# Patient Record
Sex: FEMALE | Race: WHITE | NOT HISPANIC OR LATINO | Employment: OTHER | URBAN - METROPOLITAN AREA
[De-identification: names, ages, dates, MRNs, and addresses within clinical notes are randomized per-mention and may not be internally consistent; named-entity substitution may affect disease eponyms.]

---

## 2017-02-17 ENCOUNTER — COMMUNICATION - HEALTHEAST (OUTPATIENT)
Dept: ADMINISTRATIVE | Facility: CLINIC | Age: 82
End: 2017-02-17

## 2017-02-17 DIAGNOSIS — M31.6 GCA (GIANT CELL ARTERITIS) (H): ICD-10-CM

## 2017-03-16 ENCOUNTER — COMMUNICATION - HEALTHEAST (OUTPATIENT)
Dept: ADMINISTRATIVE | Facility: CLINIC | Age: 82
End: 2017-03-16

## 2017-03-16 DIAGNOSIS — M31.6 GCA (GIANT CELL ARTERITIS) (H): ICD-10-CM

## 2017-03-22 ENCOUNTER — COMMUNICATION - HEALTHEAST (OUTPATIENT)
Dept: FAMILY MEDICINE | Facility: CLINIC | Age: 82
End: 2017-03-22

## 2017-03-22 DIAGNOSIS — E78.01 ESSENTIAL FAMILIAL HYPERCHOLESTEROLEMIA: ICD-10-CM

## 2017-05-02 ENCOUNTER — RECORDS - HEALTHEAST (OUTPATIENT)
Dept: ADMINISTRATIVE | Facility: OTHER | Age: 82
End: 2017-05-02

## 2017-05-08 ENCOUNTER — OFFICE VISIT - HEALTHEAST (OUTPATIENT)
Dept: RHEUMATOLOGY | Facility: CLINIC | Age: 82
End: 2017-05-08

## 2017-05-08 DIAGNOSIS — M81.0 OSTEOPOROSIS: ICD-10-CM

## 2017-05-08 DIAGNOSIS — M31.6 GCA (GIANT CELL ARTERITIS) (H): ICD-10-CM

## 2017-05-08 DIAGNOSIS — Z79.899 HIGH RISK MEDICATION USE: ICD-10-CM

## 2017-05-24 ENCOUNTER — COMMUNICATION - HEALTHEAST (OUTPATIENT)
Dept: FAMILY MEDICINE | Facility: CLINIC | Age: 82
End: 2017-05-24

## 2017-05-24 DIAGNOSIS — M81.0 OSTEOPOROSIS, UNSPECIFIED: ICD-10-CM

## 2017-06-19 ENCOUNTER — COMMUNICATION - HEALTHEAST (OUTPATIENT)
Dept: SCHEDULING | Facility: CLINIC | Age: 82
End: 2017-06-19

## 2017-06-27 ENCOUNTER — OFFICE VISIT - HEALTHEAST (OUTPATIENT)
Dept: FAMILY MEDICINE | Facility: CLINIC | Age: 82
End: 2017-06-27

## 2017-06-27 DIAGNOSIS — E83.52 HYPERCALCEMIA: ICD-10-CM

## 2017-06-27 DIAGNOSIS — E78.5 HYPERLIPIDEMIA: ICD-10-CM

## 2017-06-27 DIAGNOSIS — R22.0 LIP SWELLING: ICD-10-CM

## 2017-06-27 DIAGNOSIS — M81.0 OSTEOPOROSIS: ICD-10-CM

## 2017-06-27 LAB
CHOLEST SERPL-MCNC: 220 MG/DL
FASTING STATUS PATIENT QL REPORTED: YES
HDLC SERPL-MCNC: 53 MG/DL
LDLC SERPL CALC-MCNC: 142 MG/DL
TRIGL SERPL-MCNC: 125 MG/DL

## 2017-06-30 ENCOUNTER — COMMUNICATION - HEALTHEAST (OUTPATIENT)
Dept: PEDIATRICS | Facility: CLINIC | Age: 82
End: 2017-06-30

## 2017-07-05 ENCOUNTER — OFFICE VISIT - HEALTHEAST (OUTPATIENT)
Dept: RHEUMATOLOGY | Facility: CLINIC | Age: 82
End: 2017-07-05

## 2017-07-05 DIAGNOSIS — M81.0 OSTEOPOROSIS: ICD-10-CM

## 2017-07-05 DIAGNOSIS — M31.6 GCA (GIANT CELL ARTERITIS) (H): ICD-10-CM

## 2017-07-05 DIAGNOSIS — M17.0 BILATERAL PRIMARY OSTEOARTHRITIS OF KNEE: ICD-10-CM

## 2017-07-05 ASSESSMENT — MIFFLIN-ST. JEOR: SCORE: 1059.97

## 2017-07-07 ENCOUNTER — AMBULATORY - HEALTHEAST (OUTPATIENT)
Dept: NURSING | Facility: CLINIC | Age: 82
End: 2017-07-07

## 2017-07-18 ENCOUNTER — OFFICE VISIT - HEALTHEAST (OUTPATIENT)
Dept: FAMILY MEDICINE | Facility: CLINIC | Age: 82
End: 2017-07-18

## 2017-07-18 ENCOUNTER — RECORDS - HEALTHEAST (OUTPATIENT)
Dept: ADMINISTRATIVE | Facility: OTHER | Age: 82
End: 2017-07-18

## 2017-07-18 ENCOUNTER — SURGERY - HEALTHEAST (OUTPATIENT)
Dept: CARDIOLOGY | Facility: CLINIC | Age: 82
End: 2017-07-18

## 2017-07-18 ENCOUNTER — RECORDS - HEALTHEAST (OUTPATIENT)
Dept: BONE DENSITY | Facility: CLINIC | Age: 82
End: 2017-07-18

## 2017-07-18 DIAGNOSIS — R07.9 CHEST PAIN: ICD-10-CM

## 2017-07-18 DIAGNOSIS — M81.0 AGE-RELATED OSTEOPOROSIS WITHOUT CURRENT PATHOLOGICAL FRACTURE: ICD-10-CM

## 2017-07-18 LAB
ATRIAL RATE - MUSE: 75 BPM
DIASTOLIC BLOOD PRESSURE - MUSE: NORMAL MMHG
INTERPRETATION ECG - MUSE: NORMAL
P AXIS - MUSE: 76 DEGREES
PR INTERVAL - MUSE: 170 MS
QRS DURATION - MUSE: 76 MS
QT - MUSE: 372 MS
QTC - MUSE: 415 MS
R AXIS - MUSE: -9 DEGREES
SYSTOLIC BLOOD PRESSURE - MUSE: NORMAL MMHG
T AXIS - MUSE: 32 DEGREES
VENTRICULAR RATE- MUSE: 75 BPM

## 2017-07-18 ASSESSMENT — MIFFLIN-ST. JEOR
SCORE: 1043.2
SCORE: 1050.45

## 2017-07-19 ASSESSMENT — MIFFLIN-ST. JEOR: SCORE: 1048.19

## 2017-07-21 ENCOUNTER — COMMUNICATION - HEALTHEAST (OUTPATIENT)
Dept: ADMINISTRATIVE | Facility: CLINIC | Age: 82
End: 2017-07-21

## 2017-07-25 ENCOUNTER — OFFICE VISIT - HEALTHEAST (OUTPATIENT)
Dept: FAMILY MEDICINE | Facility: CLINIC | Age: 82
End: 2017-07-25

## 2017-07-25 DIAGNOSIS — I95.9 HYPOTENSION: ICD-10-CM

## 2017-07-25 DIAGNOSIS — I21.3 STEMI (ST ELEVATION MYOCARDIAL INFARCTION) (H): ICD-10-CM

## 2017-07-25 DIAGNOSIS — R09.A2 GLOBUS SENSATION: ICD-10-CM

## 2017-07-25 DIAGNOSIS — I10 HTN (HYPERTENSION): ICD-10-CM

## 2017-07-28 ENCOUNTER — COMMUNICATION - HEALTHEAST (OUTPATIENT)
Dept: CARDIOLOGY | Facility: CLINIC | Age: 82
End: 2017-07-28

## 2017-07-31 ENCOUNTER — COMMUNICATION - HEALTHEAST (OUTPATIENT)
Dept: FAMILY MEDICINE | Facility: CLINIC | Age: 82
End: 2017-07-31

## 2017-08-01 ENCOUNTER — AMBULATORY - HEALTHEAST (OUTPATIENT)
Dept: CARDIAC REHAB | Facility: HOSPITAL | Age: 82
End: 2017-08-01

## 2017-08-01 DIAGNOSIS — I21.3 STEMI (ST ELEVATION MYOCARDIAL INFARCTION) (H): ICD-10-CM

## 2017-08-01 DIAGNOSIS — Z95.5 STENTED CORONARY ARTERY: ICD-10-CM

## 2017-08-01 DIAGNOSIS — I25.5 ISCHEMIC CARDIOMYOPATHY: ICD-10-CM

## 2017-08-01 DIAGNOSIS — I21.02 ST ELEVATION MYOCARDIAL INFARCTION INVOLVING LEFT ANTERIOR DESCENDING (LAD) CORONARY ARTERY (H): ICD-10-CM

## 2017-08-02 ENCOUNTER — AMBULATORY - HEALTHEAST (OUTPATIENT)
Dept: CARDIAC REHAB | Facility: HOSPITAL | Age: 82
End: 2017-08-02

## 2017-08-02 DIAGNOSIS — Z95.5 STENTED CORONARY ARTERY: ICD-10-CM

## 2017-08-03 ENCOUNTER — OFFICE VISIT - HEALTHEAST (OUTPATIENT)
Dept: CARDIOLOGY | Facility: CLINIC | Age: 82
End: 2017-08-03

## 2017-08-03 DIAGNOSIS — I25.110 CORONARY ARTERY DISEASE INVOLVING NATIVE CORONARY ARTERY OF NATIVE HEART WITH UNSTABLE ANGINA PECTORIS (H): ICD-10-CM

## 2017-08-03 DIAGNOSIS — E78.2 MIXED HYPERLIPIDEMIA: ICD-10-CM

## 2017-08-03 DIAGNOSIS — I25.5 ISCHEMIC CARDIOMYOPATHY: ICD-10-CM

## 2017-08-03 ASSESSMENT — MIFFLIN-ST. JEOR: SCORE: 1014.17

## 2017-08-04 ENCOUNTER — AMBULATORY - HEALTHEAST (OUTPATIENT)
Dept: CARDIAC REHAB | Facility: HOSPITAL | Age: 82
End: 2017-08-04

## 2017-08-04 DIAGNOSIS — I21.02 ST ELEVATION MYOCARDIAL INFARCTION INVOLVING LEFT ANTERIOR DESCENDING (LAD) CORONARY ARTERY (H): ICD-10-CM

## 2017-08-04 DIAGNOSIS — Z95.5 STENTED CORONARY ARTERY: ICD-10-CM

## 2017-08-08 ENCOUNTER — COMMUNICATION - HEALTHEAST (OUTPATIENT)
Dept: FAMILY MEDICINE | Facility: CLINIC | Age: 82
End: 2017-08-08

## 2017-08-09 ENCOUNTER — AMBULATORY - HEALTHEAST (OUTPATIENT)
Dept: CARDIAC REHAB | Facility: HOSPITAL | Age: 82
End: 2017-08-09

## 2017-08-09 DIAGNOSIS — Z95.5 STENTED CORONARY ARTERY: ICD-10-CM

## 2017-08-11 ENCOUNTER — AMBULATORY - HEALTHEAST (OUTPATIENT)
Dept: CARDIAC REHAB | Facility: HOSPITAL | Age: 82
End: 2017-08-11

## 2017-08-11 DIAGNOSIS — I21.02 ST ELEVATION MYOCARDIAL INFARCTION INVOLVING LEFT ANTERIOR DESCENDING (LAD) CORONARY ARTERY (H): ICD-10-CM

## 2017-08-11 DIAGNOSIS — Z95.5 STENTED CORONARY ARTERY: ICD-10-CM

## 2017-08-16 ENCOUNTER — AMBULATORY - HEALTHEAST (OUTPATIENT)
Dept: CARDIAC REHAB | Facility: HOSPITAL | Age: 82
End: 2017-08-16

## 2017-08-16 ENCOUNTER — OFFICE VISIT - HEALTHEAST (OUTPATIENT)
Dept: CARDIOLOGY | Facility: CLINIC | Age: 82
End: 2017-08-16

## 2017-08-16 DIAGNOSIS — I21.02 ACUTE ST ELEVATION MYOCARDIAL INFARCTION (STEMI) INVOLVING LEFT ANTERIOR DESCENDING CORONARY ARTERY (H): ICD-10-CM

## 2017-08-16 DIAGNOSIS — I25.5 ISCHEMIC CARDIOMYOPATHY: ICD-10-CM

## 2017-08-16 DIAGNOSIS — Z95.5 STENTED CORONARY ARTERY: ICD-10-CM

## 2017-08-16 DIAGNOSIS — I50.22 CHRONIC SYSTOLIC HEART FAILURE (H): ICD-10-CM

## 2017-08-16 ASSESSMENT — MIFFLIN-ST. JEOR: SCORE: 1027.2

## 2017-08-18 ENCOUNTER — COMMUNICATION - HEALTHEAST (OUTPATIENT)
Dept: FAMILY MEDICINE | Facility: CLINIC | Age: 82
End: 2017-08-18

## 2017-08-18 ENCOUNTER — AMBULATORY - HEALTHEAST (OUTPATIENT)
Dept: CARDIAC REHAB | Facility: HOSPITAL | Age: 82
End: 2017-08-18

## 2017-08-18 DIAGNOSIS — N28.9 RENAL INSUFFICIENCY: ICD-10-CM

## 2017-08-18 DIAGNOSIS — Z95.5 STENTED CORONARY ARTERY: ICD-10-CM

## 2017-08-18 DIAGNOSIS — I21.02 ST ELEVATION MYOCARDIAL INFARCTION INVOLVING LEFT ANTERIOR DESCENDING (LAD) CORONARY ARTERY (H): ICD-10-CM

## 2017-08-23 ENCOUNTER — AMBULATORY - HEALTHEAST (OUTPATIENT)
Dept: CARDIAC REHAB | Facility: HOSPITAL | Age: 82
End: 2017-08-23

## 2017-08-23 DIAGNOSIS — I21.02 ST ELEVATION MYOCARDIAL INFARCTION INVOLVING LEFT ANTERIOR DESCENDING (LAD) CORONARY ARTERY (H): ICD-10-CM

## 2017-08-23 DIAGNOSIS — Z95.5 STENTED CORONARY ARTERY: ICD-10-CM

## 2017-08-25 ENCOUNTER — AMBULATORY - HEALTHEAST (OUTPATIENT)
Dept: CARDIAC REHAB | Facility: HOSPITAL | Age: 82
End: 2017-08-25

## 2017-08-25 DIAGNOSIS — I21.02 ST ELEVATION MYOCARDIAL INFARCTION INVOLVING LEFT ANTERIOR DESCENDING (LAD) CORONARY ARTERY (H): ICD-10-CM

## 2017-08-25 DIAGNOSIS — Z95.5 STENTED CORONARY ARTERY: ICD-10-CM

## 2017-08-30 ENCOUNTER — AMBULATORY - HEALTHEAST (OUTPATIENT)
Dept: CARDIAC REHAB | Facility: HOSPITAL | Age: 82
End: 2017-08-30

## 2017-08-30 DIAGNOSIS — Z95.5 STENTED CORONARY ARTERY: ICD-10-CM

## 2017-09-01 ENCOUNTER — AMBULATORY - HEALTHEAST (OUTPATIENT)
Dept: CARDIAC REHAB | Facility: HOSPITAL | Age: 82
End: 2017-09-01

## 2017-09-01 DIAGNOSIS — I21.02 ST ELEVATION MYOCARDIAL INFARCTION INVOLVING LEFT ANTERIOR DESCENDING (LAD) CORONARY ARTERY (H): ICD-10-CM

## 2017-09-01 DIAGNOSIS — Z95.5 STENTED CORONARY ARTERY: ICD-10-CM

## 2017-09-06 ENCOUNTER — OFFICE VISIT - HEALTHEAST (OUTPATIENT)
Dept: OTOLARYNGOLOGY | Facility: CLINIC | Age: 82
End: 2017-09-06

## 2017-09-06 ENCOUNTER — AMBULATORY - HEALTHEAST (OUTPATIENT)
Dept: CARDIAC REHAB | Facility: HOSPITAL | Age: 82
End: 2017-09-06

## 2017-09-06 ENCOUNTER — OFFICE VISIT - HEALTHEAST (OUTPATIENT)
Dept: CARDIOLOGY | Facility: CLINIC | Age: 82
End: 2017-09-06

## 2017-09-06 DIAGNOSIS — E78.2 MIXED HYPERLIPIDEMIA: ICD-10-CM

## 2017-09-06 DIAGNOSIS — R09.A2 SENSATION OF LUMP IN THROAT: ICD-10-CM

## 2017-09-06 DIAGNOSIS — I21.02 ST ELEVATION MYOCARDIAL INFARCTION INVOLVING LEFT ANTERIOR DESCENDING (LAD) CORONARY ARTERY (H): ICD-10-CM

## 2017-09-06 DIAGNOSIS — I25.110 CORONARY ARTERY DISEASE INVOLVING NATIVE CORONARY ARTERY OF NATIVE HEART WITH UNSTABLE ANGINA PECTORIS (H): ICD-10-CM

## 2017-09-06 DIAGNOSIS — Z95.5 STENTED CORONARY ARTERY: ICD-10-CM

## 2017-09-06 DIAGNOSIS — I21.02 ACUTE ST ELEVATION MYOCARDIAL INFARCTION (STEMI) INVOLVING LEFT ANTERIOR DESCENDING CORONARY ARTERY (H): ICD-10-CM

## 2017-09-06 DIAGNOSIS — I50.23 ACUTE ON CHRONIC SYSTOLIC HEART FAILURE (H): ICD-10-CM

## 2017-09-06 DIAGNOSIS — I25.5 ISCHEMIC CARDIOMYOPATHY: ICD-10-CM

## 2017-09-06 ASSESSMENT — MIFFLIN-ST. JEOR: SCORE: 1020.28

## 2017-09-08 ENCOUNTER — AMBULATORY - HEALTHEAST (OUTPATIENT)
Dept: CARDIAC REHAB | Facility: HOSPITAL | Age: 82
End: 2017-09-08

## 2017-09-08 DIAGNOSIS — Z95.5 STENTED CORONARY ARTERY: ICD-10-CM

## 2017-09-08 DIAGNOSIS — I21.02 ST ELEVATION MYOCARDIAL INFARCTION INVOLVING LEFT ANTERIOR DESCENDING (LAD) CORONARY ARTERY (H): ICD-10-CM

## 2017-09-11 ENCOUNTER — AMBULATORY - HEALTHEAST (OUTPATIENT)
Dept: LAB | Facility: CLINIC | Age: 82
End: 2017-09-11

## 2017-09-11 ENCOUNTER — AMBULATORY - HEALTHEAST (OUTPATIENT)
Dept: FAMILY MEDICINE | Facility: CLINIC | Age: 82
End: 2017-09-11

## 2017-09-11 DIAGNOSIS — N28.9 RENAL INSUFFICIENCY: ICD-10-CM

## 2017-09-13 ENCOUNTER — AMBULATORY - HEALTHEAST (OUTPATIENT)
Dept: CARDIAC REHAB | Facility: HOSPITAL | Age: 82
End: 2017-09-13

## 2017-09-13 DIAGNOSIS — I21.02 ST ELEVATION MYOCARDIAL INFARCTION INVOLVING LEFT ANTERIOR DESCENDING (LAD) CORONARY ARTERY (H): ICD-10-CM

## 2017-09-13 DIAGNOSIS — Z95.5 STENTED CORONARY ARTERY: ICD-10-CM

## 2017-09-14 ENCOUNTER — HOSPITAL ENCOUNTER (OUTPATIENT)
Dept: CARDIOLOGY | Facility: HOSPITAL | Age: 82
Discharge: HOME | End: 2017-09-14
Attending: INTERNAL MEDICINE

## 2017-09-14 ENCOUNTER — COMMUNICATION - HEALTHEAST (OUTPATIENT)
Dept: FAMILY MEDICINE | Facility: CLINIC | Age: 82
End: 2017-09-14

## 2017-09-14 ENCOUNTER — HOSPITAL ENCOUNTER (OUTPATIENT)
Dept: NUCLEAR MEDICINE | Facility: HOSPITAL | Age: 82
Discharge: HOME | End: 2017-09-14
Attending: INTERNAL MEDICINE

## 2017-09-14 DIAGNOSIS — I21.02 ACUTE ST ELEVATION MYOCARDIAL INFARCTION (STEMI) INVOLVING LEFT ANTERIOR DESCENDING CORONARY ARTERY (H): ICD-10-CM

## 2017-09-14 DIAGNOSIS — I25.110 CORONARY ARTERY DISEASE INVOLVING NATIVE CORONARY ARTERY OF NATIVE HEART WITH UNSTABLE ANGINA PECTORIS (H): ICD-10-CM

## 2017-09-14 DIAGNOSIS — I25.5 ISCHEMIC CARDIOMYOPATHY: ICD-10-CM

## 2017-09-14 LAB
CV STRESS CURRENT BP HE: NORMAL
CV STRESS CURRENT HR HE: 59
CV STRESS CURRENT HR HE: 59
CV STRESS CURRENT HR HE: 67
CV STRESS CURRENT HR HE: 68
CV STRESS CURRENT HR HE: 72
CV STRESS CURRENT HR HE: 74
CV STRESS CURRENT HR HE: 76
CV STRESS CURRENT HR HE: 76
CV STRESS CURRENT HR HE: 78
CV STRESS CURRENT HR HE: 79
CV STRESS CURRENT HR HE: 82
CV STRESS CURRENT HR HE: 85
CV STRESS CURRENT HR HE: 87
CV STRESS CURRENT HR HE: 88
CV STRESS DEVIATION TIME HE: NORMAL
CV STRESS ECHO PERCENT HR HE: NORMAL
CV STRESS EXERCISE STAGE HE: NORMAL
CV STRESS FINAL RESTING BP HE: NORMAL
CV STRESS FINAL RESTING HR HE: 76
CV STRESS MAX HR HE: 89
CV STRESS MAX TREADMILL GRADE HE: 0
CV STRESS MAX TREADMILL SPEED HE: 0
CV STRESS PEAK DIA BP HE: NORMAL
CV STRESS PEAK SYS BP HE: NORMAL
CV STRESS PHASE HE: NORMAL
CV STRESS PROTOCOL HE: NORMAL
CV STRESS RESTING PT POSITION HE: NORMAL
CV STRESS ST DEVIATION AMOUNT HE: NORMAL
CV STRESS ST DEVIATION ELEVATION HE: NORMAL
CV STRESS ST EVELATION AMOUNT HE: NORMAL
CV STRESS TEST TYPE HE: NORMAL
CV STRESS TOTAL STAGE TIME MIN 1 HE: NORMAL
NUC STRESS EJECTION FRACTION: 70 %
STRESS ECHO BASELINE BP: NORMAL
STRESS ECHO BASELINE HR: 63
STRESS ECHO CALCULATED PERCENT HR: 67 %
STRESS ECHO LAST STRESS BP: NORMAL
STRESS ECHO LAST STRESS HR: 85

## 2017-09-16 ENCOUNTER — COMMUNICATION - HEALTHEAST (OUTPATIENT)
Dept: RHEUMATOLOGY | Facility: CLINIC | Age: 82
End: 2017-09-16

## 2017-09-16 DIAGNOSIS — M31.6 GCA (GIANT CELL ARTERITIS) (H): ICD-10-CM

## 2017-09-17 ENCOUNTER — COMMUNICATION - HEALTHEAST (OUTPATIENT)
Dept: FAMILY MEDICINE | Facility: CLINIC | Age: 82
End: 2017-09-17

## 2017-10-01 ENCOUNTER — COMMUNICATION - HEALTHEAST (OUTPATIENT)
Dept: INTENSIVE CARE | Facility: CLINIC | Age: 82
End: 2017-10-01

## 2017-10-01 DIAGNOSIS — I21.3 STEMI (ST ELEVATION MYOCARDIAL INFARCTION) (H): ICD-10-CM

## 2017-10-04 ENCOUNTER — OFFICE VISIT - HEALTHEAST (OUTPATIENT)
Dept: CARDIOLOGY | Facility: CLINIC | Age: 82
End: 2017-10-04

## 2017-10-04 ENCOUNTER — OFFICE VISIT - HEALTHEAST (OUTPATIENT)
Dept: RHEUMATOLOGY | Facility: CLINIC | Age: 82
End: 2017-10-04

## 2017-10-04 DIAGNOSIS — M31.6 GCA (GIANT CELL ARTERITIS) (H): ICD-10-CM

## 2017-10-04 DIAGNOSIS — Z79.899 HIGH RISK MEDICATION USE: ICD-10-CM

## 2017-10-04 DIAGNOSIS — I25.5 ISCHEMIC CARDIOMYOPATHY: ICD-10-CM

## 2017-10-04 DIAGNOSIS — M17.0 BILATERAL PRIMARY OSTEOARTHRITIS OF KNEE: ICD-10-CM

## 2017-10-04 ASSESSMENT — MIFFLIN-ST. JEOR
SCORE: 996.02
SCORE: 991.49

## 2017-10-17 ENCOUNTER — AMBULATORY - HEALTHEAST (OUTPATIENT)
Dept: NURSING | Facility: CLINIC | Age: 82
End: 2017-10-17

## 2017-10-17 DIAGNOSIS — Z23 NEED FOR INFLUENZA VACCINATION: ICD-10-CM

## 2017-11-06 ENCOUNTER — OFFICE VISIT - HEALTHEAST (OUTPATIENT)
Dept: CARDIOLOGY | Facility: CLINIC | Age: 82
End: 2017-11-06

## 2017-11-06 DIAGNOSIS — I25.5 ISCHEMIC CARDIOMYOPATHY: ICD-10-CM

## 2017-11-06 ASSESSMENT — MIFFLIN-ST. JEOR: SCORE: 1005.54

## 2017-12-06 ENCOUNTER — OFFICE VISIT - HEALTHEAST (OUTPATIENT)
Dept: CARDIOLOGY | Facility: CLINIC | Age: 82
End: 2017-12-06

## 2017-12-06 DIAGNOSIS — I25.5 ISCHEMIC CARDIOMYOPATHY: ICD-10-CM

## 2017-12-06 DIAGNOSIS — I21.02 ACUTE ST ELEVATION MYOCARDIAL INFARCTION (STEMI) INVOLVING LEFT ANTERIOR DESCENDING CORONARY ARTERY (H): ICD-10-CM

## 2017-12-06 DIAGNOSIS — E78.2 MIXED HYPERLIPIDEMIA: ICD-10-CM

## 2017-12-06 DIAGNOSIS — I25.110 CORONARY ARTERY DISEASE INVOLVING NATIVE CORONARY ARTERY OF NATIVE HEART WITH UNSTABLE ANGINA PECTORIS (H): ICD-10-CM

## 2017-12-06 ASSESSMENT — MIFFLIN-ST. JEOR: SCORE: 980.14

## 2017-12-13 ENCOUNTER — OFFICE VISIT - HEALTHEAST (OUTPATIENT)
Dept: RHEUMATOLOGY | Facility: CLINIC | Age: 82
End: 2017-12-13

## 2017-12-13 DIAGNOSIS — M31.6 GCA (GIANT CELL ARTERITIS) (H): ICD-10-CM

## 2017-12-13 DIAGNOSIS — Z79.899 HIGH RISK MEDICATION USE: ICD-10-CM

## 2017-12-13 DIAGNOSIS — M17.0 BILATERAL PRIMARY OSTEOARTHRITIS OF KNEE: ICD-10-CM

## 2017-12-13 ASSESSMENT — MIFFLIN-ST. JEOR: SCORE: 980.14

## 2017-12-28 ENCOUNTER — COMMUNICATION - HEALTHEAST (OUTPATIENT)
Dept: CARDIOLOGY | Facility: CLINIC | Age: 82
End: 2017-12-28

## 2018-01-09 ENCOUNTER — COMMUNICATION - HEALTHEAST (OUTPATIENT)
Dept: FAMILY MEDICINE | Facility: CLINIC | Age: 83
End: 2018-01-09

## 2018-01-23 ENCOUNTER — OFFICE VISIT - HEALTHEAST (OUTPATIENT)
Dept: FAMILY MEDICINE | Facility: CLINIC | Age: 83
End: 2018-01-23

## 2018-01-23 DIAGNOSIS — Z48.02 ENCOUNTER FOR STAPLE REMOVAL: ICD-10-CM
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
## 2018-02-13 ENCOUNTER — AMBULATORY - HEALTHEAST (OUTPATIENT)
Dept: NURSING | Facility: CLINIC | Age: 83
End: 2018-02-13

## 2018-02-24 ENCOUNTER — COMMUNICATION - HEALTHEAST (OUTPATIENT)
Dept: RHEUMATOLOGY | Facility: CLINIC | Age: 83
End: 2018-02-24

## 2018-02-24 DIAGNOSIS — M31.6 GCA (GIANT CELL ARTERITIS) (H): ICD-10-CM

## 2018-02-24 DIAGNOSIS — M17.0 BILATERAL PRIMARY OSTEOARTHRITIS OF KNEE: ICD-10-CM

## 2018-03-10 ENCOUNTER — COMMUNICATION - HEALTHEAST (OUTPATIENT)
Dept: INTENSIVE CARE | Facility: CLINIC | Age: 83
End: 2018-03-10

## 2018-03-10 DIAGNOSIS — I21.3 STEMI (ST ELEVATION MYOCARDIAL INFARCTION) (H): ICD-10-CM

## 2018-03-21 ENCOUNTER — OFFICE VISIT - HEALTHEAST (OUTPATIENT)
Dept: RHEUMATOLOGY | Facility: CLINIC | Age: 83
End: 2018-03-21

## 2018-03-21 DIAGNOSIS — M17.0 BILATERAL PRIMARY OSTEOARTHRITIS OF KNEE: ICD-10-CM

## 2018-03-21 DIAGNOSIS — Z79.899 HIGH RISK MEDICATION USE: ICD-10-CM

## 2018-03-21 DIAGNOSIS — M31.6 GCA (GIANT CELL ARTERITIS) (H): ICD-10-CM

## 2018-03-21 ASSESSMENT — MIFFLIN-ST. JEOR: SCORE: 968.81

## 2018-04-09 ENCOUNTER — COMMUNICATION - HEALTHEAST (OUTPATIENT)
Dept: FAMILY MEDICINE | Facility: CLINIC | Age: 83
End: 2018-04-09

## 2018-04-09 DIAGNOSIS — M81.0 OSTEOPOROSIS: ICD-10-CM

## 2018-05-20 ENCOUNTER — COMMUNICATION - HEALTHEAST (OUTPATIENT)
Dept: CARDIOLOGY | Facility: CLINIC | Age: 83
End: 2018-05-20

## 2018-05-20 DIAGNOSIS — I25.5 ISCHEMIC CARDIOMYOPATHY: ICD-10-CM

## 2018-05-29 ENCOUNTER — COMMUNICATION - HEALTHEAST (OUTPATIENT)
Dept: RHEUMATOLOGY | Facility: CLINIC | Age: 83
End: 2018-05-29

## 2018-05-29 DIAGNOSIS — M31.6 GCA (GIANT CELL ARTERITIS) (H): ICD-10-CM

## 2018-05-29 DIAGNOSIS — M17.0 BILATERAL PRIMARY OSTEOARTHRITIS OF KNEE: ICD-10-CM

## 2018-06-06 ENCOUNTER — COMMUNICATION - HEALTHEAST (OUTPATIENT)
Dept: CARE COORDINATION | Facility: CLINIC | Age: 83
End: 2018-06-06

## 2018-06-20 ENCOUNTER — COMMUNICATION - HEALTHEAST (OUTPATIENT)
Dept: CARE COORDINATION | Facility: CLINIC | Age: 83
End: 2018-06-20

## 2018-06-21 ENCOUNTER — COMMUNICATION - HEALTHEAST (OUTPATIENT)
Dept: FAMILY MEDICINE | Facility: CLINIC | Age: 83
End: 2018-06-21

## 2018-06-21 DIAGNOSIS — M81.0 OSTEOPOROSIS: ICD-10-CM

## 2018-06-28 ENCOUNTER — COMMUNICATION - HEALTHEAST (OUTPATIENT)
Dept: FAMILY MEDICINE | Facility: CLINIC | Age: 83
End: 2018-06-28

## 2018-07-03 ENCOUNTER — COMMUNICATION - HEALTHEAST (OUTPATIENT)
Dept: CARDIOLOGY | Facility: CLINIC | Age: 83
End: 2018-07-03

## 2018-07-03 DIAGNOSIS — I21.3 STEMI (ST ELEVATION MYOCARDIAL INFARCTION) (H): ICD-10-CM

## 2018-07-03 DIAGNOSIS — I25.110 CORONARY ARTERY DISEASE INVOLVING NATIVE CORONARY ARTERY OF NATIVE HEART WITH UNSTABLE ANGINA PECTORIS (H): ICD-10-CM

## 2018-07-03 DIAGNOSIS — I25.5 ISCHEMIC CARDIOMYOPATHY: ICD-10-CM

## 2018-07-08 ASSESSMENT — MIFFLIN-ST. JEOR: SCORE: 972.2

## 2018-07-09 ENCOUNTER — SURGERY - HEALTHEAST (OUTPATIENT)
Dept: GASTROENTEROLOGY | Facility: HOSPITAL | Age: 83
End: 2018-07-09

## 2018-07-11 ENCOUNTER — COMMUNICATION - HEALTHEAST (OUTPATIENT)
Dept: NEUROSURGERY | Facility: CLINIC | Age: 83
End: 2018-07-11

## 2018-07-11 DIAGNOSIS — S22.000A COMPRESSION FRACTURE OF THORACIC VERTEBRA (H): ICD-10-CM

## 2018-07-13 ENCOUNTER — OFFICE VISIT - HEALTHEAST (OUTPATIENT)
Dept: GERIATRICS | Facility: CLINIC | Age: 83
End: 2018-07-13

## 2018-07-13 DIAGNOSIS — E87.6 HYPOKALEMIA: ICD-10-CM

## 2018-07-13 DIAGNOSIS — R52 PAIN MANAGEMENT: ICD-10-CM

## 2018-07-13 DIAGNOSIS — S22.060A: ICD-10-CM

## 2018-07-13 DIAGNOSIS — E83.52 HYPERCALCEMIA: ICD-10-CM

## 2018-07-13 DIAGNOSIS — N39.0 URINARY TRACT INFECTION: ICD-10-CM

## 2018-07-13 DIAGNOSIS — I25.10 CORONARY ARTERY DISEASE: ICD-10-CM

## 2018-07-16 ENCOUNTER — RECORDS - HEALTHEAST (OUTPATIENT)
Dept: LAB | Facility: CLINIC | Age: 83
End: 2018-07-16

## 2018-07-16 LAB
CALCIUM SERPL-MCNC: 9.5 MG/DL (ref 8.5–10.5)
POTASSIUM BLD-SCNC: 3.4 MMOL/L (ref 3.5–5)

## 2018-07-17 ENCOUNTER — OFFICE VISIT - HEALTHEAST (OUTPATIENT)
Dept: GERIATRICS | Facility: CLINIC | Age: 83
End: 2018-07-17

## 2018-07-17 ENCOUNTER — RECORDS - HEALTHEAST (OUTPATIENT)
Dept: LAB | Facility: CLINIC | Age: 83
End: 2018-07-17

## 2018-07-17 DIAGNOSIS — E83.52 HYPERCALCEMIA: ICD-10-CM

## 2018-07-17 DIAGNOSIS — N39.0 URINARY TRACT INFECTION: ICD-10-CM

## 2018-07-17 DIAGNOSIS — I25.10 CORONARY ARTERY DISEASE: ICD-10-CM

## 2018-07-17 DIAGNOSIS — R52 PAIN MANAGEMENT: ICD-10-CM

## 2018-07-17 DIAGNOSIS — S22.060A: ICD-10-CM

## 2018-07-17 LAB — POTASSIUM BLD-SCNC: 3 MMOL/L (ref 3.5–5)

## 2018-07-19 ENCOUNTER — RECORDS - HEALTHEAST (OUTPATIENT)
Dept: LAB | Facility: CLINIC | Age: 83
End: 2018-07-19

## 2018-07-19 ENCOUNTER — OFFICE VISIT - HEALTHEAST (OUTPATIENT)
Dept: GERIATRICS | Facility: CLINIC | Age: 83
End: 2018-07-19

## 2018-07-19 DIAGNOSIS — S22.060A: ICD-10-CM

## 2018-07-19 DIAGNOSIS — R52 PAIN MANAGEMENT: ICD-10-CM

## 2018-07-19 LAB
ANION GAP SERPL CALCULATED.3IONS-SCNC: 10 MMOL/L (ref 5–18)
BUN SERPL-MCNC: 20 MG/DL (ref 8–28)
CALCIUM SERPL-MCNC: 9.2 MG/DL (ref 8.5–10.5)
CHLORIDE BLD-SCNC: 106 MMOL/L (ref 98–107)
CO2 SERPL-SCNC: 24 MMOL/L (ref 22–31)
CREAT SERPL-MCNC: 0.68 MG/DL (ref 0.6–1.1)
GFR SERPL CREATININE-BSD FRML MDRD: >60 ML/MIN/1.73M2
GLUCOSE BLD-MCNC: 89 MG/DL (ref 70–125)
POTASSIUM BLD-SCNC: 3.9 MMOL/L (ref 3.5–5)
SODIUM SERPL-SCNC: 140 MMOL/L (ref 136–145)

## 2018-07-23 ENCOUNTER — COMMUNICATION - HEALTHEAST (OUTPATIENT)
Dept: ADMINISTRATIVE | Facility: CLINIC | Age: 83
End: 2018-07-23

## 2018-07-23 ENCOUNTER — OFFICE VISIT - HEALTHEAST (OUTPATIENT)
Dept: RHEUMATOLOGY | Facility: CLINIC | Age: 83
End: 2018-07-23

## 2018-07-23 DIAGNOSIS — S22.060A CLOSED WEDGE COMPRESSION FRACTURE OF SEVENTH THORACIC VERTEBRA, INITIAL ENCOUNTER: ICD-10-CM

## 2018-07-23 DIAGNOSIS — M81.0 OSTEOPOROSIS: ICD-10-CM

## 2018-07-23 DIAGNOSIS — M31.6 GCA (GIANT CELL ARTERITIS) (H): ICD-10-CM

## 2018-07-23 DIAGNOSIS — Z79.899 HIGH RISK MEDICATION USE: ICD-10-CM

## 2018-07-24 ENCOUNTER — OFFICE VISIT - HEALTHEAST (OUTPATIENT)
Dept: GERIATRICS | Facility: CLINIC | Age: 83
End: 2018-07-24

## 2018-07-24 DIAGNOSIS — E83.52 HYPERCALCEMIA: ICD-10-CM

## 2018-07-24 DIAGNOSIS — I25.10 CORONARY ARTERY DISEASE: ICD-10-CM

## 2018-07-24 DIAGNOSIS — S22.060A: ICD-10-CM

## 2018-07-24 DIAGNOSIS — E87.6 HYPOKALEMIA: ICD-10-CM

## 2018-07-24 DIAGNOSIS — R52 PAIN MANAGEMENT: ICD-10-CM

## 2018-07-24 DIAGNOSIS — N39.0 URINARY TRACT INFECTION: ICD-10-CM

## 2018-07-25 ENCOUNTER — COMMUNICATION - HEALTHEAST (OUTPATIENT)
Dept: ADMINISTRATIVE | Facility: CLINIC | Age: 83
End: 2018-07-25

## 2018-07-25 ENCOUNTER — COMMUNICATION - HEALTHEAST (OUTPATIENT)
Dept: CARDIOLOGY | Facility: CLINIC | Age: 83
End: 2018-07-25

## 2018-07-25 DIAGNOSIS — I21.02 ACUTE ST ELEVATION MYOCARDIAL INFARCTION (STEMI) INVOLVING LEFT ANTERIOR DESCENDING CORONARY ARTERY (H): ICD-10-CM

## 2018-07-26 ENCOUNTER — COMMUNICATION - HEALTHEAST (OUTPATIENT)
Dept: GERIATRICS | Facility: CLINIC | Age: 83
End: 2018-07-26

## 2018-07-26 ENCOUNTER — AMBULATORY - HEALTHEAST (OUTPATIENT)
Dept: GERIATRICS | Facility: CLINIC | Age: 83
End: 2018-07-26

## 2018-07-27 ENCOUNTER — COMMUNICATION - HEALTHEAST (OUTPATIENT)
Dept: FAMILY MEDICINE | Facility: CLINIC | Age: 83
End: 2018-07-27

## 2018-07-30 ENCOUNTER — OFFICE VISIT - HEALTHEAST (OUTPATIENT)
Dept: ENDOCRINOLOGY | Facility: CLINIC | Age: 83
End: 2018-07-30

## 2018-07-30 DIAGNOSIS — M81.0 OSTEOPOROSIS: ICD-10-CM

## 2018-07-31 ENCOUNTER — COMMUNICATION - HEALTHEAST (OUTPATIENT)
Dept: ENDOCRINOLOGY | Facility: CLINIC | Age: 83
End: 2018-07-31

## 2018-08-01 ENCOUNTER — OFFICE VISIT - HEALTHEAST (OUTPATIENT)
Dept: FAMILY MEDICINE | Facility: CLINIC | Age: 83
End: 2018-08-01

## 2018-08-01 ENCOUNTER — COMMUNICATION - HEALTHEAST (OUTPATIENT)
Dept: CARDIOLOGY | Facility: CLINIC | Age: 83
End: 2018-08-01

## 2018-08-01 DIAGNOSIS — I25.2 HISTORY OF ST ELEVATION MYOCARDIAL INFARCTION (STEMI): ICD-10-CM

## 2018-08-01 DIAGNOSIS — I25.5 ISCHEMIC CARDIOMYOPATHY: ICD-10-CM

## 2018-08-01 DIAGNOSIS — I25.10 CORONARY ARTERY DISEASE INVOLVING NATIVE CORONARY ARTERY OF NATIVE HEART WITHOUT ANGINA PECTORIS: ICD-10-CM

## 2018-08-01 DIAGNOSIS — M31.6 GCA (GIANT CELL ARTERITIS) (H): ICD-10-CM

## 2018-08-01 DIAGNOSIS — S22.060A CLOSED WEDGE COMPRESSION FRACTURE OF SEVENTH THORACIC VERTEBRA, INITIAL ENCOUNTER: ICD-10-CM

## 2018-08-01 DIAGNOSIS — Z76.89 ESTABLISHING CARE WITH NEW DOCTOR, ENCOUNTER FOR: ICD-10-CM

## 2018-08-02 ENCOUNTER — COMMUNICATION - HEALTHEAST (OUTPATIENT)
Dept: FAMILY MEDICINE | Facility: CLINIC | Age: 83
End: 2018-08-02

## 2018-08-02 ENCOUNTER — AMBULATORY - HEALTHEAST (OUTPATIENT)
Dept: FAMILY MEDICINE | Facility: CLINIC | Age: 83
End: 2018-08-02

## 2018-08-02 ENCOUNTER — COMMUNICATION - HEALTHEAST (OUTPATIENT)
Dept: SCHEDULING | Facility: CLINIC | Age: 83
End: 2018-08-02

## 2018-08-02 ENCOUNTER — AMBULATORY - HEALTHEAST (OUTPATIENT)
Dept: LAB | Facility: CLINIC | Age: 83
End: 2018-08-02

## 2018-08-02 DIAGNOSIS — R30.0 DYSURIA: ICD-10-CM

## 2018-08-02 LAB
ALBUMIN UR-MCNC: ABNORMAL MG/DL
APPEARANCE UR: ABNORMAL
BILIRUB UR QL STRIP: ABNORMAL
COLOR UR AUTO: ABNORMAL
GLUCOSE UR STRIP-MCNC: NEGATIVE MG/DL
HGB UR QL STRIP: ABNORMAL
KETONES UR STRIP-MCNC: ABNORMAL MG/DL
LEUKOCYTE ESTERASE UR QL STRIP: ABNORMAL
NITRATE UR QL: NEGATIVE
PH UR STRIP: 5 [PH] (ref 5–8)
SP GR UR STRIP: 1.02 (ref 1–1.03)
UROBILINOGEN UR STRIP-ACNC: ABNORMAL

## 2018-08-04 LAB — BACTERIA SPEC CULT: ABNORMAL

## 2018-08-06 ENCOUNTER — COMMUNICATION - HEALTHEAST (OUTPATIENT)
Dept: PEDIATRICS | Facility: CLINIC | Age: 83
End: 2018-08-06

## 2018-08-08 ENCOUNTER — OFFICE VISIT - HEALTHEAST (OUTPATIENT)
Dept: NEUROSURGERY | Facility: CLINIC | Age: 83
End: 2018-08-08

## 2018-08-08 ENCOUNTER — HOSPITAL ENCOUNTER (OUTPATIENT)
Dept: RADIOLOGY | Facility: CLINIC | Age: 83
Discharge: HOME | End: 2018-08-08
Attending: NEUROLOGICAL SURGERY

## 2018-08-08 DIAGNOSIS — M85.9 DISORDER OF BONE DENSITY AND STRUCTURE, UNSPECIFIED: ICD-10-CM

## 2018-08-08 DIAGNOSIS — S22.060A CLOSED WEDGE COMPRESSION FRACTURE OF SEVENTH THORACIC VERTEBRA, INITIAL ENCOUNTER: ICD-10-CM

## 2018-08-08 DIAGNOSIS — S22.000A COMPRESSION FRACTURE OF THORACIC VERTEBRA (H): ICD-10-CM

## 2018-08-09 ENCOUNTER — COMMUNICATION - HEALTHEAST (OUTPATIENT)
Dept: SCHEDULING | Facility: CLINIC | Age: 83
End: 2018-08-09

## 2018-08-09 ENCOUNTER — COMMUNICATION - HEALTHEAST (OUTPATIENT)
Dept: FAMILY MEDICINE | Facility: CLINIC | Age: 83
End: 2018-08-09

## 2018-08-09 ENCOUNTER — COMMUNICATION - HEALTHEAST (OUTPATIENT)
Dept: ADMINISTRATIVE | Facility: CLINIC | Age: 83
End: 2018-08-09

## 2018-08-10 ENCOUNTER — COMMUNICATION - HEALTHEAST (OUTPATIENT)
Dept: OTHER | Facility: CLINIC | Age: 83
End: 2018-08-10

## 2018-08-15 ENCOUNTER — COMMUNICATION - HEALTHEAST (OUTPATIENT)
Dept: FAMILY MEDICINE | Facility: CLINIC | Age: 83
End: 2018-08-15

## 2018-08-15 ENCOUNTER — AMBULATORY - HEALTHEAST (OUTPATIENT)
Dept: LAB | Facility: CLINIC | Age: 83
End: 2018-08-15

## 2018-08-15 ENCOUNTER — AMBULATORY - HEALTHEAST (OUTPATIENT)
Dept: FAMILY MEDICINE | Facility: CLINIC | Age: 83
End: 2018-08-15

## 2018-08-15 DIAGNOSIS — Z87.440 RECENT URINARY TRACT INFECTION: ICD-10-CM

## 2018-08-15 LAB
ALBUMIN UR-MCNC: ABNORMAL MG/DL
APPEARANCE UR: ABNORMAL
BILIRUB UR QL STRIP: ABNORMAL
COLOR UR AUTO: ABNORMAL
GLUCOSE UR STRIP-MCNC: NEGATIVE MG/DL
HGB UR QL STRIP: ABNORMAL
KETONES UR STRIP-MCNC: NEGATIVE MG/DL
LEUKOCYTE ESTERASE UR QL STRIP: ABNORMAL
NITRATE UR QL: POSITIVE
PH UR STRIP: 5.5 [PH] (ref 5–8)
SP GR UR STRIP: 1.02 (ref 1–1.03)
UROBILINOGEN UR STRIP-ACNC: ABNORMAL

## 2018-08-17 ENCOUNTER — HOSPITAL ENCOUNTER (OUTPATIENT)
Dept: INTERVENTIONAL RADIOLOGY/VASCULAR | Facility: CLINIC | Age: 83
Discharge: HOME | End: 2018-08-17
Admitting: RADIOLOGY

## 2018-08-17 DIAGNOSIS — M85.9 DISORDER OF BONE DENSITY AND STRUCTURE, UNSPECIFIED: ICD-10-CM

## 2018-08-17 DIAGNOSIS — S22.060A CLOSED WEDGE COMPRESSION FRACTURE OF SEVENTH THORACIC VERTEBRA, INITIAL ENCOUNTER: ICD-10-CM

## 2018-08-17 LAB
BACTERIA SPEC CULT: ABNORMAL
BACTERIA SPEC CULT: ABNORMAL
CREAT SERPL-MCNC: 0.84 MG/DL (ref 0.6–1.1)
GFR SERPL CREATININE-BSD FRML MDRD: >60 ML/MIN/1.73M2
HGB BLD-MCNC: 13.6 G/DL (ref 12–16)
INR PPP: 1 (ref 0.9–1.1)
PLATELET # BLD AUTO: 206 THOU/UL (ref 140–440)

## 2018-08-17 ASSESSMENT — MIFFLIN-ST. JEOR: SCORE: 946.13

## 2018-08-24 ENCOUNTER — OFFICE VISIT - HEALTHEAST (OUTPATIENT)
Dept: FAMILY MEDICINE | Facility: CLINIC | Age: 83
End: 2018-08-24

## 2018-08-24 DIAGNOSIS — K59.00 CONSTIPATION: ICD-10-CM

## 2018-08-24 DIAGNOSIS — R53.83 FATIGUE: ICD-10-CM

## 2018-08-24 DIAGNOSIS — R06.00 DYSPNEA: ICD-10-CM

## 2018-08-24 LAB
ALBUMIN SERPL-MCNC: 3.5 G/DL (ref 3.5–5)
ALBUMIN UR-MCNC: NEGATIVE MG/DL
ALP SERPL-CCNC: 32 U/L (ref 45–120)
ALT SERPL W P-5'-P-CCNC: <9 U/L (ref 0–45)
ANION GAP SERPL CALCULATED.3IONS-SCNC: 13 MMOL/L (ref 5–18)
APPEARANCE UR: CLEAR
AST SERPL W P-5'-P-CCNC: 18 U/L (ref 0–40)
BACTERIA #/AREA URNS HPF: ABNORMAL HPF
BASOPHILS # BLD AUTO: 0.1 THOU/UL (ref 0–0.2)
BASOPHILS NFR BLD AUTO: 1 % (ref 0–2)
BILIRUB SERPL-MCNC: 0.3 MG/DL (ref 0–1)
BILIRUB UR QL STRIP: NEGATIVE
BUN SERPL-MCNC: 17 MG/DL (ref 8–28)
CALCIUM SERPL-MCNC: 9.7 MG/DL (ref 8.5–10.5)
CHLORIDE BLD-SCNC: 97 MMOL/L (ref 98–107)
CK SERPL-CCNC: 27 U/L (ref 30–190)
CO2 SERPL-SCNC: 23 MMOL/L (ref 22–31)
COLOR UR AUTO: YELLOW
CREAT SERPL-MCNC: 1 MG/DL (ref 0.6–1.1)
EOSINOPHIL # BLD AUTO: 0.3 THOU/UL (ref 0–0.4)
EOSINOPHIL NFR BLD AUTO: 5 % (ref 0–6)
ERYTHROCYTE [DISTWIDTH] IN BLOOD BY AUTOMATED COUNT: 15.3 % (ref 11–14.5)
FOLATE SERPL-MCNC: 3.2 NG/ML
GFR SERPL CREATININE-BSD FRML MDRD: 52 ML/MIN/1.73M2
GLUCOSE BLD-MCNC: 74 MG/DL (ref 70–125)
GLUCOSE UR STRIP-MCNC: NEGATIVE MG/DL
HCT VFR BLD AUTO: 42.3 % (ref 35–47)
HGB BLD-MCNC: 14.1 G/DL (ref 12–16)
HGB UR QL STRIP: ABNORMAL
KETONES UR STRIP-MCNC: NEGATIVE MG/DL
LACTATE SERPL-SCNC: 1.2 MMOL/L (ref 0.5–2.2)
LEUKOCYTE ESTERASE UR QL STRIP: NEGATIVE
LYMPHOCYTES # BLD AUTO: 1 THOU/UL (ref 0.8–4.4)
LYMPHOCYTES NFR BLD AUTO: 14 % (ref 20–40)
MAGNESIUM SERPL-MCNC: 2 MG/DL (ref 1.8–2.6)
MCH RBC QN AUTO: 31 PG (ref 27–34)
MCHC RBC AUTO-ENTMCNC: 33.3 G/DL (ref 32–36)
MCV RBC AUTO: 93 FL (ref 80–100)
MONOCYTES # BLD AUTO: 0.8 THOU/UL (ref 0–0.9)
MONOCYTES NFR BLD AUTO: 12 % (ref 2–10)
NEUTROPHILS # BLD AUTO: 4.5 THOU/UL (ref 2–7.7)
NEUTROPHILS NFR BLD AUTO: 68 % (ref 50–70)
NITRATE UR QL: NEGATIVE
PH UR STRIP: 7.5 [PH] (ref 5–8)
PHOSPHATE SERPL-MCNC: 3.6 MG/DL (ref 2.5–4.5)
PLATELET # BLD AUTO: 251 THOU/UL (ref 140–440)
PMV BLD AUTO: 9.5 FL (ref 8.5–12.5)
POTASSIUM BLD-SCNC: 4.4 MMOL/L (ref 3.5–5)
PROT SERPL-MCNC: 6.8 G/DL (ref 6–8)
RBC # BLD AUTO: 4.55 MILL/UL (ref 3.8–5.4)
RBC #/AREA URNS AUTO: ABNORMAL HPF
SODIUM SERPL-SCNC: 133 MMOL/L (ref 136–145)
SP GR UR STRIP: 1.01 (ref 1–1.03)
SQUAMOUS #/AREA URNS AUTO: ABNORMAL LPF
TRANS CELLS #/AREA URNS HPF: ABNORMAL LPF
TROPONIN I SERPL-MCNC: <0.01 NG/ML (ref 0–0.29)
TSH SERPL DL<=0.005 MIU/L-ACNC: 1.88 UIU/ML (ref 0.3–5)
UROBILINOGEN UR STRIP-ACNC: ABNORMAL
VIT B12 SERPL-MCNC: 291 PG/ML (ref 213–816)
WBC #/AREA URNS AUTO: ABNORMAL HPF
WBC: 6.7 THOU/UL (ref 4–11)

## 2018-08-25 LAB — BNP SERPL-MCNC: 63 PG/ML (ref 0–167)

## 2018-08-27 LAB
ATRIAL RATE - MUSE: 74 BPM
DIASTOLIC BLOOD PRESSURE - MUSE: NORMAL MMHG
INTERPRETATION ECG - MUSE: NORMAL
P AXIS - MUSE: 68 DEGREES
PR INTERVAL - MUSE: 172 MS
QRS DURATION - MUSE: 90 MS
QT - MUSE: 384 MS
QTC - MUSE: 426 MS
R AXIS - MUSE: -6 DEGREES
SYSTOLIC BLOOD PRESSURE - MUSE: NORMAL MMHG
T AXIS - MUSE: 54 DEGREES
VENTRICULAR RATE- MUSE: 74 BPM

## 2018-08-30 ENCOUNTER — OFFICE VISIT - HEALTHEAST (OUTPATIENT)
Dept: CARDIOLOGY | Facility: CLINIC | Age: 83
End: 2018-08-30

## 2018-08-30 DIAGNOSIS — I25.5 ISCHEMIC CARDIOMYOPATHY: ICD-10-CM

## 2018-08-30 DIAGNOSIS — I25.10 CORONARY ARTERY DISEASE INVOLVING NATIVE CORONARY ARTERY OF NATIVE HEART WITHOUT ANGINA PECTORIS: ICD-10-CM

## 2018-08-30 DIAGNOSIS — I25.2 HISTORY OF ST ELEVATION MYOCARDIAL INFARCTION (STEMI): ICD-10-CM

## 2018-08-30 DIAGNOSIS — S22.060A CLOSED WEDGE COMPRESSION FRACTURE OF SEVENTH THORACIC VERTEBRA, INITIAL ENCOUNTER: ICD-10-CM

## 2018-08-30 DIAGNOSIS — E78.00 PURE HYPERCHOLESTEROLEMIA: ICD-10-CM

## 2018-09-06 ENCOUNTER — AMBULATORY - HEALTHEAST (OUTPATIENT)
Dept: FAMILY MEDICINE | Facility: CLINIC | Age: 83
End: 2018-09-06

## 2018-09-06 ENCOUNTER — AMBULATORY - HEALTHEAST (OUTPATIENT)
Dept: LAB | Facility: CLINIC | Age: 83
End: 2018-09-06

## 2018-09-06 ENCOUNTER — COMMUNICATION - HEALTHEAST (OUTPATIENT)
Dept: FAMILY MEDICINE | Facility: CLINIC | Age: 83
End: 2018-09-06

## 2018-09-06 DIAGNOSIS — R30.0 DYSURIA: ICD-10-CM

## 2018-09-06 LAB
ALBUMIN UR-MCNC: ABNORMAL MG/DL
APPEARANCE UR: CLEAR
BILIRUB UR QL STRIP: NEGATIVE
COLOR UR AUTO: YELLOW
GLUCOSE UR STRIP-MCNC: ABNORMAL MG/DL
HGB UR QL STRIP: ABNORMAL
KETONES UR STRIP-MCNC: NEGATIVE MG/DL
LEUKOCYTE ESTERASE UR QL STRIP: ABNORMAL
NITRATE UR QL: NEGATIVE
PH UR STRIP: 5.5 [PH] (ref 5–8)
SP GR UR STRIP: >=1.03 (ref 1–1.03)
UROBILINOGEN UR STRIP-ACNC: ABNORMAL

## 2018-09-07 ENCOUNTER — AMBULATORY - HEALTHEAST (OUTPATIENT)
Dept: FAMILY MEDICINE | Facility: CLINIC | Age: 83
End: 2018-09-07

## 2018-09-07 ENCOUNTER — COMMUNICATION - HEALTHEAST (OUTPATIENT)
Dept: FAMILY MEDICINE | Facility: CLINIC | Age: 83
End: 2018-09-07

## 2018-09-07 DIAGNOSIS — N39.0 URINARY TRACT INFECTION: ICD-10-CM

## 2018-09-09 LAB — BACTERIA SPEC CULT: ABNORMAL

## 2018-09-10 ENCOUNTER — COMMUNICATION - HEALTHEAST (OUTPATIENT)
Dept: SCHEDULING | Facility: CLINIC | Age: 83
End: 2018-09-10

## 2018-09-10 ENCOUNTER — COMMUNICATION - HEALTHEAST (OUTPATIENT)
Dept: FAMILY MEDICINE | Facility: CLINIC | Age: 83
End: 2018-09-10

## 2018-09-13 ENCOUNTER — COMMUNICATION - HEALTHEAST (OUTPATIENT)
Dept: CARE COORDINATION | Facility: CLINIC | Age: 83
End: 2018-09-13

## 2018-09-19 ENCOUNTER — OFFICE VISIT - HEALTHEAST (OUTPATIENT)
Dept: FAMILY MEDICINE | Facility: CLINIC | Age: 83
End: 2018-09-19

## 2018-09-19 DIAGNOSIS — S22.060A CLOSED WEDGE COMPRESSION FRACTURE OF SEVENTH THORACIC VERTEBRA, INITIAL ENCOUNTER: ICD-10-CM

## 2018-09-19 DIAGNOSIS — N12 PYELONEPHRITIS: ICD-10-CM

## 2018-09-21 ENCOUNTER — AMBULATORY - HEALTHEAST (OUTPATIENT)
Dept: ENDOCRINOLOGY | Facility: CLINIC | Age: 83
End: 2018-09-21

## 2018-09-21 DIAGNOSIS — M81.0 OSTEOPOROSIS: ICD-10-CM

## 2018-09-27 ENCOUNTER — OFFICE VISIT - HEALTHEAST (OUTPATIENT)
Dept: RHEUMATOLOGY | Facility: CLINIC | Age: 83
End: 2018-09-27

## 2018-09-27 DIAGNOSIS — M31.6 GCA (GIANT CELL ARTERITIS) (H): ICD-10-CM

## 2018-09-27 DIAGNOSIS — M17.0 BILATERAL PRIMARY OSTEOARTHRITIS OF KNEE: ICD-10-CM

## 2018-09-27 DIAGNOSIS — M81.0 OSTEOPOROSIS: ICD-10-CM

## 2018-09-27 DIAGNOSIS — Z79.899 HIGH RISK MEDICATION USE: ICD-10-CM

## 2018-10-02 ENCOUNTER — COMMUNICATION - HEALTHEAST (OUTPATIENT)
Dept: CARDIOLOGY | Facility: CLINIC | Age: 83
End: 2018-10-02

## 2018-10-02 DIAGNOSIS — I25.10 CAD (CORONARY ARTERY DISEASE): ICD-10-CM

## 2018-10-17 ENCOUNTER — COMMUNICATION - HEALTHEAST (OUTPATIENT)
Dept: CARDIOLOGY | Facility: CLINIC | Age: 83
End: 2018-10-17

## 2018-10-17 DIAGNOSIS — I21.02 ACUTE ST ELEVATION MYOCARDIAL INFARCTION (STEMI) INVOLVING LEFT ANTERIOR DESCENDING CORONARY ARTERY (H): ICD-10-CM

## 2018-10-17 DIAGNOSIS — I25.10 CAD (CORONARY ARTERY DISEASE): ICD-10-CM

## 2018-11-07 ENCOUNTER — COMMUNICATION - HEALTHEAST (OUTPATIENT)
Dept: FAMILY MEDICINE | Facility: CLINIC | Age: 83
End: 2018-11-07

## 2018-11-07 DIAGNOSIS — I25.10 CAD (CORONARY ARTERY DISEASE): ICD-10-CM

## 2018-12-21 ENCOUNTER — AMBULATORY - HEALTHEAST (OUTPATIENT)
Dept: LAB | Facility: CLINIC | Age: 83
End: 2018-12-21

## 2018-12-21 DIAGNOSIS — N12 PYELONEPHRITIS: ICD-10-CM

## 2018-12-21 LAB
ANION GAP SERPL CALCULATED.3IONS-SCNC: 10 MMOL/L (ref 5–18)
BASOPHILS # BLD AUTO: 0.1 THOU/UL (ref 0–0.2)
BASOPHILS NFR BLD AUTO: 1 % (ref 0–2)
BUN SERPL-MCNC: 17 MG/DL (ref 8–28)
CALCIUM SERPL-MCNC: 9.9 MG/DL (ref 8.5–10.5)
CHLORIDE BLD-SCNC: 102 MMOL/L (ref 98–107)
CO2 SERPL-SCNC: 30 MMOL/L (ref 22–31)
CREAT SERPL-MCNC: 0.78 MG/DL (ref 0.6–1.1)
EOSINOPHIL # BLD AUTO: 0.2 THOU/UL (ref 0–0.4)
EOSINOPHIL NFR BLD AUTO: 2 % (ref 0–6)
ERYTHROCYTE [DISTWIDTH] IN BLOOD BY AUTOMATED COUNT: 12.8 % (ref 11–14.5)
GFR SERPL CREATININE-BSD FRML MDRD: >60 ML/MIN/1.73M2
GLUCOSE BLD-MCNC: 83 MG/DL (ref 70–125)
HCT VFR BLD AUTO: 43.6 % (ref 35–47)
HGB BLD-MCNC: 15.3 G/DL (ref 12–16)
LYMPHOCYTES # BLD AUTO: 1.8 THOU/UL (ref 0.8–4.4)
LYMPHOCYTES NFR BLD AUTO: 24 % (ref 20–40)
MCH RBC QN AUTO: 31.4 PG (ref 27–34)
MCHC RBC AUTO-ENTMCNC: 35.1 G/DL (ref 32–36)
MCV RBC AUTO: 89 FL (ref 80–100)
MONOCYTES # BLD AUTO: 0.6 THOU/UL (ref 0–0.9)
MONOCYTES NFR BLD AUTO: 8 % (ref 2–10)
NEUTROPHILS # BLD AUTO: 4.9 THOU/UL (ref 2–7.7)
NEUTROPHILS NFR BLD AUTO: 65 % (ref 50–70)
PLATELET # BLD AUTO: 222 THOU/UL (ref 140–440)
PMV BLD AUTO: 6.9 FL (ref 7–10)
POTASSIUM BLD-SCNC: 4.1 MMOL/L (ref 3.5–5)
RBC # BLD AUTO: 4.88 MILL/UL (ref 3.8–5.4)
SODIUM SERPL-SCNC: 142 MMOL/L (ref 136–145)
WBC: 7.6 THOU/UL (ref 4–11)

## 2018-12-26 ENCOUNTER — COMMUNICATION - HEALTHEAST (OUTPATIENT)
Dept: FAMILY MEDICINE | Facility: CLINIC | Age: 83
End: 2018-12-26

## 2019-01-07 ENCOUNTER — AMBULATORY - HEALTHEAST (OUTPATIENT)
Dept: ENDOCRINOLOGY | Facility: CLINIC | Age: 84
End: 2019-01-07

## 2019-01-07 DIAGNOSIS — M81.0 OSTEOPOROSIS: ICD-10-CM

## 2019-01-07 DIAGNOSIS — E55.9 VITAMIN D DEFICIENCY: ICD-10-CM

## 2019-01-08 ENCOUNTER — OFFICE VISIT - HEALTHEAST (OUTPATIENT)
Dept: RHEUMATOLOGY | Facility: CLINIC | Age: 84
End: 2019-01-08

## 2019-01-08 DIAGNOSIS — M31.6 GCA (GIANT CELL ARTERITIS) (H): ICD-10-CM

## 2019-01-08 DIAGNOSIS — I25.10 CORONARY ARTERY DISEASE INVOLVING NATIVE CORONARY ARTERY OF NATIVE HEART WITHOUT ANGINA PECTORIS: ICD-10-CM

## 2019-01-08 DIAGNOSIS — M17.0 BILATERAL PRIMARY OSTEOARTHRITIS OF KNEE: ICD-10-CM

## 2019-01-10 ENCOUNTER — OFFICE VISIT - HEALTHEAST (OUTPATIENT)
Dept: CARDIOLOGY | Facility: CLINIC | Age: 84
End: 2019-01-10

## 2019-01-10 DIAGNOSIS — Z86.79 HISTORY OF CORONARY ARTERY DISEASE: ICD-10-CM

## 2019-01-10 DIAGNOSIS — S22.060A CLOSED WEDGE COMPRESSION FRACTURE OF SEVENTH THORACIC VERTEBRA, INITIAL ENCOUNTER: ICD-10-CM

## 2019-01-10 DIAGNOSIS — I25.5 ISCHEMIC CARDIOMYOPATHY: ICD-10-CM

## 2019-01-10 DIAGNOSIS — I25.2 HISTORY OF ST ELEVATION MYOCARDIAL INFARCTION (STEMI): ICD-10-CM

## 2019-01-10 DIAGNOSIS — E78.00 PURE HYPERCHOLESTEROLEMIA: ICD-10-CM

## 2019-01-10 ASSESSMENT — MIFFLIN-ST. JEOR: SCORE: 944.98

## 2019-01-17 ENCOUNTER — AMBULATORY - HEALTHEAST (OUTPATIENT)
Dept: LAB | Facility: HOSPITAL | Age: 84
End: 2019-01-17

## 2019-01-17 DIAGNOSIS — M81.0 OSTEOPOROSIS: ICD-10-CM

## 2019-01-17 DIAGNOSIS — Z86.79 HISTORY OF CORONARY ARTERY DISEASE: ICD-10-CM

## 2019-01-17 DIAGNOSIS — I25.2 HISTORY OF ST ELEVATION MYOCARDIAL INFARCTION (STEMI): ICD-10-CM

## 2019-01-17 DIAGNOSIS — E55.9 VITAMIN D DEFICIENCY: ICD-10-CM

## 2019-01-17 DIAGNOSIS — I25.5 ISCHEMIC CARDIOMYOPATHY: ICD-10-CM

## 2019-01-17 LAB
CHOLEST SERPL-MCNC: 180 MG/DL
FASTING STATUS PATIENT QL REPORTED: YES
HDLC SERPL-MCNC: 56 MG/DL
LDLC SERPL CALC-MCNC: 103 MG/DL
PTH-INTACT SERPL-MCNC: 49 PG/ML (ref 10–86)
TRIGL SERPL-MCNC: 103 MG/DL

## 2019-01-18 LAB
25(OH)D3 SERPL-MCNC: 32.5 NG/ML (ref 30–80)
ALBUMIN PERCENT: 59.5 % (ref 51–67)
ALBUMIN SERPL ELPH-MCNC: 3.9 G/DL (ref 3.2–4.7)
ALPHA 1 PERCENT: 3.3 % (ref 2–4)
ALPHA 2 PERCENT: 13.4 % (ref 5–13)
ALPHA1 GLOB SERPL ELPH-MCNC: 0.2 G/DL (ref 0.1–0.3)
ALPHA2 GLOB SERPL ELPH-MCNC: 0.9 G/DL (ref 0.4–0.9)
B-GLOBULIN SERPL ELPH-MCNC: 0.8 G/DL (ref 0.7–1.2)
BETA PERCENT: 12.3 % (ref 10–17)
GAMMA GLOB SERPL ELPH-MCNC: 0.8 G/DL (ref 0.6–1.4)
GAMMA GLOBULIN PERCENT: 11.5 % (ref 9–20)
PATH ICD:: ABNORMAL
PROT PATTERN SERPL ELPH-IMP: ABNORMAL
PROT SERPL-MCNC: 6.6 G/DL (ref 6–8)
REVIEWING PATHOLOGIST: ABNORMAL

## 2019-01-29 ENCOUNTER — OFFICE VISIT - HEALTHEAST (OUTPATIENT)
Dept: ENDOCRINOLOGY | Facility: CLINIC | Age: 84
End: 2019-01-29

## 2019-01-29 DIAGNOSIS — M80.00XG AGE-RELATED OSTEOPOROSIS WITH CURRENT PATHOLOGICAL FRACTURE WITH DELAYED HEALING, SUBSEQUENT ENCOUNTER: ICD-10-CM

## 2019-01-29 ASSESSMENT — MIFFLIN-ST. JEOR: SCORE: 935.69

## 2019-02-01 ENCOUNTER — COMMUNICATION - HEALTHEAST (OUTPATIENT)
Dept: RHEUMATOLOGY | Facility: CLINIC | Age: 84
End: 2019-02-01

## 2019-03-01 ENCOUNTER — COMMUNICATION - HEALTHEAST (OUTPATIENT)
Dept: CARDIOLOGY | Facility: CLINIC | Age: 84
End: 2019-03-01

## 2019-03-01 DIAGNOSIS — I21.3 STEMI (ST ELEVATION MYOCARDIAL INFARCTION) (H): ICD-10-CM

## 2019-03-07 ENCOUNTER — COMMUNICATION - HEALTHEAST (OUTPATIENT)
Dept: CARDIOLOGY | Facility: CLINIC | Age: 84
End: 2019-03-07

## 2019-03-07 DIAGNOSIS — I21.02 ACUTE ST ELEVATION MYOCARDIAL INFARCTION (STEMI) INVOLVING LEFT ANTERIOR DESCENDING CORONARY ARTERY (H): ICD-10-CM

## 2019-03-27 ENCOUNTER — RECORDS - HEALTHEAST (OUTPATIENT)
Dept: ADMINISTRATIVE | Facility: OTHER | Age: 84
End: 2019-03-27

## 2019-03-27 ENCOUNTER — AMBULATORY - HEALTHEAST (OUTPATIENT)
Dept: CARDIOLOGY | Facility: CLINIC | Age: 84
End: 2019-03-27

## 2019-03-27 ENCOUNTER — AMBULATORY - HEALTHEAST (OUTPATIENT)
Dept: ENDOCRINOLOGY | Facility: CLINIC | Age: 84
End: 2019-03-27

## 2019-03-27 DIAGNOSIS — M81.0 OSTEOPOROSIS WITHOUT CURRENT PATHOLOGICAL FRACTURE, UNSPECIFIED OSTEOPOROSIS TYPE: ICD-10-CM

## 2019-03-28 ENCOUNTER — COMMUNICATION - HEALTHEAST (OUTPATIENT)
Dept: FAMILY MEDICINE | Facility: CLINIC | Age: 84
End: 2019-03-28

## 2019-04-01 ENCOUNTER — OFFICE VISIT - HEALTHEAST (OUTPATIENT)
Dept: CARDIOLOGY | Facility: CLINIC | Age: 84
End: 2019-04-01

## 2019-04-01 DIAGNOSIS — I10 ESSENTIAL HYPERTENSION: ICD-10-CM

## 2019-04-01 DIAGNOSIS — I25.2 HISTORY OF ST ELEVATION MYOCARDIAL INFARCTION (STEMI): ICD-10-CM

## 2019-04-01 DIAGNOSIS — E78.00 PURE HYPERCHOLESTEROLEMIA: ICD-10-CM

## 2019-04-01 DIAGNOSIS — I25.5 ISCHEMIC CARDIOMYOPATHY: ICD-10-CM

## 2019-04-01 DIAGNOSIS — Z86.79 HISTORY OF CORONARY ARTERY DISEASE: ICD-10-CM

## 2019-04-01 ASSESSMENT — MIFFLIN-ST. JEOR: SCORE: 934.78

## 2019-04-02 ENCOUNTER — RECORDS - HEALTHEAST (OUTPATIENT)
Dept: RADIOLOGY | Facility: CLINIC | Age: 84
End: 2019-04-02

## 2019-04-02 ENCOUNTER — COMMUNICATION - HEALTHEAST (OUTPATIENT)
Dept: CARDIOLOGY | Facility: CLINIC | Age: 84
End: 2019-04-02

## 2019-04-10 ENCOUNTER — HOSPITAL ENCOUNTER (OUTPATIENT)
Dept: NUCLEAR MEDICINE | Facility: HOSPITAL | Age: 84
Discharge: HOME | End: 2019-04-10
Attending: INTERNAL MEDICINE

## 2019-04-10 ENCOUNTER — HOSPITAL ENCOUNTER (OUTPATIENT)
Dept: CARDIOLOGY | Facility: HOSPITAL | Age: 84
Discharge: HOME | End: 2019-04-10
Attending: INTERNAL MEDICINE

## 2019-04-10 ENCOUNTER — COMMUNICATION - HEALTHEAST (OUTPATIENT)
Dept: CARDIOLOGY | Facility: CLINIC | Age: 84
End: 2019-04-10

## 2019-04-10 DIAGNOSIS — I21.02 ACUTE ST ELEVATION MYOCARDIAL INFARCTION (STEMI) INVOLVING LEFT ANTERIOR DESCENDING CORONARY ARTERY (H): ICD-10-CM

## 2019-04-10 DIAGNOSIS — I25.2 HISTORY OF ST ELEVATION MYOCARDIAL INFARCTION (STEMI): ICD-10-CM

## 2019-04-10 DIAGNOSIS — I25.5 ISCHEMIC CARDIOMYOPATHY: ICD-10-CM

## 2019-04-10 DIAGNOSIS — Z86.79 HISTORY OF CORONARY ARTERY DISEASE: ICD-10-CM

## 2019-04-10 LAB
CV STRESS CURRENT BP HE: NORMAL
CV STRESS CURRENT HR HE: 68
CV STRESS CURRENT HR HE: 71
CV STRESS CURRENT HR HE: 71
CV STRESS CURRENT HR HE: 74
CV STRESS CURRENT HR HE: 86
CV STRESS CURRENT HR HE: 88
CV STRESS CURRENT HR HE: 89
CV STRESS CURRENT HR HE: 90
CV STRESS CURRENT HR HE: 90
CV STRESS CURRENT HR HE: 91
CV STRESS CURRENT HR HE: 92
CV STRESS CURRENT HR HE: 94
CV STRESS CURRENT HR HE: 94
CV STRESS CURRENT HR HE: 96
CV STRESS CURRENT HR HE: 96
CV STRESS CURRENT HR HE: 97
CV STRESS CURRENT HR HE: 97
CV STRESS CURRENT HR HE: 98
CV STRESS CURRENT HR HE: 99
CV STRESS DEVIATION TIME HE: NORMAL
CV STRESS ECHO PERCENT HR HE: NORMAL
CV STRESS EXERCISE STAGE HE: NORMAL
CV STRESS FINAL RESTING BP HE: NORMAL
CV STRESS FINAL RESTING HR HE: 89
CV STRESS MAX HR HE: 100
CV STRESS MAX TREADMILL GRADE HE: 0
CV STRESS MAX TREADMILL SPEED HE: 0
CV STRESS PEAK DIA BP HE: NORMAL
CV STRESS PEAK SYS BP HE: NORMAL
CV STRESS PHASE HE: NORMAL
CV STRESS PROTOCOL HE: NORMAL
CV STRESS RESTING PT POSITION HE: NORMAL
CV STRESS ST DEVIATION AMOUNT HE: NORMAL
CV STRESS ST DEVIATION ELEVATION HE: NORMAL
CV STRESS ST EVELATION AMOUNT HE: NORMAL
CV STRESS TEST TYPE HE: NORMAL
CV STRESS TOTAL STAGE TIME MIN 1 HE: NORMAL
NUC STRESS EJECTION FRACTION: 70 %
STRESS ECHO BASELINE BP: NORMAL
STRESS ECHO BASELINE HR: 65
STRESS ECHO CALCULATED PERCENT HR: 77 %
STRESS ECHO LAST STRESS BP: NORMAL
STRESS ECHO LAST STRESS HR: 94

## 2019-04-11 ENCOUNTER — COMMUNICATION - HEALTHEAST (OUTPATIENT)
Dept: CARDIOLOGY | Facility: CLINIC | Age: 84
End: 2019-04-11

## 2019-04-11 DIAGNOSIS — I21.02 ACUTE ST ELEVATION MYOCARDIAL INFARCTION (STEMI) INVOLVING LEFT ANTERIOR DESCENDING CORONARY ARTERY (H): ICD-10-CM

## 2019-04-23 ENCOUNTER — COMMUNICATION - HEALTHEAST (OUTPATIENT)
Dept: CARDIOLOGY | Facility: CLINIC | Age: 84
End: 2019-04-23

## 2019-04-23 DIAGNOSIS — I21.02 ACUTE ST ELEVATION MYOCARDIAL INFARCTION (STEMI) INVOLVING LEFT ANTERIOR DESCENDING CORONARY ARTERY (H): ICD-10-CM

## 2019-04-25 ENCOUNTER — OFFICE VISIT - HEALTHEAST (OUTPATIENT)
Dept: RHEUMATOLOGY | Facility: CLINIC | Age: 84
End: 2019-04-25

## 2019-04-25 DIAGNOSIS — Z86.79 HISTORY OF CORONARY ARTERY DISEASE: ICD-10-CM

## 2019-04-25 DIAGNOSIS — M17.0 BILATERAL PRIMARY OSTEOARTHRITIS OF KNEE: ICD-10-CM

## 2019-04-25 DIAGNOSIS — I25.5 ISCHEMIC CARDIOMYOPATHY: ICD-10-CM

## 2019-04-26 ENCOUNTER — COMMUNICATION - HEALTHEAST (OUTPATIENT)
Dept: CARDIOLOGY | Facility: CLINIC | Age: 84
End: 2019-04-26

## 2019-07-15 ENCOUNTER — OFFICE VISIT - HEALTHEAST (OUTPATIENT)
Dept: CARDIOLOGY | Facility: CLINIC | Age: 84
End: 2019-07-15

## 2019-07-15 DIAGNOSIS — I10 ESSENTIAL HYPERTENSION: ICD-10-CM

## 2019-07-15 DIAGNOSIS — Z86.79 HISTORY OF CORONARY ARTERY DISEASE: ICD-10-CM

## 2019-07-15 DIAGNOSIS — I25.5 ISCHEMIC CARDIOMYOPATHY: ICD-10-CM

## 2019-07-15 DIAGNOSIS — E78.00 PURE HYPERCHOLESTEROLEMIA: ICD-10-CM

## 2019-07-15 ASSESSMENT — MIFFLIN-ST. JEOR: SCORE: 930.25

## 2019-07-29 ENCOUNTER — OFFICE VISIT - HEALTHEAST (OUTPATIENT)
Dept: RHEUMATOLOGY | Facility: CLINIC | Age: 84
End: 2019-07-29

## 2019-07-29 DIAGNOSIS — M17.0 BILATERAL PRIMARY OSTEOARTHRITIS OF KNEE: ICD-10-CM

## 2019-07-29 DIAGNOSIS — Z86.79 HISTORY OF CORONARY ARTERY DISEASE: ICD-10-CM

## 2019-07-29 DIAGNOSIS — I25.5 ISCHEMIC CARDIOMYOPATHY: ICD-10-CM

## 2019-08-27 ENCOUNTER — COMMUNICATION - HEALTHEAST (OUTPATIENT)
Dept: ENDOCRINOLOGY | Facility: CLINIC | Age: 84
End: 2019-08-27

## 2019-09-24 ENCOUNTER — COMMUNICATION - HEALTHEAST (OUTPATIENT)
Dept: INTERNAL MEDICINE | Facility: CLINIC | Age: 84
End: 2019-09-24

## 2019-09-24 ENCOUNTER — AMBULATORY - HEALTHEAST (OUTPATIENT)
Dept: INTERNAL MEDICINE | Facility: CLINIC | Age: 84
End: 2019-09-24

## 2019-09-24 ENCOUNTER — AMBULATORY - HEALTHEAST (OUTPATIENT)
Dept: LAB | Facility: CLINIC | Age: 84
End: 2019-09-24

## 2019-09-24 DIAGNOSIS — I10 ESSENTIAL HYPERTENSION: ICD-10-CM

## 2019-09-24 DIAGNOSIS — M31.6 TEMPORAL ARTERITIS (H): ICD-10-CM

## 2019-09-24 DIAGNOSIS — Z86.79 HISTORY OF CORONARY ARTERY DISEASE: ICD-10-CM

## 2019-09-24 DIAGNOSIS — E78.2 MIXED HYPERLIPIDEMIA: ICD-10-CM

## 2019-09-24 DIAGNOSIS — E55.9 VITAMIN D DEFICIENCY: ICD-10-CM

## 2019-09-24 LAB
ALBUMIN SERPL-MCNC: 3.6 G/DL (ref 3.5–5)
ALP SERPL-CCNC: 26 U/L (ref 45–120)
ALT SERPL W P-5'-P-CCNC: 12 U/L (ref 0–45)
ANION GAP SERPL CALCULATED.3IONS-SCNC: 9 MMOL/L (ref 5–18)
AST SERPL W P-5'-P-CCNC: 21 U/L (ref 0–40)
BILIRUB SERPL-MCNC: 0.4 MG/DL (ref 0–1)
BUN SERPL-MCNC: 17 MG/DL (ref 8–28)
C REACTIVE PROTEIN LHE: 0.2 MG/DL (ref 0–0.8)
CALCIUM SERPL-MCNC: 10 MG/DL (ref 8.5–10.5)
CHLORIDE BLD-SCNC: 104 MMOL/L (ref 98–107)
CHOLEST SERPL-MCNC: 163 MG/DL
CO2 SERPL-SCNC: 29 MMOL/L (ref 22–31)
CORTIS SERPL-MCNC: 9.9 UG/DL
CREAT SERPL-MCNC: 0.72 MG/DL (ref 0.6–1.1)
ERYTHROCYTE [DISTWIDTH] IN BLOOD BY AUTOMATED COUNT: 13.4 % (ref 11–14.5)
ERYTHROCYTE [SEDIMENTATION RATE] IN BLOOD BY WESTERGREN METHOD: 6 MM/HR (ref 0–20)
FASTING STATUS PATIENT QL REPORTED: NORMAL
GFR SERPL CREATININE-BSD FRML MDRD: >60 ML/MIN/1.73M2
GLUCOSE BLD-MCNC: 91 MG/DL (ref 70–125)
HCT VFR BLD AUTO: 44 % (ref 35–47)
HDLC SERPL-MCNC: 55 MG/DL
HGB BLD-MCNC: 14.7 G/DL (ref 12–16)
LDLC SERPL CALC-MCNC: 86 MG/DL
MCH RBC QN AUTO: 31.3 PG (ref 27–34)
MCHC RBC AUTO-ENTMCNC: 33.5 G/DL (ref 32–36)
MCV RBC AUTO: 93 FL (ref 80–100)
PLATELET # BLD AUTO: 202 THOU/UL (ref 140–440)
PMV BLD AUTO: 7 FL (ref 7–10)
POTASSIUM BLD-SCNC: 4.4 MMOL/L (ref 3.5–5)
PROT SERPL-MCNC: 6.2 G/DL (ref 6–8)
RBC # BLD AUTO: 4.71 MILL/UL (ref 3.8–5.4)
SODIUM SERPL-SCNC: 142 MMOL/L (ref 136–145)
TRIGL SERPL-MCNC: 112 MG/DL
WBC: 5.9 THOU/UL (ref 4–11)

## 2019-09-30 ENCOUNTER — AMBULATORY - HEALTHEAST (OUTPATIENT)
Dept: ENDOCRINOLOGY | Facility: CLINIC | Age: 84
End: 2019-09-30

## 2019-10-01 ENCOUNTER — OFFICE VISIT - HEALTHEAST (OUTPATIENT)
Dept: ENDOCRINOLOGY | Facility: CLINIC | Age: 84
End: 2019-10-01

## 2019-10-01 ENCOUNTER — AMBULATORY - HEALTHEAST (OUTPATIENT)
Dept: SCHEDULING | Facility: CLINIC | Age: 84
End: 2019-10-01

## 2019-10-01 DIAGNOSIS — M81.0 AGE-RELATED OSTEOPOROSIS WITHOUT CURRENT PATHOLOGICAL FRACTURE: ICD-10-CM

## 2019-10-01 ASSESSMENT — MIFFLIN-ST. JEOR: SCORE: 943.85

## 2019-10-02 ENCOUNTER — COMMUNICATION - HEALTHEAST (OUTPATIENT)
Dept: INTERNAL MEDICINE | Facility: CLINIC | Age: 84
End: 2019-10-02

## 2019-10-02 ENCOUNTER — COMMUNICATION - HEALTHEAST (OUTPATIENT)
Dept: ADMINISTRATIVE | Facility: CLINIC | Age: 84
End: 2019-10-02

## 2019-10-03 ENCOUNTER — COMMUNICATION - HEALTHEAST (OUTPATIENT)
Dept: LAB | Facility: CLINIC | Age: 84
End: 2019-10-03

## 2019-10-07 ENCOUNTER — OFFICE VISIT - HEALTHEAST (OUTPATIENT)
Dept: INTERNAL MEDICINE | Facility: CLINIC | Age: 84
End: 2019-10-07

## 2019-10-07 DIAGNOSIS — D69.2 SENILE PURPURA (H): ICD-10-CM

## 2019-10-07 DIAGNOSIS — M81.0 AGE-RELATED OSTEOPOROSIS WITHOUT CURRENT PATHOLOGICAL FRACTURE: ICD-10-CM

## 2019-10-07 DIAGNOSIS — T78.3XXA ANGIOEDEMA, INITIAL ENCOUNTER: ICD-10-CM

## 2019-10-07 DIAGNOSIS — E78.2 MIXED HYPERLIPIDEMIA: ICD-10-CM

## 2019-10-07 DIAGNOSIS — E55.9 VITAMIN D DEFICIENCY: ICD-10-CM

## 2019-10-07 DIAGNOSIS — I10 ESSENTIAL HYPERTENSION: ICD-10-CM

## 2019-10-07 DIAGNOSIS — Z87.891 FORMER SMOKER: ICD-10-CM

## 2019-10-07 DIAGNOSIS — I25.10 CORONARY ARTERY DISEASE INVOLVING NATIVE CORONARY ARTERY OF NATIVE HEART WITHOUT ANGINA PECTORIS: ICD-10-CM

## 2019-10-07 DIAGNOSIS — H90.3 SENSORINEURAL HEARING LOSS (SNHL) OF BOTH EARS: ICD-10-CM

## 2019-10-07 DIAGNOSIS — M31.6 TEMPORAL ARTERITIS (H): ICD-10-CM

## 2019-10-07 DIAGNOSIS — L72.3 SEBACEOUS CYST: ICD-10-CM

## 2019-10-07 DIAGNOSIS — M17.0 BILATERAL PRIMARY OSTEOARTHRITIS OF KNEE: ICD-10-CM

## 2019-10-07 DIAGNOSIS — S22.060A CLOSED WEDGE COMPRESSION FRACTURE OF SEVENTH THORACIC VERTEBRA, INITIAL ENCOUNTER: ICD-10-CM

## 2019-10-07 DIAGNOSIS — Z76.89 ENCOUNTER TO ESTABLISH CARE WITH NEW DOCTOR: ICD-10-CM

## 2019-10-07 ASSESSMENT — MIFFLIN-ST. JEOR: SCORE: 925.71

## 2019-10-11 ENCOUNTER — COMMUNICATION - HEALTHEAST (OUTPATIENT)
Dept: LAB | Facility: CLINIC | Age: 84
End: 2019-10-11

## 2019-10-15 ENCOUNTER — COMMUNICATION - HEALTHEAST (OUTPATIENT)
Dept: INTERNAL MEDICINE | Facility: CLINIC | Age: 84
End: 2019-10-15

## 2019-10-16 ENCOUNTER — COMMUNICATION - HEALTHEAST (OUTPATIENT)
Dept: ENDOCRINOLOGY | Facility: CLINIC | Age: 84
End: 2019-10-16

## 2019-10-24 ENCOUNTER — OFFICE VISIT - HEALTHEAST (OUTPATIENT)
Dept: INTERNAL MEDICINE | Facility: CLINIC | Age: 84
End: 2019-10-24

## 2019-10-24 DIAGNOSIS — M17.11 PRIMARY OSTEOARTHRITIS OF RIGHT KNEE: ICD-10-CM

## 2019-10-24 DIAGNOSIS — G52.9 CRANIAL NEURALGIA: ICD-10-CM

## 2019-10-24 DIAGNOSIS — Z48.02 ENCOUNTER FOR REMOVAL OF SUTURES: ICD-10-CM

## 2019-10-24 DIAGNOSIS — M31.6 TEMPORAL ARTERITIS (H): ICD-10-CM

## 2019-10-24 DIAGNOSIS — S51.812A FOREARM LACERATION, LEFT, INITIAL ENCOUNTER: ICD-10-CM

## 2019-10-24 DIAGNOSIS — M81.0 AGE-RELATED OSTEOPOROSIS WITHOUT CURRENT PATHOLOGICAL FRACTURE: ICD-10-CM

## 2019-10-30 ENCOUNTER — OFFICE VISIT - HEALTHEAST (OUTPATIENT)
Dept: RHEUMATOLOGY | Facility: CLINIC | Age: 84
End: 2019-10-30

## 2019-10-30 DIAGNOSIS — I25.10 CORONARY ARTERY DISEASE INVOLVING NATIVE CORONARY ARTERY OF NATIVE HEART WITHOUT ANGINA PECTORIS: ICD-10-CM

## 2019-10-30 DIAGNOSIS — M17.0 BILATERAL PRIMARY OSTEOARTHRITIS OF KNEE: ICD-10-CM

## 2019-10-30 DIAGNOSIS — M25.561 CHRONIC PAIN OF RIGHT KNEE: ICD-10-CM

## 2019-10-30 DIAGNOSIS — G89.29 CHRONIC PAIN OF RIGHT KNEE: ICD-10-CM

## 2019-11-05 ENCOUNTER — RECORDS - HEALTHEAST (OUTPATIENT)
Dept: ADMINISTRATIVE | Facility: OTHER | Age: 84
End: 2019-11-05

## 2019-11-12 ENCOUNTER — COMMUNICATION - HEALTHEAST (OUTPATIENT)
Dept: CARDIOLOGY | Facility: CLINIC | Age: 84
End: 2019-11-12

## 2019-11-12 DIAGNOSIS — Z86.79 HISTORY OF CORONARY ARTERY DISEASE: ICD-10-CM

## 2019-11-12 DIAGNOSIS — I25.5 ISCHEMIC CARDIOMYOPATHY: ICD-10-CM

## 2019-11-12 DIAGNOSIS — I25.2 HISTORY OF ST ELEVATION MYOCARDIAL INFARCTION (STEMI): ICD-10-CM

## 2019-11-12 DIAGNOSIS — E78.00 PURE HYPERCHOLESTEROLEMIA: ICD-10-CM

## 2019-12-10 ENCOUNTER — AMBULATORY - HEALTHEAST (OUTPATIENT)
Dept: INTERNAL MEDICINE | Facility: CLINIC | Age: 84
End: 2019-12-10

## 2019-12-10 DIAGNOSIS — G89.29 CHRONIC LEFT-SIDED LOW BACK PAIN WITH LEFT-SIDED SCIATICA: ICD-10-CM

## 2019-12-10 DIAGNOSIS — M54.42 CHRONIC LEFT-SIDED LOW BACK PAIN WITH LEFT-SIDED SCIATICA: ICD-10-CM

## 2019-12-11 ENCOUNTER — COMMUNICATION - HEALTHEAST (OUTPATIENT)
Dept: INTERNAL MEDICINE | Facility: CLINIC | Age: 84
End: 2019-12-11

## 2019-12-16 ENCOUNTER — OFFICE VISIT - HEALTHEAST (OUTPATIENT)
Dept: CARDIOLOGY | Facility: CLINIC | Age: 84
End: 2019-12-16

## 2019-12-16 DIAGNOSIS — I10 ESSENTIAL HYPERTENSION: ICD-10-CM

## 2019-12-16 DIAGNOSIS — E78.2 MIXED HYPERLIPIDEMIA: ICD-10-CM

## 2019-12-16 DIAGNOSIS — I25.10 CORONARY ARTERY DISEASE INVOLVING NATIVE CORONARY ARTERY OF NATIVE HEART WITHOUT ANGINA PECTORIS: ICD-10-CM

## 2019-12-16 ASSESSMENT — MIFFLIN-ST. JEOR: SCORE: 957.46

## 2020-01-03 ENCOUNTER — OFFICE VISIT - HEALTHEAST (OUTPATIENT)
Dept: INTERNAL MEDICINE | Facility: CLINIC | Age: 85
End: 2020-01-03

## 2020-01-03 DIAGNOSIS — G89.29 CHRONIC LEFT-SIDED LOW BACK PAIN WITH LEFT-SIDED SCIATICA: ICD-10-CM

## 2020-01-03 DIAGNOSIS — M17.0 BILATERAL PRIMARY OSTEOARTHRITIS OF KNEE: ICD-10-CM

## 2020-01-03 DIAGNOSIS — I25.10 CORONARY ARTERY DISEASE INVOLVING NATIVE CORONARY ARTERY OF NATIVE HEART WITHOUT ANGINA PECTORIS: ICD-10-CM

## 2020-01-03 DIAGNOSIS — M54.42 CHRONIC LEFT-SIDED LOW BACK PAIN WITH LEFT-SIDED SCIATICA: ICD-10-CM

## 2020-01-03 DIAGNOSIS — M53.3 SACROILIAC JOINT PAIN: ICD-10-CM

## 2020-01-03 DIAGNOSIS — I10 ESSENTIAL HYPERTENSION: ICD-10-CM

## 2020-01-03 DIAGNOSIS — M81.0 AGE-RELATED OSTEOPOROSIS WITHOUT CURRENT PATHOLOGICAL FRACTURE: ICD-10-CM

## 2020-01-03 DIAGNOSIS — D69.2 SENILE PURPURA (H): ICD-10-CM

## 2020-01-03 DIAGNOSIS — Z99.89 USES WALKER: ICD-10-CM

## 2020-01-03 DIAGNOSIS — Z78.9 FULL CODE STATUS: ICD-10-CM

## 2020-01-09 ENCOUNTER — OFFICE VISIT - HEALTHEAST (OUTPATIENT)
Dept: INTERNAL MEDICINE | Facility: CLINIC | Age: 85
End: 2020-01-09

## 2020-01-09 DIAGNOSIS — K21.9 GASTROESOPHAGEAL REFLUX DISEASE, ESOPHAGITIS PRESENCE NOT SPECIFIED: ICD-10-CM

## 2020-01-09 DIAGNOSIS — I10 ESSENTIAL HYPERTENSION: ICD-10-CM

## 2020-01-09 DIAGNOSIS — Z09 HOSPITAL DISCHARGE FOLLOW-UP: ICD-10-CM

## 2020-01-09 DIAGNOSIS — M17.0 BILATERAL PRIMARY OSTEOARTHRITIS OF KNEE: ICD-10-CM

## 2020-01-09 DIAGNOSIS — L30.9 DERMATITIS: ICD-10-CM

## 2020-01-09 DIAGNOSIS — R07.89 ATYPICAL CHEST PAIN: ICD-10-CM

## 2020-01-27 ENCOUNTER — RECORDS - HEALTHEAST (OUTPATIENT)
Dept: ADMINISTRATIVE | Facility: OTHER | Age: 85
End: 2020-01-27

## 2020-02-17 ENCOUNTER — RECORDS - HEALTHEAST (OUTPATIENT)
Dept: ADMINISTRATIVE | Facility: OTHER | Age: 85
End: 2020-02-17

## 2020-03-02 ENCOUNTER — COMMUNICATION - HEALTHEAST (OUTPATIENT)
Dept: ENDOCRINOLOGY | Facility: CLINIC | Age: 85
End: 2020-03-02

## 2020-03-02 DIAGNOSIS — M81.0 AGE-RELATED OSTEOPOROSIS WITHOUT CURRENT PATHOLOGICAL FRACTURE: ICD-10-CM

## 2020-03-02 DIAGNOSIS — Z92.29 PERSONAL HISTORY OF OTHER DRUG THERAPY: ICD-10-CM

## 2020-03-17 ENCOUNTER — OFFICE VISIT - HEALTHEAST (OUTPATIENT)
Dept: INTERNAL MEDICINE | Facility: CLINIC | Age: 85
End: 2020-03-17

## 2020-03-17 DIAGNOSIS — B02.9 HERPES ZOSTER WITHOUT COMPLICATION: ICD-10-CM

## 2020-03-17 DIAGNOSIS — L30.9 DERMATITIS: ICD-10-CM

## 2020-03-17 DIAGNOSIS — I25.10 CORONARY ARTERY DISEASE INVOLVING NATIVE CORONARY ARTERY OF NATIVE HEART WITHOUT ANGINA PECTORIS: ICD-10-CM

## 2020-03-17 DIAGNOSIS — M17.11 PRIMARY OSTEOARTHRITIS OF RIGHT KNEE: ICD-10-CM

## 2020-04-02 ENCOUNTER — AMBULATORY - HEALTHEAST (OUTPATIENT)
Dept: ENDOCRINOLOGY | Facility: CLINIC | Age: 85
End: 2020-04-02

## 2020-04-20 ENCOUNTER — COMMUNICATION - HEALTHEAST (OUTPATIENT)
Dept: ADMINISTRATIVE | Facility: CLINIC | Age: 85
End: 2020-04-20

## 2020-05-18 ENCOUNTER — OFFICE VISIT - HEALTHEAST (OUTPATIENT)
Dept: INTERNAL MEDICINE | Facility: CLINIC | Age: 85
End: 2020-05-18

## 2020-05-18 DIAGNOSIS — I10 ESSENTIAL HYPERTENSION: ICD-10-CM

## 2020-05-18 DIAGNOSIS — E78.2 MIXED HYPERLIPIDEMIA: ICD-10-CM

## 2020-05-18 DIAGNOSIS — M25.461 EFFUSION OF RIGHT KNEE: ICD-10-CM

## 2020-05-18 DIAGNOSIS — I25.10 CORONARY ARTERY DISEASE INVOLVING NATIVE CORONARY ARTERY OF NATIVE HEART WITHOUT ANGINA PECTORIS: ICD-10-CM

## 2020-05-18 DIAGNOSIS — M81.0 AGE-RELATED OSTEOPOROSIS WITHOUT CURRENT PATHOLOGICAL FRACTURE: ICD-10-CM

## 2020-05-18 DIAGNOSIS — M17.11 PRIMARY OSTEOARTHRITIS OF RIGHT KNEE: ICD-10-CM

## 2020-05-18 LAB
APPEARANCE FLD: CLEAR
COLOR FLD: YELLOW
CRYSTALS SNV MICRO: ABNORMAL
GLUCOSE FLD-MCNC: 91 MG/DL
LYMPHOCYTES NFR FLD MANUAL: 26 %
MACROPHAGE % - HISTORICAL: 26 %
MONOCYTE % - HISTORICAL: 30 %
NEUTS BAND NFR FLD MANUAL: 15 %
OTHER CELLS FLD MANUAL: 3 %
PROT FLD-MCNC: 2.7 G/DL
RBC FLUID - HISTORICAL: ABNORMAL /UL
WBC # FLD AUTO: 194 /UL (ref 0–99)

## 2020-05-18 ASSESSMENT — MIFFLIN-ST. JEOR: SCORE: 928.44

## 2020-05-19 ENCOUNTER — COMMUNICATION - HEALTHEAST (OUTPATIENT)
Dept: INTERNAL MEDICINE | Facility: CLINIC | Age: 85
End: 2020-05-19

## 2020-05-19 ENCOUNTER — COMMUNICATION - HEALTHEAST (OUTPATIENT)
Dept: LAB | Facility: CLINIC | Age: 85
End: 2020-05-19

## 2020-05-24 ENCOUNTER — COMMUNICATION - HEALTHEAST (OUTPATIENT)
Dept: CARDIOLOGY | Facility: CLINIC | Age: 85
End: 2020-05-24

## 2020-05-24 DIAGNOSIS — I21.02 ACUTE ST ELEVATION MYOCARDIAL INFARCTION (STEMI) INVOLVING LEFT ANTERIOR DESCENDING CORONARY ARTERY (H): ICD-10-CM

## 2020-06-01 ENCOUNTER — RECORDS - HEALTHEAST (OUTPATIENT)
Dept: ADMINISTRATIVE | Facility: OTHER | Age: 85
End: 2020-06-01

## 2020-06-08 ENCOUNTER — RECORDS - HEALTHEAST (OUTPATIENT)
Dept: ADMINISTRATIVE | Facility: OTHER | Age: 85
End: 2020-06-08

## 2020-07-06 ENCOUNTER — RECORDS - HEALTHEAST (OUTPATIENT)
Dept: ADMINISTRATIVE | Facility: OTHER | Age: 85
End: 2020-07-06

## 2020-07-15 ENCOUNTER — COMMUNICATION - HEALTHEAST (OUTPATIENT)
Dept: CARDIOLOGY | Facility: CLINIC | Age: 85
End: 2020-07-15

## 2020-07-15 DIAGNOSIS — I21.02 ACUTE ST ELEVATION MYOCARDIAL INFARCTION (STEMI) INVOLVING LEFT ANTERIOR DESCENDING CORONARY ARTERY (H): ICD-10-CM

## 2020-07-21 ENCOUNTER — RECORDS - HEALTHEAST (OUTPATIENT)
Dept: ADMINISTRATIVE | Facility: OTHER | Age: 85
End: 2020-07-21

## 2020-08-04 ENCOUNTER — AMBULATORY - HEALTHEAST (OUTPATIENT)
Dept: ENDOCRINOLOGY | Facility: CLINIC | Age: 85
End: 2020-08-04

## 2020-08-04 DIAGNOSIS — M81.0 AGE-RELATED OSTEOPOROSIS WITHOUT CURRENT PATHOLOGICAL FRACTURE: ICD-10-CM

## 2020-08-11 ENCOUNTER — AMBULATORY - HEALTHEAST (OUTPATIENT)
Dept: CARDIOLOGY | Facility: CLINIC | Age: 85
End: 2020-08-11

## 2020-08-14 ENCOUNTER — COMMUNICATION - HEALTHEAST (OUTPATIENT)
Dept: CARDIOLOGY | Facility: CLINIC | Age: 85
End: 2020-08-14

## 2020-08-17 ENCOUNTER — AMBULATORY - HEALTHEAST (OUTPATIENT)
Dept: CARDIOLOGY | Facility: CLINIC | Age: 85
End: 2020-08-17

## 2020-08-17 ENCOUNTER — OFFICE VISIT - HEALTHEAST (OUTPATIENT)
Dept: CARDIOLOGY | Facility: CLINIC | Age: 85
End: 2020-08-17

## 2020-08-17 DIAGNOSIS — I21.3 STEMI (ST ELEVATION MYOCARDIAL INFARCTION) (H): ICD-10-CM

## 2020-08-17 DIAGNOSIS — Z11.59 ENCOUNTER FOR SCREENING FOR OTHER VIRAL DISEASES: ICD-10-CM

## 2020-08-17 DIAGNOSIS — I25.10 CORONARY ARTERY DISEASE INVOLVING NATIVE CORONARY ARTERY OF NATIVE HEART WITHOUT ANGINA PECTORIS: ICD-10-CM

## 2020-08-17 DIAGNOSIS — E78.2 MIXED HYPERLIPIDEMIA: ICD-10-CM

## 2020-08-17 DIAGNOSIS — I10 ESSENTIAL HYPERTENSION: ICD-10-CM

## 2020-08-17 DIAGNOSIS — R07.89 ATYPICAL CHEST PAIN: ICD-10-CM

## 2020-08-17 RX ORDER — NITROGLYCERIN 0.4 MG/1
0.4 TABLET SUBLINGUAL EVERY 5 MIN PRN
Qty: 25 TABLET | Refills: 2 | Status: SHIPPED | OUTPATIENT
Start: 2020-08-17

## 2020-08-17 ASSESSMENT — MIFFLIN-ST. JEOR: SCORE: 932.52

## 2020-08-18 ENCOUNTER — AMBULATORY - HEALTHEAST (OUTPATIENT)
Dept: LAB | Facility: CLINIC | Age: 85
End: 2020-08-18

## 2020-08-18 DIAGNOSIS — Z11.59 ENCOUNTER FOR SCREENING FOR OTHER VIRAL DISEASES: ICD-10-CM

## 2020-08-20 ENCOUNTER — COMMUNICATION - HEALTHEAST (OUTPATIENT)
Dept: SCHEDULING | Facility: CLINIC | Age: 85
End: 2020-08-20

## 2020-08-20 ENCOUNTER — SURGERY - HEALTHEAST (OUTPATIENT)
Dept: CARDIOLOGY | Facility: CLINIC | Age: 85
End: 2020-08-20

## 2020-08-20 DIAGNOSIS — I25.10 CORONARY ARTERY DISEASE INVOLVING NATIVE CORONARY ARTERY OF NATIVE HEART WITHOUT ANGINA PECTORIS: ICD-10-CM

## 2020-08-21 ENCOUNTER — SURGERY - HEALTHEAST (OUTPATIENT)
Dept: CARDIOLOGY | Facility: CLINIC | Age: 85
End: 2020-08-21

## 2020-08-21 ASSESSMENT — MIFFLIN-ST. JEOR
SCORE: 908.13
SCORE: 932.97

## 2020-08-22 ASSESSMENT — MIFFLIN-ST. JEOR: SCORE: 922.09

## 2020-08-24 ENCOUNTER — COMMUNICATION - HEALTHEAST (OUTPATIENT)
Dept: CARDIOLOGY | Facility: CLINIC | Age: 85
End: 2020-08-24

## 2020-08-24 ASSESSMENT — MIFFLIN-ST. JEOR
SCORE: 932.52
SCORE: 926.62

## 2020-08-25 ENCOUNTER — SURGERY - HEALTHEAST (OUTPATIENT)
Dept: CARDIOLOGY | Facility: CLINIC | Age: 85
End: 2020-08-25

## 2020-08-27 ENCOUNTER — COMMUNICATION - HEALTHEAST (OUTPATIENT)
Dept: INTERNAL MEDICINE | Facility: CLINIC | Age: 85
End: 2020-08-27

## 2020-08-27 ENCOUNTER — COMMUNICATION - HEALTHEAST (OUTPATIENT)
Dept: NURSING | Facility: CLINIC | Age: 85
End: 2020-08-27

## 2020-08-27 ENCOUNTER — HOSPITAL ENCOUNTER (OUTPATIENT)
Dept: PHARMACY | Facility: OTHER | Age: 85
Discharge: HOME | End: 2020-08-27
Attending: INTERNAL MEDICINE

## 2020-08-27 DIAGNOSIS — I10 ESSENTIAL HYPERTENSION: ICD-10-CM

## 2020-08-27 DIAGNOSIS — K21.9 GASTROESOPHAGEAL REFLUX DISEASE, ESOPHAGITIS PRESENCE NOT SPECIFIED: ICD-10-CM

## 2020-08-27 DIAGNOSIS — I20.89 OTHER FORMS OF ANGINA PECTORIS (H): ICD-10-CM

## 2020-08-27 DIAGNOSIS — M81.0 AGE-RELATED OSTEOPOROSIS WITHOUT CURRENT PATHOLOGICAL FRACTURE: ICD-10-CM

## 2020-08-27 DIAGNOSIS — I25.10 CORONARY ARTERY DISEASE INVOLVING NATIVE CORONARY ARTERY OF NATIVE HEART WITHOUT ANGINA PECTORIS: ICD-10-CM

## 2020-08-27 DIAGNOSIS — B02.9 HERPES ZOSTER WITHOUT COMPLICATION: ICD-10-CM

## 2020-08-27 PROCEDURE — 99605 MTMS BY PHARM NP 15 MIN: CPT | Performed by: PHARMACIST

## 2020-08-27 PROCEDURE — 99607 MTMS BY PHARM ADDL 15 MIN: CPT | Performed by: PHARMACIST

## 2020-09-01 ENCOUNTER — OFFICE VISIT - HEALTHEAST (OUTPATIENT)
Dept: INTERNAL MEDICINE | Facility: CLINIC | Age: 85
End: 2020-09-01

## 2020-09-01 DIAGNOSIS — I20.0 UNSTABLE ANGINA PECTORIS (H): ICD-10-CM

## 2020-09-01 DIAGNOSIS — K21.9 GASTROESOPHAGEAL REFLUX DISEASE, ESOPHAGITIS PRESENCE NOT SPECIFIED: ICD-10-CM

## 2020-09-01 DIAGNOSIS — I25.10 CORONARY ARTERY DISEASE INVOLVING NATIVE CORONARY ARTERY OF NATIVE HEART WITHOUT ANGINA PECTORIS: ICD-10-CM

## 2020-09-01 DIAGNOSIS — M25.561 CHRONIC PAIN OF RIGHT KNEE: ICD-10-CM

## 2020-09-01 DIAGNOSIS — J31.0 CHRONIC RHINITIS: ICD-10-CM

## 2020-09-01 DIAGNOSIS — Z09 HOSPITAL DISCHARGE FOLLOW-UP: ICD-10-CM

## 2020-09-01 DIAGNOSIS — M17.0 BILATERAL PRIMARY OSTEOARTHRITIS OF KNEE: ICD-10-CM

## 2020-09-01 DIAGNOSIS — I21.3 ST ELEVATION MYOCARDIAL INFARCTION (STEMI), UNSPECIFIED ARTERY (H): ICD-10-CM

## 2020-09-01 DIAGNOSIS — I20.89 OTHER FORMS OF ANGINA PECTORIS (H): ICD-10-CM

## 2020-09-01 DIAGNOSIS — I10 ESSENTIAL HYPERTENSION: ICD-10-CM

## 2020-09-01 DIAGNOSIS — G89.29 CHRONIC PAIN OF RIGHT KNEE: ICD-10-CM

## 2020-09-03 ENCOUNTER — HOSPITAL ENCOUNTER (OUTPATIENT)
Dept: PHARMACY | Facility: OTHER | Age: 85
Discharge: HOME | End: 2020-09-03
Attending: PHARMACIST

## 2020-09-03 DIAGNOSIS — B02.9 HERPES ZOSTER WITHOUT COMPLICATION: ICD-10-CM

## 2020-09-03 DIAGNOSIS — M81.0 AGE-RELATED OSTEOPOROSIS WITHOUT CURRENT PATHOLOGICAL FRACTURE: ICD-10-CM

## 2020-09-03 DIAGNOSIS — K21.9 GASTROESOPHAGEAL REFLUX DISEASE, ESOPHAGITIS PRESENCE NOT SPECIFIED: ICD-10-CM

## 2020-09-03 DIAGNOSIS — I10 ESSENTIAL HYPERTENSION: ICD-10-CM

## 2020-09-03 DIAGNOSIS — I25.10 CORONARY ARTERY DISEASE INVOLVING NATIVE CORONARY ARTERY OF NATIVE HEART WITHOUT ANGINA PECTORIS: ICD-10-CM

## 2020-09-03 DIAGNOSIS — I20.89 OTHER FORMS OF ANGINA PECTORIS (H): ICD-10-CM

## 2020-09-03 PROCEDURE — 99606 MTMS BY PHARM EST 15 MIN: CPT | Performed by: PHARMACIST

## 2020-09-09 ENCOUNTER — COMMUNICATION - HEALTHEAST (OUTPATIENT)
Dept: CARDIOLOGY | Facility: CLINIC | Age: 85
End: 2020-09-09

## 2020-09-10 ENCOUNTER — OFFICE VISIT - HEALTHEAST (OUTPATIENT)
Dept: CARDIOLOGY | Facility: CLINIC | Age: 85
End: 2020-09-10

## 2020-09-10 DIAGNOSIS — E78.2 MIXED HYPERLIPIDEMIA: ICD-10-CM

## 2020-09-10 DIAGNOSIS — I25.10 CORONARY ARTERY DISEASE INVOLVING NATIVE CORONARY ARTERY OF NATIVE HEART WITHOUT ANGINA PECTORIS: ICD-10-CM

## 2020-09-10 DIAGNOSIS — I10 ESSENTIAL HYPERTENSION: ICD-10-CM

## 2020-09-10 DIAGNOSIS — F03.90 DEMENTIA (H): ICD-10-CM

## 2020-09-10 ASSESSMENT — MIFFLIN-ST. JEOR: SCORE: 923.45

## 2020-09-18 ENCOUNTER — COMMUNICATION - HEALTHEAST (OUTPATIENT)
Dept: CARDIOLOGY | Facility: CLINIC | Age: 85
End: 2020-09-18

## 2020-09-18 DIAGNOSIS — I25.10 CORONARY ARTERY DISEASE INVOLVING NATIVE CORONARY ARTERY OF NATIVE HEART WITHOUT ANGINA PECTORIS: ICD-10-CM

## 2020-09-18 DIAGNOSIS — Z95.5 S/P PRIMARY ANGIOPLASTY WITH CORONARY STENT: ICD-10-CM

## 2020-09-22 ENCOUNTER — OFFICE VISIT - HEALTHEAST (OUTPATIENT)
Dept: INTERNAL MEDICINE | Facility: CLINIC | Age: 85
End: 2020-09-22

## 2020-09-22 ENCOUNTER — AMBULATORY - HEALTHEAST (OUTPATIENT)
Dept: LAB | Facility: CLINIC | Age: 85
End: 2020-09-22

## 2020-09-22 DIAGNOSIS — M81.0 AGE-RELATED OSTEOPOROSIS WITHOUT CURRENT PATHOLOGICAL FRACTURE: ICD-10-CM

## 2020-09-22 DIAGNOSIS — I21.02 ACUTE ST ELEVATION MYOCARDIAL INFARCTION (STEMI) INVOLVING LEFT ANTERIOR DESCENDING CORONARY ARTERY (H): ICD-10-CM

## 2020-09-22 DIAGNOSIS — Z95.5 S/P PRIMARY ANGIOPLASTY WITH CORONARY STENT: ICD-10-CM

## 2020-09-22 DIAGNOSIS — Z23 IMMUNIZATION DUE: ICD-10-CM

## 2020-09-22 DIAGNOSIS — I25.10 CORONARY ARTERY DISEASE INVOLVING NATIVE CORONARY ARTERY OF NATIVE HEART WITHOUT ANGINA PECTORIS: ICD-10-CM

## 2020-09-22 DIAGNOSIS — M17.0 BILATERAL PRIMARY OSTEOARTHRITIS OF KNEE: ICD-10-CM

## 2020-09-22 DIAGNOSIS — I10 ESSENTIAL HYPERTENSION: ICD-10-CM

## 2020-09-22 DIAGNOSIS — K21.9 GASTROESOPHAGEAL REFLUX DISEASE, ESOPHAGITIS PRESENCE NOT SPECIFIED: ICD-10-CM

## 2020-09-22 DIAGNOSIS — G25.2 RESTING TREMOR: ICD-10-CM

## 2020-09-22 RX ORDER — METOPROLOL SUCCINATE 25 MG/1
25 TABLET, EXTENDED RELEASE ORAL DAILY
Qty: 90 TABLET | Refills: 3 | Status: SHIPPED | OUTPATIENT
Start: 2020-09-22 | End: 2021-11-02

## 2020-09-22 RX ORDER — ISOSORBIDE MONONITRATE 30 MG/1
30 TABLET, EXTENDED RELEASE ORAL EVERY MORNING
Qty: 90 TABLET | Refills: 3 | Status: SHIPPED | OUTPATIENT
Start: 2020-09-22 | End: 2021-10-25

## 2020-09-23 LAB — 25(OH)D3 SERPL-MCNC: 34.5 NG/ML (ref 30–80)

## 2020-09-28 ENCOUNTER — RECORDS - HEALTHEAST (OUTPATIENT)
Dept: ADMINISTRATIVE | Facility: OTHER | Age: 85
End: 2020-09-28

## 2020-10-06 ENCOUNTER — OFFICE VISIT - HEALTHEAST (OUTPATIENT)
Dept: ENDOCRINOLOGY | Facility: CLINIC | Age: 85
End: 2020-10-06

## 2020-10-06 DIAGNOSIS — M81.0 AGE-RELATED OSTEOPOROSIS WITHOUT CURRENT PATHOLOGICAL FRACTURE: ICD-10-CM

## 2020-12-15 ENCOUNTER — COMMUNICATION - HEALTHEAST (OUTPATIENT)
Dept: INTERNAL MEDICINE | Facility: CLINIC | Age: 85
End: 2020-12-15

## 2020-12-15 ENCOUNTER — OFFICE VISIT - HEALTHEAST (OUTPATIENT)
Dept: INTERNAL MEDICINE | Facility: CLINIC | Age: 85
End: 2020-12-15

## 2020-12-15 DIAGNOSIS — I10 ESSENTIAL HYPERTENSION: ICD-10-CM

## 2020-12-15 DIAGNOSIS — M17.0 BILATERAL PRIMARY OSTEOARTHRITIS OF KNEE: ICD-10-CM

## 2020-12-15 DIAGNOSIS — K21.9 GASTROESOPHAGEAL REFLUX DISEASE, UNSPECIFIED WHETHER ESOPHAGITIS PRESENT: ICD-10-CM

## 2020-12-15 DIAGNOSIS — E78.2 MIXED HYPERLIPIDEMIA: ICD-10-CM

## 2020-12-15 DIAGNOSIS — R53.1 GENERALIZED WEAKNESS: ICD-10-CM

## 2020-12-15 DIAGNOSIS — I25.10 CORONARY ARTERY DISEASE INVOLVING NATIVE CORONARY ARTERY OF NATIVE HEART WITHOUT ANGINA PECTORIS: ICD-10-CM

## 2020-12-15 DIAGNOSIS — M81.0 AGE-RELATED OSTEOPOROSIS WITHOUT CURRENT PATHOLOGICAL FRACTURE: ICD-10-CM

## 2020-12-15 LAB
ALBUMIN SERPL-MCNC: 3.9 G/DL (ref 3.5–5)
ALP SERPL-CCNC: 25 U/L (ref 45–120)
ALT SERPL W P-5'-P-CCNC: 14 U/L (ref 0–45)
ANION GAP SERPL CALCULATED.3IONS-SCNC: 13 MMOL/L (ref 5–18)
AST SERPL W P-5'-P-CCNC: 22 U/L (ref 0–40)
BILIRUB SERPL-MCNC: 0.4 MG/DL (ref 0–1)
BUN SERPL-MCNC: 20 MG/DL (ref 8–28)
CALCIUM SERPL-MCNC: 9.4 MG/DL (ref 8.5–10.5)
CHLORIDE BLD-SCNC: 105 MMOL/L (ref 98–107)
CHOLEST SERPL-MCNC: 162 MG/DL
CK SERPL-CCNC: 46 U/L (ref 30–190)
CO2 SERPL-SCNC: 24 MMOL/L (ref 22–31)
CREAT SERPL-MCNC: 0.74 MG/DL (ref 0.6–1.1)
ERYTHROCYTE [DISTWIDTH] IN BLOOD BY AUTOMATED COUNT: 12.3 % (ref 11–14.5)
FASTING STATUS PATIENT QL REPORTED: NO
GFR SERPL CREATININE-BSD FRML MDRD: >60 ML/MIN/1.73M2
GLUCOSE BLD-MCNC: 87 MG/DL (ref 70–125)
HCT VFR BLD AUTO: 44.7 % (ref 35–47)
HDLC SERPL-MCNC: 49 MG/DL
HGB BLD-MCNC: 14.8 G/DL (ref 12–16)
LDLC SERPL CALC-MCNC: 87 MG/DL
MCH RBC QN AUTO: 31.6 PG (ref 27–34)
MCHC RBC AUTO-ENTMCNC: 33.1 G/DL (ref 32–36)
MCV RBC AUTO: 95 FL (ref 80–100)
PLATELET # BLD AUTO: 186 THOU/UL (ref 140–440)
PMV BLD AUTO: 7.3 FL (ref 7–10)
POTASSIUM BLD-SCNC: 4.7 MMOL/L (ref 3.5–5)
PROT SERPL-MCNC: 6.5 G/DL (ref 6–8)
RBC # BLD AUTO: 4.69 MILL/UL (ref 3.8–5.4)
SODIUM SERPL-SCNC: 142 MMOL/L (ref 136–145)
TRIGL SERPL-MCNC: 128 MG/DL
WBC: 8.3 THOU/UL (ref 4–11)

## 2021-01-19 ENCOUNTER — OFFICE VISIT - HEALTHEAST (OUTPATIENT)
Dept: CARDIOLOGY | Facility: CLINIC | Age: 86
End: 2021-01-19

## 2021-01-19 DIAGNOSIS — I25.118 CORONARY ARTERY DISEASE OF NATIVE ARTERY OF NATIVE HEART WITH STABLE ANGINA PECTORIS (H): ICD-10-CM

## 2021-01-19 DIAGNOSIS — I10 ESSENTIAL HYPERTENSION: ICD-10-CM

## 2021-01-19 DIAGNOSIS — R53.83 FATIGUE: ICD-10-CM

## 2021-01-19 DIAGNOSIS — E78.2 MIXED HYPERLIPIDEMIA: ICD-10-CM

## 2021-01-19 ASSESSMENT — MIFFLIN-ST. JEOR: SCORE: 915.28

## 2021-02-05 ENCOUNTER — COMMUNICATION - HEALTHEAST (OUTPATIENT)
Dept: CARDIOLOGY | Facility: CLINIC | Age: 86
End: 2021-02-05

## 2021-02-08 ENCOUNTER — RECORDS - HEALTHEAST (OUTPATIENT)
Dept: ADMINISTRATIVE | Facility: OTHER | Age: 86
End: 2021-02-08

## 2021-02-25 ENCOUNTER — COMMUNICATION - HEALTHEAST (OUTPATIENT)
Dept: CARDIOLOGY | Facility: CLINIC | Age: 86
End: 2021-02-25

## 2021-02-26 ENCOUNTER — OFFICE VISIT - HEALTHEAST (OUTPATIENT)
Dept: CARDIOLOGY | Facility: CLINIC | Age: 86
End: 2021-02-26

## 2021-02-26 DIAGNOSIS — I10 ESSENTIAL HYPERTENSION: ICD-10-CM

## 2021-02-26 DIAGNOSIS — I25.118 CORONARY ARTERY DISEASE OF NATIVE ARTERY OF NATIVE HEART WITH STABLE ANGINA PECTORIS (H): ICD-10-CM

## 2021-02-26 DIAGNOSIS — E78.2 MIXED HYPERLIPIDEMIA: ICD-10-CM

## 2021-02-26 DIAGNOSIS — R53.82 CHRONIC FATIGUE: ICD-10-CM

## 2021-03-08 ENCOUNTER — COMMUNICATION - HEALTHEAST (OUTPATIENT)
Dept: CARDIOLOGY | Facility: CLINIC | Age: 86
End: 2021-03-08

## 2021-03-08 ENCOUNTER — COMMUNICATION - HEALTHEAST (OUTPATIENT)
Dept: INTERNAL MEDICINE | Facility: CLINIC | Age: 86
End: 2021-03-08

## 2021-03-08 DIAGNOSIS — K21.9 GASTROESOPHAGEAL REFLUX DISEASE: ICD-10-CM

## 2021-03-08 DIAGNOSIS — I25.118 CORONARY ARTERY DISEASE OF NATIVE ARTERY OF NATIVE HEART WITH STABLE ANGINA PECTORIS (H): ICD-10-CM

## 2021-03-08 DIAGNOSIS — E78.2 MIXED HYPERLIPIDEMIA: ICD-10-CM

## 2021-03-08 RX ORDER — ATORVASTATIN CALCIUM 40 MG/1
TABLET, FILM COATED ORAL
Qty: 90 TABLET | Refills: 3 | Status: SHIPPED | OUTPATIENT
Start: 2021-03-08 | End: 2022-04-07

## 2021-03-10 ENCOUNTER — COMMUNICATION - HEALTHEAST (OUTPATIENT)
Dept: INTERNAL MEDICINE | Facility: CLINIC | Age: 86
End: 2021-03-10

## 2021-03-10 DIAGNOSIS — K21.9 GASTROESOPHAGEAL REFLUX DISEASE: ICD-10-CM

## 2021-03-15 ENCOUNTER — OFFICE VISIT - HEALTHEAST (OUTPATIENT)
Dept: INTERNAL MEDICINE | Facility: CLINIC | Age: 86
End: 2021-03-15

## 2021-03-15 DIAGNOSIS — D69.2 SENILE PURPURA (H): ICD-10-CM

## 2021-03-15 DIAGNOSIS — F03.90 DEMENTIA WITHOUT BEHAVIORAL DISTURBANCE, UNSPECIFIED DEMENTIA TYPE: ICD-10-CM

## 2021-03-15 DIAGNOSIS — I10 ESSENTIAL HYPERTENSION: ICD-10-CM

## 2021-03-15 DIAGNOSIS — M17.0 BILATERAL PRIMARY OSTEOARTHRITIS OF KNEE: ICD-10-CM

## 2021-03-15 DIAGNOSIS — K21.9 GASTROESOPHAGEAL REFLUX DISEASE, UNSPECIFIED WHETHER ESOPHAGITIS PRESENT: ICD-10-CM

## 2021-03-15 DIAGNOSIS — I25.10 CORONARY ARTERY DISEASE INVOLVING NATIVE CORONARY ARTERY OF NATIVE HEART WITHOUT ANGINA PECTORIS: ICD-10-CM

## 2021-04-02 ENCOUNTER — AMBULATORY - HEALTHEAST (OUTPATIENT)
Dept: INFUSION THERAPY | Facility: HOSPITAL | Age: 86
End: 2021-04-02

## 2021-04-08 ENCOUNTER — AMBULATORY - HEALTHEAST (OUTPATIENT)
Dept: ENDOCRINOLOGY | Facility: CLINIC | Age: 86
End: 2021-04-08

## 2021-04-08 DIAGNOSIS — Z92.29 PERSONAL HISTORY OF OTHER DRUG THERAPY: ICD-10-CM

## 2021-04-08 DIAGNOSIS — M81.0 AGE-RELATED OSTEOPOROSIS WITHOUT CURRENT PATHOLOGICAL FRACTURE: ICD-10-CM

## 2021-04-20 ENCOUNTER — AMBULATORY - HEALTHEAST (OUTPATIENT)
Dept: ENDOCRINOLOGY | Facility: CLINIC | Age: 86
End: 2021-04-20

## 2021-04-20 DIAGNOSIS — M81.0 AGE-RELATED OSTEOPOROSIS WITHOUT CURRENT PATHOLOGICAL FRACTURE: ICD-10-CM

## 2021-05-24 ENCOUNTER — RECORDS - HEALTHEAST (OUTPATIENT)
Dept: ADMINISTRATIVE | Facility: CLINIC | Age: 86
End: 2021-05-24

## 2021-05-25 ENCOUNTER — RECORDS - HEALTHEAST (OUTPATIENT)
Dept: ADMINISTRATIVE | Facility: CLINIC | Age: 86
End: 2021-05-25

## 2021-05-27 NOTE — TELEPHONE ENCOUNTER
Medication Request  Medication name:   isosorbide mononitrate (IMDUR) 30 MG 24 hr tablet (Discontinued) 90 tablet 2 7/3/2018 7/8/2018 No   Sig - Route: Take 1 tablet (30 mg total) by mouth daily. - Oral     Pharmacy Name and Location: [facility]  Reason for request: Not on active medication list - never prescribed by Erika Beck FNP.  When did you use medication last?:  2017  Patient offered appointment:  patient declined  Okay to leave a detailed message: no

## 2021-05-27 NOTE — TELEPHONE ENCOUNTER
----- Message from Henny Miranda MD sent at 4/1/2019  3:32 PM CDT -----  Patient very confused, apparently had left main intervention in [place] a month or so ago.  Not sure of medications needing refills or not, not sure what she is taking.  Is a possible that we can get home health to check things out?  If not, it can we somehow get them to call us when they run out of medications and need refills?  LF

## 2021-05-27 NOTE — PATIENT INSTRUCTIONS - HE
Ms Kaminski SARAH Keller,  I enjoyed visiting with you again today.  I am glad to hear you are doing well.  Per our conversation let us check the stress test to make sure heart is okay.  I will plan on seeing you 6 months.  Christopher Miranda

## 2021-05-27 NOTE — TELEPHONE ENCOUNTER
Called patient to inform her of providers message below. Patient states she was seen at a hospital in [place] for some heart issues and was given the medication. Patient has a plan and is scheduled to see cardiology on Monday. Patient will discuss this medication with Dr. Miranda at that time.

## 2021-05-27 NOTE — TELEPHONE ENCOUNTER
I don't have any record of her being on this medication. If it was prescribed it would have been from cardiology and I don't see it from their notes either.

## 2021-05-27 NOTE — TELEPHONE ENCOUNTER
"Called patient to review results of stress test. Pt and  spoke on the phone with several questions for Dr. Miranda. Pt's  asks if Dr. Miranda reviewed the CDs that he apparently gave to Dr. Miranda during last OV appointment with him 4/1. He says one CD had all the paperwork from the hospital admission in [place] and the other had \"heart imaging.\" Pt and  ask if the clot has disappeared.   Pt also asks if it is OK to discontinue metoprolol based on the findings of the stress test.      Dr. Miranda, pt and  are asking if you have reviewed the CDs that they apparently gave you during 4/1 office visit. I can't find anything under media tab, do you remember these CDs? They are wondering if her \"clot has dissolved.\" Also, 4/1 OV note mentions discontinue metoprolol if stress test is normal, are you Ok with discontinuing? Thanks.  "

## 2021-05-27 NOTE — TELEPHONE ENCOUNTER
Called and informed pt of Dr. Miranda's comments. Pt and  verbalize understanding. They requests refills for atorvastatin and Imdur. Refill sent to preferred pharm. Pt and  decline another office appointment with Dr. Miranda at this time. They believe there questions have been answered.

## 2021-05-27 NOTE — TELEPHONE ENCOUNTER
From: Henny Miranda MD  Sent: 4/11/2019   2:50 PM  To: Cindi Givens RN    Elderly couple that I feel is somewhat confused.  I reviewed CD and paperwork and images with them during that appointment, it is not a blood clot in the left ventricle but a blockage that was stented in [place], that was addressed in [place] and taken care of.  Based on our stress test that blockage is still open and has not recurred, it is then safe to stop metoprolol.  If they do not understand I would be more than happy to see them again in clinic, I believe I have several openings in the next week or so.  Once again, did discuss with them that it was not a clot but a blockage that was addressed by the hospital in [place].  LF

## 2021-05-27 NOTE — PROGRESS NOTES
"Prolia Injection Phone Screen      Screening questions have been asked 2-3 days prior to administration visit for Prolia. If any questions are answered with \"Yes,\" this phone encounter were will routed to ordering provider for further evaluation.     1.  When was the last injection?  9/21/18    2.  Has insurance for this injection been verified?  Yes    3.  Did you experience any new onset achiness or rashes that lasted for over a month with your previous Prolia injection?   No    4.  Do you have a fever over 101?F or a new deep cough that is unusual for you today? No    5.  Have you started any new medications in the last 6 months that you were told could affect your immune system? These may have been prescribed by oncologist, transplant, rheumatology, or dermatology.   No    6.  In the last 6 months have you have gastric bypass or parathyroid surgery?   No    7.  Do you plan dental work requiring drilling into the bone such as implants/extractions or oral surgery in the next 2-3 months?   No    8. Do you have new insurance since the last injection?    Patient informed if symptoms discussed above present prior to their administration appointment, they are to notify clinic immediately.     Darlene Dixon        The following steps were completed to comply with the REMS program for Prolia:  1. Ordering provider has previously reviewed information in the Medication Guide and Patient Counseling Chart, including the serious risks of Prolia  and the symptoms of each risk and have been advised to seek prompt medical attention if they have signs or symptoms of any of the serious risks.  2. Provided each patient a copy of the Medication Guide and Patient Brochure.  See MAR for administration details.   Indication: Prolia  (denosumab) is a prescription medicine used to treat osteoporosis in patients who:   Are at high risk for fracture, meaning patients who have had a fracture related to osteoporosis, or who have multiple " risk factors for fracture; Cannot use another osteoporosis medicine or other osteoporosis medicines did not work well.   The timeline for early/late injections would be 4 weeks early and any time after the 6 month sarwat. If a patient receives their injection late, then the subsequent injection would be 6 months from the date that they actually received the injection    Have the screening questions been asked prior to this administration? Yes    After obtaining consent, and per orders of Dr. Murillo, injection of Prolia 60 mg given by Darlene Dixon. Patient instructed to remain in clinic for 20 minutes afterwards, and to report any adverse reaction to me immediately.

## 2021-05-27 NOTE — PROGRESS NOTES
[facility] Follow-up Note    Assessment & Plan        1. History of coronary artery disease -angiography July 18, 2017 showed normal left main, ostial left anterior descending 99% lesion that received a drug-coated stent, mid left anterior descending 70% lesion that received a drug-coated stent, distal 25% lesion, distal circumflex 25% lesion with a left AV groove 40% lesion, and proximal right coronary artery 20% lesion. She had more chest discomfort in [place] and underwent repeat stress test and angiography suggesting left main disease and apparently received stents into the LAD and circumflex at that time as well.  She is now fatigued and I will repeat stress test and if normal back off on metoprolol.     2. History of ST elevation myocardial infarction (STEMI) -of the anterior wall in July 2017 with normalized ejection fraction.   3. Ischemic cardiomyopathy -as above ejection fraction was 35-40% but mostly C 70% stress test and will repeat stress test.   4. Essential Hypercholesterolemia -no recent lipids and on maximum dose of atorvastatin.   5. Essential hypertension -under good control but given fatigue might consider backing off on medications.   6.  Fatigue-stress testing as above and if normal metoprolol discontinuation.    Plan  1.  Pharmacological nuclear stress test, if significant ischemia angiography.  If normal stop metoprolol.  2.  Follow-up with me in several months  3.  Fasting lipid.  4.  Have my nurse follow-up with phone call considering the confusion.    Subjective  CC: 89-year-old white female here to see me urgently.  Since of seen her she went to [place], apparently had more chest discomfort prompting transfer to angiography capable hospital.  She underwent intervention on left main.  She comes in today not being able to recall whether she is having chest discomfort or not.  She thinks that maybe she does have a tightness, there is no shortness of breath, syncope,  "dizziness or peripheral edema.  They live in senior housing, not assisted but yet making no meals.    Medications  Current Outpatient Medications   Medication Sig     acetaminophen (TYLENOL) 500 MG tablet Take 2 tablets (1,000 mg total) by mouth 3 (three) times a day.     aspirin 81 MG EC tablet Take 1 tablet (81 mg total) by mouth at bedtime. For CAD     atorvastatin (LIPITOR) 80 MG tablet Take 1 tablet (80 mg total) by mouth at bedtime. for cad and lipids     calcium, as carbonate, (OS-MARLYN) 500 mg calcium (1,250 mg) tablet Take 2 tablets by mouth at bedtime.     cetirizine (ZYRTEC) 10 MG tablet Take 10 mg by mouth daily.     isosorbide mononitrate (IMDUR) 30 MG 24 hr tablet Take 30 mg by mouth every morning.     metoprolol succinate (TOPROL-XL) 25 MG Take 0.5 tablets (12.5 mg total) by mouth daily.     nitroglycerin (NITROSTAT) 0.4 MG SL tablet DISSOLVE 1 TABLET UNDER THE TONGUE EVERY 5 MINUTES AS NEEDED FOR CHEST PAIN     OMEGA-3/DHA/EPA/FISH OIL (FISH OIL-OMEGA-3 FATTY ACIDS) 300-1,000 mg capsule Take 1 g by mouth at bedtime.      senna-docusate (PERICOLACE) 8.6-50 mg tablet Take 1 tablet by mouth 2 (two) times a day.     clopidogrel (PLAVIX) 75 mg tablet Take 1 tablet (75 mg total) by mouth daily.       Objective  /58 (Patient Site: Left Arm, Patient Position: Sitting, Cuff Size: Adult Regular)   Pulse 62   Resp 16   Ht 5' 2\" (1.575 m)   Wt 126 lb (57.2 kg)   BMI 23.05 kg/m      General Appearance:    Alert, cooperative, no distress, appears stated age   Head:    Normocephalic, without obvious abnormality, atraumatic   Throat:   Lips, mucosa, and tongue normal; teeth and gums normal   Neck:   Supple, symmetrical, trachea midline, no adenopathy;        thyroid:  No enlargement/tenderness/nodules; no carotid    bruit or JVD   Back:     Symmetric, no curvature, ROM normal, no CVA tenderness   Lungs:     Clear to auscultation bilaterally, respirations unlabored   Chest wall:    No tenderness or deformity "   Heart:    Regular rate and rhythm, S1 and S2 normal, no murmur, rub   or gallop   Abdomen:     Soft, non-tender, bowel sounds active all four quadrants,     no masses, no organomegaly   Extremities:   Normal, atraumatic, no cyanosis or edema   Pulses:   2+ and symmetric all extremities   Skin:   Skin color, texture, turgor normal, no rashes or lesions     Results    Lab Results personally reviewed   Lab Results   Component Value Date    CHOL 180 01/17/2019    CHOL 157 07/19/2017     Lab Results   Component Value Date    HDL 56 01/17/2019    HDL 41 (L) 07/19/2017     Lab Results   Component Value Date    LDLCALC 103 01/17/2019    LDLCALC 91 07/19/2017     Lab Results   Component Value Date    TRIG 103 01/17/2019    TRIG 124 07/19/2017     Lab Results   Component Value Date    WBC 7.6 12/21/2018    HGB 15.3 12/21/2018    HCT 43.6 12/21/2018     12/21/2018     Lab Results   Component Value Date    CREATININE 0.78 12/21/2018    BUN 17 12/21/2018     12/21/2018    K 4.1 12/21/2018    CO2 30 12/21/2018     Review of Systems:   General: WNL  Eyes: WNL  Ears/Nose/Throat: WNL  Lungs: WNL  Heart: Shortness of Breath with activity  Stomach: WNL  Bladder: WNL  Muscle/Joints: WNL  Skin: WNL  Nervous System: Daytime Sleepiness, Dizziness  Mental Health: WNL     Blood: WNL

## 2021-05-27 NOTE — TELEPHONE ENCOUNTER
"Called patient and her  and went over list of cardiac medications that we have on file vs. What Yamilex is taking at home. They confirmed all her medications with the correct dosages. They will call when she needs further refills. They understand that based on the stress test on Monday, we may stop some of her medications. Incidentally, the patient had a very poor night. She woke up between [id number] and has been weak and vomiting. She has not tried anything to drink. She last vomited a little bit ago- \"green bile\". She denies shortness of breath, but reports chest discomfort of 4/10. She does not believe she needs a nitro tablet. She cannot tell if the pain is related to retching and vomiting or cardiac-related. She has not taken her medications this AM. She was encouraged to try a little bit of fluids and to contact her primary care doctor, Erika Beck, to update. Informed them that if she does not tolerate the liquids, she should be seen in either a walk-in clinic or ER due to her cardiac history and to monitor her for any weakness/dehydration. They verbalized understanding. They took down writer's name and number and will be in contact should they have any further questions or concerns. [place] [place]  "

## 2021-05-28 NOTE — TELEPHONE ENCOUNTER
----- Message from Cm Mcdowell sent at 4/26/2019 12:14 PM CDT -----  Contact:   General phone call:    Caller: Pt    Primary cardiologist: Dr Miranda    Detailed reason for call: Pt has some medication questions: seems to be regarding stopping a couple meds: Zantac and Aspirin - wanted a call back to discuss    New or active symptoms? Na    Best phone number: [phone]    Best time to contact: Any    Ok to leave a detailed message? Yes    Device? NA    Additional Info: thank you

## 2021-05-28 NOTE — TELEPHONE ENCOUNTER
Called pt to discuss concerns. Pt states that she was started on Zantac in [place] because they thought her symptoms were related to heart burn. Pt requesting Dr. Miranda's recommendations regarding stopping Zantac therapy since she no longer has these symptoms. Pt also complains of small bruises on her arms and legs and would like to stop her aspirin therapy or possible switch to every other day.         Dr. Miranda, pt requesting your recommendations regarding stopping aspirin and Zantac therapy. Any new recommendations?

## 2021-05-28 NOTE — PROGRESS NOTES
ASSESSMENT AND PLAN:  Yamilex Keller 89 y.o. female is seen here on 04/25/19 for moderately severe exacerbation pain in her right knee.  She can get him back from [place] recently.  She sustained an acute coronary events and had stents put in.  She is now not a candidate for nonsteroidals.  She has osteoarthritis.  1 of the option is to repeat corticosteroid injections, given the success in the past.  This was discussed.  40 mg of Kenalog injected into the right knee anterolateral approach.  She tolerated the procedure well.  She is no longer on prednisone, she used to have GCA.  Return for follow-up as needed.    Diagnoses and all orders for this visit:    Bilateral primary osteoarthritis of knee  -     triamcinolone acetonide 40 mg/mL injection 40 mg (KENALOG-40)    Ischemic cardiomyopathy    History of coronary artery disease          HISTORY OF PRESENTING ILLNESS ON 04/25/19 :  Yamilex Keller 89 y.o. is here for a moderately severe flare up of pain.  Joints affected include the right knee(s). This has gone on for 2 weeks. Pain is described as sharp. It is when active and at night/ wakes from sleep at night.  This is intermittent. Her symptoms are moderate, severe. The symptoms are gradually worsening.  She was in [place].  She had chest pain.  This went on to have 2 more stents put in.  Some modification of the drug regimen.  Symptoms include pain, tenderness to touch, swelling.  Treatment to date has been without significant relief.    Further historical information, including ROS and limitation in activities as noted in the multidimensional health assessment questionnaire scanned in the EMR and in the assessment and plan section.    XR KNEES BILATERAL 1 OR 2 VWS10/19/2016 10:59 AMINDICATION: Bilateral primary osteoarthritis of kneeCOMPARISON: None.FINDINGS: Severe lateral joint space narrowing with subchondral sclerosis and large osteophytes in the right knee. Mild to moderate   narrowing of the medial  patellofemoral compartments as well as all 3 compartments on the left. No fracture, dislocation, or joint effusion on either side.This report was electronically interpreted by: Dr. Kyrie Reed MD ON 10/19/2016 at 14:45    ALLERGIES:Lisinopril and Losartan    PAST MEDICAL/ACTIVE PROBLEMS/MEDICATION/SOCIAL DATA  Past Medical History:   Diagnosis Date     Acute ST elevation myocardial infarction (STEMI) involving left anterior descending coronary artery (H) 2017     Basal Cell Carcinoma Of The Skin      Benign Adenomatous Polyp Of The Large Intestine     Yady Escamilla: colon @ [facility]  '06--recheck 5      Chronic pain of right knee 10/19/2016     Coronary artery disease      Hyperlipidemia      Osteoporosis      Pneumonia      Temporal arteritis (H)      Social History     Tobacco Use   Smoking Status Former Smoker     Packs/day: 1.00     Years: 4.00     Pack years: 4.00     Types: Cigarettes     Last attempt to quit: 1953     Years since quittin.3   Smokeless Tobacco Never Used   Tobacco Comment    Quit 65 yrs     Patient Active Problem List   Diagnosis     Vitamin D Deficiency     Essential Hypercholesterolemia     Osteoporosis     Hearing Loss     GCA (giant cell arteritis) (H)     High risk medication use     Bilateral primary osteoarthritis of knee     Hypercalcemia     Ischemic cardiomyopathy     Angioedema     Abdominal pain, epigastric     Closed wedge compression fracture of seventh thoracic vertebra, initial encounter (H)     Abnormal CT of the abdomen     Sinus bradycardia     Pain     History of ST elevation myocardial infarction (STEMI)     Pyelonephritis     Acute pyelonephritis     Left flank pain     History of coronary artery disease     Giant cell arteritis (H)     Gastritis     Essential hypertension     Current Outpatient Medications   Medication Sig Dispense Refill     acetaminophen (TYLENOL) 500 MG tablet Take 2 tablets (1,000 mg total) by mouth 3 (three) times a day.  0     aspirin 81  MG EC tablet Take 1 tablet (81 mg total) by mouth at bedtime. For CAD 30 tablet 0     atorvastatin (LIPITOR) 80 MG tablet TAKE 1 TABLET AT BEDTIME FOR CORONARY ARTERY DISEASE AND LIPIDS 90 tablet 3     calcium, as carbonate, (OS-MARLYN) 500 mg calcium (1,250 mg) tablet Take 2 tablets by mouth at bedtime.       cetirizine (ZYRTEC) 10 MG tablet Take 10 mg by mouth daily.  0     clopidogrel (PLAVIX) 75 mg tablet Take 1 tablet (75 mg total) by mouth daily. 90 tablet 2     isosorbide mononitrate (IMDUR) 30 MG 24 hr tablet Take 1 tablet (30 mg total) by mouth every morning. 90 tablet 3     nitroglycerin (NITROSTAT) 0.4 MG SL tablet DISSOLVE 1 TABLET UNDER THE TONGUE EVERY 5 MINUTES AS NEEDED FOR CHEST PAIN 3 Bottle 2     OMEGA-3/DHA/EPA/FISH OIL (FISH OIL-OMEGA-3 FATTY ACIDS) 300-1,000 mg capsule Take 1 g by mouth at bedtime.        ranitidine (ZANTAC) 75 MG tablet Take 75 mg by mouth at bedtime.       senna-docusate (PERICOLACE) 8.6-50 mg tablet Take 1 tablet by mouth 2 (two) times a day.  0     Current Facility-Administered Medications   Medication Dose Route Frequency Provider Last Rate Last Dose     denosumab 60 mg (PROLIA 60 mg/ml)  60 mg Subcutaneous Q6 Months Ravi Murillo MD   60 mg at 03/27/19 1103     triamcinolone acetonide 40 mg/mL injection 40 mg (KENALOG-40)  40 mg Intra-articular Once Ana Govea MBBS         DETAILED EXAMINATION  04/25/19  :  Vitals:    04/25/19 1522   BP: 120/70   Patient Site: Right Arm   Patient Position: Sitting   Cuff Size: Adult Regular   Pulse: 80   Weight: 126 lb (57.2 kg)     Alert oriented. Head including the face is examined for malar rash, heliotropes, scarring, lupus pernio. Eyes examined for redness such as in episcleritis/scleritis, periorbital lesions.   Neck/ Face examined for parotid gland swelling, range of motion of neck.  Left upper and lower and right upper and lower extremities examined for tenderness, swelling, warmth of the appendicular joints, range of  motion, edema, rash.  Some of the important findings included: Right knee is warmer than the left as bony hypertrophy compared to the left, she has widespread Heberden's Ron's.          LAB / IMAGING DATA:  ALT   Date Value Ref Range Status   09/11/2018 12 0 - 45 U/L Final   08/24/2018 <9 0 - 45 U/L Final   07/08/2018 10 0 - 45 U/L Final     Albumin   Date Value Ref Range Status   09/11/2018 3.1 (L) 3.5 - 5.0 g/dL Final   08/24/2018 3.5 3.5 - 5.0 g/dL Final   07/08/2018 3.5 3.5 - 5.0 g/dL Final     Creatinine   Date Value Ref Range Status   12/21/2018 0.78 0.60 - 1.10 mg/dL Final   09/11/2018 0.66 0.60 - 1.10 mg/dL Final   09/10/2018 0.71 0.60 - 1.10 mg/dL Final       WBC   Date Value Ref Range Status   12/21/2018 7.6 4.0 - 11.0 thou/uL Final   09/11/2018 6.4 4.0 - 11.0 thou/uL Final     Hemoglobin   Date Value Ref Range Status   12/21/2018 15.3 12.0 - 16.0 g/dL Final   09/11/2018 12.3 12.0 - 16.0 g/dL Final   09/10/2018 13.2 12.0 - 16.0 g/dL Final     Platelets   Date Value Ref Range Status   12/21/2018 222 140 - 440 thou/uL Final   09/11/2018 189 140 - 440 thou/uL Final   09/10/2018 208 140 - 440 thou/uL Final       Lab Results   Component Value Date    SEDRATE 3 05/08/2017

## 2021-05-28 NOTE — TELEPHONE ENCOUNTER
--- Message -----  From: Henny Miranda MD  Sent: 4/29/2019   5:38 PM  To: Bernadine Tobin RN    Great as long as record reflects.  LF

## 2021-05-28 NOTE — TELEPHONE ENCOUNTER
-- Message -----  From: Henny Miranda MD  Sent: 4/26/2019   3:38 PM  To: Cindi Givens RN    Interesting situation.  This lady lives with her  and I think their memories are fading.  She just had intervention in [place] of the left main and was placed on aspirin and ticagrelor.  We had to confirm she is taking her ticagrelor twice a day, if still is in yes I have no problem going to aspirin every other day.  She cannot stop either of these for a year however.  As far as the Zantac, if the medication helped her stomach and it is better I strongly recommend staying on it, I am concerned that she needs some sort of an antacid given her age and the fact that she is now on aspirin and ticagrelor.  With strongly recommend she associate with the primary care physician who can address the stomach issues.  Please confirm taking ticagrelor.  LF        Called patient and her ; confirmed she is taking plavix daily. Passed along that patient should continue Zantac but consult with PCP. Stated that she can go to [facility] every other day to help with bruising. Pt verbalized understanding. [place] updated. [place] [place]

## 2021-05-30 VITALS — BODY MASS INDEX: 26.03 KG/M2 | WEIGHT: 149.3 LBS

## 2021-05-30 NOTE — PROGRESS NOTES
ASSESSMENT AND PLAN:  Yamilex Keller 89 y.o. female is seen here on 07/29/19.  She has exacerbation pain in her right knee.  And given the coronary artery disease and other comorbidities not a candidate for nonsteroidals.  Other options including viscosupplementation considered.  She would like to proceed with corticosteroid injection done with 40 mg of Kenalog anterolateral approach..  Follow-up as needed.       Diagnoses and all orders for this visit:    Bilateral primary osteoarthritis of knee  -     triamcinolone acetonide 40 mg/mL injection 40 mg (KENALOG-40)    History of coronary artery disease    Ischemic cardiomyopathy          HISTORY OF PRESENTING ILLNESS ON 07/29/19 :  Yamilex Keller 89 y.o. is here for a moderately severe flare up of pain.  Joints affected include the right knee(s). This has gone on for 6 weeks. Pain is described as sharp. It is when active and at night/ wakes from sleep at night.  This is intermittent. Her symptoms are moderate, severe. The symptoms are gradually worsening.   Symptoms include pain, tenderness to touch, swelling.  Treatment to date has been without significant relief.    Further historical information, including ROS and limitation in activities as noted in the multidimensional health assessment questionnaire scanned in the EMR and in the assessment and plan section.    XR KNEES BILATERAL 1 OR 2 VWS10/19/2016 10:59 AMINDICATION: Bilateral primary osteoarthritis of kneeCOMPARISON: None.FINDINGS: Severe lateral joint space narrowing with subchondral sclerosis and large osteophytes in the right knee. Mild to moderate   narrowing of the medial patellofemoral compartments as well as all 3 compartments on the left. No fracture, dislocation, or joint effusion on either side.This report was electronically interpreted by: Dr. Kyrie Reed MD ON 10/19/2016 at 14:45    ALLERGIES:Lisinopril and Losartan    PAST MEDICAL/ACTIVE PROBLEMS/MEDICATION/SOCIAL DATA  Past Medical  History:   Diagnosis Date     Acute ST elevation myocardial infarction (STEMI) involving left anterior descending coronary artery (H) 2017     Basal Cell Carcinoma Of The Skin      Benign Adenomatous Polyp Of The Large Intestine     Yady Escamilla: colon @ [place]  '06--recheck 5      Chronic pain of right knee 10/19/2016     Coronary artery disease      Hyperlipidemia      Osteoporosis      Pneumonia      Temporal arteritis (H)      Social History     Tobacco Use   Smoking Status Former Smoker     Packs/day: 1.00     Years: 4.00     Pack years: 4.00     Types: Cigarettes     Last attempt to quit: 1953     Years since quittin.6   Smokeless Tobacco Never Used   Tobacco Comment    Quit 65 yrs     Patient Active Problem List   Diagnosis     Vitamin D Deficiency     Essential Hypercholesterolemia     Osteoporosis     Hearing Loss     High risk medication use     Bilateral primary osteoarthritis of knee     Hypercalcemia     Ischemic cardiomyopathy     Angioedema     Abdominal pain, epigastric     Closed wedge compression fracture of seventh thoracic vertebra, initial encounter (H)     Abnormal CT of the abdomen     Sinus bradycardia     Pain     History of ST elevation myocardial infarction (STEMI)     Pyelonephritis     Acute pyelonephritis     Left flank pain     History of coronary artery disease     Gastritis     Essential hypertension     Current Outpatient Medications   Medication Sig Dispense Refill     aspirin 81 MG EC tablet Take 1 tablet (81 mg total) by mouth at bedtime. For CAD 30 tablet 0     atorvastatin (LIPITOR) 80 MG tablet TAKE 1 TABLET AT BEDTIME FOR CORONARY ARTERY DISEASE AND LIPIDS 90 tablet 3     calcium, as carbonate, (OS-MARLYN) 500 mg calcium (1,250 mg) tablet Take 2 tablets by mouth at bedtime.       cetirizine (ZYRTEC) 10 MG tablet Take 10 mg by mouth daily.  0     clopidogrel (PLAVIX) 75 mg tablet Take 1 tablet (75 mg total) by mouth daily. 90 tablet 2     isosorbide mononitrate  (IMDUR) 30 MG 24 hr tablet Take 1 tablet (30 mg total) by mouth every morning. 90 tablet 3     nitroglycerin (NITROSTAT) 0.4 MG SL tablet DISSOLVE 1 TABLET UNDER THE TONGUE EVERY 5 MINUTES AS NEEDED FOR CHEST PAIN 3 Bottle 2     OMEGA-3/DHA/EPA/FISH OIL (FISH OIL-OMEGA-3 FATTY ACIDS) 300-1,000 mg capsule Take 1 g by mouth at bedtime.        ranitidine (ZANTAC) 75 MG tablet Take 75 mg by mouth at bedtime.       senna-docusate (PERICOLACE) 8.6-50 mg tablet Take 1 tablet by mouth 2 (two) times a day.  0     Current Facility-Administered Medications   Medication Dose Route Frequency Provider Last Rate Last Dose     denosumab 60 mg (PROLIA 60 mg/ml)  60 mg Subcutaneous Q6 Months Ravi Murillo MD   60 mg at 03/27/19 1103     triamcinolone acetonide 40 mg/mL injection 40 mg (KENALOG-40)  40 mg Intra-articular Once Ana Govea MBBS         DETAILED EXAMINATION  07/29/19  :  Vitals:    07/29/19 1232   BP: 124/70   Patient Site: Right Arm   Patient Position: Sitting   Cuff Size: Adult Regular   Pulse: 84   Weight: 125 lb (56.7 kg)     Alert oriented. Head including the face is examined for malar rash, heliotropes, scarring, lupus pernio. Eyes examined for redness such as in episcleritis/scleritis, periorbital lesions.   Neck/ Face examined for parotid gland swelling, range of motion of neck.  Left upper and lower and right upper and lower extremities examined for tenderness, swelling, warmth of the appendicular joints, range of motion, edema, rash.  Some of the important findings included: she does not have evidence of synovitis in the palpable joints of the upper extremities.  No significant deformities of the digits.  + [place]berden nodes.  Range of motion of the shoulders show full abduction.  No JLT effusion or warmth of the knees.  Right knee is warmer than the left, JLT especially laterally.               LAB / IMAGING DATA:  ALT   Date Value Ref Range Status   09/11/2018 12 0 - 45 U/L Final   08/24/2018 <9 0 - 45  U/L Final   07/08/2018 10 0 - 45 U/L Final     Albumin   Date Value Ref Range Status   09/11/2018 3.1 (L) 3.5 - 5.0 g/dL Final   08/24/2018 3.5 3.5 - 5.0 g/dL Final   07/08/2018 3.5 3.5 - 5.0 g/dL Final     Creatinine   Date Value Ref Range Status   12/21/2018 0.78 0.60 - 1.10 mg/dL Final   09/11/2018 0.66 0.60 - 1.10 mg/dL Final   09/10/2018 0.71 0.60 - 1.10 mg/dL Final       WBC   Date Value Ref Range Status   12/21/2018 7.6 4.0 - 11.0 thou/uL Final   09/11/2018 6.4 4.0 - 11.0 thou/uL Final     Hemoglobin   Date Value Ref Range Status   12/21/2018 15.3 12.0 - 16.0 g/dL Final   09/11/2018 12.3 12.0 - 16.0 g/dL Final   09/10/2018 13.2 12.0 - 16.0 g/dL Final     Platelets   Date Value Ref Range Status   12/21/2018 222 140 - 440 thou/uL Final   09/11/2018 189 140 - 440 thou/uL Final   09/10/2018 208 140 - 440 thou/uL Final       Lab Results   Component Value Date    SEDRATE 3 05/08/2017

## 2021-05-30 NOTE — PATIENT INSTRUCTIONS - HE
Ms Kaminski SARAH Keller,  I enjoyed visiting with you again today.  I am glad to hear you are doing well.  Per our conversation given the heart stent in February you need to stay on the PLAVIX at least until February 2020. Continue the ASPIRIN every other day.  I will plan on seeing you in about 6 months or so.  Christopher Miranda

## 2021-05-30 NOTE — PROGRESS NOTES
[facility] Follow-up Note    Assessment & Plan        1. History of coronary artery disease -angiography July 18, 2017 showed normal left main, ostial left anterior descending 99% lesion that received a drug-coated stent, mid left anterior descending 70% lesion that received a drug-coated stent, distal 25% lesion, distal circumflex 25% lesion with a left AV groove 40% lesion, and proximal right coronary artery 20% lesion. She had more chest discomfort in [place] in February 2018 and underwent repeat stress test showing anterior ischemia and angiography suggesting left main disease and apparently received stents into the LAD and circumflex at that time as well.  She is here today to try to get off some dual antiplatelet therapy secondary to bruising and we discussed the need to continue aspirin every other day and Plavix until February.  We did repeat stress test in April 2019 which showed no ischemia or scar with preserved ejection fraction 70%.   2. Essential hypertension -borderline elevated and given her history and age will leave medications stable.   3. Essential Hypercholesterolemia - from January of this year and on maximum dose of atorvastatin.  If she has a recurrent event would add Zetia.   4. Ischemic cardiomyopathy -no cardiomyopathy since ejection fraction is preserved at 70%.     Plan  1.  Come to medical attention should she have more chest discomfort.  2.  Aspirin every other day and continue Plavix daily until February.  3.  Follow-up with me in about 6 months which will be January just prior before stopping Plavix.    Subjective  CC: 89-year-old white female made an urgent appointment to see me today to discontinue her meds secondary to peripheral bruising.  She is here alone, her  is in a another appointment.  She thinks she may have had chest discomfort in the last month or so, cannot remember the details but tells me she took 2 nitros when she was sitting down.   "She is living apparently with her , but tells me she is not cooking anymore and to go out for meals or go downstairs to eat meals.  Does not seem to be assisted living.  In general, she has bruising and that is the reason for appointment today.  She does not think she is short of breath, denies any PND, denies any orthopnea, denies any syncope, denies any dizziness, denies any ongoing chest discomfort.    Medications  Current Outpatient Medications   Medication Sig     aspirin 81 MG EC tablet Take 1 tablet (81 mg total) by mouth at bedtime. For CAD     atorvastatin (LIPITOR) 80 MG tablet TAKE 1 TABLET AT BEDTIME FOR CORONARY ARTERY DISEASE AND LIPIDS     calcium, as carbonate, (OS-MARLYN) 500 mg calcium (1,250 mg) tablet Take 2 tablets by mouth at bedtime.     clopidogrel (PLAVIX) 75 mg tablet Take 1 tablet (75 mg total) by mouth daily.     isosorbide mononitrate (IMDUR) 30 MG 24 hr tablet Take 1 tablet (30 mg total) by mouth every morning.     nitroglycerin (NITROSTAT) 0.4 MG SL tablet DISSOLVE 1 TABLET UNDER THE TONGUE EVERY 5 MINUTES AS NEEDED FOR CHEST PAIN     OMEGA-3/DHA/EPA/FISH OIL (FISH OIL-OMEGA-3 FATTY ACIDS) 300-1,000 mg capsule Take 1 g by mouth at bedtime.      ranitidine (ZANTAC) 75 MG tablet Take 75 mg by mouth at bedtime.     cetirizine (ZYRTEC) 10 MG tablet Take 10 mg by mouth daily.     senna-docusate (PERICOLACE) 8.6-50 mg tablet Take 1 tablet by mouth 2 (two) times a day.       Objective  /70 (Patient Site: Right Arm, Patient Position: Sitting, Cuff Size: Adult Small)   Pulse 74   Resp 16   Ht 5' 2\" (1.575 m)   Wt 125 lb (56.7 kg)   BMI 22.86 kg/m      General Appearance:    Alert, cooperative, no distress, appears stated age   Head:    Normocephalic, without obvious abnormality, atraumatic   Throat:   Lips, mucosa, and tongue normal; teeth and gums normal   Neck:   Supple, symmetrical, trachea midline, no adenopathy;        thyroid:  No enlargement/tenderness/nodules; no " carotid    bruit or JVD   Back:     Symmetric, no curvature, ROM normal, no CVA tenderness   Lungs:     Clear to auscultation bilaterally, respirations unlabored   Chest wall:    No tenderness or deformity   Heart:    Regular rate and rhythm, S1 and S2 normal, no murmur, rub   or gallop   Abdomen:     Soft, non-tender, bowel sounds active all four quadrants,     no masses, no organomegaly   Extremities:   Normal, atraumatic, no cyanosis or edema   Pulses:   2+ and symmetric all extremities   Skin:   Skin color, texture, turgor normal, no rashes or lesions     Results    Lab Results personally reviewed   Lab Results   Component Value Date    CHOL 180 01/17/2019    CHOL 157 07/19/2017     Lab Results   Component Value Date    HDL 56 01/17/2019    HDL 41 (L) 07/19/2017     Lab Results   Component Value Date    LDLCALC 103 01/17/2019    LDLCALC 91 07/19/2017     Lab Results   Component Value Date    TRIG 103 01/17/2019    TRIG 124 07/19/2017     Lab Results   Component Value Date    WBC 7.6 12/21/2018    HGB 15.3 12/21/2018    HCT 43.6 12/21/2018     12/21/2018     Lab Results   Component Value Date    CREATININE 0.78 12/21/2018    BUN 17 12/21/2018     12/21/2018    K 4.1 12/21/2018    CO2 30 12/21/2018     Review of Systems:   General: WNL  Eyes: WNL  Ears/Nose/Throat: WNL  Lungs: WNL  Heart: WNL  Stomach: WNL     Muscle/Joints: WNL  Skin: WNL  Nervous System: WNL  Mental Health: WNL     Blood: Easy Bruising

## 2021-05-31 ENCOUNTER — RECORDS - HEALTHEAST (OUTPATIENT)
Dept: ADMINISTRATIVE | Facility: CLINIC | Age: 86
End: 2021-05-31

## 2021-05-31 VITALS — WEIGHT: 129 LBS | HEIGHT: 64 IN | BODY MASS INDEX: 22.02 KG/M2

## 2021-05-31 VITALS — WEIGHT: 145.7 LBS | BODY MASS INDEX: 25.81 KG/M2

## 2021-05-31 VITALS — BODY MASS INDEX: 25.16 KG/M2 | HEIGHT: 63 IN | WEIGHT: 142 LBS

## 2021-05-31 VITALS — BODY MASS INDEX: 23.83 KG/M2 | HEIGHT: 64 IN | WEIGHT: 139.6 LBS

## 2021-05-31 VITALS — BODY MASS INDEX: 26.13 KG/M2 | WEIGHT: 147.5 LBS | HEIGHT: 63 IN

## 2021-05-31 VITALS — WEIGHT: 148 LBS | BODY MASS INDEX: 25.4 KG/M2

## 2021-05-31 VITALS — HEIGHT: 63 IN | WEIGHT: 135 LBS | BODY MASS INDEX: 23.92 KG/M2

## 2021-05-31 VITALS — BODY MASS INDEX: 22.98 KG/M2 | HEIGHT: 64 IN | WEIGHT: 134.6 LBS

## 2021-05-31 VITALS — HEIGHT: 63 IN | WEIGHT: 140 LBS | BODY MASS INDEX: 24.8 KG/M2

## 2021-05-31 VITALS — WEIGHT: 146.6 LBS | BODY MASS INDEX: 25.03 KG/M2 | HEIGHT: 64 IN

## 2021-05-31 VITALS — BODY MASS INDEX: 24.1 KG/M2 | WEIGHT: 136 LBS | HEIGHT: 63 IN

## 2021-05-31 VITALS — WEIGHT: 145.7 LBS | BODY MASS INDEX: 25.01 KG/M2

## 2021-05-31 NOTE — TELEPHONE ENCOUNTER
Pt has an appt with Lidia in Jan, since we do not know when or if he will be back, we should call and inform pt of Lidia, see if he wants to schedule else where or see CECILIA Valle,  and schedule prolia 9/28/19 or after.

## 2021-06-01 VITALS — WEIGHT: 125 LBS | BODY MASS INDEX: 22.15 KG/M2 | HEIGHT: 63 IN

## 2021-06-01 VITALS — BODY MASS INDEX: 21.51 KG/M2 | WEIGHT: 121.4 LBS

## 2021-06-01 VITALS — WEIGHT: 129.25 LBS | BODY MASS INDEX: 22.54 KG/M2

## 2021-06-01 VITALS — WEIGHT: 129 LBS | HEIGHT: 64 IN | BODY MASS INDEX: 22.02 KG/M2

## 2021-06-01 VITALS — BODY MASS INDEX: 21.45 KG/M2 | WEIGHT: 123 LBS

## 2021-06-01 VITALS — BODY MASS INDEX: 22.14 KG/M2 | WEIGHT: 125 LBS

## 2021-06-01 VITALS — BODY MASS INDEX: 23.04 KG/M2 | WEIGHT: 130 LBS | HEIGHT: 63 IN

## 2021-06-01 VITALS — BODY MASS INDEX: 21.45 KG/M2 | HEIGHT: 64 IN

## 2021-06-01 NOTE — TELEPHONE ENCOUNTER
Please call patient -    ______________________________________________________________________     Home phone:  [phone] (home)     Cell phone:   No relevant phone numbers on file.       Other contacts:  Name Home Phone Work Phone Mobile Phone Relationship Lgl Grd   CLAUDIA WOODS [phone] Spouse    ANDRES ALANIS   [phone] Child      ______________________________________________________________________     Your blood counts are normal and you are not anemic.     Your markers of inflammation were normal.  This suggests she DOES NOT need to take the prednisone at this time.    Your kidney tests are normal.  Your electrolytes are also normal.  There is no signs of diabetes.  Your liver tests are normal.      Your blood counts are normal and you are not anemic.     Your cholesterol is doing well.     Please follow up with me again on 10/4/2019 as you have already scheduled.     Michael Arzate MD  [facility]  9/24/2019, 10:36 PM     ______________________________________________________________________    Recent Results (from the past 240 hour(s))   HM2(CBC w/o Differential)   Result Value Ref Range    WBC 5.9 4.0 - 11.0 thou/uL    RBC 4.71 3.80 - 5.40 mill/uL    Hemoglobin 14.7 12.0 - 16.0 g/dL    Hematocrit 44.0 35.0 - 47.0 %    MCV 93 80 - 100 fL    MCH 31.3 27.0 - 34.0 pg    MCHC 33.5 32.0 - 36.0 g/dL    RDW 13.4 11.0 - 14.5 %    Platelets 202 140 - 440 thou/uL    MPV 7.0 7.0 - 10.0 fL   Cortisol   Result Value Ref Range    Cortisol 9.9 ug/dL   Sedimentation Rate   Result Value Ref Range    Sed Rate 6 0 - 20 mm/hr   C-Reactive Protein   Result Value Ref Range    CRP 0.2 0.0 - 0.8 mg/dL   Comprehensive Metabolic Panel   Result Value Ref Range    Sodium 142 136 - 145 mmol/L    Potassium 4.4 3.5 - 5.0 mmol/L    Chloride 104 98 - 107 mmol/L    CO2 29 22 - 31 mmol/L    Anion Gap, Calculation 9 5 - 18 mmol/L    Glucose 91 70 - 125 mg/dL    BUN 17 8 - 28 mg/dL    Creatinine  0.72 0.60 - 1.10 mg/dL    GFR MDRD Af Amer >60 >60 mL/min/1.73m2    GFR MDRD Non Af Amer >60 >60 mL/min/1.73m2    Bilirubin, Total 0.4 0.0 - 1.0 mg/dL    Calcium 10.0 8.5 - 10.5 mg/dL    Protein, Total 6.2 6.0 - 8.0 g/dL    Albumin 3.6 3.5 - 5.0 g/dL    Alkaline Phosphatase 26 (L) 45 - 120 U/L    AST 21 0 - 40 U/L    ALT 12 0 - 45 U/L   Lipid Cascade RANDOM   Result Value Ref Range    Cholesterol 163 <=199 mg/dL    Triglycerides 112 <=149 mg/dL    HDL Cholesterol 55 >=50 mg/dL    LDL Calculated 86 <=129 mg/dL    Patient Fasting > 8hrs? Unknown        ______________________________________________________________________

## 2021-06-01 NOTE — PROGRESS NOTES
"Prolia Injection Phone Screen      Screening questions have been asked 2-3 days prior to administration visit for Prolia. If any questions are answered with \"Yes,\" this phone encounter were will routed to ordering provider for further evaluation.     1.  When was the last injection?  3/27/19    2.  Has insurance for this injection been verified?  Yes    3.  Did you experience any new onset achiness or rashes that lasted for over a month with your previous Prolia injection?   No    4.  Do you have a fever over 101?F or a new deep cough that is unusual for you today? No    5.  Have you started any new medications in the last 6 months that you were told could affect your immune system? These may have been prescribed by oncologist, transplant, rheumatology, or dermatology.   No    6.  In the last 6 months have you have gastric bypass or parathyroid surgery?   No    7.  Do you plan dental work requiring drilling into the bone such as implants/extractions or oral surgery in the next 2-3 months?   No    8. Do you have new insurance since the last injection?    Patient informed if symptoms discussed above present prior to their administration appointment, they are to notify clinic immediately.     Darlene Dixon            Prolia Injection Phone Screen      Screening questions have been asked 2-3 days prior to administration visit for Prolia. If any questions are answered with \"Yes,\" this phone           After obtaining consent, and per orders of Rocío Sainz NP, injection of Prolia 60 mg given by Darlene Dixon. Patient instructed to remain in clinic for 20 minutes afterwards, and to report any adverse reaction to me immediately.    "

## 2021-06-01 NOTE — TELEPHONE ENCOUNTER
Tori patient    Patient would like a call back from CSS to discuss previous symptoms she was having and if it was due to what she was seen for yesterday. She would prefer to speak with CSS directly and ask questions.     Yamilex @ [phone]

## 2021-06-01 NOTE — TELEPHONE ENCOUNTER
Who is calling:  Spouse, Martín  Reason for Call:  Patient currently scheduled for appt 10/04/19, 11:20 AM. They need to change it to an afternoon appointment, sooner than the next available  10/29/19.  Date of last appointment with primary care: n/a  Okay to leave a detailed message: Yes

## 2021-06-01 NOTE — TELEPHONE ENCOUNTER
I called the pt, she is calling about her arteritis. I did some assessment, however I did inform that Rheumatology is the specialist for this. The pt states that for the last ~ 2 wks she has been experiencing intermittent Jaw discomfort/difficulty chewing harder items (the example she provided was [employer]), fuzzy vision, and the feeling like something is resting on her temples. The pt was off prednisone for some time, however was back on 2.5 mg of prednisone a few wks ago, then had labs, and was told she did not need the prednisone. I informed the pt that I will review with Dr Govea's nurse and contact her back.

## 2021-06-01 NOTE — TELEPHONE ENCOUNTER
Pt states she has headaches off and on from time to time but can't really remember how it feels at this time but different than a regular headache. Pt denies any new vision changes, just sometimes fuzzy when looking at song screens at [employer] but states this is not new. Pts  said pt had her eyes last examined in April 2019. Pt states she has jaw pain when she eats the bread at [employer] but doesn't have jaw pain when eating any other foods. Pt had labs last week and saw Dr Arzate, who advised pt did not need prednisone, at that time, CRP and ESR were within normal range. Pt has appt with Dr Arzate again on Monday 10/7. Explained to pt that she could come in to have labs- CRP and ESR repeated but pt states she did not feel this was necessary as her symptoms have not changed since last week. Pt has not been on prednisone for a while but is not sure when she for sure stopped taking it. Pt has appt for labs on 10/14 and appt with Dr Govea on 10/30. Offered to move her appt with Dr Govea up but pt feels this is not needed at this time either. I explained to pt and her  that should pts symptoms change- headaches get worse or more often, jaw pain or change in vision, pt should call us sooner and let Dr Govea know as she may need to be seen sooner or repeat labs. Pt and her  verbalized understanding and agreed to do so.

## 2021-06-02 ENCOUNTER — RECORDS - HEALTHEAST (OUTPATIENT)
Dept: ADMINISTRATIVE | Facility: CLINIC | Age: 86
End: 2021-06-02

## 2021-06-02 VITALS — BODY MASS INDEX: 23.22 KG/M2 | HEIGHT: 62 IN | WEIGHT: 126.2 LBS

## 2021-06-02 VITALS — WEIGHT: 123 LBS | BODY MASS INDEX: 21 KG/M2 | HEIGHT: 64 IN

## 2021-06-02 VITALS — WEIGHT: 124 LBS | BODY MASS INDEX: 21.62 KG/M2

## 2021-06-02 VITALS — BODY MASS INDEX: 21.27 KG/M2 | WEIGHT: 122 LBS

## 2021-06-02 VITALS — BODY MASS INDEX: 23.19 KG/M2 | HEIGHT: 62 IN | WEIGHT: 126 LBS

## 2021-06-02 NOTE — TELEPHONE ENCOUNTER
My use a reserved slot for this.    Michael Arzate MD  General Internal Medicine  [facility]  10/15/2019, 10:57 AM

## 2021-06-02 NOTE — PROGRESS NOTES
ASSESSMENT AND PLAN:  Yamilex Keller 89 y.o. female is seen here on 10/30/19.  She has been hurting more in her right knee.  Corticosteroid injections have failed to provide her any relief during the past 2 visits, prior to that many years ago she recalls having had Visco supplementation which was of no use either.  Her options were discussed.  Given her coronary artery disease and other factors nonsteroidals on a regular basis would not be an option for her.  One option for her is to consider hemiarthroplasty, if that is feasible for ultimate involvement. Another one that she can consider is duloxetine.  Major side effects, literature provided.  20 mg to begin with.  If she did demonstrates tolerability depending upon her response to the dose may be increased in future.  She is to follow-up here in 3 months or sooner.            Diagnoses and all orders for this visit:    Bilateral primary osteoarthritis of knee  -     DULoxetine (CYMBALTA) 20 MG capsule; Take 1 capsule (20 mg total) by mouth daily.  Dispense: 30 capsule; Refill: 2  -     Ambulatory referral to Orthopedic Surgery    Coronary artery disease involving native coronary artery of native heart without angina pectoris    Chronic pain of right knee  -     DULoxetine (CYMBALTA) 20 MG capsule; Take 1 capsule (20 mg total) by mouth daily.  Dispense: 30 capsule; Refill: 2  -     Ambulatory referral to Orthopedic Surgery          HISTORY OF PRESENTING ILLNESS ON 10/30/19 :  Yamilex Keller 89 y.o. is here for a moderately severe flare up of pain.  Joints affected include the right knee(s). This has gone on for 3 months, after the most recent injection there was very little better so it has been 2 rounds where she has had very little improvement.  Pain is described as sharp. It is when active and at night/ wakes from sleep at night.  This is intermittent. Her symptoms are moderate, severe. The symptoms are gradually worsening.   Symptoms include pain,  tenderness to touch, swelling.  Treatment to date has been without significant relief.    Further historical information, including ROS and limitation in activities as noted in the multidimensional health assessment questionnaire scanned in the EMR and in the assessment and plan section.    XR KNEES BILATERAL 1 OR 2 VWS1 10:59 AMINDICATION: Bilateral primary osteoarthritis of kneeCOMPARISON: None.FINDINGS: Severe lateral joint space narrowing with subchondral sclerosis and large osteophytes in the right knee. Mild to moderate   narrowing of the medial patellofemoral compartments as well as all 3 compartments on the left. No fracture, dislocation, or joint effusion on either side.This report was electronically interpreted by: Dr. Kyrie Reed MD ON 10/19/2016 at 14:45    ALLERGIES:Lisinopril and Losartan    PAST MEDICAL/ACTIVE PROBLEMS/MEDICATION/SOCIAL DATA  Past Medical History:   Diagnosis Date     Acute ST elevation myocardial infarction (STEMI) involving left anterior descending coronary artery (H) 2017     CAD (coronary artery disease)      Closed wedge compression fracture of seventh thoracic vertebra, initial encounter (H) 2018     Former smoker      Hearing loss      HLD (hyperlipidemia)      HTN (hypertension) 2019     Osteoporosis      Temporal arteritis (H), in remission      Tubular adenoma of colon     Last colonoscopy      Social History     Tobacco Use   Smoking Status Former Smoker     Packs/day: 1.00     Years: 4.00     Pack years: 4.00     Types: Cigarettes     Last attempt to quit: 1953     Years since quittin.8   Smokeless Tobacco Never Used   Tobacco Comment    Quit 65 yrs     Patient Active Problem List   Diagnosis     Osteoporosis     Hearing loss     High risk medication use     Bilateral primary osteoarthritis of knee     Angioedema     Closed wedge compression fracture of seventh thoracic vertebra, initial encounter (H)     Temporal arteritis (H), in  remission     HTN (hypertension)     HLD (hyperlipidemia)     CAD (coronary artery disease)     Former smoker     Current Outpatient Medications   Medication Sig Dispense Refill     aspirin 81 MG EC tablet Take 1 tablet (81 mg total) by mouth at bedtime. For CAD 30 tablet 0     atorvastatin (LIPITOR) 80 MG tablet TAKE 1 TABLET AT BEDTIME FOR CORONARY ARTERY DISEASE AND LIPIDS 90 tablet 3     calcium, as carbonate, (OS-MARLYN) 500 mg calcium (1,250 mg) tablet Take 2 tablets by mouth at bedtime.       clopidogrel (PLAVIX) 75 mg tablet Take 1 tablet (75 mg total) by mouth daily. 90 tablet 2     isosorbide mononitrate (IMDUR) 30 MG 24 hr tablet Take 1 tablet (30 mg total) by mouth every morning. 90 tablet 3     nitroglycerin (NITROSTAT) 0.4 MG SL tablet DISSOLVE 1 TABLET UNDER THE TONGUE EVERY 5 MINUTES AS NEEDED FOR CHEST PAIN 3 Bottle 2     OMEGA-3/DHA/EPA/FISH OIL (FISH OIL-OMEGA-3 FATTY ACIDS) 300-1,000 mg capsule Take 1 g by mouth at bedtime.        senna-docusate (PERICOLACE) 8.6-50 mg tablet Take 1 tablet by mouth 2 (two) times a day.  0     Current Facility-Administered Medications   Medication Dose Route Frequency Provider Last Rate Last Dose     denosumab 60 mg (PROLIA 60 mg/ml)  60 mg Subcutaneous Q6 Months Ravi Murillo MD   60 mg at 09/30/19 1018     DETAILED EXAMINATION  10/30/19  :  Vitals:    10/30/19 1126   BP: 132/80   Patient Site: Right Arm   Patient Position: Sitting   Cuff Size: Adult Regular   Pulse: 80   Weight: 125 lb (56.7 kg)     Alert oriented. Head including the face is examined for malar rash, heliotropes, scarring, lupus pernio. Eyes examined for redness such as in episcleritis/scleritis, periorbital lesions.   Neck/ Face examined for parotid gland swelling, range of motion of neck.  Left upper and lower and right upper and lower extremities examined for tenderness, swelling, warmth of the appendicular joints, range of motion, edema, rash.  Some of the important findings included: she  does not have evidence of synovitis in the palpable joints of the upper extremities.  No significant deformities of the digits.  + Heberden nodes.  Range of motion of the shoulders show full abduction.  Marked joint line tenderness of the right knee equally worse on the medial and the lateral slight warmth.          LAB / IMAGING DATA:  ALT   Date Value Ref Range Status   09/24/2019 12 0 - 45 U/L Final   09/11/2018 12 0 - 45 U/L Final   08/24/2018 <9 0 - 45 U/L Final     Albumin   Date Value Ref Range Status   09/24/2019 3.6 3.5 - 5.0 g/dL Final   09/11/2018 3.1 (L) 3.5 - 5.0 g/dL Final   08/24/2018 3.5 3.5 - 5.0 g/dL Final     Creatinine   Date Value Ref Range Status   09/24/2019 0.72 0.60 - 1.10 mg/dL Final   12/21/2018 0.78 0.60 - 1.10 mg/dL Final   09/11/2018 0.66 0.60 - 1.10 mg/dL Final       WBC   Date Value Ref Range Status   09/24/2019 5.9 4.0 - 11.0 thou/uL Final   12/21/2018 7.6 4.0 - 11.0 thou/uL Final     Hemoglobin   Date Value Ref Range Status   09/24/2019 14.7 12.0 - 16.0 g/dL Final   12/21/2018 15.3 12.0 - 16.0 g/dL Final   09/11/2018 12.3 12.0 - 16.0 g/dL Final     Platelets   Date Value Ref Range Status   09/24/2019 202 140 - 440 thou/uL Final   12/21/2018 222 140 - 440 thou/uL Final   09/11/2018 189 140 - 440 thou/uL Final       Lab Results   Component Value Date    SEDRATE 6 09/24/2019

## 2021-06-02 NOTE — TELEPHONE ENCOUNTER
I did not need the patient to come back for labs.    The labs we ran may also suffice for Dr. Ana Govea.    Michael Arzate MD  General Internal Medicine  [facility]  10/11/2019, 1:07 PM

## 2021-06-02 NOTE — TELEPHONE ENCOUNTER
Please just have her follow up on Monday as scheduled and we can go from there.    Michael Arzate MD  General Internal Medicine  [facility]  10/3/2019, 3:16 PM

## 2021-06-02 NOTE — PATIENT INSTRUCTIONS - HE
Please follow up if you have any further issues.    You may contact me by phone or [employer] if you are worsening or if things are not improving.    Thank you for coming in today.      
21.9

## 2021-06-02 NOTE — TELEPHONE ENCOUNTER
Who is calling:  Aris  Reason for Call:  Patient has to have stitches removed on 10/24/19 per Dr. Jackson at [facility].  Please call patient to set up an appointment.  Current slots are on reserve holds.  Date of last appointment with primary care: 10/7/19  Okay to leave a detailed message: Yes

## 2021-06-02 NOTE — TELEPHONE ENCOUNTER
Patient is coming in on Monday 10/14/2019  Not sure if the labs are suppose to be for Dr Arzate or Dr Govea.  Please review and someone put in orders so they are available come Monday.  Thank you

## 2021-06-02 NOTE — TELEPHONE ENCOUNTER
Pt states she has headaches off and on from time to time but can't really remember how it feels at this time but different than a regular headache. Pt denies any new vision changes, just sometimes fuzzy when looking at song screens at [employer] but states this is not new. Pts  said pt had her eyes last examined in April 2019. Pt states she has jaw pain when she eats the bread at [employer] but doesn't have jaw pain when eating any other foods. Pt had labs last week and saw Dr Arzate, who advised pt did not need prednisone, at that time, CRP and ESR were within normal range. Pt has appt with Dr Arzate again on Monday 10/7. Explained to pt that she could come in to have labs- CRP and ESR repeated but pt states she did not feel this was necessary as her symptoms have not changed since last week. Pt has not been on prednisone for a while but is not sure when she for sure stopped taking it. Pt has appt for labs on 10/14 and appt with Dr Govea on 10/30. Offered to move her appt with Dr Govea up but pt feels this is not needed at this time either. I explained to pt and her  that should pts symptoms change- headaches get worse or more often, jaw pain or change in vision, pt should call us sooner and let Dr Govea know as she may need to be seen sooner or repeat labs. Pt and her  verbalized understanding and agreed to do so.      Did you want to order labs?

## 2021-06-02 NOTE — TELEPHONE ENCOUNTER
Called and spoke to pt and pt states that she thought that Dr Arzate ordered the labs.  States that she doesn't think that Dr Govea ordered anything.  Pt is fine cancelling the appt at this time.     appt was cancelled

## 2021-06-02 NOTE — PROGRESS NOTES
[facility]  ENDOCRINOLOGY    Osteoporosis Follow Up 10/3/2019    Yamilex Keller, 3/25/1929, [id number]          Reason for visit      1. Age-related osteoporosis without current pathological fracture        History     Yamilex Keller is a very pleasant 89 y.o. old female who presents for follow up.   SUMMARY:  Yamilex is here today as a BUD from Dr Murillo. She is here with her  of 66 years. Neither of them hear very well. Her hx includes a compression fx of T7, 10 years on Bisphosphonate, treatment for PMR with Prednisone, and smoking earlier in her life. She quit about the same time she got . She has had several Cardiac events. She was started on Prolia in September of 2018 and has had 3 injections. Her last Dexa Scan shows: Since the previous scan done on December 08,2014, there has been an 8.5% increase in bone density in the lumbar spine.  Additionally, there has been a statistically significant 2.7% increase in the left total hip and a 2.5% increase in the right total hip.  An increase or stability is seen as a positive response to therapy. She has tolerated the Prolia injections well. Calcium level is 10. Last Vit D level was 32.     Risk Factors     The following high- risk conditions have been ruled out: celiac disease, eating disorders, gastric bypass, hyperparathyroidism, inflammatory bowel disease, hyperthyroidism, rheumatoid arthritis, lupus, chronic kidney disease.    Yamilex Keller has the following risk factors: Age, Female gender, , Low BMI and Family history of osteoporosis    She is not on high risk medications such as glucocorticoids, anti-coagulants, anti-convulsants, chemotherapy or levothyroxine.    Patient deniesHysterectomy, Oophrectomy, Breast cancer and Family history of breast cancer.        Past Medical History     Patient Active Problem List   Diagnosis     Vitamin D Deficiency     Essential Hypercholesterolemia     Osteoporosis     Hearing Loss     High risk  "medication use     Bilateral primary osteoarthritis of knee     Hypercalcemia     Ischemic cardiomyopathy     Angioedema     Abdominal pain, epigastric     Closed wedge compression fracture of seventh thoracic vertebra, initial encounter (H)     Abnormal CT of the abdomen     Sinus bradycardia     Pain     History of ST elevation myocardial infarction (STEMI)     Pyelonephritis     Acute pyelonephritis     Left flank pain     History of coronary artery disease     Gastritis     Essential hypertension     Burning chest pain     Atherosclerosis of left anterior descending (LAD) artery     Hyperlipidemia       Family History       family history includes Pacemaker in her brother.    Social History      reports that she quit smoking about 66 years ago. Her smoking use included cigarettes. She has a 4.00 pack-year smoking history. She has never used smokeless tobacco. She reports current alcohol use. She reports that she does not use drugs.      Review of Systems     Patient denies current pain, limited mobility, fractures.   Remainder per HPI.      Vital Signs     /68   Ht 5' 2\" (1.575 m)   Wt 128 lb (58.1 kg)   BMI 23.41 kg/m      Physical Exam     GENERAL:  Normal, NIRD  EYES:  Pupils equal, round and reactive to light; no proptosis, lid lag or  periorbital edema.  THYROID:  Thyroid is normal.  No tenderness or bruit  NECK: No lymph nodes  MUSCULOSKELETAL: mild to moderate osteoarthritis. Mild Kyphosis. Ambulates with cane.  HEART:  Regular rate and rhythm without murmur.  LUNGS:  Clear to auscultation.  ABDOMEN:Soft, non-tender, no masses or organomegaly  NEURO:  Patella Reflexes were normal.No tremors  SKIN:  No acanthosis nigricans or vitiligo        Assessment     1. Age-related osteoporosis without current pathological fracture        Plan     Yamilex will continue on the Prolia injections.  She is currently due for another Dexa Scan and this was ordered. She will f/u with me in 1 year.       Total visit " minutes:25  Time spent counseling and coordination of care:23    Rocío Sainz   Endocrinology  10/3/2019  5:51 AM      Current Medications     Outpatient Medications Prior to Visit   Medication Sig Dispense Refill     aspirin 81 MG EC tablet Take 1 tablet (81 mg total) by mouth at bedtime. For CAD 30 tablet 0     atorvastatin (LIPITOR) 80 MG tablet TAKE 1 TABLET AT BEDTIME FOR CORONARY ARTERY DISEASE AND LIPIDS 90 tablet 3     calcium, as carbonate, (OS-MARLYN) 500 mg calcium (1,250 mg) tablet Take 2 tablets by mouth at bedtime.       clopidogrel (PLAVIX) 75 mg tablet Take 1 tablet (75 mg total) by mouth daily. 90 tablet 2     isosorbide mononitrate (IMDUR) 30 MG 24 hr tablet Take 1 tablet (30 mg total) by mouth every morning. 90 tablet 3     nitroglycerin (NITROSTAT) 0.4 MG SL tablet DISSOLVE 1 TABLET UNDER THE TONGUE EVERY 5 MINUTES AS NEEDED FOR CHEST PAIN 3 Bottle 2     OMEGA-3/DHA/EPA/FISH OIL (FISH OIL-OMEGA-3 FATTY ACIDS) 300-1,000 mg capsule Take 1 g by mouth at bedtime.        ranitidine (ZANTAC) 75 MG tablet Take 75 mg by mouth at bedtime.       cetirizine (ZYRTEC) 10 MG tablet Take 10 mg by mouth daily.  0     senna-docusate (PERICOLACE) 8.6-50 mg tablet Take 1 tablet by mouth 2 (two) times a day.  0     Facility-Administered Medications Prior to Visit   Medication Dose Route Frequency Provider Last Rate Last Dose     denosumab 60 mg (PROLIA 60 mg/ml)  60 mg Subcutaneous Q6 Months Ravi Murillo MD   60 mg at 09/30/19 1018         Lab Results     TSH   Date Value Ref Range Status   09/10/2018 1.21 0.30 - 5.00 uIU/mL Final     PTH   Date Value Ref Range Status   01/17/2019 49 10 - 86 pg/mL Final     Calcium   Date Value Ref Range Status   09/24/2019 10.0 8.5 - 10.5 mg/dL Final     Phosphorus   Date Value Ref Range Status   08/24/2018 3.6 2.5 - 4.5 mg/dL Final           Imaging Results   Last DEXA scan:  Results for orders placed in visit on 07/18/17   DXA Bone Density Scan    Narrative  7/18/2017      RE: Yamilex Keller  YOB: 1929        Dear Lady Oleann,    Patient Profile:  88 y.o. female, postmenopausal, is here for the follow up bone density   test.   History of fractures - Yes;  Wrist. Family history of osteoporosis - Yes;    sibling.  Family history of hip fracture: None. Smoking history - No.   Osteoporosis treatment past -  Yes;  Bisphosphonates. Osteoporosis   treatment current - Yes;  Bisphosphonates.  Chronic medical problems -   None. High risk medications -  Steroids;  Yes, Currently.      Assessment:    1. The spine bone density L1-L2 with T-score -1.0.  2. Femoral bone densities show left femoral neck T- score -2.7 and right   total hip T-score -2.5.  3. Trabecular bone score indicates moderate trabecular bone architecture.      88 y.o. female with OSTEOPOROSIS , currently on treatment with   Bisphosphonates.     Since the previous scan done on December 08,2014, there has been an 8.5%   increase in bone density in the lumbar spine.  Additionally, there has   been a statistically significant 2.7% increase in the left total hip and a   2.5% increase in the right total hip.  An increase or stability is seen as   a positive response to therapy.    Recommendations:  Continuation of current treatment is recommended with follow up bone   density scan in 2 years.      Bone densitometry was performed on your patient using our [employer]   densitometer. The results are summarized and a copy of the actual scans   are included for your review. In conformity with the International Society   of Clinical Densitometry's most recent position statement for DXA   interpretation (2015), the diagnosis will be made on the lowest measured   T-score of the lumbar spine, femoral neck, total proximal femur or 33%   radius. Note the change in terminology for diagnostic classification from   OSTEOPENIA to LOW BONE MASS. All trending for sequential exams will be   done using multiple  vertebrae or the total proximal femur. Fracture risk   is based on the WHO Fracture Risk Assessment Tool (FRAX). If additional   information is needed or if you would like to discuss the results, please   do not hesitate to call me.       Thank you for referring this patient to [facility].   We are happy to be of service in support of you and your practice. If you   have any questions or suggestions to improve our service, please call me   at [phone].     Sincerely,     Alona Wei M.D. C.C.D.  Osteoporosis Services, [facility]

## 2021-06-02 NOTE — PATIENT INSTRUCTIONS - HE
Please follow up if you have any further issues.    You may contact me by phone or [employer] System[employer]art if you are worsening or if things are not improving.    Thank you for coming in today.

## 2021-06-03 VITALS
HEART RATE: 67 BPM | SYSTOLIC BLOOD PRESSURE: 136 MMHG | WEIGHT: 124 LBS | BODY MASS INDEX: 22.82 KG/M2 | OXYGEN SATURATION: 97 % | DIASTOLIC BLOOD PRESSURE: 80 MMHG | HEIGHT: 62 IN

## 2021-06-03 VITALS — BODY MASS INDEX: 23.05 KG/M2 | WEIGHT: 126 LBS

## 2021-06-03 VITALS
HEART RATE: 80 BPM | SYSTOLIC BLOOD PRESSURE: 132 MMHG | BODY MASS INDEX: 22.86 KG/M2 | WEIGHT: 125 LBS | DIASTOLIC BLOOD PRESSURE: 80 MMHG

## 2021-06-03 VITALS — WEIGHT: 125 LBS | BODY MASS INDEX: 23 KG/M2 | HEIGHT: 62 IN

## 2021-06-03 VITALS
HEIGHT: 62 IN | WEIGHT: 128 LBS | SYSTOLIC BLOOD PRESSURE: 140 MMHG | DIASTOLIC BLOOD PRESSURE: 68 MMHG | BODY MASS INDEX: 23.55 KG/M2

## 2021-06-03 VITALS
BODY MASS INDEX: 22.86 KG/M2 | SYSTOLIC BLOOD PRESSURE: 136 MMHG | HEART RATE: 79 BPM | OXYGEN SATURATION: 95 % | WEIGHT: 125 LBS | DIASTOLIC BLOOD PRESSURE: 68 MMHG

## 2021-06-03 VITALS — BODY MASS INDEX: 22.86 KG/M2 | WEIGHT: 125 LBS

## 2021-06-04 VITALS
OXYGEN SATURATION: 95 % | DIASTOLIC BLOOD PRESSURE: 80 MMHG | WEIGHT: 121 LBS | BODY MASS INDEX: 21.43 KG/M2 | HEART RATE: 77 BPM | SYSTOLIC BLOOD PRESSURE: 132 MMHG

## 2021-06-04 VITALS
RESPIRATION RATE: 16 BRPM | HEIGHT: 63 IN | SYSTOLIC BLOOD PRESSURE: 136 MMHG | HEART RATE: 86 BPM | DIASTOLIC BLOOD PRESSURE: 80 MMHG | WEIGHT: 122 LBS | BODY MASS INDEX: 21.62 KG/M2

## 2021-06-04 VITALS
BODY MASS INDEX: 21.26 KG/M2 | DIASTOLIC BLOOD PRESSURE: 72 MMHG | WEIGHT: 120 LBS | HEIGHT: 63 IN | RESPIRATION RATE: 16 BRPM | HEART RATE: 68 BPM | SYSTOLIC BLOOD PRESSURE: 140 MMHG

## 2021-06-04 VITALS
DIASTOLIC BLOOD PRESSURE: 50 MMHG | OXYGEN SATURATION: 95 % | WEIGHT: 120 LBS | SYSTOLIC BLOOD PRESSURE: 122 MMHG | HEART RATE: 61 BPM | BODY MASS INDEX: 21.26 KG/M2

## 2021-06-04 VITALS
WEIGHT: 122 LBS | OXYGEN SATURATION: 94 % | DIASTOLIC BLOOD PRESSURE: 64 MMHG | SYSTOLIC BLOOD PRESSURE: 126 MMHG | BODY MASS INDEX: 21.61 KG/M2 | HEART RATE: 78 BPM

## 2021-06-04 VITALS
SYSTOLIC BLOOD PRESSURE: 126 MMHG | HEART RATE: 78 BPM | WEIGHT: 123 LBS | BODY MASS INDEX: 21.11 KG/M2 | OXYGEN SATURATION: 95 % | DIASTOLIC BLOOD PRESSURE: 60 MMHG

## 2021-06-04 VITALS
BODY MASS INDEX: 21.46 KG/M2 | HEART RATE: 70 BPM | HEIGHT: 63 IN | OXYGEN SATURATION: 96 % | WEIGHT: 121.1 LBS | DIASTOLIC BLOOD PRESSURE: 84 MMHG | SYSTOLIC BLOOD PRESSURE: 136 MMHG

## 2021-06-04 VITALS — BODY MASS INDEX: 21.21 KG/M2 | WEIGHT: 119.7 LBS | HEIGHT: 63 IN

## 2021-06-04 VITALS — HEIGHT: 63 IN | BODY MASS INDEX: 20.79 KG/M2 | WEIGHT: 117.3 LBS

## 2021-06-04 VITALS
HEART RATE: 80 BPM | SYSTOLIC BLOOD PRESSURE: 120 MMHG | OXYGEN SATURATION: 95 % | BODY MASS INDEX: 21.17 KG/M2 | WEIGHT: 124 LBS | DIASTOLIC BLOOD PRESSURE: 60 MMHG | HEIGHT: 64 IN

## 2021-06-04 NOTE — TELEPHONE ENCOUNTER
----- Message from Michael Arzate MD sent at 12/10/2019  8:39 PM CST -----  Please call patient -    ______________________________________________________________________     Home Phone:  [phone] (home)     Cell phone: No relevant phone numbers on file.    ______________________________________________________________________     Please let the patient know that her x-ray of her back did not show a fracture.    She does have osteoarthritis (OA) of the spine and some other degenerative changes.    Her options for this would be:    - A follow up appointment with me to review.  - Physical therapy to strengthen the spine.  - MRI scan of the spine to delineate further.    Let me know if more needs to be done.     Michael Arzate MD  [facility]  12/10/2019, 8:37 PM

## 2021-06-04 NOTE — TELEPHONE ENCOUNTER
Patient Returning Call  Reason for call:   returning call to the clinic.  Information relayed to patient:  Yes as instructed and agrees with plan to schedule with Michael Arzate MD to discuss this.   Patient has additional questions:  No  If YES, what are your questions/concerns:  none  Okay to leave a detailed message?: Yes

## 2021-06-04 NOTE — PATIENT INSTRUCTIONS - HE
Please follow up if you have any further issues.    You may contact me by phone or MyChart if you are worsening or if things are not improving.    Thank you for coming in today.      ______________________________________________________________________      Future Appointments   Date Time Provider Department Center   5/12/2020 10:55 AM Michael Arzate MD [facility]   9/22/2020  1:20 PM [facility]   9/29/2020  1:00 PM Rocío Sainz NP [facility]

## 2021-06-04 NOTE — PROGRESS NOTES
Please call patient -    ______________________________________________________________________     Home Phone:  [phone] (home)     Cell phone: No relevant phone numbers on file.    ______________________________________________________________________     Please let the patient know that her x-ray of her back did not show a fracture.    She does have osteoarthritis (OA) of the spine and some other degenerative changes.    Her options for this would be:    - A follow up appointment with me to review.  - Physical therapy to strengthen the spine.  - MRI scan of the spine to delineate further.    Let me know if more needs to be done.    Michael Arzate MD  [facility]  12/10/2019, 8:37 PM

## 2021-06-04 NOTE — TELEPHONE ENCOUNTER
Patient Returning Call  Reason for call:  lmtcb  Information relayed to patient:  Patient informed of PCP message and will call back once she decide how she want's to proceed.  Patient has additional questions:  No  If YES, what are your questions/concerns:  NA  Okay to leave a detailed message?: No call back needed

## 2021-06-04 NOTE — PATIENT INSTRUCTIONS - HE
Ms Kaminski SARAH Keller,  I enjoyed visiting with you again today.  I am glad to hear you are doing well.  Per our conversation stop the CLOPRIDOGREL (PLAVIX) and go back to a baby ASPIRIN every day.  Hold the ASPIRIN for 3 days prior to cyst removal.  I will plan on seeing you 6 months.  Christopher iMranda

## 2021-06-05 VITALS
DIASTOLIC BLOOD PRESSURE: 64 MMHG | SYSTOLIC BLOOD PRESSURE: 130 MMHG | WEIGHT: 119 LBS | HEART RATE: 57 BPM | OXYGEN SATURATION: 96 % | BODY MASS INDEX: 21.08 KG/M2

## 2021-06-05 VITALS
DIASTOLIC BLOOD PRESSURE: 82 MMHG | SYSTOLIC BLOOD PRESSURE: 136 MMHG | HEART RATE: 63 BPM | OXYGEN SATURATION: 95 % | BODY MASS INDEX: 21.43 KG/M2 | WEIGHT: 121 LBS

## 2021-06-05 VITALS
DIASTOLIC BLOOD PRESSURE: 72 MMHG | WEIGHT: 121.8 LBS | SYSTOLIC BLOOD PRESSURE: 138 MMHG | BODY MASS INDEX: 21.58 KG/M2 | RESPIRATION RATE: 16 BRPM | HEART RATE: 60 BPM

## 2021-06-05 VITALS
HEART RATE: 54 BPM | DIASTOLIC BLOOD PRESSURE: 72 MMHG | BODY MASS INDEX: 21.26 KG/M2 | SYSTOLIC BLOOD PRESSURE: 134 MMHG | OXYGEN SATURATION: 94 % | WEIGHT: 120 LBS

## 2021-06-05 VITALS
RESPIRATION RATE: 16 BRPM | HEIGHT: 63 IN | BODY MASS INDEX: 20.94 KG/M2 | SYSTOLIC BLOOD PRESSURE: 130 MMHG | HEART RATE: 68 BPM | WEIGHT: 118.2 LBS | DIASTOLIC BLOOD PRESSURE: 64 MMHG

## 2021-06-06 NOTE — TELEPHONE ENCOUNTER
Date: 4/2/2020 Status: Kristie   Time: 2:45 PM Length: 15   Visit Type: NURSE VISIT [[id number]] Copay: $0.00   Provider: [facility] Department: [facility]   Referring Provider: DIONTE PALENCIA CSN: [id number]   Notes: bruce pt - prolia injection

## 2021-06-07 NOTE — PROGRESS NOTES
"Prolia Injection Phone Screen      Screening questions have been asked 2-3 days prior to administration visit for Prolia. If any questions are answered with \"Yes,\" this phone encounter were will routed to ordering provider for further evaluation.     1.  When was the last injection?  9/30/19    2.  Has insurance for this injection been verified?  Yes    3.  Did you experience any new onset achiness or rashes that lasted for over a month with your previous Prolia injection? No      4.  Do you have a fever over 101?F or a new deep cough that is unusual for you today? No    5.  Have you started any new medications in the last 6 months that you were told could affect your immune system? These may have been prescribed by oncologist, transplant, rheumatology, or dermatology.   No    6.  In the last 6 months have you have gastric bypass or parathyroid surgery?   No    7.  Do you plan dental work requiring drilling into the bone such as implants/extractions or oral surgery in the next 2-3 months?   No     8. Do you have new insurance since the last injection? no    Patient informed if symptoms discussed above present prior to their administration appointment, they are to notify clinic immediately.     Elena Jerry          The following steps were completed to comply with the REMS program for Prolia:  1. Ordering provider has previously reviewed information in the Medication Guide and Patient Counseling Chart, including the serious risks of Prolia  and the symptoms of each risk and have been advised to seek prompt medical attention if they have signs or symptoms of any of the serious risks.  2. Provided each patient a copy of the Medication Guide and Patient Brochure.  See MAR for administration details.   Indication: Prolia  (denosumab) is a prescription medicine used to treat osteoporosis in patients who:   Are at high risk for fracture, meaning patients who have had a fracture related to osteoporosis, or who have " multiple risk factors for fracture; Cannot use another osteoporosis medicine or other osteoporosis medicines did not work well.   The timeline for early/late injections would be 4 weeks early and any time after the 6 month sarwat. If a patient receives their injection late, then the subsequent injection would be 6 months from the date that they actually received the injection    Have the screening questions been asked prior to this administration? Yes    After obtaining consent, and per orders of Rocío Sainz NP, injection of Prolia 60mg given by Elena Jerry. Patient instructed to remain in clinic for 20 minutes afterwards, and to report any adverse reaction to me immediately.

## 2021-06-07 NOTE — TELEPHONE ENCOUNTER
Pt states she would like to do a face to face visit.     Pt scheduled for 5/18/20 at 2:00.    She thanked for the call.

## 2021-06-07 NOTE — TELEPHONE ENCOUNTER
Talked to pt and let her know appt was cancelled and her care team will contact her to reschedule as appropriate.

## 2021-06-07 NOTE — TELEPHONE ENCOUNTER
Please see if the patient would like to be scheduled for a telephone visit next week or the following week.    If has pressing issues could be seen FACE TO FACE visit this week or again in 4 weeks.    Okay to not be seen if doing well.    Michael Arzate MD  General Internal Medicine  [facility]  4/20/2020, 11:26 PM

## 2021-06-08 NOTE — PROGRESS NOTES
Injection - Other    Date/Time: 5/18/2020 4:33 PM  Performed by: Michael Arzate MD  Authorized by: Michael Arzate MD   Comments:     Procedure:  Right knee injection    Indications:  Knee pain.    Description of procedure:  The patient was given informed consent prior to the procedure.  A lateral superior approach was marked and cleaned with alcohol.  Using a 22G needle, the joint space was encountered.    Joint aspirated and about 15 cc of yellow clear to slightly cloudy fluid returned.  Sent off for studies.    1 ml of lidocaine with epinephrine and 80 mg of DepoMedrol was drawn up.  The fluid was injected easily.    No immediate complications.  Aftercare instructions given.

## 2021-06-08 NOTE — PATIENT INSTRUCTIONS - HE
Please follow up if you have any further issues.    You may contact me by phone or [email or web address] if you are worsening or if things are not improving.    Thank you for coming in today.

## 2021-06-09 ENCOUNTER — RECORDS - HEALTHEAST (OUTPATIENT)
Dept: CARDIOLOGY | Facility: CLINIC | Age: 86
End: 2021-06-09

## 2021-06-10 NOTE — PATIENT INSTRUCTIONS - HE
Ms Yamilex SMITH Krishna,  I enjoyed visiting with you again today as well as your .  I am concerned with the increased chest discomfort and given several blockages in the past we will plan on angiogram.  Per our conversation the angiogram will be scheduled at [facility].  I will plan on seeing you thereafter.  Christopher Miranda

## 2021-06-10 NOTE — TELEPHONE ENCOUNTER
Wellness Screening Tool  Symptom Screening:  Do you have one of the following NEW symptoms:    Fever (subjective or >100.0)?  No    A new cough?  No    Shortness of breath?  No     Chills? No     New loss of taste or smell? No     Generalized body aches? No     New persistent headache? No     New sore throat? No     Nausea, vomiting, or diarrhea?  No    Within the past 3 weeks, have you been exposed to someone with a known positive illness below:    COVID-19 (known or suspected)?  No    Chicken pox?  No    Mealses?  No    Pertussis?  No    Patient notified of visitor policy- They may have one person accompany them to their appointment, but they will need to wear a mask and will be screened upon arrival for symptoms: Yes  Pt informed to wear a mask: Yes  Pt notified if they develop any symptoms listed above, prior to their appointment, they are to call the clinic directly at [phone] for further instructions.  Yes  Patient's appointment status: Patient will be seen in clinic as scheduled on 8/17/2020 4:10 pm

## 2021-06-10 NOTE — TELEPHONE ENCOUNTER
New Appointment Needed  What is the reason for the visit:    Inpatient/ED Follow Up Appt Request  At what hospital or facility were you seen?: [facility]  What is the reason you were seen?: Cardiac  What date were you admitted?: 08/21/20  What date were you discharged?: 08/26/20  What was the recommended timeframe for your follow up appointment?: 7 days  Provider Preference: PCP only  How soon do you need to be seen?: As soon as possible.  Also having a rash from a medication given to her in the hospital.  Waitlist offered?: No  Okay to leave a detailed message:  Yes

## 2021-06-10 NOTE — PATIENT INSTRUCTIONS - HE
Recommendations from today's MTM visit:                                                    MTM (medication therapy management) is a service provided by a clinical pharmacist designed to help you get the most of out of your medicines. and Today we reviewed what your medicines are for, how to know if they are working, that your medicines are safe and how to make your medicine regimen as easy as possible.     1. Stop Fish Oil - it likely isn't more beneficial for you (you are already taking other medicines for your heart health) and it may be worsening your bruising. Even after you stop this, you'll likely still have some bruising from the combination of Aspirin and Brilinta. Monitor these bruises and make sure they are healing over time. If you start to have any bleeding, bloody noses, dark tarry stools - call your doctor right away.     2. You were given a new medicine called Omeprazole (Prilosec). This medicine is to prevent heartburn symptoms. Take this medicine once daily. It's okay to continue Tums as needed if you still have heartburn symptoms that pop up.     3. You were given a new medicine called Metoprolol Succinate (Toprol-XL). This medicine helps to lower your blood pressure. Take this medicine once daily.     4. You were given a new medicine call Acyclovir. This medicine is to treat shingles. You received the 400 mg tablets from the pharmacy (verus the 200 mg prescribed at discharge) so you should take 2 tablets 5 times daily.       It was great to speak with you today.  I value your experience and would be very thankful for your time with providing feedback on our clinic survey. You may receive a survey via email or text message in the next few days.     Next MTM visit: Thursday, September 3 at 10:30 am - PHONE     To schedule another MTM appointment, please call the clinic directly or you may call the MTM scheduling line at [phone] or toll-free at [phone].     My Clinical Pharmacist's  contact information:                                                      It was a pleasure seeing you today!  Please feel free to contact me with any questions or concerns you have.      Georges Martin PharmD  Medication Therapy Management (MTM) Pharmacist  [facility]

## 2021-06-10 NOTE — PROGRESS NOTES
----- Message -----  From: Nadeen Rene  Sent: 8/17/2020   4:44 PM CDT  To: Selene Guzman RN  Subject: WILLIAM ORDERING                                 CORS POSS PCI WITH SUE/ PADMINI     8/21/2020 7:30 AM ADMIT     H&P: 8/17 [facility] IN CLINIC    NO ALERTS     COVID TEST SCHEDULED: 8/18 11 AM     ThanksNany

## 2021-06-10 NOTE — PROGRESS NOTES
Yamilex Keller  [address]  [place] [address]  [phone] (home) [phone] (work)    Procedure cardiologist:  Dr. Peck or Dr. Navarro  PCP:  Michael Arzate MD  H&P completed by:  Dr. Miranda, 8/17/20  Admit date 8/21/20  Arrival time:  0730  Anticoagulation: None  CPAP: No  Previous PCI: 7/18/2017  Bypass Grafts: No  Renal Issues: No  Diabetic?: No  Device?: No  Type:  n/a    Angiogram Teaching    Reason for Visit:  Patient seen for pre-procedure education in preparation for: Cors poss PCI    Procedure Prep:  Primary Cardiologist note dated: 8/17/20  EKG results obtained, dated: am of procedure  Hemogram results obtained: am of procedure  Basic Metabolic Panel results obtained: am of procedure  Lipid Profile results obtained: am of procedure  Covid-19 results obtained: 8/18/20, pending    Patient Education  Patient states understanding of procedure and agrees to proceed.    Pre-procedure instructions  Patient instructed to be NPO after midnight.  Patient instructed to shower the evening before or the morning of the procedure.  Patient instructed to arrange for transportation home following procedure from a responsible family member of friend. No driving for at least 24 hours.  Patient instructed to have a responsible adult with them for 24 hours post-procedure.  Post-procedure follow up process.  Conscious sedation discussed.    Pre-procedure medication instructions  Patient instructed on antiplatelet medication.  Continue medications as scheduled, with a small amount of water on the day of the procedure unless indicated.  Patient instructed to take 324 mg of Aspirin am of procedure: Yes  Other medication: instructed to only take regularly scheduled prescription medications a.m. of the procedure.    *PATIENTS RECORDS AVAILABLE IN [facility] UNLESS OTHERWISE INDICATED*      Patient Active Problem List   Diagnosis     Osteoporosis     Hearing loss     High risk medication use     Bilateral  primary osteoarthritis of knee     Chronic pain of right knee     Angioedema     HTN (hypertension)     HLD (hyperlipidemia)     CAD (coronary artery disease)     Atypical chest pain     Senile purpura (H)     Full code status     Uses walker     GERD (gastroesophageal reflux disease)     STEMI (ST elevation myocardial infarction) (H)     Coronary artery disease involving native coronary artery of native heart without angina pectoris       Current Outpatient Medications   Medication Sig Dispense Refill     aspirin 81 MG EC tablet Take 1 tablet (81 mg total) by mouth at bedtime. For CAD 30 tablet 0     atorvastatin (LIPITOR) 80 MG tablet TAKE 1 TABLET AT BEDTIME FOR CORONARY ARTERY DISEASE AND LIPIDS 90 tablet 0     calcium-vitamin D 500 mg(1,250mg) -200 unit per tablet Take 1 tablet by mouth 2 (two) times a day with meals.       isosorbide mononitrate (IMDUR) 30 MG 24 hr tablet TAKE 1 TABLET EVERY MORNING 90 tablet 1     nitroglycerin (NITROSTAT) 0.4 MG SL tablet Place 1 tablet (0.4 mg total) under the tongue every 5 (five) minutes as needed for chest pain. 25 tablet 2     OMEGA-3/DHA/EPA/FISH OIL (FISH OIL-OMEGA-3 FATTY ACIDS) 300-1,000 mg capsule Take 1 g by mouth daily.        triamcinolone (KENALOG) 0.1 % cream Apply 1 application topically as needed.       Current Facility-Administered Medications   Medication Dose Route Frequency Provider Last Rate Last Dose     denosumab 60 mg (PROLIA 60 mg/ml)  60 mg Subcutaneous Q6 Months Rocío Sainz NP   60 mg at 04/02/20 1512       Allergies   Allergen Reactions     Lisinopril Swelling     Swelling of lips/mouth      Losartan Angioedema         Selene Guzman, RN

## 2021-06-10 NOTE — PROGRESS NOTES
"ASSESSMENT AND PLAN:  Yamilex Keller 88 y.o. female  has temporal arteritis, biopsy-proven, widespread osteoarthritis.  [place] which were \"VV, no headaches no jaw claudication not sure about double vision.  She was then started on prednisone after she had been off it entirely for several weeks.  She is now down to 10 mg.  She has not had recurrence of the symptoms.  She has had cataract surgery.  I have asked her to have visit with the eye physicians.  Check labs again today.  Back then she recalls that her sed rate was normal.  I have asked her to go down to 7-1/2 mg for 1 month and then recheck the labs come for follow-up.   We discussed what osteopenia osteoporosis.  She is on alendronate calcium and vitamin D.  Return for follow-up and 12 weeks.  Diagnoses and all orders for this visit:    GCA (giant cell arteritis)  -     predniSONE (DELTASONE) 2.5 MG tablet; Take 7.5 mg/d prednisone PO for 1 month, reduce to 5 mg/d for the next month, and then reduce to 2.5 mg/d for the third month and then stop.  Dispense: 90 tablet; Refill: 2  -     Erythrocyte Sedimentation Rate  -     C-Reactive Protein    High risk medication use    Osteoporosis      HISTORY OF PRESENTING ILLNESS:  Yamilex Keller, 88 y.o., female  is here for follow up of GCA, her esr originally was 104.  She had bilateral temporal artery biopsies which were abnormal showing evidence of vasculitis.  She was started on adequate doses of prednisone.  She has done well.  She is she was down to 0 mg of prednisone.  While watching TV she had \"wavy vision.  She called her concerned that this might represent a flareup.  She did not have headaches.  She had no jaw claudication.  She is not sure if there was double vision.  She did have a cataract surgery recently.  She was started on prednisone.  Now she is on 10 mg daily.  There is no recurrence of her symptoms.  Doing well on the past visit she was started on " methotrexate when she was on [place].  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 she says she never had pain in the joints.  Whether this is an effort to start methotrexate as a steroid sparing is unclear.  If needed I would prefer to have her take Actemra but that's not yet at the point here.  She has noted no residual headache jaw claudication N lower extremity claudication.  He describes herself in very good health.  She did have back pain and radicular symptoms 2 years ago.  She is neither a smoker not takes alcohol.  He likes to walk 30 minutes a day.  She is accompanied here by her .  She does not like prednisone as it has caused her to have skin changes.  She points to the purpuric areas.    Further historical information and ADL limitations as noted in the multidimensional health assessment questionnaire attached in the EMR. .     ALLERGIES:Prednisone    PAST MEDICAL/ACTIVE PROBLEMS/MEDICATION/ FAMILY HISTORY/SOCIAL DATA:  The patient has a family history of  Past Medical History:   Diagnosis Date     Hyperlipidemia      Osteoporosis      Pneumonia      Temporal arteritis      History   Smoking Status     Former Smoker   Smokeless Tobacco     Not on file     Patient Active Problem List   Diagnosis     Basal Cell Carcinoma Of The Skin     Vitamin D Deficiency     Essential Hypercholesterolemia     Asymptomatic Postmenopausal Status     Osteoporosis     Benign Adenomatous Polyp Of The Large Intestine     Hearing Loss     Cerumen Impaction     GCA (giant cell arteritis)     High risk medication use     Bilateral primary osteoarthritis of knee     Chronic pain of right knee     Current Outpatient Prescriptions   Medication Sig Dispense Refill     alendronate (FOSAMAX) 70 MG tablet Take 1 tablet (70 mg total) by mouth every 7 days. Take on an empty stomach with a full glass of water 30 minutes before food. 12 tablet 3     aspirin 81 MG EC tablet Take 81 mg by mouth daily.       calcium  carbonate (OS-MARLYN) 600 mg (1,500 mg) tablet Take 1,200 mg by mouth 2 (two) times a day with meals.        OMEGA-3/DHA/EPA/FISH OIL (FISH OIL-OMEGA-3 FATTY ACIDS) 300-1,000 mg capsule Take 1 g by mouth daily.        simvastatin (ZOCOR) 20 MG tablet TAKE 1 TABLET AT BEDTIME 90 tablet 1     No current facility-administered medications for this visit.      DETAILED EXAMINATION  05/08/17  :  Vitals:    05/08/17 1029   BP: 122/70   Patient Site: Right Arm   Patient Position: Sitting   Cuff Size: Adult Regular   Pulse: 72   Weight: 149 lb 4.8 oz (67.7 kg)     Alert oriented. Head including the face is examined for malar rash, heliotropes, scarring, lupus pernio. Eyes examined for redness such as in episcleritis/scleritis, periorbital lesions.   Neck examined  for lymph nodes, range of motion Both upper and lower extremities (all four) examined for swollen, warm &/or  tender joints, range of motion, rash, muscle weakness, edema. The salient normal / abnormal findings are appended.  She does not have synovitis in any of the palpable appendicular joints.  She has [place] nodes.  Her radial pulses are symmetrical.    LAB / IMAGING DATA:  ALT   Date Value Ref Range Status   10/12/2015 17 0 - 45 U/L Final   10/02/2015 25 0 - 45 U/L Final   12/09/2013 13 <46 IU/L Final     Albumin   Date Value Ref Range Status   10/12/2015 2.9 (L) 3.5 - 5.0 g/dL Final   10/02/2015 2.5 (L) 3.5 - 5.0 g/dL Final   12/09/2013 3.7 3.5 - 5.0 g/dL Final     Creatinine   Date Value Ref Range Status   09/16/2016 0.70 0.60 - 1.10 mg/dL Final   12/12/2015 0.76 0.60 - 1.10 mg/dL Final   10/12/2015 0.73 0.60 - 1.10 mg/dL Final       WBC   Date Value Ref Range Status   12/12/2015 11.8 (H) 4.0 - 11.0 thou/uL Final   12/08/2015 11.0 4.0 - 11.0 thou/uL Final     Hemoglobin   Date Value Ref Range Status   12/12/2015 15.1 12.0 - 16.0 g/dL Final   12/08/2015 14.6 12.0 - 16.0 g/dL Final   10/12/2015 15.1 12.0 - 16.0 g/dL Final     Platelets   Date Value Ref Range  Status   12/12/2015 296 140 - 440 thou/uL Final   12/08/2015 245 140 - 440 thou/uL Final   10/12/2015 511 (H) 140 - 440 thou/uL Final       Lab Results   Component Value Date    SEDRATE 9 09/16/2016

## 2021-06-10 NOTE — TELEPHONE ENCOUNTER
----- Message from Tiara Johnson sent at 8/24/2020 11:12 AM CDT -----  General phone call:    Caller: Patient  Primary cardiologist: Ruben  Detailed reason for call: pain in the front of the breast and in the left hand side of the back.  Who did the procedure was  Melanie last week    New or active symptoms?   Best phone number:   Best time to contact: [phone]   Ok to leave a detailed message? Soon  Device? Yes     Additional Info:     Pt calls with complaints of new onset of active chest pain that started this morning and described as a burning sensation. Pt isn't sure if its because she slept wrong or if its related to her heart. Pt was encouraged to seek ED services in order to be assessed most quickly. Pt and her  will go to [facility] now. -kcl

## 2021-06-10 NOTE — TELEPHONE ENCOUNTER
Earliest I can accommodate is Tuesday at 4:30 pm.  Can do telephone visit on Monday, but this might be adequate for her needs.    Come into WALK IN CARE or see a colleague if the rash is bad enough.    Michael Arzate MD  General Internal Medicine  [facility]  8/27/2020, 10:23 AM

## 2021-06-10 NOTE — PROGRESS NOTES
"TCM DISCHARGE FOLLOW UP CALL      \"Hi, my name is  Mckenna, and I am calling from  [facility].  I am calling to follow up and see how things are going for you after your recent hospitalization.      Tell me how you are doing now that you are home?\" I have a rash from the medicine, but I am okay. I am waiting on the clinic to call me back to schedule my appointment with the doctor.      Discharge Instructions     Do you understand your discharge instructions?  Pt. Response: Yes      \"Has an appointment with your primary care provider been scheduled?\" No  \"If yes, when is that appointment?  N/A  If no, date of appointment scheduled: Clinic is calling me back to schedule.      Medications    \"Did you have any medication changes?   Yes   Do you have any concerns with obtaining the medications or questions about your medications that you would like a RN to review with you?  No      \"When you see the provider, I would recommend that you bring your medications with you.\"    Call Summary    \"What questions or concerns do you have about your recent visit and your follow-up care?\" None right now.        \"If you have questions or things don't continue to improve, we encourage you contact us through the main clinic number at [phone].  Even if the clinic is not open, triage nurses are available 24/7 to help you.     The Clinic Community Health Worker spoke with the patient today due to ED/Hospital Discharge to discuss possible Clinic Care Coordination enrollment.  The service was described to the patient and immediate needs were discussed.  The patient declined enrollment at this time.  The PCP is encouraged to refer in the future if the patient's needs change.     - Lives [facility] and this apartment complex does also offer transitional care to Assisted Living or Memory Care.     - Adult daughter is an RN at [employer] and is available for support along with spouse.    "

## 2021-06-11 NOTE — PROGRESS NOTES
<150 systolic is ok, but recommend close monitoring for now (just a few times a week at home, or 1-2 times/month here)

## 2021-06-11 NOTE — PROGRESS NOTES
Injection - Other    Date/Time: 9/1/2020 8:00 AM  Performed by: Michael Arzate MD  Authorized by: Michael Arzate MD   Comments:     Procedure:  Right knee injection    Indications:  Knee pain.    Description of procedure:  The patient was given informed consent prior to the procedure.  A lateral superior approach was marked and cleaned with alcohol.  Using a 22G needle, the joint space was encountered.    2 ml of serous fluid drawn back with syringe to verify placement.  Syringe then changed in place.    1 ml of lidocaine with epinephrine and 80 mg of DepoMedrol was drawn up.  The fluid was injected easily.    No immediate complications.  Aftercare instructions given.

## 2021-06-11 NOTE — PATIENT INSTRUCTIONS - HE

## 2021-06-11 NOTE — PATIENT INSTRUCTIONS - HE

## 2021-06-11 NOTE — TELEPHONE ENCOUNTER
Wellness Screening Tool  Symptom Screening:  Do you have one of the following NEW symptoms:    Fever (subjective or >100.0)?  No    A new cough?  No    Shortness of breath?  No     Chills? No     New loss of taste or smell? No     Generalized body aches? No     New persistent headache? No     New sore throat? No     Nausea, vomiting, or diarrhea?  No    Within the past 2 weeks, have you been exposed to someone with a known positive illness below:    COVID-19 (known or suspected)?  No    Chicken pox?  No    Mealses?  No    Pertussis?  No    Patient notified of visitor policy- They may have one person accompany them to their appointment, but they will need to wear a mask and will be screened upon arrival for symptoms: Yes  Pt informed to wear a mask: Yes  Pt notified if they develop any symptoms listed above, prior to their appointment, they are to call the clinic directly at [phone] for further instructions.  Yes  Patient's appointment status: 9/10

## 2021-06-11 NOTE — PATIENT INSTRUCTIONS - HE
Ms Kaminski SARAH Keller,  I enjoyed visiting with you again today.  I am glad to hear you are doing well.  Per our conversation go to the aspirin every other day starting around the 22 of September.  I will plan on seeing you 3-4 months or sooner if needed.  Christopher Miranda

## 2021-06-11 NOTE — PROGRESS NOTES
"ASSESSMENT & PLAN:    1. Chest pain  Blood pressure is elevated but vitals are otherwise stable.  She is still having some mild discomfort.  EKG is normal by my review, cardiology review pending.  She has had increasing reflux symptoms over the last couple of months, and her current symptoms could be GI related, but recommend further evaluation in the ER, as discussed with patient and  that cannot rule out cardiac cause of her symptoms here in clinic.  Discussed transport by EMS, but they strongly prefer to go by private transport, as her symptoms are minimal currently.  - Electrocardiogram Perform and Read    There are no Patient Instructions on file for this visit.    Orders Placed This Encounter   Procedures     Electrocardiogram Perform and Read     There are no discontinued medications.    No Follow-up on file.    CHIEF COMPLAINT:  Chief Complaint   Patient presents with     Chest Pain     c/o sudden onset of chest heaviness, \"feels like someone is sitting on chest,\" aching pain in arms        HISTORY OF PRESENT ILLNESS:  Yamilex is a 88 y.o. female presenting to the clinic today with complaints of chest pain. She is accompanied by her .     Chest Pain: She was going to [employer] earlier today when her chest started to hurt as she was walking to the store. She states it felt as though someone was sitting on her chest. She simultaneously developed a dull ache in bilateral arms. She denies shortness of breath, but she felt as though she could not continue walking around, so she returned to the car. Onset of these symptoms was about an hour ago. No associated diaphoresis or nausea. No palpitations or sensation of rapid heart beat. She continues to have some discomfort in her chest and arms, but it is improved compared to 60-90 minutes ago.  No previous cardiac issues. She fatigues easily, but she attributes this at least in part to age. Her  thinks this is worse recently. Her  notes " that she has complained about getting tired while doing house work in the past, but she has not had chest pain.     Reflux: Although she has never had this exact chest pain before, she has been having more symptoms of acid reflux in the past two months. She started having the sensation of needing to belch a couple months ago and then started to have trouble swallowing her pills in the past few weeks. She recently bought some Rolaids for her heartburn. She feels like she could belch right now, and she notes that it feels somewhat similar to her reflux in the past.      Elevated Blood Pressure: Her blood pressure used to always be good, but it has recently been higher. She checks it regularly at home. It seems to have been more elevated after she took a recent burst of prednisone.     GCA: She follows with Dr. Govea in rheumatology for her giant cell arteritis. She was off of prednisone for a while in [place], but she developed visual changes. She was given a burst of prednisone and was then dropped back down to her normal daily dose.     REVIEW OF SYSTEMS:   She has had a few lesions removed by dermatology. She was in the ER for a swollen lip earlier this year. All other systems are negative.    PFSH:  Reviewed, as below.     TOBACCO USE:  History   Smoking Status     Former Smoker     Packs/day: 1.00     Years: 4.00     Quit date: 1/1/1953   Smokeless Tobacco     Never Used       VITALS:  Vitals:    07/18/17 1431   BP: 168/70   Patient Site: Right Arm   Patient Position: Sitting   Cuff Size: Adult Regular   Pulse: 82   Resp: 18   Temp: 98.5  F (36.9  C)   TempSrc: Oral   SpO2: 96%   Weight: 148 lb (67.1 kg)     Wt Readings from Last 3 Encounters:   07/18/17 148 lb (67.1 kg)   07/18/17 148 lb (67.1 kg)   07/05/17 146 lb 9.6 oz (66.5 kg)       PHYSICAL EXAM:  GENERAL: Pleasant, well-appearing patient in no acute distress.  CARDIOVASCULAR: Heart regular rate and rhythm without murmur normal S1-S2   LUNGS: Clear to  auscultation bilaterally, good air movement throughout   EXTREMITIES Warm and well-perfused without edema. Pedal pulses palpable and symmetric bilaterally  NEURO: Alert and oriented. Grossly nonfocal.  PSYCHIATRIC: Presents on time and well groomed.  Normal speech and thought content.  Full affect.  No abnormal movements or behaviors noted.   Musculoskeletal: Chest pain is not reproducible with palpation over the chest wall    EKG: Normal sinus rhythm, no acute findings (personally reviewed, cardiology read pending)    DATA REVIEWED:   ADDITIONAL HISTORY SUMMARIZED (2): None.   DECISION TO OBTAIN EXTRA INFORMATION (1): None.   RADIOLOGY TESTS SUMMARIZED OR ORDERED (1): None.  LABS REVIEWED OR ORDERED (1): None.  MEDICINE TESTS SUMMARIZED OR ORDERED (1): EKG ordered.   INDEPENDENT REVIEW OF EKG OR X-RAY (2 each): EKG from today personally read.     The visit lasted a total of 12 minutes face to face with the patient. Over 50% of the time was spent counseling and educating the patient about her chest pain.    IMonty, am scribing for and in the presence of, Dr. Terrell.    I, Dr. Terrell, personally performed the services described in this documentation, as scribed by Monty Berger in my presence, and it is both accurate and complete.    MEDICATIONS:  No current facility-administered medications for this visit.      Current Outpatient Prescriptions   Medication Sig Dispense Refill     alendronate (FOSAMAX) 70 MG tablet Take 70 mg by mouth every 7 days. Take in the morning on an empty stomach with a full glass of water 30 minutes before food, usually takes on Sundays       aspirin 81 MG EC tablet Take 81 mg by mouth daily.       calcium carbonate (OS-MARLYN) 600 mg (1,500 mg) tablet Take 1,200 mg by mouth daily with supper.        OMEGA-3/DHA/EPA/FISH OIL (FISH OIL-OMEGA-3 FATTY ACIDS) 300-1,000 mg capsule Take 1 g by mouth daily.        predniSONE (DELTASONE) 2.5 MG tablet Take 1 tablet (2.5 mg total) by mouth  daily. 90 tablet 0     simvastatin (ZOCOR) 20 MG tablet Take 2 tablets (40 mg total) by mouth at bedtime. 180 tablet 0     Facility-Administered Medications Ordered in Other Visits   Medication Dose Route Frequency Provider Last Rate Last Dose     aspirin tablet 325 mg  325 mg Oral Once Vicente Tineo PA-C         nitroglycerin SL tablet 0.4 mg (NITROSTAT)  0.4 mg Sublingual Q5 Min PRN Vicente Tineo PA-C           Total Data Points: 3

## 2021-06-11 NOTE — PROGRESS NOTES
ASSESSMENT:  1. Elevated blood pressure  Improved on recheck today.  She had elevated blood pressure on clinic visits about a year and a half ago as well but had a significant amount of stress and pain at that time.  When she monitor blood pressure regularly and let me know if greater than 140/90, but systolic blood pressure of 150 would likely be acceptable.   - Thyroid Cascade    2. Hyperlipidemia  Lipids last checked less than a year ago but LDL was high.  Recheck today.  - Lipid Cascade  - Comprehensive Metabolic Panel    3. Hypercalcemia  Calcium was mildly elevated during her hospital stay.  Recommended recheck on discharge.  We will recheck that today.  - Comprehensive Metabolic Panel    4. Osteoporosis  She is overdue for recheck bone density scan.  Also recheck her vitamin D level today.  She continues on Fosamax 70 mg daily.  If calcium is high on recheck today, plan to add PTH lab.  - Vitamin D, Total (25-Hydroxy)  - DXA Bone Density Scan; Future    5.  Lip swelling, hospital follow up  Recent brief hospitalization for lip swelling, resolved.  It appears she had a slightly higher dose of prednisone while she was in the hospital, but then discharged on her usual regimen for giant cell arteritis.  She has had no further lip swelling.  Trigger unknown.     I have reconciled all of the medications with patient today.       PLAN:  Patient Instructions   Get scheduled for bone density (DXA) scan.     Periodically have blood pressure checked every couple months or so either in a clinic or at home to make sure it is not creeping up.       Orders Placed This Encounter   Procedures     DXA Bone Density Scan     Standing Status:   Future     Standing Expiration Date:   6/28/2018     Scheduling Instructions:      PATIENT WILL SCHEDULE?  Yes            [facility]       [phone]- [place]       [phone]- [facility]       [phone]- [place]      [phone]- [place]     Order Specific  Question:   Can the procedure be changed per Radiologist protocol?     Answer:   Yes     Lipid Cascade     Order Specific Question:   Fasting is required?     Answer:   Yes     Comprehensive Metabolic Panel     Thyroid Cascade     Vitamin D, Total (25-Hydroxy)     There are no discontinued medications.    No Follow-up on file.    CHIEF COMPLAINT:  Chief Complaint   Patient presents with     Hospital Visit Follow Up     ER [facility] 6/19--lip swelling       HISTORY OF PRESENT ILLNESS:  Yamilex is a 88 y.o. female presenting to the clinic today for a hospital follow up. She was admitted on 6/19 for lip swelling but was discharged later that day after the swelling resolved. At around midnight, she first noticed that her lip was slightly swollen. At 3 AM that same night, she woke up to go to the bathroom and saw that the lip was even more swollen. She was not having trouble breathing but was having trouble swallowing her large fish oil pills in the morning. She was concerned that it could have been an allergic reaction and went to the ER. She has never had an allergic reaction before. Her swallowing has since improved, but it does not feel completely back to normal; she still has some trouble with the fish oil pills. Her lip has returned to normal size. She had been trying to wean off of prednisone, but she was told to increase the dose slightly for the lip while in the ER. She has had recurrent symptoms of her temporal arteritis when she tries to discontinue prednisone completely.     Elevated Blood Pressure: She has never had problems with high blood pressure in the past, but it was elevated in the ER. She has never taken blood pressure medications. Her blood pressure was slightly elevated initially today as well, but it improved upon recheck. She next sees Dr. Govea in the beginning of July and will have her blood pressure checked then. She has a blood pressure cuff at home, but she is concerned that it might not be  accurate. It is an arm cuff. It was discussed that she could bring it in to the clinic to have the accuracy checked.     Osteoporosis: She continues to take 70 mg of alendronate once per week for her osteoporosis. She is due for a DXA scan. She takes two tablets of a combination vitamin D and calcium supplement per day. She takes them at the same time, but it was discussed that they may absorb better if split and taken at different times of day.     REVIEW OF SYSTEMS:   She fasted today in case she needed to do blood work. She is not in any pain today. Her calcium was elevated at the ER, so she will have that rechecked today. She bruises easily and has had many lesions frozen from her skin. She had skin cancer on her leg, but it was all removed. All other systems are negative.    PFSH:  Reviewed, as below.     TOBACCO USE:  History   Smoking Status     Former Smoker     Packs/day: 1.00     Years: 4.00     Quit date: 1/1/1953   Smokeless Tobacco     Never Used       VITALS:  Vitals:    06/27/17 1039 06/27/17 1045 06/27/17 1102   BP: 162/78 150/72 142/74   Patient Site: Left Arm     Patient Position: Sitting     Cuff Size: Adult Regular     Pulse: 76     Resp: 16     Temp: 97.7  F (36.5  C)     TempSrc: Oral     Weight: 145 lb 11.2 oz (66.1 kg)       Wt Readings from Last 3 Encounters:   06/27/17 145 lb 11.2 oz (66.1 kg)   06/19/17 147 lb 11.2 oz (67 kg)   05/08/17 149 lb 4.8 oz (67.7 kg)       PHYSICAL EXAM:  GENERAL:  Pleasant, well-appearing patient in no acute distress. She ambulates with a cane.  REPEAT BP BY MD: 142/74 LUE, sitting.   VITAL SIGNS:  Reviewed.  HEENT:  Pupils are equal, round, and reactive to light.  Sclerae and  conjunctivae clear.  TMs are clear bilaterally.  Oropharynx is clear with  moist mucous membranes. No lip swelling.   NECK:  Supple without lymphadenopathy or thyromegaly.  No carotid bruits.  CARDIOVASCULAR:  Heart regular rate and rhythm without murmur.  Normal S1 and  S2.  LUNGS:   Clear to auscultation bilaterally without wheezes or crackles.  Good  air movement throughout.    ABDOMEN:  Soft, nontender, and nondistended without  guarding or rebound.  No organomegaly.  EXTREMITIES:  Warm and well perfused without edema. Dorsalis pedis pulses easily palpable and symmetric bilaterally.  NEURO: Alert and oriented. Grossly nonfocal.  PSYCHIATRIC: Presents on time and well groomed.  Normal speech and thought content.  Full affect.  No abnormal movements or behaviors noted.     ADDITIONAL HISTORY SUMMARIZED (2): Reviewed 6/19 hospital notes regarding lip swelling.   DECISION TO OBTAIN EXTRA INFORMATION (1): None.   RADIOLOGY TESTS SUMMARIZED OR ORDERED (1): DXA ordered  LABS REVIEWED OR ORDERED (1): Labs from 6/19/2017 reviewed. Labs ordered.   MEDICINE TESTS SUMMARIZED OR ORDERED (1): None.  INDEPENDENT REVIEW OF EKG OR X-RAY (2 each): None.     The visit lasted a total of 22 minutes face to face with the patient. Over 50% of the time was spent counseling and educating the patient about her medications and recent hospital visit.    IMonty, am scribing for and in the presence of, Dr. Terrell.    IDr. Terrell, personally performed the services described in this documentation, as scribed by Monty Berger in my presence, and it is both accurate and complete.    MEDICATIONS:  Current Outpatient Prescriptions   Medication Sig Dispense Refill     alendronate (FOSAMAX) 70 MG tablet Take 70 mg by mouth every 7 days. Take in the morning on an empty stomach with a full glass of water 30 minutes before food, usually takes on Sundays       aspirin 81 MG EC tablet Take 81 mg by mouth daily.       calcium carbonate (OS-MARLYN) 600 mg (1,500 mg) tablet Take 1,200 mg by mouth daily with supper.        OMEGA-3/DHA/EPA/FISH OIL (FISH OIL-OMEGA-3 FATTY ACIDS) 300-1,000 mg capsule Take 1 g by mouth daily.        predniSONE (DELTASONE) 2.5 MG tablet Take 7.5 mg/d prednisone PO for 1 month, reduce to 5 mg/d for the  next month, and then reduce to 2.5 mg/d for the third month and then stop. (Patient taking differently: Take 5 mg by mouth daily. Take 7.5 mg/d prednisone PO for 1 month, reduce to 5 mg/d for the next month, and then reduce to 2.5 mg/d for the third month and then stop. Currently had been taking 5 mg daily (6/19/17)) 90 tablet 2     simvastatin (ZOCOR) 20 MG tablet Take 20 mg by mouth at bedtime.       No current facility-administered medications for this visit.        Total data points: 4

## 2021-06-11 NOTE — PROGRESS NOTES
Left detailed message for patient that BP ok, monitor BP at home 1-2 times a week or 1-2 times a month in clinic.

## 2021-06-11 NOTE — PROGRESS NOTES
"ASSESSMENT AND PLAN:  Yamilex Keller 88 y.o. female  has temporal arteritis, biopsy-proven, widespread osteoarthritis.  She has no evidence of recurrence of the GCA symptoms.  Recent sed rate and CRP normal.  Continue 2.5 mg prednisone for the next 90 days.  We will meet in 3 months.  Osteoarthritis management outlined avoid nonsteroidals, take acetaminophen.  A corticosteroid injection done last December was not all that helpful for her.  She is aware of the option of Visco supplementation.   We discussed what osteopenia and  Osteoporosis means.  She is on alendronate calcium and vitamin D.  Return for follow-up and 12 weeks.  Diagnoses and all orders for this visit:    GCA (giant cell arteritis)  -     predniSONE (DELTASONE) 2.5 MG tablet; Take 1 tablet (2.5 mg total) by mouth daily.  Dispense: 90 tablet; Refill: 0    Osteoporosis    Bilateral primary osteoarthritis of knee      HISTORY OF PRESENTING ILLNESS:  Yamilex Keller, 88 y.o., female  is here for follow up of biopsy-proven GCA, her esr originally was 104.  She is on 2-1/2 mg of prednisone since July 1.  She continues to do well.  There is no headache, there is no jaw claudication.  Sometimes she hears clicking and has discomfort in the TMJ area.  There is no double vision.  She gets occasional fuzzy vision watching TV that is intermittent.  She was originally started on adequate doses of prednisone.  She has done well.  She is she was down to 0 mg of prednisone.  While watching TV she had \"wavy vision.  She called her concerned that this might represent a flareup.  She did not have headaches.  She had no jaw claudication.  She is not sure if there was double vision.  She did have a cataract surgery recently.  She was started on prednisone.  Now she is on 10 mg daily.  There is no recurrence of her symptoms.  Doing well on the past visit she was started on methotrexate when she was on [place].  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 she " says she never had pain in the joints.  Whether this is an effort to start methotrexate as a steroid sparing is unclear.  If needed I would prefer to have her take Actemra but that's not yet at the point here.  She has noted no residual headache jaw claudication N lower extremity claudication.  He describes herself in very good health.  She did have back pain and radicular symptoms 2 years ago.  She is neither a smoker not takes alcohol.  He likes to walk 30 minutes a day.  She is accompanied here by her .  She does not like prednisone as it has caused her to have skin changes.  She points to the purpuric areas.    Further historical information and ADL limitations as noted in the multidimensional health assessment questionnaire attached in the EMR. .     ALLERGIES:Prednisone    PAST MEDICAL/ACTIVE PROBLEMS/MEDICATION/ FAMILY HISTORY/SOCIAL DATA:  The patient has a family history of  Past Medical History:   Diagnosis Date     Hyperlipidemia      Osteoporosis      Pneumonia      Temporal arteritis      History   Smoking Status     Former Smoker     Packs/day: 1.00     Years: 4.00     Quit date: 1/1/1953   Smokeless Tobacco     Never Used     Patient Active Problem List   Diagnosis     Basal Cell Carcinoma Of The Skin     Vitamin D Deficiency     Essential Hypercholesterolemia     Asymptomatic Postmenopausal Status     Osteoporosis     Benign Adenomatous Polyp Of The Large Intestine     Hearing Loss     Cerumen Impaction     GCA (giant cell arteritis)     High risk medication use     Bilateral primary osteoarthritis of knee     Chronic pain of right knee     Lip swelling     Hypercalcemia     Current Outpatient Prescriptions   Medication Sig Dispense Refill     alendronate (FOSAMAX) 70 MG tablet Take 70 mg by mouth every 7 days. Take in the morning on an empty stomach with a full glass of water 30 minutes before food, usually takes on Sundays       aspirin 81 MG EC tablet Take 81 mg by mouth daily.        "calcium carbonate (OS-MARLYN) 600 mg (1,500 mg) tablet Take 1,200 mg by mouth daily with supper.        OMEGA-3/DHA/EPA/FISH OIL (FISH OIL-OMEGA-3 FATTY ACIDS) 300-1,000 mg capsule Take 1 g by mouth daily.        predniSONE (DELTASONE) 2.5 MG tablet Take 7.5 mg/d prednisone PO for 1 month, reduce to 5 mg/d for the next month, and then reduce to 2.5 mg/d for the third month and then stop. (Patient taking differently: Take 5 mg by mouth daily. Take 7.5 mg/d prednisone PO for 1 month, reduce to 5 mg/d for the next month, and then reduce to 2.5 mg/d for the third month and then stop. Currently had been taking 5 mg daily (6/19/17)) 90 tablet 2     simvastatin (ZOCOR) 20 MG tablet Take 2 tablets (40 mg total) by mouth at bedtime. 180 tablet 0     No current facility-administered medications for this visit.      DETAILED EXAMINATION  07/05/17  :  Vitals:    07/05/17 1053   BP: 144/62   Patient Site: Right Arm   Patient Position: Sitting   Cuff Size: Adult Regular   Weight: 146 lb 9.6 oz (66.5 kg)   Height: 5' 4\" (1.626 m)     Alert oriented. Head including the face is examined for malar rash, heliotropes, scarring, lupus pernio. Eyes examined for redness such as in episcleritis/scleritis, periorbital lesions.   Neck examined  for lymph nodes, range of motion Both upper and lower extremities (all four) examined for swollen, warm &/or  tender joints, range of motion, rash, muscle weakness, edema. The salient normal / abnormal findings are appended.  She does not have synovitis in any of the palpable appendicular joints.  She has [place] nodes.  Her radial pulses are symmetrical.  Joint line tenderness right knee.    LAB / IMAGING DATA:  ALT   Date Value Ref Range Status   06/27/2017 13 0 - 45 U/L Final   10/12/2015 17 0 - 45 U/L Final   10/02/2015 25 0 - 45 U/L Final     Albumin   Date Value Ref Range Status   06/27/2017 3.7 3.5 - 5.0 g/dL Final   10/12/2015 2.9 (L) 3.5 - 5.0 g/dL Final   10/02/2015 2.5 (L) 3.5 - 5.0 g/dL " Final     Creatinine   Date Value Ref Range Status   06/27/2017 0.69 0.60 - 1.10 mg/dL Final   06/19/2017 0.81 0.60 - 1.10 mg/dL Final   09/16/2016 0.70 0.60 - 1.10 mg/dL Final       WBC   Date Value Ref Range Status   06/19/2017 10.2 4.0 - 11.0 thou/uL Final   12/12/2015 11.8 (H) 4.0 - 11.0 thou/uL Final     Hemoglobin   Date Value Ref Range Status   06/19/2017 15.5 12.0 - 16.0 g/dL Final   12/12/2015 15.1 12.0 - 16.0 g/dL Final   12/08/2015 14.6 12.0 - 16.0 g/dL Final     Platelets   Date Value Ref Range Status   06/19/2017 242 140 - 440 thou/uL Final   12/12/2015 296 140 - 440 thou/uL Final   12/08/2015 245 140 - 440 thou/uL Final       Lab Results   Component Value Date    SEDRATE 3 05/08/2017

## 2021-06-12 NOTE — PROGRESS NOTES
Cardiac Rehab  Phase II Assessment    Assessment Date: 8-1-17    Diagnosis: STEMI  Date of Onset: 7/18/17  Procedure: NIKKI X 2 to LAD  Date of Onset: 7/18/17  ICD/Pacemaker: No   Post-op Complications: NA  ECG History: Sinus Rhythm EF%:35-40%  Past Medical History:   Patient Active Problem List   Diagnosis     Basal Cell Carcinoma Of The Skin     Vitamin D Deficiency     Essential Hypercholesterolemia     Asymptomatic Postmenopausal Status     Osteoporosis     Benign Adenomatous Polyp Of The Large Intestine     Hearing Loss     Cerumen Impaction     GCA (giant cell arteritis)     High risk medication use     Bilateral primary osteoarthritis of knee     Chronic pain of right knee     Lip swelling     Hypercalcemia     NSTEMI (non-ST elevated myocardial infarction)     Acute ST elevation myocardial infarction (STEMI) involving left anterior descending coronary artery     Mixed hyperlipidemia     Ischemic cardiomyopathy     Systolic dysfunction without heart failure     Chest pain     Acute systolic (congestive) heart failure     AMI anterior wall         Physical Assessment  Precautions/ Physical Limitations: Right knee osteoarthritis  Oxygen: No  O2 Sats: 94-97% on RA Lung Sounds: Clear, diminished Edema: None  Sleeping Pattern: good   Appetite: good   Nutrition Risk Screen: Will follow up with dietician    Pain  Location: Osteoarthritis in knees  Characteristics:Sore  Intensity: (0-10 scale) 5  Current Pain Management: Relief with ROM  Intervention: NA  Response: Some relief    Psychosocial/ Emotional Health  1. In the past 12 months, have you been in a relationship where you have been abused physically, emotionally, sexually or financially? No  notified: No  2. Who do you turn to for emotional support?:  Martín, daughter  3. Do you have cultural or spiritual needs? No  4. Have there been any major life changes in the past 12 months? No    Referral Information  Primary Physician: Lady Waller  MD Xander  Cardiologist: Unknown at this time, will follow up      Home exercise/Equipment: Walked 30 minutes in stores    Patient's long-term goal(s): Get back to 30 minute walk    1. Living Accommodations: [facility] Steps: No      Support people at home:  Martín   2. Marital Status:   3. Family is able to assist with cares      O[employer] orth[employer]/Community involvement: Only attend  4. Recreation/Hobbies: Going out to dinner

## 2021-06-12 NOTE — PROGRESS NOTES
[facility]  ENDOCRINOLOGY    Osteoporosis Follow Up 10/6/2020    Yamilex Keller, 3/25/1929, [id number]          Reason for visit      1. Age-related osteoporosis without current pathological fracture        History     Yamilex Keller is a very pleasant 89 y.o. old female who presents for follow up.   SUMMARY:  Her hx includes a compression fx of T7, 10 years on Bisphosphonate, treatment for PMR with Prednisone, and smoking earlier in her life. She quit about the same time she got . She has had several Cardiac events. She was started on Prolia in September of 2018 and has had 3 injections. Her last Dexa Scan shows: Since the previous scan done on December 08,2014, there has been an 8.5% increase in bone density in the lumbar spine.  Additionally, there has been a statistically significant 2.7% increase in the left total hip and a 2.5% increase in the right total hip.  An increase or stability is seen as a positive response to therapy.    TODAY:    Yamilex is seen today in Clinic in f/u for Osteoporosis. She was due for her Prolia injection today and assumed that she would get it at her appointment - fortunately, we were able to accommodate her in this. She is well. She has had no falls in the last year, and has had no problems with her injections. Her current Vit D level is 34.5 and Calcium level is 9.2.  Her last Dexa Scan showed: Since the previous bone density dated  July 18, 2017, there has been no statistically significant % change in the bone density of the spine and hips bilaterally.   Stability is considered a positive response to treatment.    Risk Factors     The following high- risk conditions have been ruled out: celiac disease, eating disorders, gastric bypass, hyperparathyroidism, inflammatory bowel disease, hyperthyroidism, rheumatoid arthritis, lupus, chronic kidney disease.     Yamilex Keller has the following risk factors: Age, Female gender, , Low BMI and Family history of  osteoporosis     She is not on high risk medications such as glucocorticoids, anti-coagulants, anti-convulsants, chemotherapy or levothyroxine.    Patient deniesHysterectomy, Oophrectomy, Breast cancer and Family history of breast cancer.      Past Medical History     Patient Active Problem List   Diagnosis     Osteoporosis     Hearing loss     High risk medication use     Bilateral primary osteoarthritis of knee     Chronic pain of right knee     Angioedema     HTN (hypertension)     HLD (hyperlipidemia)     CAD (coronary artery disease)     Senile purpura (H)     Full code status     Uses walker     GERD (gastroesophageal reflux disease)     Dementia (H)       Family History       family history includes Liver cancer in her mother; Lung cancer in her brother and brother; Pacemaker in her brother; Prostate cancer in her father; Uterine cancer in her sister.    Social History      reports that she quit smoking about 67 years ago. Her smoking use included cigarettes. She has a 4.00 pack-year smoking history. She has never used smokeless tobacco. She reports current alcohol use. She reports that she does not use drugs.      Review of Systems     Patient denies current pain, limited mobility, fractures.  Remainder per HPI.          Assessment     1. Age-related osteoporosis without current pathological fracture        Plan     Yamilex received her Prolia injection today without difficulty. She will f/u in 6 months for her next. I will see her in one year for f/u. She will be due for another Dexa Scan next year, and we will determine whether at 89, that it is necessary.     Total visit minutes:20  Time spent counseling and coordination of care:18     Rocío MOORE Endocrinology  10/3/2019  5:51 AM      Current Medications     Outpatient Medications Prior to Visit   Medication Sig Dispense Refill     aspirin 81 MG EC tablet Take 1 tablet (81 mg total) by mouth at bedtime. For CAD 30 tablet 0     atorvastatin  (LIPITOR) 80 MG tablet TAKE 1 TABLET AT BEDTIME FOR CORONARY ARTERY DISEASE AND LIPIDS 90 tablet 3     calcium-vitamin D 500 mg(1,250mg) -200 unit per tablet Take 1 tablet by mouth 2 (two) times a day with meals.       isosorbide mononitrate (IMDUR) 30 MG 24 hr tablet Take 1 tablet (30 mg total) by mouth every morning. 90 tablet 3     metoprolol succinate (TOPROL-XL) 25 MG Take 1 tablet (25 mg total) by mouth daily. 90 tablet 3     nitroglycerin (NITROSTAT) 0.4 MG SL tablet Place 1 tablet (0.4 mg total) under the tongue every 5 (five) minutes as needed for chest pain. 25 tablet 2     omeprazole (PRILOSEC) 20 MG capsule Take 1 capsule (20 mg total) by mouth daily before breakfast. 90 capsule 3     ticagrelor (BRILINTA) 90 mg Tab Take 1 tablet (90 mg total) by mouth 2 (two) times a day. 180 tablet 3     triamcinolone (KENALOG) 0.1 % cream Apply 1 application topically as needed.       calcium, as carbonate, (TUMS) 200 mg calcium (500 mg) chewable tablet Chew 1 tablet as needed for heartburn.       Facility-Administered Medications Prior to Visit   Medication Dose Route Frequency Provider Last Rate Last Dose     denosumab 60 mg (PROLIA 60 mg/ml)  60 mg Subcutaneous Q6 Months Rocío Sainz, NP   60 mg at 04/02/20 1512         Lab Results     TSH   Date Value Ref Range Status   09/10/2018 1.21 0.30 - 5.00 uIU/mL Final     PTH   Date Value Ref Range Status   01/17/2019 49 10 - 86 pg/mL Final     Calcium   Date Value Ref Range Status   08/25/2020 9.2 8.5 - 10.5 mg/dL Final     Phosphorus   Date Value Ref Range Status   08/24/2018 3.6 2.5 - 4.5 mg/dL Final           Imaging Results   Last DEXA scan:  Results for orders placed in visit on 10/01/19   DXA Bone Density Scan    Narrative 10/14/2019      RE: Yamilex Keller  YOB: 1929        Dear Rocío Sainz,    Patient Profile:  89 y.o. female, postmenopausal, is here for the follow up bone density   test.   History of fractures - Yes;  Wrist and Thoracic  vertebrae. Family history   of osteoporosis - None.  Family history of hip fracture: None. Smoking   history - No. Osteoporosis treatment past -  Yes;  Bisphosphonates.   Osteoporosis treatment current - Yes;  Prolia.  Chronic medical problems -   None. High risk medications -  Blood thinner (Coumadin or Heparin);  Yes,   Currently and Steroids;  Yes, in the Past.      Assessment:    1. The spine bone density L1-L2 with T-score -1.1.  2. Femoral bone densities show left total hip T- score -2.4 and right   femoral neck T-score -2.6.  3. Trabecular bone score indicates moderate trabecular bone architecture.      89 y.o. female with OSTEOPOROSIS, currently receiving treatment with   Prolia.     Since the previous bone density dated  July 18, 2017, there has been no   statistically significant % change in the bone density of the spine and   hips bilaterally.   Stability is considered a positive response to   treatment.    Recommendations:  Continuation of current treatment is recommended with follow up bone   density scan in 2 years.      Bone densitometry was performed on your patient using our Lingohub   densitometer. The results are summarized and a copy of the actual scans   are included for your review. In conformity with the International Society   of Clinical Densitometry's most recent position statement for DXA   interpretation (2015), the diagnosis will be made on the lowest measured   T-score of the lumbar spine, femoral neck, total proximal femur or 33%   radius. Note the change in terminology for diagnostic classification from   OSTEOPENIA to LOW BONE MASS. All trending for sequential exams will be   done using multiple vertebrae or the total proximal femur. Fracture risk   is based on the WHO Fracture Risk Assessment Tool (FRAX). If additional   information is needed or if you would like to discuss the results, please   do not hesitate to call me.       Thank you for referring this patient to  "[facility].   We are happy to be of service in support of you and your practice. If you   have any questions or suggestions to improve our service, please call me   at [phone].     Sincerely,     Alona Wei M.D. CELIACTALI.  Osteoporosis Services, [facility]     Prolia Injection Phone Screen       Screening questions have been asked 2-3 days prior to administration visit for Prolia. If any questions are answered with \"Yes,\" this phone encounter were will routed to ordering provider for further evaluation.      1.  When was the last injection?  4/2/2020     2.  Has insurance for this injection been verified?  Yes     3.  Did you experience any new onset achiness or rashes that lasted for over a month with your previous Prolia injection? No        4.  Do you have a fever over 101?F or a new deep cough that is unusual for you today? No     5.  Have you started any new medications in the last 6 months that you were told could affect your immune system? These may have been prescribed by oncologist, transplant, rheumatology, or dermatology.   No     6.  In the last 6 months have you have gastric bypass or parathyroid surgery?   No     7.  Do you plan dental work requiring drilling into the bone such as implants/extractions or oral surgery in the next 2-3 months?   No     8. Do you have new insurance since the last injection? no     Patient informed if symptoms discussed above present prior to their administration appointment, they are to notify clinic immediately.            The following steps were completed to comply with the REMS program for Prolia:  1. Ordering provider has previously reviewed information in the Medication Guide and Patient Counseling Chart, including the serious risks of Prolia  and the symptoms of each risk and have been advised to seek prompt medical attention if they have signs or symptoms of any of the serious risks.  2. Provided each patient a copy of the " Medication Guide and Patient Brochure.  See MAR for administration details.   Indication: Prolia  (denosumab) is a prescription medicine used to treat osteoporosis in patients who:   Are at high risk for fracture, meaning patients who have had a fracture related to osteoporosis, or who have multiple risk factors for fracture; Cannot use another osteoporosis medicine or other osteoporosis medicines did not work well.   The timeline for early/late injections would be 4 weeks early and any time after the 6 month sarwat. If a patient receives their injection late, then the subsequent injection would be 6 months from the date that they actually received the injection     Have the screening questions been asked prior to this administration? Yes     After obtaining consent, and per orders of Rocío Sainz NP, injection of Prolia 60mg given by Rocío Sainz NP. Patient instructed to remain in clinic for 20 minutes afterwards, and to report any adverse reaction to me immediately.

## 2021-06-12 NOTE — PROGRESS NOTES
HISTORY OF PRESENT ILLNESS  Two to three months ago patient reports that something went down the wrong way in the throat.  She feels like something is stuck in the throat.  She also has problems swallowing large pills.  No choking.  No change in her voice.  No pain in the throat.  The throat has just felt funny.  She thought that she might have some heartburn.      REVIEW OF SYSTEMS  Review of Systems: a 10-system review was performed. Pertinent positives are noted in the HPI and on a separate scanned document in the chart.    PMH, PSH, FH and SH has documented in the EHR.      EXAM    CONSTITUTIONAL  General Appearance:  Normal, well developed, well nourished, no obvious distress  Ability to Communicate:  communicates appropriately.    HEAD AND FACE  Appearance and Symmetry:  Normal, no scalp or facial scarring or suspicious lesions.  Paranasal sinuses tenderness:  Normal, Paranasal sinuses non tender    EARS  Clinical speech reception threshold:  Normal, able to hear normal speech.  Auricle:  Normal, Auricles without scars, lesions, masses.  External auditory canal:  Normal, External auditory canal normal.  Tympanic membrane:  Normal, Tympanic membranes normal without swelling or erythema.  Tympanic membrane mobility:  Normal, Normal tympanic membrane mobility.    NOSE (speculum or scope)  Architecture:  Normal, Grossly normal external nasal architecture with no masses or lesions.  Mucosa:  Normal mucosa, No polyps or masses.  Septum:  Normal, Septum non-obstructing.  Turbinates:  Normal, No turbinate abnormalities    ORAL CAVITY AND OROPHARYNX  Lips:  Normal.  Dental and gingiva:  Normal, No obvious dental or gingival disease.  Mucosa:  Normal, Moist mucous membranes.  Tongue:  Normal, Tongue mobile with no mucosal abnormalities  Hard and soft palate:  Normal, Hard and soft palate without cleft or mucosal lesions.  Oral pharynx:  Normal, Posterior pharynx without lesions or remarkable asymmetry.  Saliva:  Normal,  Clear saliva.  Masses:  Normal, No palpable masses or pathologically enlarged lymph nodes.    LARYNGOSCOPY  Risks and benefit of Flexible laryngeal endoscopy were discussed with the patient and they wished to proceed.  The nose was sprayed with 4% lidocaine / 1% phenylephrine.  After allowing adequate time for anesthesia the procedure was started.  Patient tolerated the procedure well.  See exam note for findings.    LARYNX AND HYPOPHARYNX (mirror or scope)  Voice Quality:  Normal voice quality.  Supra glottis:  Normal, No edema or erythema.  Epiglottis:  Normal, No epiglottic mass or mucosal lesions.  Pyriform sinuses:  Normal, Pyriform sinuses clear.  Vocal cords appearance:  Normal, Vocal cord motion symmetric.  Vocal cord motion:  Normal, Vocal cord motion symmetric  Endolarynx:  Normal, No Endolaryngeal mass or mucosal lesions.    NECK  Masses/lymph nodes:  Normal, No worrisome neck masses or lymph nodes.  Salivary glands:  Normal, Parotid and submandibular glands.  Trachea and larynx position:  Normal, Trachea and larynx midline.  Thyroid:  Normal, No thyroid abnormality.  Tenderness:  Normal, No cervical tenderness.  Suppleness:  Normal, Neck supple    NEUROLOGICAL  Speech pattern:  Normal, Proasaic    RESPIRATORY  Symmetry and Respiratory effort:  Normal, Symmetric chest movement and expansion with no increased intercostal retractions or use of accessory muscles.     IMPRESSION  Sensation in throat with mild dysphagia.      RECOMMENDATION  I reassured her that her exam was unremarkable.  I also explained that her symptoms could be related to reflux.  I discussed other options for workup including esophagram.  She will observe for now.    Duc Zhang MD

## 2021-06-12 NOTE — PROGRESS NOTES
ASSESSMENT:  1. STEMI, status post PCI, 2 LAD stents placed  Patient has been compliant with new medications.  Medication list reviewed in full with patient and her daughter.  She is hypotensive and lightheaded with position change.  Discussed with patient's nurse practitioner in cardiology clinic, and we will have the patient hold her lisinopril until her follow-up appointment there next week.  We have rescheduled her cardiac rehab for 2 days from now since patient's  is in the hospital right now.  Continue metoprolol succinate 12.5 mg daily, Brilinta, high-dose atorvastatin, baby aspirin.  Patient was instructed to call if she continues to feel lightheaded.  BMP today due to recent med changes.  She has follow-up with cardiology clinic next week.    2. Globus sensation  - Ambulatory referral to ENT    I have reconciled all of the medications.     PLAN:  Patient Instructions   Do not take lisinopril. If you are feeling lightheaded, give us a call.      Orders Placed This Encounter   Procedures     Basic Metabolic Panel     Ambulatory referral to ENT     Referral Priority:   Routine     Referral Type:   Consultation     Referral Reason:   Evaluation and Treatment     Requested Specialty:   Otolaryngology     Number of Visits Requested:   1     There are no discontinued medications.    No Follow-up on file.    CHIEF COMPLAINT:  Chief Complaint   Patient presents with     Hospital Visit Follow Up     BRIT/Tex's--7/20--Stent Placement       HISTORY OF PRESENT ILLNESS:  Yamilex is a 88 y.o. female presenting to the clinic today for a hospital follow up. She is accompanied by her daughter. She was admitted on 7/18/2017 for STEMI and stent placement. She was contacted after discharge on 7/20/2017 and was advised to follow up with her PCP post discharge. She was seen here in the clinic on 7/18 with complaints of mild chest pressure and some symptoms radiating into her arms. Although her EKG done in the clinic was  normal, her symptoms were concerning for ischemia, so she was sent to the ED. A troponin level was elevated at [facility] ED, so she was transferred to [facility].  In route, EMS noted ST elevation on monitoring, so she went right to the Cath Lab at [facility], where an angiogram was performed and stents placed. For the most part, she has felt okay since being discharged from the hospital. She is supposed to start cardiac rehab today, but she is very fatigued from lack of sleep last night and would like to put it off until later. She does not feel short of breath. She has had some upper chest burning near her throat, which she thinks it is heart burn. She describes it as a burning sensation in the upper midline chest. She initially thought her heart attack pain last week could have been heart burn, so she states she has a tough time distinguishing between epigastric and cardiac discomfort.  This is much different however than her significant chest pressure that she experienced at the time of the heart attack.  She was started on lisinopril, metoprolol succinate, atorvastatin, and Brilinta in the hospital. She has felt dizzy and lightheaded since starting these medications. She needs to pause upon initially standing up before she can start walking. Her blood pressure is low in the clinic today, and she states she has not even taken her medications yet today.     Throat Discomfort: She has had an intermittent sensation that something is stuck in her throat for the past three weeks. She is able to eat and drink without trouble, but she occasionally struggles to swallow her large fish oil pill. She does not choke, but it has been uncomfortable. She diagnosed herself with acid reflux a little while ago. She tried taking Rolaids but notes that she accidentally swallowed one whole instead of chewing it up the first time. Her voice has not changed since she first noticed this discomfort. She would like  to meet with ENT to investigate this further.     REVIEW OF SYSTEMS:   She occasionally has ear discomfort, especially whenever there are pressure changes. She has frequent nasal drainage. She has had diarrhea since leaving the hospital, which is unusual for her. She has not had a normal stool since discharge, but there has not been red or black in the stool. All other systems are negative.    PFSH:  Her  is currently in the hospital with a UTI.     TOBACCO USE:  History   Smoking Status     Former Smoker     Packs/day: 1.00     Years: 4.00     Quit date: 1/1/1953   Smokeless Tobacco     Never Used       VITALS:  Vitals:    07/25/17 0919   BP: 102/64   Patient Site: Left Arm   Patient Position: Sitting   Cuff Size: Adult Regular   Pulse: 64   Resp: 16   Temp: 97.4  F (36.3  C)   TempSrc: Oral   Weight: 145 lb 11.2 oz (66.1 kg)     Wt Readings from Last 3 Encounters:   07/25/17 145 lb 11.2 oz (66.1 kg)   07/19/17 147 lb 8 oz (66.9 kg)   07/18/17 148 lb (67.1 kg)       PHYSICAL EXAM:  GENERAL:  Pleasant, well-appearing patient in no acute distress. She ambulates with a cane.   VITAL SIGNS:  Reviewed.  HEENT:  Pupils are equal, round, and reactive to light.  Sclerae and  conjunctivae clear.  TMs are clear bilaterally.  Oropharynx is clear with  moist mucous membranes.  NECK:  Supple without lymphadenopathy or thyromegaly.  No carotid bruits.  CARDIOVASCULAR:  Heart regular rate and rhythm without murmur.  Normal S1 and  S2.  LUNGS:  Clear to auscultation bilaterally without wheezes or crackles.  Good  air movement throughout.    ABDOMEN:  Soft, nontender, and nondistended without  guarding or rebound.  No organomegaly.  EXTREMITIES:  Warm and well perfused without edema. Dorsalis pedis pulses easily palpable and symmetric bilaterally.  Radial and ulnar pulses easily palpable and symmetric.  NEURO: Alert and oriented. Grossly nonfocal.  PSYCHIATRIC: Presents on time and well groomed.  Normal speech and thought  content.  Full affect.  No abnormal movements or behaviors noted.     ADDITIONAL HISTORY SUMMARIZED (2): Reviewed 7/18 - 7/20 hospital notes regarding STEMI  DECISION TO OBTAIN EXTRA INFORMATION (1): None.   RADIOLOGY TESTS SUMMARIZED OR ORDERED (1): None.  LABS REVIEWED OR ORDERED (1): Labs from 7/18 - 7/20 reviewed. Labs ordered.   MEDICINE TESTS SUMMARIZED OR ORDERED (1): Reviewed 7/18 and 7/19 EKG's. Reviewed 7/19 echo.   INDEPENDENT REVIEW OF EKG OR X-RAY (2 each): None.     The visit lasted a total of 20 minutes face to face with the patient. Over 50% of the time was spent counseling and educating the patient about her recent heart attack.    IMonty, am scribing for and in the presence of, Dr. Terrell.    I, Dr. Terrell, personally performed the services described in this documentation, as scribed by Monty Berger in my presence, and it is both accurate and complete.    MEDICATIONS:  Current Outpatient Prescriptions   Medication Sig Dispense Refill     alendronate (FOSAMAX) 70 MG tablet Take 70 mg by mouth every 7 days. Take in the morning on an empty stomach with a full glass of water 30 minutes before food, usually takes on Saturdays       aspirin 81 MG EC tablet Take 81 mg by mouth daily.       atorvastatin (LIPITOR) 80 MG tablet Take 1 tablet (80 mg total) by mouth daily. 30 tablet 2     CALCIUM CARBONATE/VITAMIN D3 (CALCIUM WITH VITAMIN D ORAL) Take 2 tablets by mouth daily.       lisinopril (PRINIVIL,ZESTRIL) 2.5 MG tablet Take 1 tablet (2.5 mg total) by mouth daily. 30 tablet 2     metoprolol succinate (TOPROL-XL) 25 MG Take 0.5 tablets (12.5 mg total) by mouth daily. 30 tablet 2     nitroglycerin (NITROSTAT) 0.4 MG SL tablet Place 1 tablet (0.4 mg total) under the tongue every 5 (five) minutes as needed for chest pain. 30 tablet 2     OMEGA-3/DHA/EPA/FISH OIL (FISH OIL-OMEGA-3 FATTY ACIDS) 300-1,000 mg capsule Take 1 g by mouth daily.        predniSONE (DELTASONE) 2.5 MG tablet Take 1 tablet (2.5  mg total) by mouth daily. 90 tablet 0     ticagrelor (BRILINTA) 90 mg Tab Take 1 tablet (90 mg total) by mouth 2 (two) times a day. 30 tablet 11     No current facility-administered medications for this visit.        Total data points: 4

## 2021-06-12 NOTE — PROGRESS NOTES
[facility] Follow-up Note    Assessment & Plan        1. Coronary artery disease involving native coronary artery of native heart with unstable angina pectoris-angiography July 18 showed normal left main, ostial left anterior descending 99% lesion that received a drug-coated stent, mid left anterior descending 70% lesion the received a drug-coated stent, distal 25% lesion, distal circumflex 25% lesion with a left AV groove 40% lesion, and proximal right coronary artery 20% lesion.  She is having vague chest discomfort and rule out major ischemia will perform pharmacological nuclear stress.   2. Acute ST elevation myocardial infarction (STEMI) involving left anterior descending coronary artery -this prompted urgent angiography as above.   3. Ischemic cardiomyopathy -ejection fraction 35-40% and will recheck echo.  Hopefully improving.  Currently on losartan and beta-blocker and Lasix.   4. Mixed hyperlipidemia -cholesterol excellent 157 with an LDL of 91.  On atorvastatin.   5. Acute on chronic systolic heart failure -no signs or symptoms on exam currently and on Lasix as above.   6.  Shortness of breath-could be due to ticagrelor but will do stress test as above.    Plan  1.  Pharmacological nuclear stress test and a significant ischemia angiography.  2.  Reevaluate ejection fraction with stress test and address if abnormal.  3.  Follow-up with me in about 3 or 4 months or sooner if needed.    Subjective  CC: 88-year-old white female being seen by me for the first time after having seen 2 of my partners.  She is a post hospital discharge follow-up and only a 20 minute appointment.  When she arrived home from the hospital she she had chest tightness while sitting in bed and took 3 sublingual nitroglycerin and had a syncopal spell.  Since then she has heartburn.  She thinks this is related to food.  She has vague chest discomforts described as pins in the chest.  Nothing is associated with activity.  " She has generalized shortness of breath, but there is no more dizziness, PND, orthopnea or peripheral edema.    Medications  Current Outpatient Prescriptions   Medication Sig     alendronate (FOSAMAX) 70 MG tablet Take 70 mg by mouth every 7 days. Take in the morning on an empty stomach with a full glass of water 30 minutes before food, usually takes on Saturdays     aspirin 81 MG EC tablet Take 81 mg by mouth at bedtime.      atorvastatin (LIPITOR) 80 MG tablet Take 1 tablet (80 mg total) by mouth at bedtime.     CALCIUM CARBONATE/VITAMIN D3 (CALCIUM WITH VITAMIN D ORAL) Take 2 tablets by mouth at bedtime.      furosemide (LASIX) 20 MG tablet Take 1 tablet (20 mg total) by mouth daily.     losartan (COZAAR) 25 MG tablet Take 1 tablet (25 mg total) by mouth daily.     metoprolol succinate (TOPROL-XL) 25 MG Take 0.5 tablets (12.5 mg total) by mouth daily.     nitroglycerin (NITROSTAT) 0.4 MG SL tablet Place 1 tablet (0.4 mg total) under the tongue every 5 (five) minutes as needed for chest pain.     OMEGA-3/DHA/EPA/FISH OIL (FISH OIL-OMEGA-3 FATTY ACIDS) 300-1,000 mg capsule Take 1 g by mouth at bedtime.      predniSONE (DELTASONE) 2.5 MG tablet Take 1 tablet (2.5 mg total) by mouth daily.     ticagrelor (BRILINTA) 90 mg Tab Take 1 tablet (90 mg total) by mouth 2 (two) times a day.       Objective  /66 (Patient Site: Left Arm, Patient Position: Sitting, Cuff Size: Adult Regular)  Pulse 64  Resp 16  Ht 5' 3.5\" (1.613 m)  Wt 139 lb 9.6 oz (63.3 kg)  BMI 24.34 kg/m2    General Appearance:    Alert, cooperative, no distress, appears stated age   Head:    Normocephalic, without obvious abnormality, atraumatic   Throat:   Lips, mucosa, and tongue normal; teeth and gums normal   Neck:   Supple, symmetrical, trachea midline, no adenopathy;        thyroid:  No enlargement/tenderness/nodules; no carotid    bruit or JVD   Back:     Symmetric, no curvature, ROM normal, no CVA tenderness   Lungs:     Clear to " auscultation bilaterally, respirations unlabored   Chest wall:    No tenderness or deformity   Heart:    Regular rate and rhythm, S1 and S2 normal, no murmur, rub   or gallop   Abdomen:     Soft, non-tender, bowel sounds active all four quadrants,     no masses, no organomegaly   Extremities:   Normal, atraumatic, no cyanosis or edema   Pulses:   2+ and symmetric all extremities   Skin:   Skin color, texture, turgor normal, no rashes or lesions     Results    Lab Results personally reviewed   Lab Results   Component Value Date    CHOL 157 07/19/2017    CHOL 220 (H) 06/27/2017     Lab Results   Component Value Date    HDL 41 (L) 07/19/2017    HDL 53 06/27/2017     Lab Results   Component Value Date    LDLCALC 91 07/19/2017    LDLCALC 142 (H) 06/27/2017     Lab Results   Component Value Date    TRIG 124 07/19/2017    TRIG 125 06/27/2017     Lab Results   Component Value Date    WBC 9.4 07/28/2017    HGB 14.2 07/28/2017    HCT 42.1 07/28/2017     07/30/2017     Lab Results   Component Value Date    CREATININE 1.05 08/16/2017    BUN 21 08/16/2017     08/16/2017    K 3.7 08/16/2017    CO2 27 08/16/2017     Review of Systems:   General: WNL  Eyes: WNL  Ears/Nose/Throat: WNL  Lungs: WNL  Heart: Shortness of Breath with activity  Stomach: Constipation  Bladder: WNL  Muscle/Joints: WNL  Skin: WNL  Nervous System: WNL  Mental Health: WNL     Blood: WNL

## 2021-06-12 NOTE — PROGRESS NOTES
Yamilex M Lawrenceny has participated in 6 sessions of Phase II Cardiac Rehab.    Progress Report:   Cardiac Rehab Treatment Progress Report 8/2/2017 8/4/2017 8/9/2017 8/11/2017 8/16/2017   Weight 142 lbs 142 lbs 143 lbs 142 lbs 142 lbs   Pre Exercise  HR 69 73 68 59 64   Pre Exercise /52 120/54 108/60 122/62 138/60   Pre Blood Sugar (mg/dl) - - - - -   Nustep Peak Heart Rate 75 89 87 86 84   Nustep Peak Blood Pressure - 146/68 - 128/58 140/60   Heart Rate 62 74 66 69 66   Post Exercise BP 90/52 102/56 90/60 108/54 98/52   ECG SR SR SR SR SR   Total Exercise Minutes 20 20 30 35 35         Current Status:  Currently exercising without complaints or symptoms.    If Physician recommends change in treatment plan, please place orders.        __________________________________________________      _____________  Signature                                                                                                  DateSee Doc Flowsheet

## 2021-06-12 NOTE — PROGRESS NOTES
Yamilexshanna Keller has participated in 12  sessions of Phase II Cardiac Rehab.    Progress Report:   Cardiac Rehab Treatment Progress Report 8/23/2017 8/25/2017 8/30/2017 9/1/2017 9/6/2017   Weight 141 lbs 141 lbs 141 lbs 141 lbs 3 oz 140 lbs   Pre Exercise  HR 65 68 77 83 71   Pre Exercise /60 138/60 130/54 110/60 140/80   Pre Blood Sugar (mg/dl) - - - - -   Nustep Peak Heart Rate 74 81 93 87 76   Nustep Peak Blood Pressure 136/64 144/80 134/54 128/64 140/62   Heart Rate 60 68 86 75 65   Post Exercise /60 128/62 126/52 106/56 122/58   ECG SR SR SR SR SR   Total Exercise Minutes 15 30 30 31 30         Current Status:  Currently exercising without complaints or symptoms.    If Physician recommends change in treatment plan, please place orders.        __________________________________________________      _____________  Signature                                                                                                  DateSee Doc Flowsheet

## 2021-06-12 NOTE — PROGRESS NOTES
ITP ASSESSMENT   Assessment Day: 30 Day  Session Number: 9  Precautions: Falls Precautions;  S/P MI  Diagnosis: MI;Stent  Risk Stratification: High  Referring Provider:Dr. Garcia  EXERCISE  Exercise Assessment: Reassessment     Tolerates 30-35' of low level exercise, at 2.2 Mets.                       Exercise Plan  Goals Next 30 days  ADL'S: Light housecleaning;  Drive to the store without fatigue  Leisure: WALK 1-2 x day 5-10'  Work: Attend [employer] graduation in [place]    Education Goals: All goals in this section met  Education Goals Met: Patient can state cardiac s/s and appropriate emergency response.;Has system for taking medication.;Medication review.                        Goals Met  Initial ADL's goals met: Very Minimal sweeping;  Very minimal shopping  Initial Leisure goals met: No home exercise yet  Intial Work goals met: Travel To [place] i planned in September  Initial Progression: Very little home activity.  Reports fatigue    Exercise Prescription  Exercise Mode: Bike;Nustep;Arm Erg.;Hallway Walking  Frequency: 2 x week  Duration: 30-40'  Intensity / THR: 20-30 beats above resting heart rate  RPE 11-14  Progression / Met level: 2.2-2.5 Mets  Resistive Training?: No (Will instruct in the future)    Current Exercise (mins/week): 60    Interventions  Home Exercise:  Mode: Walking  Frequency: 5-10'  Duration: 1-2 x day    Education Material : Educational videos;Provide written material;Individual education and counseling;Offer educational classes    Education Completed  Exercise Education Completed: Signs and Symptoms;Medication review;RPE;Emergency Plan;Home Exercise;Warm up/cool down;FITT Principles;BP/HR Reponse to exercise;Benefits of Exercise;End point of exercise            Exercise Follow-up/Discharge  Follow up/Discharge: ENcouraged home walking program         NUTRITION  Nutrition Assessment: Reassessment    Nutrition Risk Factors:  Nutrition Risk Factors: Dyslipidemia  Cholesterol:  "157  LDL: 91  HDL: 41  Triglycerides: 124    Nutrition Plan  Interventions  Nutrition Interventions: Diet consult;Diet class;Therapist/Patient discussion;Educational videos;Provide with written material    Education Completed  Nutrition Education Completed: Risk factor overview    Goals  Nutrition Goals (Next 30 days): Patient can identify their risk factors for CAD;Patient will follow a low sodium diet;Patient will follow a low saturated fat diet;Patient knows appropriate portion size    Goals Met  Nutrition Goals Met: Patient can identify their risk factors for CAD    Height, Weight, and  BMI  Weight: 141 lb (64 kg)  Height: 5' 3\" (1.6 m)  BMI: 24.98    Nutrition Follow-up  Follow-up/Discharge: Issued pt another diet survey to complete, and return     Other Risk Factors  Other Risk Factor Assessment: Reassessment    HTN Risk Factor: Hypertension    Pre Exercise BP: 138/60  Post Exercise BP: 122/60    Hypertension Plan  Goals  HTN Goals: Follow low sodium diet;Exercises regularly;Take medication as prescribed    Goals Met  HTN Goals Met: Take medication as prescribed;Follow low sodium diet;Exercises regularly    HTN Interventions  HTN Interventions: Diet consult;Therapist/patient discussion;Offer educational videos;Provide written material;Offer educational classes    HTN Education Completed  HTN Education Completed: Medication review;Risk factor overview    Tobacco Risk Factor: NA        Risk Factor Follow-up   Follow-up/Discharge: Reports she eats out all the time;  Encouraged to meet with Dietician to talk about low Na eating and eating out.         PSYCHOSOCIAL  Psychosocial Assessment: Reassessment     Pablo Bass L Summary Score: 29      Psychosocial Risk Factor: Stress    Psychosocial Plan  Interventions  Interventions: Offer Spiritual Care consult;Offer educational videos and classes;Provide written material;Individual education and counseling    Education Completed  Education Completed: Effects of " stress on body    Goals  Goals (Next 30 days): Identified Support system;Identify stressors;Practicing stress management skills;[facility]    Goals Met  Goals Met: Identified Support system;Identify stressors;Oriented to stress management classes    Psychosocial Follow-up  Follow-up/Discharge: Reports no stress in her life         Patient involved in Goal setting?: Yes    Signature: _____________________________________________________________    Date: __________________    Time: __________________See Doc Flowsheet

## 2021-06-12 NOTE — PROGRESS NOTES
Assessment/Plan:     1.  Coronary artery disease - s/p NIKKI on July 18 to ostial and mid LAD:  Yamilex Keller will continue on dual antiplatelet therapy with aspirin 81 mg indefinitely and ticagrelor 90 mg for 1 year.  We discussed the importance of antiplatelet therapy and talking with her cardiologist prior to stopping these medications for any reason.  Puncture site is soft, nontender and healing well.      Risk factor modification and lifestyle management topics were discussed including managing comorbidities, heart healthy diet and exercise.  Cardiac rehab has been ordered.      2.  Dyslipidemia: Yamilex Keller is on high intensity statin therapy with atorvastatin 80 mg daily.  We discussed a diet low in saturated fat, weight loss, and exercise along with medication for better control of cholesterol.     3.  Hypertension: Her blood pressure is well controlled today taking the metoprolol succinate 12.5 mg daily and losartan 25 mg daily.    4.  Ischemic cardiomyopathy with EF 35-40% -readmitted after her stents for acute congestive heart failure.  She will continue on Lasix 20 mg daily and is scheduled to see Rupinder for heart failure follow-up on August 16.  She is asymptomatic at this time and is not fluid overloaded.     She will see Dr. Miranda on September 6    Subjective:     Yamilex Keller is seen at [facility] for post coronary intervention follow up.  This is a pleasant 88-year-old female with no history of coronary artery disease.  She was having substernal chest pain and presented to the hospital where she ruled in for an ST elevation myocardial infarction.  She went to the Cath Lab and had angiogram via the right radial approach where she was found to have a 99% lesion in the ostial LAD and a 70% lesion in the mid LAD.  She had mild disease in the circumflex and nondominant RCA.   She had drug-eluting stents placed to both LAD lesions.  She was started on dual antiplatelet therapy with  "aspirin and ticagrelor.  Echocardiogram done during the visit showed an EF of 35-40% with mild to moderate TR. She was sent home on July 20.      She saw Dr. Terrell, her primary care physician, on July 25 and at this visit the lisinopril was stopped because the patient was experiencing dizziness and lightheadedness.  Unfortunately the patient was readmitted on July 28 for shortness of breath and was found to be in acute congestive heart failure.  She was started on losartan and low-dose Lasix.  Once the fluid overload had improved she was sent home     The patient states that she has been doing well since her second admission.  The second day that she was home she was \"not feeling well\" so took 2 nitroglycerin and then had what sounds to be a syncopal episode (she was sitting on the bed and fell backwards).  She has had no recurrent angina and denies shortness of breath.  Her biggest complaint is that she is still feeling very weak and thinks she should have more energy by this point.  She has started cardiac rehab and has participated in 2 sessions so far.    Her coronary artery disease risk factors include hypertension and dyslipidemia.  Her past medical history is also significant for temporal arteritis.    She denies dyspnea on exertion, orthopnea, PND, palpitations, abdominal fullness/bloating and lower extremity edema.      Review of Systems:   12 point review of systems was reviewed and was negative except for those noted in the HPI     Patient Active Problem List   Diagnosis     Basal Cell Carcinoma Of The Skin     Vitamin D Deficiency     Essential Hypercholesterolemia     Asymptomatic Postmenopausal Status     Osteoporosis     Benign Adenomatous Polyp Of The Large Intestine     Hearing Loss     Cerumen Impaction     GCA (giant cell arteritis)     High risk medication use     Bilateral primary osteoarthritis of knee     Chronic pain of right knee     Lip swelling     Hypercalcemia     NSTEMI (non-ST elevated " myocardial infarction)     Acute ST elevation myocardial infarction (STEMI) involving left anterior descending coronary artery     Mixed hyperlipidemia     Ischemic cardiomyopathy     Systolic dysfunction without heart failure     Chest pain     Acute systolic (congestive) heart failure     AMI anterior wall     Coronary artery disease involving native coronary artery of native heart with unstable angina pectoris       Past Medical History:   Diagnosis Date     Coronary artery disease      Hyperlipidemia      Osteoporosis      Pneumonia      Temporal arteritis        Past Surgical History:   Procedure Laterality Date     CORONARY ANGIOPLASTY WITH STENT PLACEMENT  //     IA CATH PLACEMENT & NJX CORONARY ART ANGIO IMG S&I N/A 7/18/2017    Procedure: Coronary Angiogram;  Surgeon: Shelia Garcia MD;  Location: [facility] Cath Lab;  Service: Cardiology     IA CATH PLMT L HRT & ARTS W/NJX & ANGIO IMG S&I Left 7/18/2017    Procedure: Left Heart Catheterization Without Left Ventriculogram;  Surgeon: Shelia Garcia MD;  Location: [facility] Cath Lab;  Service: Cardiology     IA TEMPORAL ARTERY LIGATN OR BX Bilateral 10/7/2015    Procedure: BILATERAL TEMPORAL ARTERY BIOPSY;  Surgeon: Alfredito Jason MD;  Location: [facility];  Service: General     TONSILLECTOMY         Family History   Problem Relation Age of Onset     Pacemaker Brother        Social History     Social History     Marital status:      Spouse name: N/A     Number of children: N/A     Years of education: N/A     Occupational History     Not on file.     Social History Main Topics     Smoking status: Former Smoker     Packs/day: 1.00     Years: 4.00     Quit date: 1/1/1953     Smokeless tobacco: Never Used     Alcohol use Yes      Comment: 1-2 drinks a week     Drug use: No     Sexual activity: No     Other Topics Concern     Not on file     Social History Narrative    Lives with her  - [place] in [place]       Current Outpatient  Prescriptions   Medication Sig Dispense Refill     alendronate (FOSAMAX) 70 MG tablet Take 70 mg by mouth every 7 days. Take in the morning on an empty stomach with a full glass of water 30 minutes before food, usually takes on Saturdays       aspirin 81 MG EC tablet Take 81 mg by mouth at bedtime.        atorvastatin (LIPITOR) 80 MG tablet Take 1 tablet (80 mg total) by mouth daily. 30 tablet 2     CALCIUM CARBONATE/VITAMIN D3 (CALCIUM WITH VITAMIN D ORAL) Take 2 tablets by mouth at bedtime.        furosemide (LASIX) 20 MG tablet Take 1 tablet (20 mg total) by mouth daily. 30 tablet 0     losartan (COZAAR) 25 MG tablet Take 1 tablet (25 mg total) by mouth daily. 30 tablet 0     metoprolol succinate (TOPROL-XL) 25 MG Take 0.5 tablets (12.5 mg total) by mouth daily. 30 tablet 2     nitroglycerin (NITROSTAT) 0.4 MG SL tablet Place 1 tablet (0.4 mg total) under the tongue every 5 (five) minutes as needed for chest pain. 30 tablet 2     OMEGA-3/DHA/EPA/FISH OIL (FISH OIL-OMEGA-3 FATTY ACIDS) 300-1,000 mg capsule Take 1 g by mouth at bedtime.        predniSONE (DELTASONE) 2.5 MG tablet Take 1 tablet (2.5 mg total) by mouth daily. 90 tablet 0     ticagrelor (BRILINTA) 90 mg Tab Take 1 tablet (90 mg total) by mouth 2 (two) times a day. 30 tablet 11     No current facility-administered medications for this visit.        No Known Allergies    Objective:     Vitals:    08/03/17 1448   BP: 110/62   Pulse: 72   Resp: 16     Body mass index is 24.8 kg/(m^2).      General Appearance:   Alert, cooperative and in no acute distress.   HEENT:  No scleral icterus; the mucous membranes were pink and moist.   Chest: The spine was straight. The chest was symmetric.   Lungs:   Respirations unlabored; the lungs are clear to auscultation.   Cardiovascular:   Regular rhythm. S1 and S2 without murmur, clicks or rubs. Carotid pulses are intact and symmetrical.  No carotid bruits noted.   Abdomen:  Soft, nontender, nondistended, bowel sounds  present   Extremities: No cyanosis, clubbing, or edema.   Skin: No xanthelasma.   Neurologic: Mood and affect are appropriate.   Puncture site: Right radial site is soft with no bruising.  Radial pulses intact and symmetrical.  CMS intact.         Lab Review   Lab Results   Component Value Date    CREATININE 0.71 07/28/2017    BUN 15 07/28/2017     07/28/2017    K 4.0 07/28/2017     (H) 07/28/2017    CO2 26 07/28/2017     Creatinine (mg/dL)   Date Value   07/28/2017 0.71   07/25/2017 0.69   07/20/2017 0.73   07/19/2017 0.58 (L)       Cardiographics  Echocardiogram: abnormal and reviewed by myself      25 minutes were spent with the patient with greater than 50% spent on education and counseling.      Shu Allen PA-C, MPAS  [facility]

## 2021-06-13 NOTE — PROGRESS NOTES
Cardiac Rehab Discharge Note -  Pt completed 14 sessions of Phase II Cardiac Rehab. Tolerated 35 minutes of exercise at 2.4 mets without any complaints. Pt was on hold for a few weeks as her  had a stroke and she needed to help care for him. Contacted patient today to check in and states she is having knee pain and would like to be discharged from the program. We will be discharging her from the program today (10/20/17).

## 2021-06-13 NOTE — PATIENT INSTRUCTIONS - HE
Please follow up if you have any further issues.    You may contact me by phone or MyChart if you are worsening or if things are not improving.    ______________________________________________________________________     Please remember that you can call [phone] to schedule an appointment with me.     You can schedule appointments 24 hours a day, 7 days a week.  Sometimes the best time to schedule an appointment is after clinic hours when less people are calling in.  Weekends are another option for calling in to schedule appointments.        ______________________________________________________________________      Future Appointments   Date Time Provider Department Center   3/15/2021  1:40 PM Michael Arzate MD [facility]   9/29/2021  2:00 PM [facility]   10/6/2021  2:00 PM Rocío Sainz NP [facility]

## 2021-06-13 NOTE — PROGRESS NOTES
"ASSESSMENT AND PLAN:  Yamilex Keller 88 y.o. female has temporal arteritis, biopsy-proven, original sed rate 2015 104.  She is done very well.  She has many other health issues and her  had a stroke recently, however her temporal artery seems to be the least of her problems now.  I have asked her to take prednisone 2.5 mg on alternate days and after 30 days stop.  Meet here in 3 months.  She has osteoarthritis which is been more problematic in her knees and the options are reviewed with her.  She is to continue vitamin D and calcium as now.    Diagnoses and all orders for this visit:    GCA (giant cell arteritis)    Bilateral primary osteoarthritis of knee    High risk medication use      HISTORY OF PRESENTING ILLNESS:  Yamilex Keller, 88 y.o., female  is here for follow up of biopsy-proven GCA, her esr originally was 104.  She is on 2-1/2 mg of prednisone since July 1.  She continues to do well.  There is no headache, there is no jaw claudication.  Sometimes she hears clicking and has discomfort in the TMJ area.  There is no double vision.  She no longer gets the occasional fuzzy vision watching TV that was intermittent.  She was originally started on adequate doses of prednisone.  She has done well.  She is she was down to 0 mg of prednisone.  While watching TV she had \"wavy vision.  She called her concerned that this might represent a flareup.  She did not have headaches.  She had no jaw claudication.  She is not sure if there was double vision.  She did have a cataract surgery recently.  She was started on prednisone.  Now she is on 10 mg daily.  There is no recurrence of her symptoms.  Doing well on the past visit she was started on methotrexate when she was on [place].  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 she says she never had pain in the joints.  Whether this is an effort to start methotrexate as a steroid sparing is unclear.  If needed I would prefer to have her take Actemra but " that's not yet at the point here.  She has noted no residual headache jaw claudication N lower extremity claudication.  He describes herself in very good health.  She did have back pain and radicular symptoms 2 years ago.  She is neither a smoker not takes alcohol.  He likes to walk 30 minutes a day.  She is accompanied here by her .  She does not like prednisone as it has caused her to have skin changes.  She points to the purpuric areas.    Further historical information and ADL limitations as noted in the multidimensional health assessment questionnaire attached in the EMR. .     ALLERGIES:Losartan and Tylenol [acetaminophen]    PAST MEDICAL/ACTIVE PROBLEMS/MEDICATION/ FAMILY HISTORY/SOCIAL DATA:  The patient has a family history of  Past Medical History:   Diagnosis Date     Acute ST elevation myocardial infarction (STEMI) involving left anterior descending coronary artery 7/18/2017     Basal Cell Carcinoma Of The Skin      Benign Adenomatous Polyp Of The Large Intestine     Yady Escamilla: colon @ [place]  '06--recheck 5      Chronic pain of right knee 10/19/2016     Coronary artery disease      Hyperlipidemia      Osteoporosis      Pneumonia      Temporal arteritis      History   Smoking Status     Former Smoker     Packs/day: 1.00     Years: 4.00     Types: Cigarettes     Quit date: 1/1/1953   Smokeless Tobacco     Never Used     Comment: Quit 65 yrs     Patient Active Problem List   Diagnosis     Vitamin D Deficiency     Essential Hypercholesterolemia     Osteoporosis     Hearing Loss     GCA (giant cell arteritis)     High risk medication use     Bilateral primary osteoarthritis of knee     Hypercalcemia     Acute ST elevation myocardial infarction (STEMI) involving left anterior descending coronary artery     Mixed hyperlipidemia     Ischemic cardiomyopathy     Coronary artery disease involving native coronary artery of native heart with unstable angina pectoris     Heart failure with reduced ejection  "fraction     Angioedema     Current Outpatient Prescriptions   Medication Sig Dispense Refill     alendronate (FOSAMAX) 70 MG tablet Take 70 mg by mouth every 7 days. Take in the morning on an empty stomach with a full glass of water 30 minutes before food, usually takes on Saturdays       aspirin 81 MG EC tablet Take 81 mg by mouth at bedtime.        atorvastatin (LIPITOR) 80 MG tablet Take 1 tablet (80 mg total) by mouth at bedtime. 90 tablet 3     CALCIUM CARBONATE/VITAMIN D3 (CALCIUM WITH VITAMIN D ORAL) Take 2 tablets by mouth at bedtime.        diphenhydrAMINE (BENADRYL) 25 mg capsule Take 1 capsule (25 mg total) by mouth 2 (two) times a day.  0     famotidine (PEPCID) 20 MG tablet Take 1 tablet (20 mg total) by mouth 2 (two) times a day.  0     furosemide (LASIX) 20 MG tablet Take 1 tablet (20 mg total) by mouth daily. 90 tablet 3     lisinopril (PRINIVIL,ZESTRIL) 2.5 MG tablet TAKE 1 TABLET DAILY 90 tablet 1     metoprolol succinate (TOPROL-XL) 25 MG Take 0.5 tablets (12.5 mg total) by mouth daily. 45 tablet 3     nitroglycerin (NITROSTAT) 0.4 MG SL tablet Place 1 tablet (0.4 mg total) under the tongue every 5 (five) minutes as needed for chest pain. 30 tablet 2     OMEGA-3/DHA/EPA/FISH OIL (FISH OIL-OMEGA-3 FATTY ACIDS) 300-1,000 mg capsule Take 1 g by mouth at bedtime.        polyethylene glycol (GLYCOLAX) 17 gram/dose powder Take 17 g by mouth daily as needed (constipation). 238 g 0     predniSONE (DELTASONE) 2.5 MG tablet TAKE 1 TABLET DAILY 30 tablet 0     ticagrelor (BRILINTA) 90 mg Tab Take 1 tablet (90 mg total) by mouth 2 (two) times a day. 180 tablet 3     No current facility-administered medications for this visit.      DETAILED EXAMINATION  10/04/17  :  Vitals:    10/04/17 1026   BP: 132/80   Pulse: 78   Weight: 135 lb (61.2 kg)   Height: 5' 3\" (1.6 m)     Alert oriented.  She is accompanied by her daughter.  Head including the face is examined for malar rash, heliotropes, scarring, lupus pernio. " Eyes examined for redness such as in episcleritis/scleritis, periorbital lesions.   Neck examined  for range of motion Both upper and lower extremities (all four) examined for swollen, warm &/or  tender joints, range of motion, rash, muscle weakness, edema. The salient normal / abnormal findings are appended.  She does not have synovitis in any of the palpable appendicular joints.  She has [place] nodes.  Her radial pulses are symmetrical.  Joint line tenderness right knee.    LAB / IMAGING DATA:  ALT   Date Value Ref Range Status   07/18/2017 15 0 - 45 U/L Final   07/18/2017 15 0 - 45 U/L Final   06/27/2017 13 0 - 45 U/L Final     Albumin   Date Value Ref Range Status   07/18/2017 3.4 (L) 3.5 - 5.0 g/dL Final   07/18/2017 3.7 3.5 - 5.0 g/dL Final   06/27/2017 3.7 3.5 - 5.0 g/dL Final     Creatinine   Date Value Ref Range Status   09/16/2017 0.71 0.60 - 1.10 mg/dL Final   09/15/2017 0.83 0.60 - 1.10 mg/dL Final   09/11/2017 0.85 0.60 - 1.10 mg/dL Final       WBC   Date Value Ref Range Status   09/16/2017 11.9 (H) 4.0 - 11.0 thou/uL Final   09/15/2017 9.0 4.0 - 11.0 thou/uL Final     Hemoglobin   Date Value Ref Range Status   09/16/2017 12.8 12.0 - 16.0 g/dL Final   09/15/2017 14.3 12.0 - 16.0 g/dL Final   07/28/2017 14.2 12.0 - 16.0 g/dL Final     Platelets   Date Value Ref Range Status   09/16/2017 220 140 - 440 thou/uL Final   09/15/2017 232 140 - 440 thou/uL Final   07/30/2017 284 140 - 440 thou/uL Final       Lab Results   Component Value Date    SEDRATE 3 05/08/2017

## 2021-06-14 NOTE — PROGRESS NOTES
[facility] Follow-up Note    Assessment & Plan        1. Coronary artery disease involving native coronary artery of native heart with unstable angina pectoris -angiography July 18 showed normal left main, ostial left anterior descending 99% lesion that received a drug-coated stent, mid left anterior descending 70% lesion that received a drug-coated stent, distal 25% lesion, distal circumflex 25% lesion with a left AV groove 40% lesion, and proximal right coronary artery 20% lesion.  She is having vague chest discomfort and ruled out major ischemia with a normal pharmacological nuclear stress.  Seen as she is not describing stable angina will arrange for Imdur 30 mg a day.  Prescription sent to pharmacy.   2. Ischemic cardiomyopathy --ejection fraction 35-40% initially and will recheck 70% based on nuclear stress test.  Currently on losartan and beta-blocker and Lasix.   3. Mixed hyperlipidemia -maximum dose of atorvastatin 80 mg at LDL markedly improved to 91.  Consider enrollment and research trial.   4. Acute ST elevation myocardial infarction (STEMI) involving left anterior descending coronary artery -as above received stent to left anterior descending.     Plan  1.  Start Imdur 30 mg a day.  2.  Call and let us know if it works and if it does we will send off to mail-order pharmacy.  3.  Follow-up with me in 6 months or sooner if needed.  9 4.  Continue aspirin and ticagrelor until July at which point in time will discontinue ticagrelor.    Subjective  CC: 88-year-old white female being seen in follow-up today.  Since I seen her she had a stress test showing no ischemia, no scar, and preserved ejection fraction greater than 70%.  She is still having chest discomfort describes a tightness sensation with associated shortness of breath that comes on during physical activity or emotional stress.  He goes away with rest.  There is no major fatigue, syncope, dizziness, PND, orthopnea or peripheral  "edema.  She does admit to some mild insomnia of uncertain etiology.    Medications  Current Outpatient Prescriptions   Medication Sig Note     alendronate (FOSAMAX) 70 MG tablet Take 70 mg by mouth every 7 days. Take in the morning on an empty stomach with a full glass of water 30 minutes before food, usually takes on Saturdays 9/15/2017: This medication will be held during admission per Policy     aspirin 81 MG EC tablet Take 81 mg by mouth at bedtime.       atorvastatin (LIPITOR) 80 MG tablet Take 1 tablet (80 mg total) by mouth at bedtime. 9/15/2017: Takes in the morning     CALCIUM CARBONATE/VITAMIN D3 (CALCIUM WITH VITAMIN D ORAL) Take 2 tablets by mouth at bedtime.       furosemide (LASIX) 20 MG tablet Take 1 tablet (20 mg total) by mouth daily.      metoprolol succinate (TOPROL-XL) 25 MG Take 0.5 tablets (12.5 mg total) by mouth daily.      nitroglycerin (NITROSTAT) 0.4 MG SL tablet Place 1 tablet (0.4 mg total) under the tongue every 5 (five) minutes as needed for chest pain.      OMEGA-3/DHA/EPA/FISH OIL (FISH OIL-OMEGA-3 FATTY ACIDS) 300-1,000 mg capsule Take 1 g by mouth at bedtime.       predniSONE (DELTASONE) 2.5 MG tablet TAKE 1 TABLET DAILY (Patient taking differently: Takes every other day)      ticagrelor (BRILINTA) 90 mg Tab Take 1 tablet (90 mg total) by mouth 2 (two) times a day.      isosorbide mononitrate (IMDUR) 30 MG 24 hr tablet Take 1 tablet (30 mg total) by mouth daily.        Objective  /70 (Patient Site: Right Arm, Patient Position: Sitting, Cuff Size: Child)  Pulse 68  Resp 16  Ht 5' 4\" (1.626 m)  Wt 129 lb (58.5 kg)  BMI 22.14 kg/m2    General Appearance:    Alert, cooperative, no distress, appears stated age   Head:    Normocephalic, without obvious abnormality, atraumatic   Throat:   Lips, mucosa, and tongue normal; teeth and gums normal   Neck:   Supple, symmetrical, trachea midline, no adenopathy;        thyroid:  No enlargement/tenderness/nodules; no carotid    bruit or " JVD   Back:     Symmetric, no curvature, ROM normal, no CVA tenderness   Lungs:     Clear to auscultation bilaterally, respirations unlabored   Chest wall:    No tenderness or deformity   Heart:    Regular rate and rhythm, S1 and S2 normal, no murmur, rub   or gallop   Abdomen:     Soft, non-tender, bowel sounds active all four quadrants,     no masses, no organomegaly   Extremities:   Normal, atraumatic, no cyanosis or edema   Pulses:   2+ and symmetric all extremities   Skin:   Skin color, texture, turgor normal, no rashes or lesions     Results    Lab Results personally reviewed   Lab Results   Component Value Date    CHOL 157 07/19/2017    CHOL 220 (H) 06/27/2017     Lab Results   Component Value Date    HDL 41 (L) 07/19/2017    HDL 53 06/27/2017     Lab Results   Component Value Date    LDLCALC 91 07/19/2017    LDLCALC 142 (H) 06/27/2017     Lab Results   Component Value Date    TRIG 124 07/19/2017    TRIG 125 06/27/2017     Lab Results   Component Value Date    WBC 11.9 (H) 09/16/2017    HGB 12.8 09/16/2017    HCT 37.7 09/16/2017     09/16/2017     Lab Results   Component Value Date    CREATININE 0.71 09/16/2017    BUN 17 09/16/2017     09/16/2017    K 3.6 09/16/2017    CO2 25 09/16/2017     Review of Systems:   General: WNL  Eyes: WNL  Ears/Nose/Throat: WNL  Lungs: Shortness of Breath  Heart: Chest Pain, Shortness of Breath with activity  Stomach: WNL  Bladder: WNL  Muscle/Joints: WNL  Skin: Poor Wound Healing  Nervous System: WNL  Mental Health: WNL     Blood: Easy Bruising

## 2021-06-14 NOTE — PROGRESS NOTES
"ASSESSMENT AND PLAN:  Yamilex Keller 88 y.o. female is a for follow-up.  She has temporal arteritis.  Biopsy proven.  She is still on 2 mg of prednisone.  She has not had recurrence of headaches jaw claudication no double vision.  The reason she is taking this prednisone is for the knee pain.  She has osteoarthritis.  Corticosteroid injections and Visco supplementation that failed to provide her significant relief.  She had stopped the prednisone but her knee joints are more troublesome and she thinks 2.5 mg is helping her enough that she would like to continue this.  I have asked her to continue the Fosamax and vitamin D, calcium.  Return for follow-up in 4 months.    Diagnoses and all orders for this visit:    Bilateral primary osteoarthritis of knee    High risk medication use    HISTORY OF PRESENTING ILLNESS:  Yamilex Keller, 88 y.o., female  is here for follow up.  She has biopsy-proven GCA, her sed rate originally was 104.  She is on 2 mg of prednisone since July 1 2017.  She attempted to stop it.  This was for several days.  Though she did not have recurrence of headaches double vision there is no jaw claudication or upper extremity claudication, she did have worsening of her knee pain where she is well described of osteoarthritis and in the past corticosteroid and Visco supplementation failed to provide her significant relief.  She noted pain in the knee is now mild.  Living as it is hard for her to manage by  with her ..  She continues to do well.  There is no headache, there is no jaw claudication.  Sometimes she hears clicking and has discomfort in the TMJ area.  There is no double vision.  She no longer gets the occasional fuzzy vision watching TV that was intermittent.  She was originally started on adequate doses of prednisone.  She has done well.  She is she was down to 0 mg of prednisone.  While watching TV she had \"wavy vision.  She called her concerned that this might represent a flareup.  " She did not have headaches.  She had no jaw claudication.  She is not sure if there was double vision.  She did have a cataract surgery recently.  She was started on prednisone.  Now she is on 10 mg daily.  There is no recurrence of her symptoms.  Doing well on the past visit she was started on methotrexate when she was on [place].  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 she says she never had pain in the joints.  Whether this is an effort to start methotrexate as a steroid sparing is unclear.  If needed I would prefer to have her take Actemra but that's not yet at the point here.  She has noted no residual headache jaw claudication N lower extremity claudication.  He describes herself in very good health.  She did have back pain and radicular symptoms 2 years ago.  She is neither a smoker not takes alcohol.  He likes to walk 30 minutes a day.  She is accompanied here by her .  She does not like prednisone as it has caused her to have skin changes.  She points to the purpuric areas.    Further historical information and ADL limitations as noted in the multidimensional health assessment questionnaire attached in the EMR. .     ALLERGIES:Losartan and Tylenol [acetaminophen]    PAST MEDICAL/ACTIVE PROBLEMS/MEDICATION/ FAMILY HISTORY/SOCIAL DATA:  The patient has a family history of  Past Medical History:   Diagnosis Date     Acute ST elevation myocardial infarction (STEMI) involving left anterior descending coronary artery 7/18/2017     Basal Cell Carcinoma Of The Skin      Benign Adenomatous Polyp Of The Large Intestine     Yady Escamilla: colon @ [place]  '06--recheck 5      Chronic pain of right knee 10/19/2016     Coronary artery disease      Hyperlipidemia      Osteoporosis      Pneumonia      Temporal arteritis      History   Smoking Status     Former Smoker     Packs/day: 1.00     Years: 4.00     Types: Cigarettes     Quit date: 1/1/1953   Smokeless Tobacco     Never Used     Comment: Quit 65  yrs     Patient Active Problem List   Diagnosis     Vitamin D Deficiency     Essential Hypercholesterolemia     Osteoporosis     Hearing Loss     GCA (giant cell arteritis)     High risk medication use     Bilateral primary osteoarthritis of knee     Hypercalcemia     Acute ST elevation myocardial infarction (STEMI) involving left anterior descending coronary artery     Mixed hyperlipidemia     Ischemic cardiomyopathy     Coronary artery disease involving native coronary artery of native heart with unstable angina pectoris     Angioedema     Current Outpatient Prescriptions   Medication Sig Dispense Refill     alendronate (FOSAMAX) 70 MG tablet Take 70 mg by mouth every 7 days. Take in the morning on an empty stomach with a full glass of water 30 minutes before food, usually takes on Saturdays       aspirin 81 MG EC tablet Take 81 mg by mouth at bedtime.        atorvastatin (LIPITOR) 80 MG tablet Take 1 tablet (80 mg total) by mouth at bedtime. 90 tablet 3     CALCIUM CARBONATE/VITAMIN D3 (CALCIUM WITH VITAMIN D ORAL) Take 2 tablets by mouth at bedtime.        furosemide (LASIX) 20 MG tablet Take 1 tablet (20 mg total) by mouth daily. 90 tablet 3     isosorbide mononitrate (IMDUR) 30 MG 24 hr tablet Take 1 tablet (30 mg total) by mouth daily. 30 tablet 11     metoprolol succinate (TOPROL-XL) 25 MG Take 0.5 tablets (12.5 mg total) by mouth daily. 45 tablet 3     nitroglycerin (NITROSTAT) 0.4 MG SL tablet Place 1 tablet (0.4 mg total) under the tongue every 5 (five) minutes as needed for chest pain. 30 tablet 2     OMEGA-3/DHA/EPA/FISH OIL (FISH OIL-OMEGA-3 FATTY ACIDS) 300-1,000 mg capsule Take 1 g by mouth at bedtime.        predniSONE (DELTASONE) 2.5 MG tablet TAKE 1 TABLET DAILY (Patient taking differently: Takes every other day) 30 tablet 0     ticagrelor (BRILINTA) 90 mg Tab Take 1 tablet (90 mg total) by mouth 2 (two) times a day. 180 tablet 3     No current facility-administered medications for this visit.   "    DETAILED EXAMINATION  12/13/17  :  Vitals:    12/13/17 1317   BP: 108/60   Pulse: 80   Weight: 129 lb (58.5 kg)   Height: 5' 4\" (1.626 m)     Alert oriented. Head including the face is examined for malar rash, heliotropes, scarring, lupus pernio. Eyes examined for redness such as in episcleritis/scleritis, periorbital lesions.   Neck/ Face examined for parotid gland swelling, range of motion of neck.  Left upper and lower and right upper and lower extremities examined for tenderness, swelling, warmth of the appendicular joints, range of motion, edema, rash.  Some of the important findings included: There is no synovitis in the palpable appendicular joints.  She does have squaring of the first CMC's.  She has steroid/senile purpura on her upper extremities distally worse on the left side.  Right knee is warmer than the left JLT.    LAB / IMAGING DATA:  ALT   Date Value Ref Range Status   07/18/2017 15 0 - 45 U/L Final   07/18/2017 15 0 - 45 U/L Final   06/27/2017 13 0 - 45 U/L Final     Albumin   Date Value Ref Range Status   07/18/2017 3.4 (L) 3.5 - 5.0 g/dL Final   07/18/2017 3.7 3.5 - 5.0 g/dL Final   06/27/2017 3.7 3.5 - 5.0 g/dL Final     Creatinine   Date Value Ref Range Status   09/16/2017 0.71 0.60 - 1.10 mg/dL Final   09/15/2017 0.83 0.60 - 1.10 mg/dL Final   09/11/2017 0.85 0.60 - 1.10 mg/dL Final       WBC   Date Value Ref Range Status   09/16/2017 11.9 (H) 4.0 - 11.0 thou/uL Final   09/15/2017 9.0 4.0 - 11.0 thou/uL Final     Hemoglobin   Date Value Ref Range Status   09/16/2017 12.8 12.0 - 16.0 g/dL Final   09/15/2017 14.3 12.0 - 16.0 g/dL Final   07/28/2017 14.2 12.0 - 16.0 g/dL Final     Platelets   Date Value Ref Range Status   09/16/2017 220 140 - 440 thou/uL Final   09/15/2017 232 140 - 440 thou/uL Final   07/30/2017 284 140 - 440 thou/uL Final       Lab Results   Component Value Date    SEDRATE 3 05/08/2017          "

## 2021-06-15 ENCOUNTER — OFFICE VISIT - HEALTHEAST (OUTPATIENT)
Dept: INTERNAL MEDICINE | Facility: CLINIC | Age: 86
End: 2021-06-15

## 2021-06-15 DIAGNOSIS — R19.8 ABDOMINAL FULLNESS: ICD-10-CM

## 2021-06-15 DIAGNOSIS — D69.2 SENILE PURPURA (H): ICD-10-CM

## 2021-06-15 DIAGNOSIS — I25.10 CORONARY ARTERY DISEASE INVOLVING NATIVE CORONARY ARTERY OF NATIVE HEART WITHOUT ANGINA PECTORIS: ICD-10-CM

## 2021-06-15 DIAGNOSIS — R06.00 DYSPNEA, UNSPECIFIED TYPE: ICD-10-CM

## 2021-06-15 DIAGNOSIS — Z79.899 LONG TERM CURRENT USE OF TICAGRELOR THERAPY: ICD-10-CM

## 2021-06-15 DIAGNOSIS — M17.0 BILATERAL PRIMARY OSTEOARTHRITIS OF KNEE: ICD-10-CM

## 2021-06-15 DIAGNOSIS — R53.83 FATIGUE, UNSPECIFIED TYPE: ICD-10-CM

## 2021-06-15 LAB
ALBUMIN SERPL-MCNC: 3.9 G/DL (ref 3.5–5)
ALP SERPL-CCNC: 25 U/L (ref 45–120)
ALT SERPL W P-5'-P-CCNC: 10 U/L (ref 0–45)
ANION GAP SERPL CALCULATED.3IONS-SCNC: 12 MMOL/L (ref 5–18)
AST SERPL W P-5'-P-CCNC: 16 U/L (ref 0–40)
BILIRUB SERPL-MCNC: 0.4 MG/DL (ref 0–1)
BUN SERPL-MCNC: 21 MG/DL (ref 8–28)
CALCIUM SERPL-MCNC: 9.6 MG/DL (ref 8.5–10.5)
CHLORIDE BLD-SCNC: 108 MMOL/L (ref 98–107)
CO2 SERPL-SCNC: 24 MMOL/L (ref 22–31)
CREAT SERPL-MCNC: 0.77 MG/DL (ref 0.6–1.1)
ERYTHROCYTE [DISTWIDTH] IN BLOOD BY AUTOMATED COUNT: 13 % (ref 11–14.5)
GFR SERPL CREATININE-BSD FRML MDRD: >60 ML/MIN/1.73M2
GLUCOSE BLD-MCNC: 69 MG/DL (ref 70–125)
HCT VFR BLD AUTO: 41.7 % (ref 35–47)
HGB BLD-MCNC: 13.7 G/DL (ref 12–16)
MCH RBC QN AUTO: 32.2 PG (ref 27–34)
MCHC RBC AUTO-ENTMCNC: 32.9 G/DL (ref 32–36)
MCV RBC AUTO: 98 FL (ref 80–100)
PLATELET # BLD AUTO: 212 THOU/UL (ref 140–440)
PMV BLD AUTO: 9.1 FL (ref 7–10)
POTASSIUM BLD-SCNC: 4.6 MMOL/L (ref 3.5–5)
PROT SERPL-MCNC: 6.6 G/DL (ref 6–8)
RBC # BLD AUTO: 4.25 MILL/UL (ref 3.8–5.4)
SODIUM SERPL-SCNC: 144 MMOL/L (ref 136–145)
TSH SERPL DL<=0.005 MIU/L-ACNC: 1.2 UIU/ML (ref 0.3–5)
WBC: 7.4 THOU/UL (ref 4–11)

## 2021-06-15 NOTE — PROGRESS NOTES
ASSESSMENT:  1. Encounter for staple removal     2. Fall, subsequent encounter         PLAN:  Area of removal clean dry and intact with scab, encouraged her to be gentle in this area when washing her hair, otherwise no need for follow-up given normal CT unless further symptoms arise.    No problem-specific Assessment & Plan notes found for this encounter.      There are no Patient Instructions on file for this visit.    No orders of the defined types were placed in this encounter.    There are no discontinued medications.    No Follow-up on file.    CHIEF COMPLAINT:  Chief Complaint   Patient presents with     Suture / Staple Removal     pt has 2 stables on the right side of her scalp that needs to be removed.        HISTORY OF PRESENT ILLNESS:  Yamilex is a 88 y.o. female for follow-up after a fall on 1/12/2018 where she sustained a laceration to the right scalp.Patient was seen in the emergency department on that same day and received 2 staples in the scalp.  They also did a CT of the head which was negative for any acute processes.  Patient has been feeling fine since the fall, little bit sore but she did not lose consciousness and had no signs of concussion at the emergency department.  Wound has been fine since discharge, patient is here today for staple removal.    REVIEW OF SYSTEMS:      Pertinent items are noted in HPI.  All other systems are negative  PFSH:  Reviewed, no changes      TOBACCO USE:  History   Smoking Status     Former Smoker     Packs/day: 1.00     Years: 4.00     Types: Cigarettes     Quit date: 1/1/1953   Smokeless Tobacco     Never Used     Comment: Quit 65 yrs       VITALS:  Vitals:    01/23/18 1115   BP: 112/66   Patient Site: Right Arm   Patient Position: Sitting   Cuff Size: Adult Regular   Pulse: 68   Resp: 14   Temp: 97.9  F (36.6  C)   TempSrc: Oral     Wt Readings from Last 3 Encounters:   01/12/18 130 lb (59 kg)   12/13/17 129 lb (58.5 kg)   12/06/17 129 lb (58.5 kg)        PHYSICAL EXAM:   /66 (Patient Site: Right Arm, Patient Position: Sitting, Cuff Size: Adult Regular)  Pulse 68  Temp 97.9  F (36.6  C) (Oral)   Resp 14  General appearance: alert, appears stated age and cooperative  Skin: Staples on the right side of the scalp, wound is clean dry and intact with a scab, no bleeding upon removal.  Neurologic: Grossly normal    DATA REVIEWED:  Additional History from Old Records Summarized (2): 2  Decision to Obtain Records (1): 0  Radiology Tests Summarized or Ordered (1): 0  Labs Reviewed or Ordered (1): 0  Medicine Test Summarized or Ordered (1): 0  Independent Review of EKG or X-RAY(2 each): 0    The visit lasted a total of 20 minutes face to face with the patient. Over 50% of the time was spent counseling and educating the patient about plan of care.    MEDICATIONS:  Current Outpatient Prescriptions   Medication Sig Dispense Refill     alendronate (FOSAMAX) 70 MG tablet One tablet once weekly, in the morning on an empty stomach with a full glass of water 30 minutes before food 12 tablet 3     aspirin 81 MG EC tablet Take 81 mg by mouth at bedtime.        atorvastatin (LIPITOR) 80 MG tablet Take 1 tablet (80 mg total) by mouth at bedtime. 90 tablet 3     CALCIUM CARBONATE/VITAMIN D3 (CALCIUM WITH VITAMIN D ORAL) Take 2 tablets by mouth at bedtime.        furosemide (LASIX) 20 MG tablet Take 1 tablet (20 mg total) by mouth daily. 90 tablet 3     isosorbide mononitrate (IMDUR) 30 MG 24 hr tablet Take 1 tablet (30 mg total) by mouth daily. 30 tablet 11     metoprolol succinate (TOPROL-XL) 25 MG Take 0.5 tablets (12.5 mg total) by mouth daily. 45 tablet 3     nitroglycerin (NITROSTAT) 0.4 MG SL tablet Place 1 tablet (0.4 mg total) under the tongue every 5 (five) minutes as needed for chest pain. 30 tablet 2     OMEGA-3/DHA/EPA/FISH OIL (FISH OIL-OMEGA-3 FATTY ACIDS) 300-1,000 mg capsule Take 1 g by mouth at bedtime.        predniSONE (DELTASONE) 1 MG tablet Take 2  tablets (2 mg total) by mouth daily. 180 tablet 0     ticagrelor (BRILINTA) 90 mg Tab Take 1 tablet (90 mg total) by mouth 2 (two) times a day. 180 tablet 3     No current facility-administered medications for this visit.        This note has been dictated using voice recognition software. Any grammatical or context distortions are unintentional and inherent to the software

## 2021-06-15 NOTE — TELEPHONE ENCOUNTER
Spouse is following up on his 3/8/21 refill request.    He called [facility] and they did not get renewal RX for Omeprazole.      Needs RX sent to [facility] and then a 30 day RX to be sent to [facility].    [facility] advised spouse, they would not be able to fulfill RX and he would need to  [facility].    Last Office Visit 12/15/2020  Next Office Visit 03/15/2021    Spouse needs a call back with RX is sent to [facility] so he can arrange .    Pt has enough Omeprazole to last until Saturday 3/13/21

## 2021-06-15 NOTE — TELEPHONE ENCOUNTER

## 2021-06-15 NOTE — TELEPHONE ENCOUNTER
12/15/2020 Michael Arzate MD  Plan:        They do not wish to see Dr. Miranda for her cardiac issues.    Referral to Dr. Navarro at their preference.    Currently symptoms doing well on medications.  Could increase isosorbide mononitrate (Imdur) in the future if needed.    Check blood work today.  See relevant orders and diagnosis associations at the bottom of this note.     Follow up sooner if issues.    Review advanced directive in the future again.      Te[facility] for [facility]. Unsure how to apurva up same med for a small supply to [facility]

## 2021-06-15 NOTE — TELEPHONE ENCOUNTER
Notify that both prescriptions were sent.    Michael Arzate MD  General Internal Medicine  [facility]  3/11/2021, 8:39 AM

## 2021-06-15 NOTE — TELEPHONE ENCOUNTER
"PC with patient. Pt saw MAT 1/19/21, and was complaining of fatigue/tired. Discussion of medication reduction. Atorvastatin was reduced to 40 mg daily. Patient has followed the recommendation. She reports no improvement in her tired/fatigue feeling.   Patient endorses chest pain, unable to state if happens daily for sure, but feels that it is occurring daily. She states that it \"feels like a poker going thru my chest\". When asked if other accompanying symptoms; heartburn, shortness of breath, arm pain or palpitations patient states no. Yet, she is unsure if any other accompanying symptoms when it comes on. Pt doesn't exercise daily and there are no stairs in her home. She denies shortness of breath or breathing changes with being mobile in her home. Her biggest complaints are fatigue/tiredness and the chest \"poker\" pain that comes on. Patient is wondering if there is anything to do? Can her stents be cleaned out? Can she have new stents?     Discussion with patient that the proposed questions are best from MAT and discussion of options, if any are available. Informed patient that previous stents aren't \"cleaned out\". Pt reports that she feels that her health is failing. She has a grandsons wedding in May, which she would like to be there for.     Informed patient will send to MAT for review. Patient has upcoming follow-up appt with MAT on 2/26. Will ask provider to review and if any new recommendations for discussion PRIOR to OV return call will be made. Informed patient that MAT out of the office at this time, set to return next week. If any changes or concerns to return call. Pt verbalized understanding. Dr. Queen, please review. Any recommendations prior to OV on 2/26/2021? Thank you CMM,RN   "

## 2021-06-15 NOTE — PATIENT INSTRUCTIONS - HE
Please follow up if you have any further issues.    You may contact me by phone or MyChart if you are worsening or if things are not improving.    ______________________________________________________________________     Please remember that you can call [phone] to schedule an appointment.     You can schedule appointments 24 hours a day, 7 days a week.  Sometimes the best time to schedule an appointment is after clinic hours when less people are calling in.  Weekends are another option for calling in to schedule appointments.        ______________________________________________________________________      Future Appointments   Date Time Provider Department [place]   6/15/2021  2:15 PM Michael Arzate MD [facility]   9/29/2021  2:00 PM [facility]   10/6/2021  2:00 PM Rocío Sainz NP [facility]

## 2021-06-15 NOTE — TELEPHONE ENCOUNTER
----- Message from Carolee Flor V sent at 2/5/2021  9:24 AM CST -----  General phone call: She would like to talk to someone to see if she needs a new stent put in or if it can be cleaned.  Pt is having heaviness in her chest, fatigue, and some shortness of breath.  She's been pain in her chest for the past few weeks.  She started having chest pain when she cut her ardovistatin in half.   might answer call if she is busy.  Pt has hair appt until [id number]    Caller: Yamilex   Primary cardiologist: Bernice  Detailed reason for call: See above  New or active symptom: Yes  Best phone number: [phone]  Best time to contact: Anytime  Ok to leave a detailed message? yes  Device? no    Additional Info:

## 2021-06-15 NOTE — TELEPHONE ENCOUNTER
Refill Request  Did you contact pharmacy: Yes  Medication name:   Requested Prescriptions     Pending Prescriptions Disp Refills     omeprazole (PRILOSEC) 20 MG capsule 90 capsule 3     Sig: Take 1 capsule (20 mg total) by mouth once for 1 dose.     Who prescribed the medication:  Dr. Arzate  Requested Pharmacy: [facility]  Is patient out of medication: Yes  Patient notified refills processed in 3 business days:  yes  Okay to leave a detailed message: yes

## 2021-06-16 PROBLEM — T78.3XXA ANGIOEDEMA: Status: ACTIVE | Noted: 2017-09-15

## 2021-06-16 PROBLEM — I10 HTN (HYPERTENSION): Status: ACTIVE | Noted: 2019-04-01

## 2021-06-16 PROBLEM — K21.9 GERD (GASTROESOPHAGEAL REFLUX DISEASE): Status: ACTIVE | Noted: 2020-01-10

## 2021-06-16 PROBLEM — D69.2 SENILE PURPURA (H): Status: ACTIVE | Noted: 2020-01-05

## 2021-06-16 LAB — BNP SERPL-MCNC: 109 PG/ML (ref 0–167)

## 2021-06-16 NOTE — PROGRESS NOTES
ASSESSMENT AND PLAN:  Yamilex Keller 88 y.o. female is a for follow-up.  She has a giant cell arteritis.  This is biopsy-proven.  She is on 2 mg of prednisone over preferred to stay on that.  She has osteoarthritis.  She is hurting more in the right knee.  Worse with activity.  She wonders about options.  We discussed those.  She wants to prescribe with local injection of corticosteroids.  Pros and cons were outlined.  40 mg of Kenalog injected the right knee anterolateral approach.  She is to return for follow-up here in 3 months.  I have asked her to continue the Fosamax and vitamin D, calcium.      Diagnoses and all orders for this visit:    GCA (giant cell arteritis)    High risk medication use    Bilateral primary osteoarthritis of knee  -     triamcinolone acetonide 40 mg/mL injection 40 mg (KENALOG-40); Inject 1 mL (40 mg total) into the joint once.      HISTORY OF PRESENTING ILLNESS:  Yamilex Keller, 88 y.o., female  is here for follow up.  She has biopsy-proven GCA, her sed rate originally was 104.  She is on 2 mg of prednisone since July 1 2017.  She attempted to stop it.  This was for several days.  Though she did not have recurrence of headaches double vision there is no jaw claudication or upper extremity claudication, she did have worsening of her knee pain where she is well described of osteoarthritis and in the past corticosteroid and Visco supplementation failed to provide her significant relief.  She noted pain in the knee is now mild.  Living as it is hard for her to manage by  with her ..  She continues to do well.  There is no headache, there is no jaw claudication.  Sometimes she hears clicking and has discomfort in the TMJ area.  There is no double vision.  She no longer gets the occasional fuzzy vision watching TV that was intermittent.  She was originally started on adequate doses of prednisone.  She has done well.  She is she was down to 0 mg of prednisone.  While watching TV she  "had \"wavy vision.  She called her concerned that this might represent a flareup.  She did not have headaches.  She had no jaw claudication.  She is not sure if there was double vision.  She did have a cataract surgery recently.  She was started on prednisone.  Now she is on 10 mg daily.  There is no recurrence of her symptoms.  Doing well on the past visit she was started on methotrexate when she was on [place].  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 she says she never had pain in the joints.  Whether this is an effort to start methotrexate as a steroid sparing is unclear.  If needed I would prefer to have her take Actemra but that's not yet at the point here.  She has noted no residual headache jaw claudication N lower extremity claudication.  He describes herself in very good health.  She did have back pain and radicular symptoms 2 years ago.  She is neither a smoker not takes alcohol.  He likes to walk 30 minutes a day.  She is accompanied here by her .  She does not like prednisone as it has caused her to have skin changes.  She points to the purpuric areas.    Further historical information and ADL limitations as noted in the multidimensional health assessment questionnaire attached in the EMR. .     ALLERGIES:Losartan and Tylenol [acetaminophen]    PAST MEDICAL/ACTIVE PROBLEMS/MEDICATION/ FAMILY HISTORY/SOCIAL DATA:  The patient has a family history of  Past Medical History:   Diagnosis Date     Acute ST elevation myocardial infarction (STEMI) involving left anterior descending coronary artery 7/18/2017     Basal Cell Carcinoma Of The Skin      Benign Adenomatous Polyp Of The Large Intestine     Yady Escamilla: colon @ [place]  '06--recheck 5      Chronic pain of right knee 10/19/2016     Coronary artery disease      Hyperlipidemia      Osteoporosis      Pneumonia      Temporal arteritis      History   Smoking Status     Former Smoker     Packs/day: 1.00     Years: 4.00     Types: " Cigarettes     Quit date: 1/1/1953   Smokeless Tobacco     Never Used     Comment: Quit 65 yrs     Patient Active Problem List   Diagnosis     Vitamin D Deficiency     Essential Hypercholesterolemia     Osteoporosis     Hearing Loss     GCA (giant cell arteritis)     High risk medication use     Bilateral primary osteoarthritis of knee     Hypercalcemia     Acute ST elevation myocardial infarction (STEMI) involving left anterior descending coronary artery     Mixed hyperlipidemia     Ischemic cardiomyopathy     Coronary artery disease involving native coronary artery of native heart with unstable angina pectoris     Angioedema     Current Outpatient Prescriptions   Medication Sig Dispense Refill     alendronate (FOSAMAX) 70 MG tablet One tablet once weekly, in the morning on an empty stomach with a full glass of water 30 minutes before food 12 tablet 3     aspirin 81 MG EC tablet Take 81 mg by mouth at bedtime.        atorvastatin (LIPITOR) 80 MG tablet Take 1 tablet (80 mg total) by mouth at bedtime. 90 tablet 3     CALCIUM CARBONATE/VITAMIN D3 (CALCIUM WITH VITAMIN D ORAL) Take 2 tablets by mouth at bedtime.        furosemide (LASIX) 20 MG tablet Take 1 tablet (20 mg total) by mouth daily. 90 tablet 3     isosorbide mononitrate (IMDUR) 30 MG 24 hr tablet Take 1 tablet (30 mg total) by mouth daily. 30 tablet 11     lisinopril (PRINIVIL,ZESTRIL) 2.5 MG tablet TAKE 1 TABLET DAILY 90 tablet 1     metoprolol succinate (TOPROL-XL) 25 MG Take 0.5 tablets (12.5 mg total) by mouth daily. 45 tablet 3     nitroglycerin (NITROSTAT) 0.4 MG SL tablet Place 1 tablet (0.4 mg total) under the tongue every 5 (five) minutes as needed for chest pain. 30 tablet 2     OMEGA-3/DHA/EPA/FISH OIL (FISH OIL-OMEGA-3 FATTY ACIDS) 300-1,000 mg capsule Take 1 g by mouth at bedtime.        predniSONE (DELTASONE) 1 MG tablet TAKE 2 TABLETS DAILY 180 tablet 0     ticagrelor (BRILINTA) 90 mg Tab Take 1 tablet (90 mg total) by mouth 2 (two) times a  "day. 180 tablet 3     No current facility-administered medications for this visit.      DETAILED EXAMINATION  03/21/18  :  Vitals:    03/21/18 1010   BP: 132/70   Pulse: 60   Weight: 130 lb (59 kg)   Height: 5' 3\" (1.6 m)     Alert oriented. Head including the face is examined for malar rash, heliotropes, scarring, lupus pernio. Eyes examined for redness such as in episcleritis/scleritis, periorbital lesions.   Neck/ Face examined for parotid gland swelling, range of motion of neck.  Left upper and lower and right upper and lower extremities examined for tenderness, swelling, warmth of the appendicular joints, range of motion, edema, rash.  Some of the important findings included: No appreciable synovitis in any of the palpable appendicular joints.  She has scattered [place] nodes with associated deformities such as of the right middle and index finger.  There is tenderness of the joint line of the right knee with warmth.  LAB / IMAGING DATA:  ALT   Date Value Ref Range Status   07/18/2017 15 0 - 45 U/L Final   07/18/2017 15 0 - 45 U/L Final   06/27/2017 13 0 - 45 U/L Final     Albumin   Date Value Ref Range Status   07/18/2017 3.4 (L) 3.5 - 5.0 g/dL Final   07/18/2017 3.7 3.5 - 5.0 g/dL Final   06/27/2017 3.7 3.5 - 5.0 g/dL Final     Creatinine   Date Value Ref Range Status   09/16/2017 0.71 0.60 - 1.10 mg/dL Final   09/15/2017 0.83 0.60 - 1.10 mg/dL Final   09/11/2017 0.85 0.60 - 1.10 mg/dL Final       WBC   Date Value Ref Range Status   09/16/2017 11.9 (H) 4.0 - 11.0 thou/uL Final   09/15/2017 9.0 4.0 - 11.0 thou/uL Final     Hemoglobin   Date Value Ref Range Status   09/16/2017 12.8 12.0 - 16.0 g/dL Final   09/15/2017 14.3 12.0 - 16.0 g/dL Final   07/28/2017 14.2 12.0 - 16.0 g/dL Final     Platelets   Date Value Ref Range Status   09/16/2017 220 140 - 440 thou/uL Final   09/15/2017 232 140 - 440 thou/uL Final   07/30/2017 284 140 - 440 thou/uL Final       Lab Results   Component Value Date    SEDRATE 3 " 05/08/2017

## 2021-06-18 ENCOUNTER — COMMUNICATION - HEALTHEAST (OUTPATIENT)
Dept: INTERNAL MEDICINE | Facility: CLINIC | Age: 86
End: 2021-06-18

## 2021-06-18 DIAGNOSIS — R06.00 DYSPNEA, UNSPECIFIED TYPE: ICD-10-CM

## 2021-06-18 DIAGNOSIS — I25.10 CORONARY ARTERY DISEASE INVOLVING NATIVE CORONARY ARTERY OF NATIVE HEART WITHOUT ANGINA PECTORIS: ICD-10-CM

## 2021-06-18 NOTE — PATIENT INSTRUCTIONS - HE
Patient Instructions by Gilbert Queen DO at 1/19/2021 11:30 AM     Author: Gilbert Queen DO Service: -- Author Type: Physician    Filed: 1/19/2021 11:57 AM Encounter Date: 1/19/2021 Status: Signed    : Gilbert Queen DO (Physician)       It was a pleasure to meet with you today in clinic.  Please do not hesitate to call the [facility] clinic with any questions or concerns at [phone].    Additional Provider Instructions:   1.  Reduce atorvastatin 40 mg daily   2. If ongoing fatigue could reduce other medications levels  3. Return in 6 week for close follow-up.         Sincerely,

## 2021-06-19 NOTE — PROGRESS NOTES
CHART NOTE     DATE OF SERVICE:  2018     : 3/25/1929   89 y.o.     ASSESSMENT :   Marked progression of likely pathological T7 fracture. Patient unable to pillo brace.  Back pain present, variable. Interrupts sleep.      PLAN: Referral to [facility] to replace front piece and adjust fit of TLSO.  Referral to IR for Vertebroplasty.     ADDENDUM:  I spoke with IR, radiologist reviewed imaging and patient is appropriate for vertebroplasty. They will try to get her in this week. Advised patent once she has had vertebroplasty she may dc brace and may RTC prn only.     HPI:  Yamilex Keller is status post consult  Dr. Johnson  18 for T7 COMPRESSION FRACTURE.  Patient with known osteoporosis resented to ED w/ c/o of bilateral rib cage pain and upper back pain,  third visit to ED.  MRI done and showed compression fx of T7. No recent falls since 2018.  Did not have back pain at that time. Onset of back pain in late  early July.       TODAY, patient reports  upper back pain comes and goes thoughout the day. Better than when she first came in.   Usually responds to Tylenol and rest, although she finds it difficult to sleep    Has been wearing the brace but her  removed there front bar because it was too uncomfortable, choking her and unable tolerate, therefore has not been wearing the shoulder straps either. Saw endocrinologist, Fosamax dc'd and will be starting Prolia next month.      PAST MEDICAL HISTORY, SURGICAL HISTORY, REVIEW OF SYMPTOMS, MEDICATIONS AND ALLERGIES:  Past medical history, surgical history, ROS, medications and allergies reviewed with patient and remain unchanged from previous visit.    Past Medical History:   Diagnosis Date     Acute ST elevation myocardial infarction (STEMI) involving left anterior descending coronary artery (H) 2017     Basal Cell Carcinoma Of The Skin      Benign Adenomatous Polyp Of The Large Intestine     Yady Escamilla: colon @ [place]  '06--recheck 5       Chronic pain of right knee 10/19/2016     Coronary artery disease      Hyperlipidemia      Osteoporosis      Pneumonia      Temporal arteritis (H)        PHYSICAL EXAM:    /58  Pulse 68  Temp 98  F (36.7  C)  SpO2 94%    Neurological exam reveals:  Respirations easy, non-labored.   Skin: W/D/I. No rashes, lesions or breaks in integrity.   Recent and remote memory intact, fund of knowledge wnl.    Alert and oriented x3, speech fluent and appropriate.   PERRL, EOMI, No nystagmus,   Face symmetric, tongue midline, Uvula midline,  palate rises with phonation   Shoulder shrug equal  Arm strength bilateral grasp, biceps, triceps, and deltoids full  Leg strength bilateral dorsiflexion, plantar flexion, and hip flexion full  No extremity edema noted.   Muscle Bulk and tone wnl.   TTP over thoracic spine approx T7 level  Reflexes: No pathological reflexes   Gait and station:Normal       RADIOGRAPHIC IMAGING: XR:   There has been further height loss of the T7 vertebral body compression fracture, now with almost 90% height loss anteriorly, previously 60%. There is now segmental kyphosis of 32 degrees as measured from T6-T8.      Films personally reviewed and interpreted.  Also reviewed and discussed with Dr. Johnson.   Reviewed imaging with patient and family.

## 2021-06-19 NOTE — PROGRESS NOTES
Progress Note    Reason for Visit:  Chief Complaint     Osteoporosis          Progress Note:    HPI:       This patient seen saltation at the request of Dr. Carlos because of osteoporosis.  Thank you for referring this pleasant 89-year-old female patient who came to the clinic accompanied by her daughter and .    The patient said that a few months ago she felt sharp pain in her back and she had an MRI which showed fractures of T7.    Acute fracture.    The patient has known about osteoporosis for same who many years and she has been on Fosamax 70 mg once a week for 10 years.    Her sister had osteoporosis and she broke her hip.    Patient is known to have polymyalgia rheumatica and has been on prednisone for 4 years and right now she is takes 1 mg daily and probably due to stop it in 1 month.    She has some heart burn and acid reflux.    She had an MI in July of last year  and she had 2 stents.    The patient takes aspirin and Brilinta.    She had a bone DEXA scan which showed T score of the spine is -1 at the left hip -2.7 on the right hip -2.5.    She also broke her wrist 15 years ago creatinine is normal at 0.68 calcium 9.2.    Patient does not smoke or drink she has 1 daughter.      Component      Latest Ref Rng & Units 7/17/2018 7/19/2018              Sodium      136 - 145 mmol/L  140   Potassium      3.5 - 5.0 mmol/L 3.0 (L) 3.9   Chloride      98 - 107 mmol/L  106   CO2      22 - 31 mmol/L  24   Anion Gap, Calculation      5 - 18 mmol/L  10   Glucose      70 - 125 mg/dL  89   BUN      8 - 28 mg/dL  20   Creatinine      0.60 - 1.10 mg/dL  0.68   GFR MDRD Af Amer      >60 mL/min/1.73m2  >60   GFR MDRD Non Af Amer      >60 mL/min/1.73m2  >60   Bilirubin, Total      0.0 - 1.0 mg/dL     Calcium      8.5 - 10.5 mg/dL  9.2   Protein, Total      6.0 - 8.0 g/dL     ALBUMIN      3.5 - 5.0 g/dL     Alkaline Phosphatase      45 - 120 U/L     AST      0 - 40 U/L     ALT      0 - 45 U/L     TSH      0.30 - 5.00  uIU/mL         Patient Profile:  88 y.o. female, postmenopausal, is here for the follow up bone density test.   History of fractures - Yes;  Wrist. Family history of osteoporosis - Yes;  sibling.  Family history of hip fracture: None. Smoking history - No. Osteoporosis treatment past -  Yes;  Bisphosphonates. Osteoporosis treatment current - Yes;  Bisphosphonates.  Chronic medical problems - None. High risk medications -  Steroids;  Yes, Currently.        Assessment:     1. The spine bone density L1-L2 with T-score -1.0.  2. Femoral bone densities show left femoral neck T- score -2.7 and right total hip T-score -2.5.  3. Trabecular bone score indicates moderate trabecular bone architecture.        88 y.o. female with OSTEOPOROSIS , currently on treatment with Bisphosphonates.      Since the previous scan done on December 08,2014, there has been an 8.5% increase in bone density in the lumbar spine.  Additionally, there has been a statistically significant 2.7% increase in the left total hip and a 2.5% increase in the right total hip.  An increase or stability is seen as a positive response to therapy.     [facility]  MR THORACIC SPINE WO CONTRAST  7/8/2018 8:38 PM      INDICATION: Back pain  TECHNIQUE: Without IV contrast.  CONTRAST: None  COMPARISON: Chest x-ray 07/08/2018     FINDINGS: Acute 20% compression of the T7 vertebral body largely involving the inferior endplate. No other vertebral body height loss. No gross malalignment. No large disc herniation. Normal facets. No spinal canal or neural foraminal stenosis. No   abnormal cord signal.      Mild edema adjacent to the T7 vertebral body.     IMPRESSION:   CONCLUSION:  1.  Acute 20% T7 compression.  2.  This correlates with the fracture described as being at the T8 level on the prior plain film.  3.  No other compression fracture.         Review of Systems:    Nervous System: No headache, dizziness, fainting or memory loss. No tingling sensation of  hand or feet.  Ears: No hearing loss or ringing in the ears  Eyes: No blurring of vision, redness, itching or dryness.  Nose: No nosebleed or loss of smell  Mouth: No mouth sores or loss of taste  Throat: No hoarseness or difficulty swallowing  Neck: No enlarged thyroid or lymph nodes.  Heart: No chest pain, palpitation or irregular heartbeat. No swelling of hands or feet  Lungs: No shortness of breath, cough, night sweats, wheezing or hemoptysis.  Gastrointestinal: No nausea or vomiting, constipation or diarrhea.  No acid reflux, abdominal pain or blood in stools.  Kidney/Bladdr: No polyuria, polydipsia, nocturia or hematuria.  Genital/Sexual: No loss of libido  Skin: No rash, hair loss or hirsutism.  No abnormal striae  Muscles/Joints/Bones: No morning stiffness, muscle aches and pain or loss of height.    Current Medications:  Current Outpatient Prescriptions   Medication Sig     acetaminophen (TYLENOL) 500 MG tablet Take 2 tablets (1,000 mg total) by mouth 3 (three) times a day. For pain from T7 fx     alendronate (FOSAMAX) 70 MG tablet One tablet once weekly, in the morning on an empty stomach with a full glass of water 30 minutes before food     aspirin 81 MG EC tablet Take 1 tablet (81 mg total) by mouth at bedtime. For CAD     atorvastatin (LIPITOR) 80 MG tablet Take 1 tablet (80 mg total) by mouth at bedtime. for cad and lipids     furosemide (LASIX) 20 MG tablet Take 1 tablet (20 mg total) by mouth daily. For edema     lidocaine 4 % patch Place 1 patch on the skin daily. Remove and discard patch with 12 hours or as directed by MD.for back pain from t7 fx     lidocaine-menthol 4-1 % PtMd Apply topically 2 (two) times a day.     metoprolol succinate (TOPROL-XL) 25 MG Take 0.5 tablets (12.5 mg total) by mouth daily. For cad, htn     nitroglycerin (NITROSTAT) 0.4 MG SL tablet Place 1 tablet (0.4 mg total) under the tongue every 5 (five) minutes as needed for chest pain.     OMEGA-3/DHA/EPA/FISH OIL (FISH  OIL-OMEGA-3 FATTY ACIDS) 300-1,000 mg capsule Take 1 g by mouth at bedtime.      polyethylene glycol (MIRALAX) 17 gram packet Take 1 packet (17 g total) by mouth daily. Hold if loose stools  This is for constipation     predniSONE (DELTASONE) 1 MG tablet Take 1 mg by mouth daily. Take for 30 days.     senna-docusate (PERICOLACE) 8.6-50 mg tablet Take 1 tablet by mouth 2 (two) times a day. For constipation     ticagrelor (BRILINTA) 90 mg Tab Take 1 tablet (90 mg total) by mouth 2 (two) times a day. For CAD     traMADol (ULTRAM) 50 mg tablet Take 0.5 tablets (25 mg total) by mouth every 6 (six) hours as needed for pain.       Patients Active Problems:  Patient Active Problem List   Diagnosis     Vitamin D Deficiency     Essential Hypercholesterolemia     Osteoporosis     Hearing Loss     GCA (giant cell arteritis) (H)     High risk medication use     Bilateral primary osteoarthritis of knee     Hypercalcemia     Acute ST elevation myocardial infarction (STEMI) involving left anterior descending coronary artery (H)     Mixed hyperlipidemia     Ischemic cardiomyopathy     Coronary artery disease involving native coronary artery of native heart without angina pectoris     Angioedema     Abdominal pain, epigastric     Closed wedge compression fracture of seventh thoracic vertebra, initial encounter (H)     Abnormal CT of the abdomen     Sinus bradycardia     Abnormal CT scan     Pain       History:   reports that she quit smoking about 65 years ago. Her smoking use included Cigarettes. She has a 4.00 pack-year smoking history. She has never used smokeless tobacco. She reports that she drinks alcohol. She reports that she does not use illicit drugs.   reports that she quit smoking about 65 years ago. Her smoking use included Cigarettes. She has a 4.00 pack-year smoking history. She has never used smokeless tobacco. She reports that she drinks alcohol. She reports that she does not use illicit drugs.  History   Smoking  Status     Former Smoker     Packs/day: 1.00     Years: 4.00     Types: Cigarettes     Quit date: 1/1/1953   Smokeless Tobacco     Never Used     Comment: Quit 65 yrs      reports that she quit smoking about 65 years ago. Her smoking use included Cigarettes. She has a 4.00 pack-year smoking history. She has never used smokeless tobacco. She reports that she drinks alcohol. She reports that she does not use illicit drugs.  History   Sexual Activity     Sexual activity: Not Currently     Partners: Male     Past Medical History:   Diagnosis Date     Acute ST elevation myocardial infarction (STEMI) involving left anterior descending coronary artery (H) 7/18/2017     Basal Cell Carcinoma Of The Skin      Benign Adenomatous Polyp Of The Large Intestine     Yady Escamilla: colon @ [place] [id number]--recheck 5      Chronic pain of right knee 10/19/2016     Coronary artery disease      Hyperlipidemia      Osteoporosis      Pneumonia      Temporal arteritis (H)      Family History   Problem Relation Age of Onset     Pacemaker Brother      Past Medical History:   Diagnosis Date     Acute ST elevation myocardial infarction (STEMI) involving left anterior descending coronary artery (H) 7/18/2017     Basal Cell Carcinoma Of The Skin      Benign Adenomatous Polyp Of The Large Intestine     Yady Escamilla: colon @ [place] [id number]--recheck 5      Chronic pain of right knee 10/19/2016     Coronary artery disease      Hyperlipidemia      Osteoporosis      Pneumonia      Temporal arteritis (H)      Past Surgical History:   Procedure Laterality Date     CORONARY ANGIOPLASTY WITH STENT PLACEMENT  //     ESOPHAGOGASTRODUODENOSCOPY N/A 7/9/2018    Procedure: ESOPHAGOGASTRODUODENOSCOPY (EGD);  Surgeon: Prabhu Lin MD;  Location: [facility] GI;  Service:      LA CATH PLACEMENT & NJX CORONARY ART ANGIO IMG S&I N/A 7/18/2017    Procedure: Coronary Angiogram;  Surgeon: Shelia Garcia MD;  Location: [facility] Cath Lab;  Service: Cardiology     LA CATH  "PLMT L HRT & ARTS W/NJX & ANGIO IMG S&I Left 7/18/2017    Procedure: Left Heart Catheterization Without Left Ventriculogram;  Surgeon: Shelia Garcia MD;  Location: [facility] Cath Lab;  Service: Cardiology     AZ TEMPORAL ARTERY LIGATN OR BX Bilateral 10/7/2015    Procedure: BILATERAL TEMPORAL ARTERY BIOPSY;  Surgeon: Alfredito Jason MD;  Location: [facility] OR;  Service: General     TONSILLECTOMY         Vitals   height is 5' 3.5\" (1.613 m). Her blood pressure is 122/64. Her respiration is 18.         Exam  General appearance: The patient looked well, not in acute distress.  Eyes: no evidence of thyroid eye disease.   Retinal exam: No evidence of diabetic retinopathy.  Mouth and Throat: Normal  Neck: No evidence of thyromegaly, enlarged lymph node or tenderness  Chest: Trachea is central. Chest is clear to auscultation and percussion. Breat sounds are normal.  Cardiovascular exam: JVP is not raised. Heart sounds are normal, no murmurs or rub  Peripheral pulses are palpable.   Abdomen: No masses or tenderness.    Back: No vertebral tenderness or kyphosis.  Extremities: No evidence of leg edema.   Skin: Normal to touch.  No abnormal striae  Neurologic exam:  Visual fields are intact by confrontation, grossly intact. No evidence of peripheral neuropathy.  Detailed foot exam normal.        Diagnosis:  No diagnosis found.    Orders:   No orders of the defined types were placed in this encounter.        Assessment and Plan: Osteoporosis I have discussed the pathophysiology of the disease with the patient I did advise him to stop Fosamax since she has failed and had fracture while she is taking it and I advised her that we will contact insurance to approve Prolia 60 mg subcutaneously every 6 month I discussed side effects with the patient.    I did also advise her to take vitamin D 2000 units a day plus calcium 600 mg daily.    Patient takes aspirin 81 mg.    She has hyperlipidemia on Lipitor 80 mg she also " takes Lasix 20 mg.    She has been on long-term prednisone because of polymyalgia rheumatica.    A bone DEXA scan shows improvement of 8.5 at the spine and -2.7 had left hip and -2.5 on the right.    Patient obviously failed the Fosamax and had a new fracture while she is taking it so we will switch to Prolia 60 mg subcutaneously every 6 months.    I would also check serum protein electrophoresis    Patient return to clinic in 6 months.  She is currently having a back brace.    I have reviewed and ordered clinical lab test    I have reviewed and ordered radiology tests.    I have reviewed and ordered her medication as required.    I have reviewed her test results and advised with the performing physician.    I have reviewed the patient's old records.    I have reviewed and summarized the patient old records.    I did spend 60 minutes with the patient more than 50% was spent on counseling and managing her care.

## 2021-06-19 NOTE — PROCEDURES
[facility] Radiology Post Procedure    Procedure: T7 vertebroplasty    Radiologist: Alfredito Marquez MD; Davis French MD     Contrast: 0 ml omnipaque  Fluoro Time: 9.7 minutes  Air Kerma: 525 mGy    Medications: Versed 1.5 mg IV                        Fentanyl 75 mcg IV    Ancef 2 g IV   Sedation Time: 30 minutes    EBL: minimal  Complications: none     Preliminary findings: (see dictation for full detail)  Technically successful T7 vertebroplasty.     Assess/Plan:   Routine post procedure monitoring  Bedrest for 2 hours  Discharge when meets criteria    Alfredito Marquez MD

## 2021-06-19 NOTE — PROGRESS NOTES
Code Status:  FULL CODE  Visit Type: Problem Visit     Facility:  [facility] [[id number]]         Facility Type: SNF (Skilled Nursing Facility, TCU)    History of Present Illness: Yamilex Keller is a 89 y.o. female who I am seeing today for follow-up on the TCU.  Patient recently admitted with acute T7 compression fracture secondary to fall on 7/8/2018.  Patient with past medical history of CAD status post stent, hypertension, hyperlipidemia, giant cell arteritis, osteoarthritis of knees who presented to ED on the account of bilateral rib cage pain and upper back pain ×3 in 1 week.  Neurosurgery was consulted.  Patient treated conservatively with TLSO brace.  Pain controlled with Tylenol and tramadol.  As well as lidocaine patch and Flexeril.  Patient also found to have esophageal distention.  She denies any dysphagia.  This was incidentally noted on a CT of the chest abdomen and pelvis.  GI was consulted and an EGD was done which showed mild gastritis.  This was most likely from NSAID/aspirin use.  She was treated with Pepcid.  History of CAD with history of MI on 7/18/2017.Nuclear stress test 9/2017-for inducible myocardial ischemia or infarction with EF 70%.  Patient was advised to continue with dual antiplatelet therapy for 12 months, statin, BB therapy.  She remains on aspirin and Brilinta.  Acute hypercalcemia which resolved with IV fluids.  Her calcium supplement was held.  Acute hyperkalemia.  This was replaced.  She was treated for UTI.  History of GCA on low-dose prednisone.  She did have asymptomatic sinus bradycardia.  She continues on beta-blocker.    Today I visit with her and her . She is having increased pain today. She contributes this to having her TLSO applied while in bed. Therapy was doing teaching with her. She is crying in bed. Therapy has added heat for pain relief. She continues on Tramadol, menthol patches, tylenol and flexeril. She denies any numbness or tingling  in her feet. She complains of pain mostly on the right side near her ribs. She is also requesting less stool softener.     Active Ambulatory Problems     Diagnosis Date Noted     Vitamin D Deficiency      Essential Hypercholesterolemia      Osteoporosis      Hearing Loss      GCA (giant cell arteritis) (H) 05/25/2016     High risk medication use 05/25/2016     Bilateral primary osteoarthritis of knee 07/27/2016     Hypercalcemia 06/19/2017     Acute ST elevation myocardial infarction (STEMI) involving left anterior descending coronary artery (H) 07/18/2017     Mixed hyperlipidemia      Ischemic cardiomyopathy      Coronary artery disease involving native coronary artery of native heart without angina pectoris 08/03/2017     Angioedema 09/15/2017     Abdominal pain, epigastric 07/04/2018     Closed wedge compression fracture of seventh thoracic vertebra, initial encounter (H) 07/08/2018     Abnormal CT of the abdomen      Sinus bradycardia      Abnormal CT scan 07/08/2018     Pain 07/09/2018     Resolved Ambulatory Problems     Diagnosis Date Noted     Osteoarthritis Of The Knee      Basal Cell Carcinoma Of The Skin      Asymptomatic postmenopausal status      Benign Adenomatous Polyp Of The Large Intestine      Open Fracture Of The Carpal Bones Of The Left Wrist      Earache In The Left Ear      Joint Instability Of The Knee      Chronic pain of right knee 10/19/2016     AMI anterior wall (H)      Heart failure with reduced ejection fraction (H) 08/16/2017     Past Medical History:   Diagnosis Date     Acute ST elevation myocardial infarction (STEMI) involving left anterior descending coronary artery (H) 7/18/2017     Basal Cell Carcinoma Of The Skin      Benign Adenomatous Polyp Of The Large Intestine      Chronic pain of right knee 10/19/2016     Coronary artery disease      Hyperlipidemia      Osteoporosis      Pneumonia      Temporal arteritis (H)        Current Outpatient Prescriptions   Medication Sig Note      acetaminophen (TYLENOL) 500 MG tablet Take 2 tablets (1,000 mg total) by mouth 3 (three) times a day. For pain from T7 fx      alendronate (FOSAMAX) 70 MG tablet One tablet once weekly, in the morning on an empty stomach with a full glass of water 30 minutes before food 7/8/2018: Normally takes Saturdays, skipped yesterday 7/7     aspirin 81 MG EC tablet Take 1 tablet (81 mg total) by mouth at bedtime. For CAD      atorvastatin (LIPITOR) 80 MG tablet Take 1 tablet (80 mg total) by mouth at bedtime. for cad and lipids      cyclobenzaprine (FLEXERIL) 5 MG tablet Take 1 tablet (5 mg total) by mouth 2 (two) times a day as needed for muscle spasms.      furosemide (LASIX) 20 MG tablet Take 1 tablet (20 mg total) by mouth daily. For edema      lidocaine 4 % patch Place 1 patch on the skin daily. Remove and discard patch with 12 hours or as directed by MD.for back pain from t7 fx      metoprolol succinate (TOPROL-XL) 25 MG Take 0.5 tablets (12.5 mg total) by mouth daily. For cad, htn      nitroglycerin (NITROSTAT) 0.4 MG SL tablet Place 1 tablet (0.4 mg total) under the tongue every 5 (five) minutes as needed for chest pain.      OMEGA-3/DHA/EPA/FISH OIL (FISH OIL-OMEGA-3 FATTY ACIDS) 300-1,000 mg capsule Take 1 g by mouth at bedtime.       polyethylene glycol (MIRALAX) 17 gram packet Take 1 packet (17 g total) by mouth daily. Hold if loose stools  This is for constipation      predniSONE (DELTASONE) 1 MG tablet Take 2 tablets (2 mg total) by mouth daily. For GCA      senna-docusate (PERICOLACE) 8.6-50 mg tablet Take 1 tablet by mouth 2 (two) times a day. For constipation      ticagrelor (BRILINTA) 90 mg Tab Take 1 tablet (90 mg total) by mouth 2 (two) times a day. For CAD      traMADol (ULTRAM) 50 mg tablet Take 0.5 tablets (25 mg total) by mouth every 6 (six) hours as needed for pain.        Allergies   Allergen Reactions     Lisinopril Swelling     Swelling of lips/mouth      Losartan Angiodema         Review of Systems    No fevers or chills. No headache, lightheadedness or dizziness. No SOB, chest pains or palpitations. Appetite is good.  No dysfunction.  No nausea, vomiting, constipation or diarrhea. No dysuria, frequency, burning or pain with urination.  Continues in TLSO splinting.  Back pain increased on today's visit. She says she is unable to tolerate putting brace on while in bed.     Physical Exam   PHYSICAL EXAMINATION:  Vital signs: /58, P 70, R 12, T 96.8, O2 93%. current weight 127.2 pounds.  General: Awake, Alert, oriented x3, appropriately, follows simple commands, conversant  HEENT:PERRLA, Pink conjunctiva, anicteric sclerae, moist oral mucosa  NECK: Supple, without any lymphadenopathy, or masses  CVS:  S1  S2, without murmur or gallop.   LUNG: Clear to auscultation, No wheezes, rales or rhonci.  BACK: No kyphosis of the thoracic spine. Brace off while in bed. She has tenderness to right side of rib cage. No bruising noted.   ABDOMEN: Soft, nontender to palpation, with positive bowel sounds  EXTREMITIES: Moves both upper and lower extremities with generalized weakness.  No pedal edema.  SKIN: Warm and dry, no rashes or erythema noted  NEUROLOGIC: Intact, pulses palpable  PSYCHIATRIC: Cognition intact    Labs:    Lab Results   Component Value Date    WBC 6.8 07/11/2018    HGB 14.1 07/11/2018    HCT 41.8 07/11/2018    MCV 92 07/11/2018     07/11/2018     Results for orders placed or performed in visit on 07/19/18   Basic Metabolic Panel   Result Value Ref Range    Sodium 140 136 - 145 mmol/L    Potassium 3.9 3.5 - 5.0 mmol/L    Chloride 106 98 - 107 mmol/L    CO2 24 22 - 31 mmol/L    Anion Gap, Calculation 10 5 - 18 mmol/L    Glucose 89 70 - 125 mg/dL    Calcium 9.2 8.5 - 10.5 mg/dL    BUN 20 8 - 28 mg/dL    Creatinine 0.68 0.60 - 1.10 mg/dL    GFR MDRD Af Amer >60 >60 mL/min/1.73m2    GFR MDRD Non Af Amer >60 >60 mL/min/1.73m2           Assessment/Plan:  1. Traumatic compression fracture of T7 thoracic  vertebra (H)     2. Pain management       T& compression fracture post fall. She continues in TLSO splinting. She reports increased pain today. She tells me she is unable to tolerate brace being put on in bed. She is to have brace on when >30 degrees. She continues on Tylenol, lidocaine patch, Flexeril and tramadol. She also continues on prednisone. I will increase her Tramadol to Q 4 hours. I will also have therapy use heat for pain relief.         Electronically signed by: Mulu Silveira, CNP

## 2021-06-19 NOTE — PROGRESS NOTES
ASSESSMENT:  1. Establishing care with new doctor, encounter for     2. GCA (giant cell arteritis) (H)     3. Closed wedge compression fracture of seventh thoracic vertebra, initial encounter (H)     4. History of ST elevation myocardial infarction (STEMI)     5. Coronary artery disease involving native coronary artery of native heart without angina pectoris         PLAN:  All medications were reconciled.    We discussed her past medical history, medications and other recent diagnoses.  She has all follow-up scheduled with exception of needing to have follow-up set up with cardiology.  Information given her today so that she can call for an appointment.  Went over medications, no refills needed today.  Her  and herself will let us know if they find anything at home that needs to be updated.  Labs were updated recently and all within normal limits so no further lab collection was done today.  At this time we will defer to specialist for management of her current problems, otherwise she can return within 6 months for annual wellness exam when that is due.  Coronary artery disease involving native coronary artery of native heart without angina pectoris  Follow up needed with cardiology, given number to schedule.     History of ST elevation myocardial infarction (STEMI)  Follow up needed with cardiology, given number to schedule.     GCA (giant cell arteritis) (H)  Follow up with rheumatology planned for September. Currently they are weaning her off Prednisone.     Closed wedge compression fracture of seventh thoracic vertebra, initial encounter (H)  Continue bracing, pain control.  Plan for follow up with endocrinology.  Start Prolia as ordered.      Patient Instructions   Follow up scheduled with Endocrine and Rheumatology:    [facility]  [address] [place] [address]    Follow up with cardiology, Dr. Miranda in the next couple months.  [phone]    [facility]  Erika Beck  NP  [phone]      No orders of the defined types were placed in this encounter.    Medications Discontinued During This Encounter   Medication Reason     traMADol (ULTRAM) 50 mg tablet Therapy completed       No Follow-up on file.    CHIEF COMPLAINT:  Chief Complaint   Patient presents with     Hospital Visit Follow Up     [facility] 7/12-7/25, T-7 fracture      Establish Care       HISTORY OF PRESENT ILLNESS:  Yamilex is a 89 y.o. female Today to establish care and follow-up on her recent stay at [facility].  Patient was admitted to [facility] status post a T7 compression fracture secondary to a fall on 7/8/2018.  Patient has a history including coronary artery disease status post stent placement, hypertension, hyperlipidemia, giant cell arteritis, osteoarthritis of the knees, STEMI, patient had initially presented to the emergency department with rib cage pain and upper back pain a couple of times within the same week.  Eventually MRI was done and compression fracture was found.  Neurosurgery was consulted.  Patient was treated conservatively with a brace.  Pain has been controlled with Tylenol and as needed tramadol.  She is also used as needed lidocaine patches although not using those as much anymore.  She is continue to follow with cardiology since her MI in July 2017.  She is due for follow-up with them in the next couple of months.  Patient was on low-dose prednisone which is now being tapered for history of giant cell arteritis.  She was also treated for urinary tract infection during her stay at [facility].  She comes in today to establish care.  She is doing well at home, pain is managed with Tylenol mostly but she does use occasional as needed lidocaine patches and tramadol.  She continues to follow with both rheumatology and endocrinology regarding her medications for osteoporosis and her treatment of giant cell arteritis.  Given her recent fracture they have changed her to Prolia injections  and are trying to wean off prednisone.  Previously she had tried to wean off prednisone and had some recurrence of GCA, they will continue to monitor and she does have follow-up with rheumatology at the end of September.  Also has follow-up scheduled in January with endocrinology.  Patient is currently undergoing home PT OT and home health assessment with an RN.  She comes in today to see us to make sure medications are in order.  They have some questions about where follow-up clinics are located since they were seen in the TCU.  We will provide them with that information today and make sure labs and medications are in order for future visits.    REVIEW OF SYSTEMS:      Pertinent items are noted in HPI.  All other systems are negative  PFSH:  Reviewed, no changes      TOBACCO USE:  History   Smoking Status     Former Smoker     Packs/day: 1.00     Years: 4.00     Types: Cigarettes     Quit date: 1/1/1953   Smokeless Tobacco     Never Used     Comment: Quit 65 yrs       VITALS:  Vitals:    08/01/18 1336   BP: 133/52   Patient Site: Left Arm   Patient Position: Sitting   Cuff Size: Adult Regular   Pulse: 68   Resp: 14   Temp: (!) 95.2  F (35.1  C)   TempSrc: Oral   Weight: 129 lb 4 oz (58.6 kg)     Wt Readings from Last 3 Encounters:   08/01/18 129 lb 4 oz (58.6 kg)   07/23/18 123 lb (55.8 kg)   07/08/18 129 lb (58.5 kg)       PHYSICAL EXAM:   /52 (Patient Site: Left Arm, Patient Position: Sitting, Cuff Size: Adult Regular)  Pulse 68  Temp (!) 95.2  F (35.1  C) (Oral)   Resp 14  Wt 129 lb 4 oz (58.6 kg)  BMI 22.54 kg/m2  General appearance: alert, appears stated age and cooperative  Ears: normal TM's and external ear canals both ears  Nose: Nares normal. Septum midline. Mucosa normal. No drainage or sinus tenderness.  Throat: lips, mucosa, and tongue normal; teeth and gums normal  Neck: no adenopathy, no carotid bruit, no JVD, supple, symmetrical, trachea midline and thyroid not enlarged, symmetric, no  tenderness/mass/nodules  Lungs: clear to auscultation bilaterally  Heart: regular rate and rhythm, S1, S2 normal, no murmur, click, rub or gallop  Extremities: extremities normal, atraumatic, no cyanosis or edema  Psychologic: Mood and affect normal.      DATA REVIEWED:  Additional History from Old Records Summarized (2): 2  Decision to Obtain Records (1): 0  Radiology Tests Summarized or Ordered (1): 0  Labs Reviewed or Ordered (1): 1  Medicine Test Summarized or Ordered (1): 0  Independent Review of EKG or X-RAY(2 each): 0    The visit lasted a total of 40 minutes face to face with the patient. Over 50% of the time was spent counseling and educating the patient about plan of care.    MEDICATIONS:  Current Outpatient Prescriptions   Medication Sig Dispense Refill     acetaminophen (TYLENOL) 500 MG tablet Take 2 tablets (1,000 mg total) by mouth 3 (three) times a day. For pain from T7 fx 21 tablet 0     aspirin 81 MG EC tablet Take 1 tablet (81 mg total) by mouth at bedtime. For CAD 30 tablet 0     atorvastatin (LIPITOR) 80 MG tablet Take 1 tablet (80 mg total) by mouth at bedtime. for cad and lipids 90 tablet 3     furosemide (LASIX) 20 MG tablet Take 1 tablet (20 mg total) by mouth daily. For edema 90 tablet 3     lidocaine 4 % patch Place 1 patch on the skin daily. Remove and discard patch with 12 hours or as directed by MD.for back pain from t7 fx 7 patch 0     lidocaine-menthol 4-1 % PtMd Apply topically 2 (two) times a day.       nitroglycerin (NITROSTAT) 0.4 MG SL tablet Place 1 tablet (0.4 mg total) under the tongue every 5 (five) minutes as needed for chest pain. 3 Bottle 2     OMEGA-3/DHA/EPA/FISH OIL (FISH OIL-OMEGA-3 FATTY ACIDS) 300-1,000 mg capsule Take 1 g by mouth at bedtime.        predniSONE (DELTASONE) 1 MG tablet Take 1 mg by mouth daily. Take for 30 days.       ticagrelor (BRILINTA) 90 mg Tab Take 1 tablet (90 mg total) by mouth 2 (two) times a day. For  tablet 3     TOPROL XL 25 mg TAKE  ONE-HALF (1/2) TABLET DAILY 45 tablet 1     alendronate (FOSAMAX) 70 MG tablet One tablet once weekly, in the morning on an empty stomach with a full glass of water 30 minutes before food 12 tablet 0     polyethylene glycol (MIRALAX) 17 gram packet Take 1 packet (17 g total) by mouth daily. Hold if loose stools  This is for constipation 30 packet 0     senna-docusate (PERICOLACE) 8.6-50 mg tablet Take 1 tablet by mouth 2 (two) times a day. For constipation 30 tablet 0     No current facility-administered medications for this visit.        This note has been dictated using voice recognition software. Any grammatical or context distortions are unintentional and inherent to the software

## 2021-06-19 NOTE — PROGRESS NOTES
Code Status:  FULL CODE  Visit Type: Discharge Summary     Facility:  [facility] [[id number]]         Facility Type: SNF (Skilled Nursing Facility, TCU)    History of Present Illness: Yamilex Keller is a 89 y.o. female who I am seeing today for follow-up on the TCU.  Patient recently admitted with acute T7 compression fracture secondary to fall on 7/8/2018.  Patient with past medical history of CAD status post stent, hypertension, hyperlipidemia, giant cell arteritis, osteoarthritis of knees who presented to ED on the account of bilateral rib cage pain and upper back pain ×3 in 1 week.  Neurosurgery was consulted.  Patient treated conservatively with TLSO brace.  Pain controlled with Tylenol and tramadol.  As well as lidocaine patch and Flexeril.  Patient also found to have esophageal distention.  She denies any dysphagia.  This was incidentally noted on a CT of the chest abdomen and pelvis.  GI was consulted and an EGD was done which showed mild gastritis.  This was most likely from NSAID/aspirin use.  She was treated with Pepcid.  History of CAD with history of MI on 7/18/2017.Nuclear stress test 9/2017-for inducible myocardial ischemia or infarction with EF 70%.  Patient was advised to continue with dual antiplatelet therapy for 12 months, statin, BB therapy.  She remains on aspirin and Brilinta.  Acute hypercalcemia which resolved with IV fluids.  Her calcium supplement was held.  Acute hyperkalemia.  This was replaced.  She was treated for UTI.  History of GCA on low-dose prednisone.  She did have asymptomatic sinus bradycardia.  She continues on beta-blocker.    Today I visit with her and her . Her  and her is somewhat apprehensive about going home. She continues on Tramadol and lidocaine patches. She continues in TLSO splint. She is moving quite well. Today I talk with them both about home care.     Active Ambulatory Problems     Diagnosis Date Noted     Vitamin D Deficiency       Essential Hypercholesterolemia      Osteoporosis      Hearing Loss      GCA (giant cell arteritis) (H) 05/25/2016     High risk medication use 05/25/2016     Bilateral primary osteoarthritis of knee 07/27/2016     Hypercalcemia 06/19/2017     Acute ST elevation myocardial infarction (STEMI) involving left anterior descending coronary artery (H) 07/18/2017     Mixed hyperlipidemia      Ischemic cardiomyopathy      Coronary artery disease involving native coronary artery of native heart without angina pectoris 08/03/2017     Angioedema 09/15/2017     Abdominal pain, epigastric 07/04/2018     Closed wedge compression fracture of seventh thoracic vertebra, initial encounter (H) 07/08/2018     Abnormal CT of the abdomen      Sinus bradycardia      Abnormal CT scan 07/08/2018     Pain 07/09/2018     Resolved Ambulatory Problems     Diagnosis Date Noted     Osteoarthritis Of The Knee      Basal Cell Carcinoma Of The Skin      Asymptomatic postmenopausal status      Benign Adenomatous Polyp Of The Large Intestine      Open Fracture Of The Carpal Bones Of The Left Wrist      Earache In The Left Ear      Joint Instability Of The Knee      Chronic pain of right knee 10/19/2016     AMI anterior wall (H)      Heart failure with reduced ejection fraction (H) 08/16/2017     Past Medical History:   Diagnosis Date     Acute ST elevation myocardial infarction (STEMI) involving left anterior descending coronary artery (H) 7/18/2017     Basal Cell Carcinoma Of The Skin      Benign Adenomatous Polyp Of The Large Intestine      Chronic pain of right knee 10/19/2016     Coronary artery disease      Hyperlipidemia      Osteoporosis      Pneumonia      Temporal arteritis (H)      Reviewed at the facility.       Allergies   Allergen Reactions     Lisinopril Swelling     Swelling of lips/mouth      Losartan Angiodema         Review of Systems   No fevers or chills. No headache, lightheadedness or dizziness. No SOB, chest pains or  palpitations. Appetite is good.  No dysfunction.  No nausea, vomiting, constipation or diarrhea. No dysuria, frequency, burning or pain with urination.  Continues in TLSO splinting.  Pain controlled with Tramadol. She says she is unable to tolerate putting brace on while in bed.     Physical Exam   PHYSICAL EXAMINATION:  Vital signs: /58, P 77, R 18, T 96.4current weight 127.2 pounds.  General: Awake, Alert, oriented x3, appropriately, follows simple commands, conversant  HEENT:PERRLA, Pink conjunctiva, anicteric sclerae, moist oral mucosa  NECK: Supple, without any lymphadenopathy, or masses  CVS:  S1  S2, without murmur or gallop.   LUNG: Clear to auscultation, No wheezes, rales or rhonci.  BACK: No kyphosis of the thoracic spine. Brace off while in bed. She has tenderness to right side of rib cage. No bruising noted.   ABDOMEN: Soft, nontender to palpation, with positive bowel sounds  EXTREMITIES: Moves both upper and lower extremities with generalized weakness.  No pedal edema.  SKIN: Warm and dry, no rashes or erythema noted  NEUROLOGIC: Intact, pulses palpable  PSYCHIATRIC: Cognition intact    Labs:    Lab Results   Component Value Date    WBC 6.8 07/11/2018    HGB 14.1 07/11/2018    HCT 41.8 07/11/2018    MCV 92 07/11/2018     07/11/2018     Results for orders placed or performed in visit on 07/19/18   Basic Metabolic Panel   Result Value Ref Range    Sodium 140 136 - 145 mmol/L    Potassium 3.9 3.5 - 5.0 mmol/L    Chloride 106 98 - 107 mmol/L    CO2 24 22 - 31 mmol/L    Anion Gap, Calculation 10 5 - 18 mmol/L    Glucose 89 70 - 125 mg/dL    Calcium 9.2 8.5 - 10.5 mg/dL    BUN 20 8 - 28 mg/dL    Creatinine 0.68 0.60 - 1.10 mg/dL    GFR MDRD Af Amer >60 >60 mL/min/1.73m2    GFR MDRD Non Af Amer >60 >60 mL/min/1.73m2           Assessment/Plan:  1. Traumatic compression fracture of T7 thoracic vertebra (H)     2. Pain management     3. Coronary artery disease     4. Hypercalcemia     5. Hypokalemia      6. Urinary tract infection             T7 compression fracture post fall. She continues in TLSO splinting. She continues on Tramadol, tylenol, icy hot and lidocaine patches for pain management. She is moving quite well. UTI resolved. Hypercalcemia and hypokalemia during hospitalization resolved. She has underlying arthritis. She was seen by rheumatology yesterday. Her prednisone was decreased to 1 mg daily. She will take this X 30 days.     Pt to d/c home with current meds and treatments. Home PT, OT, HHA and RN. Follow up with PCP in 1 week.       Electronically signed by: Mulu Silveira, JAKE

## 2021-06-19 NOTE — LETTER
Letter by Rocío Sainz NP at      Author: Rocío Sainz NP Service: -- Author Type: --    Filed:  Encounter Date: 10/16/2019 Status: Signed         Yamilex Keller  [address]  [place] [address]             October 16, 2019         Dear Ms. Keller,    Below are the results from your recent visit:    Resulted Orders   DXA Bone Density Scan    Narrative    10/14/2019      RE: Yamilex Keller  YOB: 1929        Dear Rocío Sainz,    Patient Profile:  89 y.o. female, postmenopausal, is here for the follow up bone density   test.   History of fractures - Yes;  Wrist and Thoracic vertebrae. Family history   of osteoporosis - None.  Family history of hip fracture: None. Smoking   history - No. Osteoporosis treatment past -  Yes;  Bisphosphonates.   Osteoporosis treatment current - Yes;  Prolia.  Chronic medical problems -   None. High risk medications -  Blood thinner (Coumadin or Heparin);  Yes,   Currently and Steroids;  Yes, in the Past.      Assessment:    1. The spine bone density L1-L2 with T-score -1.1.  2. Femoral bone densities show left total hip T- score -2.4 and right   femoral neck T-score -2.6.  3. Trabecular bone score indicates moderate trabecular bone architecture.      89 y.o. female with OSTEOPOROSIS, currently receiving treatment with   Prolia.     Since the previous bone density dated  July 18, 2017, there has been no   statistically significant % change in the bone density of the spine and   hips bilaterally.   Stability is considered a positive response to   treatment.    Recommendations:  Continuation of current treatment is recommended with follow up bone   density scan in 2 years.      Bone densitometry was performed on your patient using our [employer]   densitometer. The results are summarized and a copy of the actual scans   are included for your review. In conformity with the International Society   of Clinical Densitometry's most recent  position statement for DXA   interpretation (2015), the diagnosis will be made on the lowest measured   T-score of the lumbar spine, femoral neck, total proximal femur or 33%   radius. Note the change in terminology for diagnostic classification from   OSTEOPENIA to LOW BONE MASS. All trending for sequential exams will be   done using multiple vertebrae or the total proximal femur. Fracture risk   is based on the WHO Fracture Risk Assessment Tool (FRAX). If additional   information is needed or if you would like to discuss the results, please   do not hesitate to call me.       Thank you for referring this patient to [facility].   We are happy to be of service in support of you and your practice. If you   have any questions or suggestions to improve our service, please call me   at [phone].     Sincerely,     Alona Wei M.D. C.C.D.  Osteoporosis Services, [facility]     The test results show that your current treatment is working. Please continue your current medication and plan. We recommend that you repeat the above test(s) in 2 years.  I will discuss this with you again at your next appointment.     Please call with questions or contact us using [employer]®.    Sincerely,        Electronically signed by Rocío Sainz NP

## 2021-06-19 NOTE — PROGRESS NOTES
ASSESSMENT AND PLAN:  Yamilex Keller 89 y.o. female is a for follow-up.  She has biopsy-proven giant cell arteritis been on 2 mg of prednisone daily.  She has osteoporosis.  She developed compression fracture T7 with significant pain.  She is going to attempt once again to reduce prednisone to 1 mg daily.  This for 30 days and then stop altogether.  She is on Fosamax, vitamin D, calcium.  Arrange for her to be seen in endocrine.  Will meet here in 2 months.           Diagnoses and all orders for this visit:    GCA (giant cell arteritis) (H)    Osteoporosis  -     Ambulatory referral to Endocrinology    Closed wedge compression fracture of seventh thoracic vertebra, initial encounter (H)  -     Ambulatory referral to Endocrinology    High risk medication use  -     Ambulatory referral to Endocrinology      HISTORY OF PRESENTING ILLNESS:  Yamilex Keller, 89 y.o., female  is here for follow up.  She has biopsy-proven GCA, her sed rate originally was 104.  She is on 2 mg of prednisone since July 1 2017.  She attempted to stop it.  This was for several days.  She was seen in the emergency room with acute pain in the thoracic region found to have a compression fracture.  This was T7.  She is wearing a brace.  We discussed that another attempt at reducing prednisone is in order.  She is on Fosamax vitamin D calcium.  She is in the past has had DEXA scans.  This is been consistent with osteoporosis.She is accompanied here by her .  She does not like prednisone as it has caused her to have skin changes.  She points to the purpuric areas.    Further historical information and ADL limitations as noted in the multidimensional health assessment questionnaire attached in the EMR. .    Following is the excerpt from a recent note: Though she did not have recurrence of headaches double vision there is no jaw claudication or upper extremity claudication, she did have worsening of her knee pain where she is well described of  "osteoarthritis and in the past corticosteroid and Visco supplementation failed to provide her significant relief.  She noted pain in the knee is now mild.  Living as it is hard for her to manage by  with her ..  She continues to do well.  There is no headache, there is no jaw claudication.  Sometimes she hears clicking and has discomfort in the TMJ area.  There is no double vision.  She no longer gets the occasional fuzzy vision watching TV that was intermittent.  She was originally started on adequate doses of prednisone.  She has done well.  She is she was down to 0 mg of prednisone.  While watching TV she had \"wavy vision.  She called her concerned that this might represent a flareup.  She did not have headaches.  She had no jaw claudication.  She is not sure if there was double vision.  She did have a cataract surgery recently.  She was started on prednisone.  Now she is on 10 mg daily.  There is no recurrence of her symptoms.  Doing well on the past visit she was started on methotrexate when she was on [place].  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 she says she never had pain in the joints.  Whether this is an effort to start methotrexate as a steroid sparing is unclear.  If needed I would prefer to have her take Actemra but that's not yet at the point here.  She has noted no residual headache jaw claudication N lower extremity claudication.  He describes herself in very good health.  She did have back pain and radicular symptoms 2 years ago.  She is neither a smoker not takes alcohol.  He likes to walk 30 minutes a day.    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     Yady Escamilla: colon @ [place]  '06--recheck 5      Chronic pain of right knee 10/19/2016     Coronary artery disease      Hyperlipidemia      Osteoporosis      Pneumonia      Temporal arteritis (H)      History   Smoking Status     Former Smoker     Packs/day: 1.00     Years: 4.00     Types: Cigarettes     Quit date: 1/1/1953   Smokeless Tobacco     Never Used     Comment: Quit 65 yrs     Patient Active Problem List   Diagnosis     Vitamin D Deficiency     Essential Hypercholesterolemia     Osteoporosis     Hearing Loss     GCA (giant cell arteritis) (H)     High risk medication use     Bilateral primary osteoarthritis of knee     Hypercalcemia     Acute ST elevation myocardial infarction (STEMI) involving left anterior descending coronary artery (H)     Mixed hyperlipidemia     Ischemic cardiomyopathy     Coronary artery disease involving native coronary artery of native heart without angina pectoris     Angioedema     Abdominal pain, epigastric     Closed wedge compression fracture of seventh thoracic vertebra, initial encounter (H)     Abnormal CT of the abdomen     Sinus bradycardia     Abnormal CT scan     Pain     Current Outpatient Prescriptions   Medication Sig Dispense Refill     acetaminophen (TYLENOL) 500 MG tablet Take 2 tablets (1,000 mg total) by mouth 3 (three) times a day. For pain from T7 fx 21 tablet 0     alendronate (FOSAMAX) 70 MG tablet One tablet once weekly, in the morning on an empty stomach with a full glass of water 30 minutes before food 12 tablet 0     aspirin 81 MG EC tablet Take 1 tablet (81 mg total) by mouth at bedtime. For CAD 30 tablet 0     atorvastatin (LIPITOR) 80 MG tablet Take 1 tablet (80 mg total) by mouth at bedtime. for cad and lipids 90 tablet 3     lidocaine 4 % patch Place 1 patch on the skin daily. Remove and discard patch with 12 hours or as directed by MD.for back pain from t7 fx 7 patch 0     metoprolol succinate (TOPROL-XL) 25 MG Take 0.5  tablets (12.5 mg total) by mouth daily. For cad, htn 45 tablet 3     nitroglycerin (NITROSTAT) 0.4 MG SL tablet Place 1 tablet (0.4 mg total) under the tongue every 5 (five) minutes as needed for chest pain. 3 Bottle 2     OMEGA-3/DHA/EPA/FISH OIL (FISH OIL-OMEGA-3 FATTY ACIDS) 300-1,000 mg capsule Take 1 g by mouth at bedtime.        polyethylene glycol (MIRALAX) 17 gram packet Take 1 packet (17 g total) by mouth daily. Hold if loose stools  This is for constipation 30 packet 0     predniSONE (DELTASONE) 1 MG tablet Take 2 tablets (2 mg total) by mouth daily. For GCA 30 tablet 0     senna-docusate (PERICOLACE) 8.6-50 mg tablet Take 1 tablet by mouth 2 (two) times a day. For constipation 30 tablet 0     ticagrelor (BRILINTA) 90 mg Tab Take 1 tablet (90 mg total) by mouth 2 (two) times a day. For  tablet 3     cyclobenzaprine (FLEXERIL) 5 MG tablet Take 1 tablet (5 mg total) by mouth 2 (two) times a day as needed for muscle spasms. 6 tablet 0     furosemide (LASIX) 20 MG tablet Take 1 tablet (20 mg total) by mouth daily. For edema 90 tablet 3     traMADol (ULTRAM) 50 mg tablet Take 0.5 tablets (25 mg total) by mouth every 6 (six) hours as needed for pain. 30 tablet 0     No current facility-administered medications for this visit.      DETAILED EXAMINATION  07/23/18  :  Vitals:    07/23/18 1017   BP: 130/72   Patient Site: Right Arm   Patient Position: Sitting   Cuff Size: Adult Regular   Pulse: 94   Weight: 123 lb (55.8 kg)     Alert oriented. Head including the face is examined for malar rash, heliotropes, scarring, lupus pernio. Eyes examined for redness such as in episcleritis/scleritis, periorbital lesions.   Neck/ Face examined for parotid gland swelling, range of motion of neck.  Left upper and lower and right upper and lower extremities examined for tenderness, swelling, warmth of the appendicular joints, range of motion, edema, rash.  Some of the important findings included: There is no synovitis in  the palpable upper extremity joints.  She has bony hypertrophy of the right knee without JLT.  She has a brace, thoracic.  She uses a walker.      LAB / IMAGING DATA:  ALT   Date Value Ref Range Status   07/08/2018 10 0 - 45 U/L Final   07/06/2018 11 0 - 45 U/L Final   07/04/2018 12 0 - 45 U/L Final     Albumin   Date Value Ref Range Status   07/08/2018 3.5 3.5 - 5.0 g/dL Final   07/06/2018 3.9 3.5 - 5.0 g/dL Final   07/04/2018 3.8 3.5 - 5.0 g/dL Final     Creatinine   Date Value Ref Range Status   07/19/2018 0.68 0.60 - 1.10 mg/dL Final   07/12/2018 0.80 0.60 - 1.10 mg/dL Final   07/11/2018 0.76 0.60 - 1.10 mg/dL Final       WBC   Date Value Ref Range Status   07/11/2018 6.8 4.0 - 11.0 thou/uL Final   07/10/2018 6.7 4.0 - 11.0 thou/uL Final     Hemoglobin   Date Value Ref Range Status   07/11/2018 14.1 12.0 - 16.0 g/dL Final   07/10/2018 14.1 12.0 - 16.0 g/dL Final   07/08/2018 14.7 12.0 - 16.0 g/dL Final     Platelets   Date Value Ref Range Status   07/11/2018 229 140 - 440 thou/uL Final   07/10/2018 218 140 - 440 thou/uL Final   07/08/2018 237 140 - 440 thou/uL Final       Lab Results   Component Value Date    SEDRATE 3 05/08/2017

## 2021-06-19 NOTE — PROGRESS NOTES
[facility] Interventional Neuroradiology Pre-Sedation Assessment    Yamilex Keller is a 89 year old female who presents today for T7 vertebroplasty of kyphoplasty.    HPI: Yamilex Keller is a 89 year old female with CAD status post stent, hypertension, hyperlipidemia, giant cell arteritis and known osteoporosis who presented to the ED with complaints of bilateral rib an upper back pain. MRI showed compression fracture of T7. She was treated with TLSO brace, Tylenol, Lidocaine patch, and Flexeril. She is on Brilinta for CAD. Had fall in January 2018. Did not have back pain at that time. Onset of back pain started in late June early July. Continues to have back pain. Referred by Neurosurgery for vertebroplasty.    History and Physical Reviewed: yes    Past Medical History:   Diagnosis Date     Acute ST elevation myocardial infarction (STEMI) involving left anterior descending coronary artery (H) 7/18/2017     Basal Cell Carcinoma Of The Skin      Benign Adenomatous Polyp Of The Large Intestine     Yady Escamilla: colon @ [place]  '06--recheck 5      Chronic pain of right knee 10/19/2016     Coronary artery disease      Hyperlipidemia      Osteoporosis      Pneumonia      Temporal arteritis (H)        Past Surgical History:   Procedure Laterality Date     CORONARY ANGIOPLASTY WITH STENT PLACEMENT  //     ESOPHAGOGASTRODUODENOSCOPY N/A 7/9/2018    Procedure: ESOPHAGOGASTRODUODENOSCOPY (EGD);  Surgeon: Prabhu Lin MD;  Location: [facility];  Service:      CA CATH PLACEMENT & NJX CORONARY ART ANGIO IMG S&I N/A 7/18/2017    Procedure: Coronary Angiogram;  Surgeon: Shelia Garcia MD;  Location: [facility] Cath Lab;  Service: Cardiology     CA CATH PLMT L HRT & ARTS W/NJX & ANGIO IMG S&I Left 7/18/2017    Procedure: Left Heart Catheterization Without Left Ventriculogram;  Surgeon: Shelia Garcia MD;  Location: [facility];  Service: Cardiology     CA TEMPORAL ARTERY LIGATN OR BX Bilateral 10/7/2015    Procedure:  "BILATERAL TEMPORAL ARTERY BIOPSY;  Surgeon: Alfredito Jason MD;  Location: [facility];  Service: General     TONSILLECTOMY         Allergies:  Allergies   Allergen Reactions     Lisinopril Swelling     Swelling of lips/mouth      Losartan Angioedema       Exam:   /58  Pulse 76  Temp 97.4  F (36.3  C) (Oral)   Resp 20  Ht 5' 3\" (1.6 m)  Wt 125 lb (56.7 kg)  SpO2 94%  BMI 22.14 kg/m2    General: pleasant, resting in bed, spouse at bedside  Neuro: alert and oriented x 4, follows commands, moves all extremities  Cardiac: regular rate and rhythm  Lungs: clear to auscultation  Mallampati score: Class 3. Soft and hard palate and base of the uvula are visible  ASA: ASA III A patient with severe systemic disease    Previous reaction to sedation/anesthesia: no  Sedation based on assessment: moderate  NPO since: midnight      Labs:    Lab Results   Component Value Date    HGB 13.6 08/17/2018     Lab Results   Component Value Date     08/17/2018     Lab Results   Component Value Date    CREATININE 0.84 08/17/2018     Lab Results   Component Value Date    INR 1.00 08/17/2018    INR 1.02 07/18/2017    INR 0.91 07/18/2017         Imaging:   MR THORACIC SPINE WO CONTRAST  7/8/2018 8:38 PM  IMPRESSION:   CONCLUSION:  1.  Acute 20% T7 compression.  2.  This correlates with the fracture described as being at the T8 level on the prior plain film.  3.  No other compression fracture      Impression: 89 year old female with osteoporosis and non-traumatic T7 compression fracture failing conservative management (medications, bracing). Has been off Brilinta for 7 days. Patient is adequately NPO and medically stable to proceed with planned procedure using moderate intravenous sedation.    Plan: Thoracic 7 vertebroplasty or kyphoplasty    The procedure and its indications, major risks, benefits, and alternatives were discussed. Risks including, but not limited to, worsening pain, bleeding, infection, allergic " reaction, nerve damage, and cement extension into the spinal canal or adjacent spinal/paraspinal structures were discussed. The possibility that the procedure may not relieve pain was discussed. Questions answered and the patient gives written and verbal consent to proceed.      Brea Box, CNP  [phone]

## 2021-06-19 NOTE — PROGRESS NOTES
[facility] Patient Outreach:     Discharge Date::  7/25/18      Reason for TCU stay (discharge diagnosis)::  Fall, T12 comp fx, UTI, esophageal distention      Are you feeling better, the same or worse since your discharge?:  Patient is feeling better          As part of your discharge plan, did they discuss home care with you?: Yes        Have your seen them yet, or are they scheduled to visit?: Yes                Do you have any follow up visits scheduled with your PCP or Specialist?:  Yes, with Specialist      Who are you seeing and when is it scheduled?:  Endocrine on 7/30/18      I'm glad to hear you're doing well and we want you to continue to do well. Your PCP would like to see you for a follow-up visit. Can we help set that up for your today?: Yes            (RN) Patient transferred to [facility]? **If immediate concers (e.g. patient is feeling worse and/or not taking new medictations), send in basket message to PCP with quick summary of concern.: Yes

## 2021-06-19 NOTE — PROGRESS NOTES
Code Status:  FULL CODE  Visit Type: Problem Visit     Facility:  [facility] [[id number]]         Facility Type: SNF (Skilled Nursing Facility, TCU)    History of Present Illness: Yamilex Keller is a 89 y.o. female who I am seeing today for follow-up on the TCU.  Patient recently admitted with acute T7 compression fracture secondary to fall on 7/8/2018.  Patient with past medical history of CAD status post stent, hypertension, hyperlipidemia, giant cell arteritis, osteoarthritis of knees who presented to ED on the account of bilateral rib cage pain and upper back pain ×3 in 1 week.  Neurosurgery was consulted.  Patient treated conservatively with TLSO brace.  Pain controlled with Tylenol and tramadol.  As well as lidocaine patch and Flexeril.  Patient also found to have esophageal distention.  She denies any dysphagia.  This was incidentally noted on a CT of the chest abdomen and pelvis.  GI was consulted and an EGD was done which showed mild gastritis.  This was most likely from NSAID/aspirin use.  She was treated with Pepcid.  History of CAD with history of MI on 7/18/2017.Nuclear stress test 9/2017-for inducible myocardial ischemia or infarction with EF 70%.  Patient was advised to continue with dual antiplatelet therapy for 12 months, statin, BB therapy.  She remains on aspirin and Brilinta.  Acute hypercalcemia which resolved with IV fluids.  Her calcium supplement was held.  Acute hyperkalemia.  This was replaced.  She was treated for UTI.  History of GCA on low-dose prednisone.  She did have asymptomatic sinus bradycardia.  She continues on beta-blocker.      Today patient sitting up in bedside chair.  She continues in TLSO splinting.  She does have pain in her lower back.  Pain controlled with Flexeril, lidocaine patches, Tylenol and tramadol.  She denies any constipation.  She is eating well.  She denies any GERDS.     Active Ambulatory Problems     Diagnosis Date Noted     Vitamin D  Deficiency      Essential Hypercholesterolemia      Osteoporosis      Hearing Loss      GCA (giant cell arteritis) (H) 05/25/2016     High risk medication use 05/25/2016     Bilateral primary osteoarthritis of knee 07/27/2016     Hypercalcemia 06/19/2017     Acute ST elevation myocardial infarction (STEMI) involving left anterior descending coronary artery (H) 07/18/2017     Mixed hyperlipidemia      Ischemic cardiomyopathy      Coronary artery disease involving native coronary artery of native heart without angina pectoris 08/03/2017     Angioedema 09/15/2017     Abdominal pain, epigastric 07/04/2018     Closed wedge compression fracture of seventh thoracic vertebra, initial encounter (H) 07/08/2018     Abnormal CT of the abdomen      Sinus bradycardia      Abnormal CT scan 07/08/2018     Pain 07/09/2018     Resolved Ambulatory Problems     Diagnosis Date Noted     Osteoarthritis Of The Knee      Basal Cell Carcinoma Of The Skin      Asymptomatic postmenopausal status      Benign Adenomatous Polyp Of The Large Intestine      Open Fracture Of The Carpal Bones Of The Left Wrist      Earache In The Left Ear      Joint Instability Of The Knee      Chronic pain of right knee 10/19/2016     AMI anterior wall (H)      Heart failure with reduced ejection fraction (H) 08/16/2017     Past Medical History:   Diagnosis Date     Acute ST elevation myocardial infarction (STEMI) involving left anterior descending coronary artery (H) 7/18/2017     Basal Cell Carcinoma Of The Skin      Benign Adenomatous Polyp Of The Large Intestine      Chronic pain of right knee 10/19/2016     Coronary artery disease      Hyperlipidemia      Osteoporosis      Pneumonia      Temporal arteritis (H)        Current Outpatient Prescriptions   Medication Sig Note     acetaminophen (TYLENOL) 500 MG tablet Take 2 tablets (1,000 mg total) by mouth 3 (three) times a day. For pain from T7 fx      alendronate (FOSAMAX) 70 MG tablet One tablet once weekly, in  the morning on an empty stomach with a full glass of water 30 minutes before food 7/8/2018: Normally takes Saturdays, skipped yesterday 7/7     aspirin 81 MG EC tablet Take 1 tablet (81 mg total) by mouth at bedtime. For CAD      atorvastatin (LIPITOR) 80 MG tablet Take 1 tablet (80 mg total) by mouth at bedtime. for cad and lipids      cyclobenzaprine (FLEXERIL) 5 MG tablet Take 1 tablet (5 mg total) by mouth 2 (two) times a day as needed for muscle spasms.      furosemide (LASIX) 20 MG tablet Take 1 tablet (20 mg total) by mouth daily. For edema      lidocaine 4 % patch Place 1 patch on the skin daily. Remove and discard patch with 12 hours or as directed by MD.for back pain from t7 fx      metoprolol succinate (TOPROL-XL) 25 MG Take 0.5 tablets (12.5 mg total) by mouth daily. For cad, htn      nitroglycerin (NITROSTAT) 0.4 MG SL tablet Place 1 tablet (0.4 mg total) under the tongue every 5 (five) minutes as needed for chest pain.      OMEGA-3/DHA/EPA/FISH OIL (FISH OIL-OMEGA-3 FATTY ACIDS) 300-1,000 mg capsule Take 1 g by mouth at bedtime.       polyethylene glycol (MIRALAX) 17 gram packet Take 1 packet (17 g total) by mouth daily. Hold if loose stools  This is for constipation      predniSONE (DELTASONE) 1 MG tablet Take 2 tablets (2 mg total) by mouth daily. For GCA      senna-docusate (PERICOLACE) 8.6-50 mg tablet Take 1 tablet by mouth 2 (two) times a day. For constipation      ticagrelor (BRILINTA) 90 mg Tab Take 1 tablet (90 mg total) by mouth 2 (two) times a day. For CAD      traMADol (ULTRAM) 50 mg tablet Take 0.5 tablets (25 mg total) by mouth every 6 (six) hours as needed for pain.        Allergies   Allergen Reactions     Lisinopril Swelling     Swelling of lips/mouth      Losartan Angiodema         Review of Systems   No fevers or chills. No headache, lightheadedness or dizziness. No SOB, chest pains or palpitations. Appetite is good.  No dysfunction.  No nausea, vomiting, constipation or diarrhea. No  dysuria, frequency, burning or pain with urination.  Continues in TLSO splinting.  Back pain.  Pain well-controlled with Tylenol, lidocaine patches, Flexeril and tramadol.      Physical Exam   PHYSICAL EXAMINATION:  Vital signs: /70, heart rate 78, respirations 18, temperature 97.7, O2 sat 95% room air, current weight 127.2 pounds.  General: Awake, Alert, oriented x3, appropriately, follows simple commands, conversant  HEENT:PERRLA, Pink conjunctiva, anicteric sclerae, moist oral mucosa  NECK: Supple, without any lymphadenopathy, or masses  CVS:  S1  S2, without murmur or gallop.   LUNG: Clear to auscultation, No wheezes, rales or rhonci.  BACK: No kyphosis of the thoracic spine.  Continues in TLSO splinting.  ABDOMEN: Soft, nontender to palpation, with positive bowel sounds  EXTREMITIES: Moves both upper and lower extremities with generalized weakness.  No pedal edema.  SKIN: Warm and dry, no rashes or erythema noted  NEUROLOGIC: Intact, pulses palpable  PSYCHIATRIC: Cognition intact    Labs:    Lab Results   Component Value Date    WBC 6.8 07/11/2018    HGB 14.1 07/11/2018    HCT 41.8 07/11/2018    MCV 92 07/11/2018     07/11/2018     Results for orders placed or performed during the hospital encounter of 07/08/18   Basic Metabolic Panel   Result Value Ref Range    Sodium 139 136 - 145 mmol/L    Potassium 4.2 3.5 - 5.0 mmol/L    Chloride 107 98 - 107 mmol/L    CO2 26 22 - 31 mmol/L    Anion Gap, Calculation 6 5 - 18 mmol/L    Glucose 89 70 - 125 mg/dL    Calcium 9.4 8.5 - 10.5 mg/dL    BUN 22 8 - 28 mg/dL    Creatinine 0.80 0.60 - 1.10 mg/dL    GFR MDRD Af Amer >60 >60 mL/min/1.73m2    GFR MDRD Non Af Amer >60 >60 mL/min/1.73m2           Assessment/Plan:  1. Traumatic compression fracture of T7 thoracic vertebra (H)     2. Pain management     3. Coronary artery disease     4. Hypercalcemia     5. Urinary tract infection       Patient with recent fall sustaining T7 compression fracture.  She is being  treated conservatively with TLSO splinting.  Pain well-controlled with Tylenol, lidocaine patch, Flexeril and tramadol.  During hospitalization she was found to have esophageal just tension.  EGD was done and showed gastritis.  She was treated with Pepcid.  Advised to avoid NSAIDs.  However she does have severe CAD with history of MI 1 year prior.  She continues on aspirin and Brilinta.  Temporal arteriovenous.  She does continue on prednisone.  Hypercalcemia.  Supplement was held.  This has been resumed.  UTI.  She is completed her antibiotic therapy.  Patient continues in therapy.    Total time spent for this visit was 35 minutes with greater than 50% of time spent face-to-face with patient reviewing above plan of care.    Electronically signed by: Mulu Silveira CNP

## 2021-06-20 NOTE — PROGRESS NOTES
[facility] Clinic Note    Patient Name: Yamilex Keller  Patient Age: 89 y.o.  YOB: 1929  MRN: [id number]    Date of visit: 8/24/2018    Patient Active Problem List   Diagnosis     Vitamin D Deficiency     Essential Hypercholesterolemia     Osteoporosis     Hearing Loss     GCA (giant cell arteritis) (H)     High risk medication use     Bilateral primary osteoarthritis of knee     Hypercalcemia     Acute ST elevation myocardial infarction (STEMI) involving left anterior descending coronary artery (H)     Mixed hyperlipidemia     Ischemic cardiomyopathy     Coronary artery disease involving native coronary artery of native heart without angina pectoris     Angioedema     Abdominal pain, epigastric     Closed wedge compression fracture of seventh thoracic vertebra, initial encounter (H)     Abnormal CT of the abdomen     Sinus bradycardia     Abnormal CT scan     Pain     History of ST elevation myocardial infarction (STEMI)     Social History     Social History Narrative    Lives with her  - cantu in [place]     Family History   Problem Relation Age of Onset     Pacemaker Brother      Outpatient Encounter Prescriptions as of 8/24/2018   Medication Sig Dispense Refill     acetaminophen (TYLENOL) 500 MG tablet Take 2 tablets (1,000 mg total) by mouth 3 (three) times a day. For pain from T7 fx 21 tablet 0     aspirin 81 MG EC tablet Take 1 tablet (81 mg total) by mouth at bedtime. For CAD 30 tablet 0     atorvastatin (LIPITOR) 80 MG tablet Take 1 tablet (80 mg total) by mouth at bedtime. for cad and lipids 90 tablet 3     furosemide (LASIX) 20 MG tablet Take 1 tablet (20 mg total) by mouth daily. For edema 90 tablet 3     lidocaine 4 % patch Place 1 patch on the skin daily. Remove and discard patch with 12 hours or as directed by MD.for back pain from t7 fx 7 patch 0     lidocaine-menthol 4-1 % PtMd Apply topically 2 (two) times a day.       nitroglycerin (NITROSTAT) 0.4 MG SL tablet Place 1 tablet  (0.4 mg total) under the tongue every 5 (five) minutes as needed for chest pain. 3 Bottle 2     OMEGA-3/DHA/EPA/FISH OIL (FISH OIL-OMEGA-3 FATTY ACIDS) 300-1,000 mg capsule Take 1 g by mouth at bedtime.        predniSONE (DELTASONE) 1 MG tablet Take 1 mg by mouth daily. Take for 30 days.       sulfamethoxazole-trimethoprim (SEPTRA DS) 800-160 mg per tablet Take 1 tablet by mouth 2 (two) times a day for 10 days. 20 tablet 0     ticagrelor (BRILINTA) 90 mg Tab Take 1 tablet (90 mg total) by mouth 2 (two) times a day. For  tablet 3     TOPROL XL 25 mg TAKE ONE-HALF (1/2) TABLET DAILY 45 tablet 1     No facility-administered encounter medications on file as of 8/24/2018.        Chief Complaint:   Chief Complaint   Patient presents with     Follow-up     fatigue loss of appetite Post Infusion and not any better       /64  Pulse 82  Temp 98.5  F (36.9  C)  Wt 121 lb 6.4 oz (55.1 kg)  SpO2 92%  BMI 21.51 kg/m2  HPI:   Has felt generalized fatigued for the past month. Intermittent. Sleeping 8-10 hours/night.  Patient states that she has had constipation for a while now, generally goes every other day but has not gone for 5 days now.  Her appetite has been less, she has had no vomiting but has had some nausea.  Her symptoms seem to start when she started to have the back pain, and her nausea seems to be worse with the back pain.  She does complain of some dyspnea, she is able to walk around without becoming dyspneic but she states that when she gets up to get out of the bathtub she gets short of breath sometimes, she is unable to tell me how long this is going on.  She states that she does not feel depressed whatsoever, not anxious in either.  She has had some frustration however with the back pain that is not responding to treatments.    Urinary symptoms are resolved with antibiotic.     General: No fever, chills  Skin: No rash, bleeding, skin breakdown  HENT: No otalgia, sore throat, nasal congestion,  sinus pressure, mouth/tooth pain  Eyes: No eye pain, vision loss  CV: No chest pain, leg edema  Pulm: No hemoptysis, cough  : No gross hematuria, dysuria, frequency, flank pain, suprapubic pain  GI: No hematochezia, diarrhea, abdominal pain, abdominal distension  Neuro: No syncope, headache, focal weakness, paresthesias, numbness, dysarthria, falling/unsteadiness, confusion  Lymph: Has not noticed enlarged or inflammed lymph nodes  Psych: No depression, anxiety      ROS: Pertinent ros findings in hpi, all other systems negative.    Objective/Physical Exam:     /64  Pulse 82  Temp 98.5  F (36.9  C)  Wt 121 lb 6.4 oz (55.1 kg)  SpO2 92%  BMI 21.51 kg/m2    Gen: NAD, conversant, appears age, well-kempt  Skin: warm, dry, no rash, pallor cyanosis  HENT: normocephalic atraumatic, MMM, no oral lesions, otorrhea, rhinorrhea. TM's normal bilaterally.  Eyes: non-icteric, extra-ocular movements intact, PERRL, conjunctivae not injected. Holding eyes open comfortably, no drainage.  CV: NRRR no m/r/g, no peripheral edema. no JVD.  Resp: CTAB no w/r/r, normal respiratory effort  GI: soft, non-tender, non-distended. No masses.  MSK: no muscle or joint swelling.  Neuro: no dysarthria or gross asymmetry  Psych: full affect, oriented x 3  Lymph: No significant cervical lymphadenopathy  Hematologic: No petechiae or purpura.    Extraocular movements intact, eyes track equally in all directions. Pupils same size bilaterally and equally reactive to light: y  Finger rub hearing is present and the same bilaterally: y  Slurring or other significant speech abnormality: n  Smile symmetric bilaterally: y  Fine touch sensation is intact and same bilaterally on face: y    Bilateral upper and lower extremity strength:  Shoulder abduction and adduction: 5/5  Elbow flexion/extension: 5/5  Wrist flexion/extension: 5/5   5/5      Hip flexion: 5/5  Knee extension: 5/5  Knee flexion: 5/5        Patellar DTR's: decreased  bilaterally    Fine touch sensation: intact and symmetric in bilateral extremities     Tone: normal  Gait abnormalities: n    Dysdiadochokinesis: n    Able to reach with left hand to grasp right ear lobe: y  Pronator drift: absent    Cognition: Alert and oriented x 3    Chaperoned rectal exam:    External:  Thrombosed vessels: no  Rash/skin breakdown: no  Fissure: no    Tenderness:  anteriorly: no  posteriorly: no  left: no  right: no    Mass palpated: no    Rectal tone: normal  Stool in vault: minimal, hard    Gross blood: no    Assessment/Plan:  Recent Results (from the past 24 hour(s))   Electrocardiogram Perform - Clinic   Result Value Ref Range    SYSTOLIC BLOOD PRESSURE  mmHg    DIASTOLIC BLOOD PRESSURE  mmHg    VENTRICULAR RATE 74 BPM    ATRIAL RATE 74 BPM    P-R INTERVAL 172 ms    QRS DURATION 90 ms    Q-T INTERVAL 384 ms    QTC CALCULATION (BEZET) 426 ms    P Axis 68 degrees    R AXIS -6 degrees    T AXIS 54 degrees    MUSE DIAGNOSIS       Normal sinus rhythm  Normal ECG  When compared with ECG of 08-JUL-2018 18:01,  No significant change was found       No medications were ordered this encounter      ICD-10-CM    1. Dyspnea R06.00 Comprehensive Metabolic Panel     Electrocardiogram Perform - Clinic     HM1(CBC and Differential)     Thyroid Stimulating Hormone (TSH)     Urinalysis-UC if Indicated     Magnesium     Phosphorus     CK Total     Vitamin B12     Folate, Serum     Lactic Acid     BNP(B-type Natriuretic Peptide)     Troponin I     CT Chest Abdomen Pelvis Without Oral With IV Contrast     CT Chest Abdomen Pelvis Without Oral With IV Contrast     HM1 (CBC with Diff)   2. Fatigue R53.83 Comprehensive Metabolic Panel     Electrocardiogram Perform - Clinic     HM1(CBC and Differential)     Thyroid Stimulating Hormone (TSH)     Urinalysis-UC if Indicated     Magnesium     Phosphorus     CK Total     Vitamin B12     Folate, Serum     Lactic Acid     BNP(B-type Natriuretic Peptide)     Troponin I     CT  Chest Abdomen Pelvis Without Oral With IV Contrast     CT Chest Abdomen Pelvis Without Oral With IV Contrast     HM1 (CBC with Diff)   3. Constipation K59.00 Comprehensive Metabolic Panel     Electrocardiogram Perform - Clinic     HM1(CBC and Differential)     Thyroid Stimulating Hormone (TSH)     Urinalysis-UC if Indicated     Magnesium     Phosphorus     CK Total     Vitamin B12     Folate, Serum     Lactic Acid     BNP(B-type Natriuretic Peptide)     Troponin I     CT Chest Abdomen Pelvis Without Oral With IV Contrast     CT Chest Abdomen Pelvis Without Oral With IV Contrast     HM1 (CBC with Diff)       I am uncertain as to cause of patient's symptoms, however, because of her age and medical history I am checking quite a few things.  Since she has had dyspnea and I am already getting a CT scan of her abdomen I chose to do a CT scan with PE protocol as well to rule this out as a cause.  If CT scan does not show any obstruction, I will give some recommendations for treatment of her constipation.  I am checking a urinalysis although her urinary symptoms are improved in case they return and since she is having these nonspecific symptoms. ECG reviewed.  Initial 02 sat was 92% but recheck with a different machine was 95%    There are no Patient Instructions on file for this visit.    Counseled patient regarding treatments, treatment options, risks and benefits and diagnosis.  The patient was interactive, attentive, verbalized understanding, and we discussed plan.     Paulo Higuera MD

## 2021-06-20 NOTE — PROGRESS NOTES
TCM DISCHARGE FOLLOW UP CALL    Discharge Date:  9/12/2018  Reason for hospital stay (discharge diagnosis)::  Pyelonephritis? Rib pain  Are you feeling better, the same or worse since your discharge?:  Patient is feeling the same (Pain not better.)  Do you feel like you have a plan in the event of a health emergency?: No    (RN) Patient provided with information to call us in the event of a health emergency: Yes (Informed pt of nurse triage availability)    As part of your discharge plan, were  home care services ordered for you?: No    Did you receive any new medications, or was there a change to your medications?: Yes    Are you taking those medications, or do you have any established regiment?:  Pt is taking gabapentin and omeprazole. Is using lidocaine patch. Not taking scheduled Tylenol. Isn't taking oxycodone.  is helping with meds. Pt is taking Brilinta daily instead of two times a day,states Dr Miranda decreased dose. Has stopped augmentin. Has been taking Lasix every other day. Instructed pt to stop lasix per discharge instructions and monitor for wt gain and edema. Call clinic if any develop. Pt is taking old tramadol instead of oxycodone.  Do you have any follow up visits scheduled with your PCP or Specialist?:  Yes, with PCP  (RN) Is PCP appt scheduled soon enough (within 14 days of discharge date)?: Yes    RN NOTES::  Instructed pt to watch for potential skin eruptions along her ribs and instructed to call clinic if it develops.

## 2021-06-20 NOTE — PROGRESS NOTES
Hospital discharge follow up call to pt, not available to talk.  RN will attempt call back at another time.

## 2021-06-20 NOTE — PROGRESS NOTES
ASSESSMENT:  1. Closed wedge compression fracture of seventh thoracic vertebra, initial encounter (H)  traMADol (ULTRAM) 50 mg tablet   2. Pyelonephritis         PLAN:    Discussed that likely her pain in the side of her abdomen is referred pain based on her recent workup and related to her compression fracture.  She can continue to use tramadol as well as lidocaine patches for treatment of pain.  If it persists we would refer her back to neurosurgery for further evaluation of the compression fracture.  She recently had a vertebroplasty that failed, it is not controlling her pain, she does have upcoming visits with endocrinology and rheumatology and she can discuss other concerns with them as well.  For now will provide her with ongoing pain control for the next 90 days, needing consistent use of this medication would have to have her come back to discuss that.  No longer taking oxycodone so this is taken off the list.    Unlikely pyelonephritis given workup in hospital.  Patient could not provide a urine sample today but I encouraged her to return for this if any symptoms return.  I have placed orders for her to get done at her visit with endocrinology next week, will follow for those results.  No problem-specific Assessment & Plan notes found for this encounter.      There are no Patient Instructions on file for this visit.    No orders of the defined types were placed in this encounter.    Medications Discontinued During This Encounter   Medication Reason     oxyCODONE (ROXICODONE) 5 MG immediate release tablet Discontinued by another clinician     ticagrelor (BRILINTA) 90 mg Tab Therapy completed     isosorbide mononitrate (IMDUR) 30 MG 24 hr tablet Therapy completed       No Follow-up on file.    CHIEF COMPLAINT:  Chief Complaint   Patient presents with     Follow-up     pt has concerns about her chest brace and if she should be wearing it      Medication Management     pt and spouse are concerned about what  meds she should be taking        HISTORY OF PRESENT ILLNESS:  Yamilex is a 89 y.o. female for hospital follow-up from diagnosis of pyelonephritis.  Patient was admitted to [facility] on 9/10/2018.  Patient recently had a vertebroplasty done for close wedge fracture of T7 on 8/17/2018.  After which on 9/10/2018 she presented for the evaluation of left flank pain.  Patient had been under treatment for UTI recently, after further imaging and evaluation they decided that pain is more likely musculoskeletal in nature and potentially referred pain from her compression fracture.  Patient was also constipated at the time which could be contributing to pain.  Patient was started on PPI twice a day during hospitalization for some gastritis on imaging.  She has some questions about medications today, wondering what to continue and want to discontinue.  She also received a order from [facility] for isosorbide which she had thought was discontinued.  She would like clarification of that today.  She also like to discuss pain management for ongoing pain in her compression fracture and would like my opinion on how long she thinks he will need to wear her brace for, and also long pain will last if it is musculoskeletal in nature.  No urinary symptoms whatsoever remaining, I recommended the patient complete a urine sample because of recent UTI and patient declined stating she cannot go.  We will update labs at her upcoming visit to endocrinology.    REVIEW OF SYSTEMS:     Pertinent items are noted in HPI.  All other systems are negative  PFSH:  Reviewed, no changes      TOBACCO USE:  History   Smoking Status     Former Smoker     Packs/day: 1.00     Years: 4.00     Types: Cigarettes     Quit date: 1/1/1953   Smokeless Tobacco     Never Used     Comment: Quit 65 yrs       VITALS:  Vitals:    09/19/18 1105   BP: 125/53   Patient Site: Right Arm   Patient Position: Sitting   Cuff Size: Adult Regular   Pulse: 66   Resp: 16     Wt  Readings from Last 3 Encounters:   09/10/18 122 lb (55.3 kg)   08/30/18 125 lb (56.7 kg)   08/24/18 121 lb 6.4 oz (55.1 kg)       PHYSICAL EXAM:   /53 (Patient Site: Right Arm, Patient Position: Sitting, Cuff Size: Adult Regular)  Pulse 66  Resp 16  General appearance: alert, appears stated age and cooperative  Back: Wearing brace, patient would not remove for exam.  States she is having pain on the left rib area, pain occurs with movement, is not present at rest.  Lungs: clear to auscultation bilaterally  Heart: regular rate and rhythm, S1, S2 normal, no murmur, click, rub or gallop  Neurologic: Grossly normal  Psychologic: Mood and affect normal.      DATA REVIEWED:  Additional History from Old Records Summarized (2): ED/Hosp visit, imaging  Decision to Obtain Records (1): 0  Radiology Tests Summarized or Ordered (1): 0  Labs Reviewed or Ordered (1): 1  Medicine Test Summarized or Ordered (1): 0  Independent Review of EKG or X-RAY(2 each): 0    The visit lasted a total of 30 minutes face to face with the patient. Over 50% of the time was spent counseling and educating the patient about plan of care  .    MEDICATIONS:  Current Outpatient Prescriptions   Medication Sig Dispense Refill     acetaminophen (TYLENOL) 500 MG tablet Take 2 tablets (1,000 mg total) by mouth 3 (three) times a day.  0     aspirin 81 MG EC tablet Take 1 tablet (81 mg total) by mouth at bedtime. For CAD 30 tablet 0     atorvastatin (LIPITOR) 80 MG tablet Take 1 tablet (80 mg total) by mouth at bedtime. for cad and lipids 90 tablet 3     calcium, as carbonate, (OS-MARLYN) 500 mg calcium (1,250 mg) tablet Take 2 tablets by mouth at bedtime.       gabapentin (NEURONTIN) 100 MG capsule Take 100 mg by mouth 2 (two) times a day. 28 capsule 0     metoprolol succinate (TOPROL-XL) 25 MG Take 12.5 mg by mouth daily.       nitroglycerin (NITROSTAT) 0.4 MG SL tablet Place 1 tablet (0.4 mg total) under the tongue every 5 (five) minutes as needed for  chest pain. 3 Bottle 2     OMEGA-3/DHA/EPA/FISH OIL (FISH OIL-OMEGA-3 FATTY ACIDS) 300-1,000 mg capsule Take 1 g by mouth at bedtime.        omeprazole (PRILOSEC) 20 MG capsule Take 1 capsule (20 mg total) by mouth 2 (two) times a day before meals. 60 capsule 0     polyethylene glycol (MIRALAX) 17 gram packet Take 1 packet (17 g total) by mouth daily.  0     predniSONE (DELTASONE) 1 MG tablet Take 1 mg by mouth daily.        senna-docusate (PERICOLACE) 8.6-50 mg tablet Take 1 tablet by mouth 2 (two) times a day.  0     lidocaine 4 % patch Place 1 patch on the skin daily. Remove and discard patch with 12 hours or as directed by MD.for back pain from t7 fx (Patient taking differently: Place 1 patch on the skin daily as needed for pain. Remove and discard patch with 12 hours or as directed by MD.for back pain from t7 fx) 7 patch 0     traMADol (ULTRAM) 50 mg tablet Take 1 tablet (50 mg total) by mouth every 6 (six) hours as needed for severe pain (7-10). 90 tablet 0     No current facility-administered medications for this visit.        This note has been dictated using voice recognition software. Any grammatical or context distortions are unintentional and inherent to the software

## 2021-06-20 NOTE — LETTER
Letter by Michael Arzate MD at      Author: Michael Arzate MD Service: -- Author Type: --    Filed:  Encounter Date: 5/19/2020 Status: (Other)       5/19/2020    Yamilex Keller   [address]  [place] [address]         Dear Yamilex,    It was good to see you in clinic.  I hope your questions were answered at the time of your visit.    The results of your tests from your visit were as follows:    Resulted Orders   Synovial fluid, cell count   Result Value Ref Range    Color, Fluid Yellow     Clarity, Fluid Clear     WBC, Fluid 194 (H) 0 - 99 /uL    RBC, Fluid <50,000 <50,000 /ul    Crystals, Fluid No Crystals Seen     Neutrophil % 15 <=25 %    Lymphocyte % 26 <=78 %    Monocyte % 30 <=71 %    Macrophage % 26 <=71 %    Other Cells % 3 (H) <=1 %    Narrative    Other cells are synovial lining cells     Glucose, Body Fluid   Result Value Ref Range    Glucose, Fluid 91 mg/dL    Narrative    The reference range has not been established for this  analyte in body fluid. The result should be integrated     into the clinical context for interpretation.    This test was developed and its performance characteristics validated by State.   The [place] Food and Drug Adminstration has not approved or cleared this test; FDA clearance is not required for clinical use.   The results are not intended to be the sole means for clinical diagnosis or patient management decisions.   Protein, Body Fluid   Result Value Ref Range    Protein, Fluid 2.7 g/dL    Narrative    The reference range has not been established for this  analyte in body fluid. The result should be integrated     into the clinical context for interpretation.    This test was developed and its performance characteristics validated by State.   The [place] Food and Drug Adminstration has not approved or cleared this test; FDA clearance is not required for clinical use.   The results are not intended to be  the sole means for clinical diagnosis or patient management decisions.     There was no signs of gout or pseudogout.    This is very good overall.    Please follow up again in 4 months.    If you have any questions regarding these results, please feel free to contact me at [phone].  I wish you the best of health!        Sincerely,      Michael Arzate MD  General Internal Medicine  [facility]

## 2021-06-20 NOTE — PROGRESS NOTES
[facility] Follow-up Note    Assessment & Plan        1. Coronary artery disease involving native coronary artery of native heart without angina pectoris -angiography July 18, 2017 showed normal left main, ostial left anterior descending 99% lesion that received a drug-coated stent, mid left anterior descending 70% lesion that received a drug-coated stent, distal 25% lesion, distal circumflex 25% lesion with a left AV groove 40% lesion, and proximal right coronary artery 20% lesion.  She is getting along well without any symptoms and was given Imdur 30 mg a day for what was felt to be stable angina.  She brings the pill bottle in today and tells me she is not taking this and getting along well so we will discontinue the Imdur.  In addition, she was supposed to stop the ticagrelor in July but she is still taking this and I have asked her to let the prescription run out and just take aspirin every day.   2. Essential Hypercholesterolemia -cholesterol 157 with an LDL of 91 from July 2017.  She is on atorvastatin 80 mg.   3. History of ST elevation myocardial infarction (STEMI) -this was of the anterior wall involving left anterior descending in July 2017 with initial ejection fraction 35-40% but has improved to 70% on stress test.  Continue current beta-blocker.   4. Ischemic cardiomyopathy -as above continue current medications and will back off on furosemide every other day and then discontinue.   5. Closed wedge compression fracture of seventh thoracic vertebra, initial encounter (H) -she had recent vertebroplasty.     Plan  1.  Discontinue Imdur since she is not taking it.  2.  Discontinue ticagrelor when the prescription runs out.  3.  Try furosemide every other day and then stop and if not needed stay off it.  4.  Follow-up with me in 6 months given all these issues.    Subjective  CC: 89-year-old white female seen me in follow-up today.  Since she is seen me she had a fall due to reaching for  a door and missing it, she had rib discomfort and was hospitalized for what was felt to be abdominal pain and underwent EGD, she has had vertebroplasty secondary to vertebral fracture.  She did not start the Imdur that I gave her for chest discomfort.  She denies any significant chest discomfort, palpitations, shortness of breath, PND, orthopnea or peripheral edema.  She still lives independently with her .    Medications  Current Outpatient Prescriptions   Medication Sig     acetaminophen (TYLENOL) 500 MG tablet Take 2 tablets (1,000 mg total) by mouth 3 (three) times a day. For pain from T7 fx     aspirin 81 MG EC tablet Take 1 tablet (81 mg total) by mouth at bedtime. For CAD     atorvastatin (LIPITOR) 80 MG tablet Take 1 tablet (80 mg total) by mouth at bedtime. for cad and lipids     furosemide (LASIX) 20 MG tablet Take 1 tablet (20 mg total) by mouth daily. For edema     lidocaine 4 % patch Place 1 patch on the skin daily. Remove and discard patch with 12 hours or as directed by MD.for back pain from t7 fx     lidocaine-menthol 4-1 % PtMd Apply topically 2 (two) times a day.     nitroglycerin (NITROSTAT) 0.4 MG SL tablet Place 1 tablet (0.4 mg total) under the tongue every 5 (five) minutes as needed for chest pain.     OMEGA-3/DHA/EPA/FISH OIL (FISH OIL-OMEGA-3 FATTY ACIDS) 300-1,000 mg capsule Take 1 g by mouth at bedtime.      predniSONE (DELTASONE) 1 MG tablet Take 1 mg by mouth daily. Take for 30 days.     senna-docusate (SENNOSIDES-DOCUSATE SODIUM) 8.6-50 mg tablet Take 1 tablet by mouth at bedtime as needed for constipation. May take 2 tablet.     ticagrelor (BRILINTA) 90 mg Tab Take 1 tablet (90 mg total) by mouth 2 (two) times a day. For CAD     TOPROL XL 25 mg TAKE ONE-HALF (1/2) TABLET DAILY       Objective  /60 (Patient Site: Right Arm, Patient Position: Sitting, Cuff Size: Adult Small)  Pulse 68  Resp 16  Wt 125 lb (56.7 kg) Comment: Pt weight also includes back brace.  BMI 22.14  kg/m2    General Appearance:    Alert, cooperative, no distress, appears stated age   Head:    Normocephalic, without obvious abnormality, atraumatic   Throat:   Lips, mucosa, and tongue normal; teeth and gums normal   Neck:   Supple, symmetrical, trachea midline, no adenopathy;        thyroid:  No enlargement/tenderness/nodules; no carotid    bruit or JVD   Back:     Symmetric, no curvature, ROM normal, no CVA tenderness, back brace in place   Lungs:     Clear to auscultation bilaterally, respirations unlabored   Chest wall:    No tenderness or deformity   Heart:    Regular rate and rhythm, S1 and S2 normal, no murmur, rub   or gallop   Abdomen:     Soft, non-tender, bowel sounds active all four quadrants,     no masses, no organomegaly   Extremities:   Normal, atraumatic, no cyanosis or edema   Pulses:   2+ and symmetric all extremities   Skin:   Skin color, texture, turgor normal, no rashes or lesions     Results    Lab Results personally reviewed   Lab Results   Component Value Date    CHOL 157 07/19/2017    CHOL 220 (H) 06/27/2017     Lab Results   Component Value Date    HDL 41 (L) 07/19/2017    HDL 53 06/27/2017     Lab Results   Component Value Date    LDLCALC 91 07/19/2017    LDLCALC 142 (H) 06/27/2017     Lab Results   Component Value Date    TRIG 124 07/19/2017    TRIG 125 06/27/2017     Lab Results   Component Value Date    WBC 6.7 08/24/2018    HGB 14.1 08/24/2018    HCT 42.3 08/24/2018     08/24/2018     Lab Results   Component Value Date    CREATININE 1.00 08/24/2018    BUN 17 08/24/2018     (L) 08/24/2018    K 4.4 08/24/2018    CO2 23 08/24/2018     Review of Systems:   General: Weight Loss  Eyes: WNL  Ears/Nose/Throat: WNL  Lungs: WNL  Heart: WNL  Stomach: WNL  Bladder: WNL  Muscle/Joints: WNL  Skin: WNL  Nervous System: WNL  Mental Health: WNL     Blood: Easy Bruising

## 2021-06-20 NOTE — PROGRESS NOTES
ASSESSMENT AND PLAN:  Yamilex Keller 89 y.o. female is a for follow-up.  She has biopsy-proven giant cell arteritis, widespread osteoarthritis, osteoporosis.  She has been following up with endocrinology for osteoporosis.  She has noted no recurrence of symptoms of GCA.  She is now down to 1 mg of prednisone for the past 2 months.  I have asked her to stop it now.  She has increasing pain in her right knee commensurate with osteoarthritis.  Given various options she would like to proceed with a local injection with corticosteroids.  40 mg of Kenalog injected into the right knee anterolateral approach.  Return for follow-up here in 3 months.            Diagnoses and all orders for this visit:    GCA (giant cell arteritis) (H)    Bilateral primary osteoarthritis of knee  -     triamcinolone acetonide 40 mg/mL injection 40 mg (KENALOG-40); Inject 1 mL (40 mg total) into the joint once.    High risk medication use    Osteoporosis      HISTORY OF PRESENTING ILLNESS:  Yamilex Keller, 89 y.o., female  is here for follow up.  She has biopsy-proven GCA, her sed rate originally was 104.  She has been on prednisone 1 mg daily.  She did not have recurrence of her original symptoms as she tapers from 2 mg to 1.  She has noted increasing pain however in her right knee.  This is bothersome especially as she ambulates.  At rest it does not trouble her in any significant way.  She has not observed swelling.  She noted the pain to be moderately severe.  There is no radiation.  There is no associated trauma.  She is noted stiffness in the morning of no more than 10 minutes.  She has not had fever weight loss blurry vision headaches jaw claudication eye redness mouth ulcer nausea cough there is no rash.  She has been seen in endocrinology.  Started on Prolia.    Further historical information and ADL limitations as noted in the multidimensional health assessment questionnaire attached in the EMR. .    Following is the excerpt from a  "recent note: Though she did not have recurrence of headaches double vision there is no jaw claudication or upper extremity claudication, she did have worsening of her knee pain where she is well described of osteoarthritis and in the past corticosteroid and Visco supplementation failed to provide her significant relief.  She noted pain in the knee is now mild.  Living as it is hard for her to manage by  with her ..  She continues to do well.  There is no headache, there is no jaw claudication.  Sometimes she hears clicking and has discomfort in the TMJ area.  There is no double vision.  She no longer gets the occasional fuzzy vision watching TV that was intermittent.  She was originally started on adequate doses of prednisone.  She has done well.  She is she was down to 0 mg of prednisone.  While watching TV she had \"wavy vision.  She called her concerned that this might represent a flareup.  She did not have headaches.  She had no jaw claudication.  She is not sure if there was double vision.  She did have a cataract surgery recently.  She was started on prednisone.  Now she is on 10 mg daily.  There is no recurrence of her symptoms.  Doing well on the past visit she was started on methotrexate when she was on [place].  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 she says she never had pain in the joints.  Whether this is an effort to start methotrexate as a steroid sparing is unclear.  If needed I would prefer to have her take Actemra but that's not yet at the point here.  She has noted no residual headache jaw claudication N lower extremity claudication.  He describes herself in very good health.  She did have back pain and radicular symptoms 2 years ago.  She is neither a smoker not takes alcohol.  He likes to walk 30 minutes a day.    ALLERGIES:Lisinopril and Losartan    PAST MEDICAL/ACTIVE PROBLEMS/MEDICATION/ FAMILY HISTORY/SOCIAL DATA:  The patient has a family history of  Past Medical " History:   Diagnosis Date     Acute ST elevation myocardial infarction (STEMI) involving left anterior descending coronary artery (H) 7/18/2017     Basal Cell Carcinoma Of The Skin      Benign Adenomatous Polyp Of The Large Intestine     Yady Escamilla: colon @ [facility]  '06--recheck 5      Chronic pain of right knee 10/19/2016     Coronary artery disease      Hyperlipidemia      Osteoporosis      Pneumonia      Temporal arteritis (H)      History   Smoking Status     Former Smoker     Packs/day: 1.00     Years: 4.00     Types: Cigarettes     Quit date: 1/1/1953   Smokeless Tobacco     Never Used     Comment: Quit 65 yrs     Patient Active Problem List   Diagnosis     Vitamin D Deficiency     Essential Hypercholesterolemia     Osteoporosis     Hearing Loss     GCA (giant cell arteritis) (H)     High risk medication use     Bilateral primary osteoarthritis of knee     Hypercalcemia     Acute ST elevation myocardial infarction (STEMI) involving left anterior descending coronary artery (H)     Ischemic cardiomyopathy     Coronary artery disease involving native coronary artery of native heart without angina pectoris     Angioedema     Abdominal pain, epigastric     Closed wedge compression fracture of seventh thoracic vertebra, initial encounter (H)     Abnormal CT of the abdomen     Sinus bradycardia     Pain     History of ST elevation myocardial infarction (STEMI)     Pyelonephritis     Acute pyelonephritis     Left flank pain     History of coronary artery disease     Giant cell arteritis (H)     Gastritis     Current Outpatient Prescriptions   Medication Sig Dispense Refill     acetaminophen (TYLENOL) 500 MG tablet Take 2 tablets (1,000 mg total) by mouth 3 (three) times a day.  0     aspirin 81 MG EC tablet Take 1 tablet (81 mg total) by mouth at bedtime. For CAD 30 tablet 0     atorvastatin (LIPITOR) 80 MG tablet Take 1 tablet (80 mg total) by mouth at bedtime. for cad and lipids 90 tablet 3     calcium, as  carbonate, (OS-MARLYN) 500 mg calcium (1,250 mg) tablet Take 2 tablets by mouth at bedtime.       gabapentin (NEURONTIN) 100 MG capsule Take 100 mg by mouth 2 (two) times a day. 28 capsule 0     lidocaine 4 % patch Place 1 patch on the skin daily. Remove and discard patch with 12 hours or as directed by MD.for back pain from t7 fx (Patient taking differently: Place 1 patch on the skin daily as needed for pain. Remove and discard patch with 12 hours or as directed by MD.for back pain from t7 fx) 7 patch 0     metoprolol succinate (TOPROL-XL) 25 MG Take 12.5 mg by mouth daily.       nitroglycerin (NITROSTAT) 0.4 MG SL tablet Place 1 tablet (0.4 mg total) under the tongue every 5 (five) minutes as needed for chest pain. 3 Bottle 2     OMEGA-3/DHA/EPA/FISH OIL (FISH OIL-OMEGA-3 FATTY ACIDS) 300-1,000 mg capsule Take 1 g by mouth at bedtime.        omeprazole (PRILOSEC) 20 MG capsule Take 1 capsule (20 mg total) by mouth 2 (two) times a day before meals. 60 capsule 0     polyethylene glycol (MIRALAX) 17 gram packet Take 1 packet (17 g total) by mouth daily.  0     predniSONE (DELTASONE) 1 MG tablet Take 1 mg by mouth daily.        senna-docusate (PERICOLACE) 8.6-50 mg tablet Take 1 tablet by mouth 2 (two) times a day.  0     traMADol (ULTRAM) 50 mg tablet Take 1 tablet (50 mg total) by mouth every 6 (six) hours as needed for severe pain (7-10). 90 tablet 0     No current facility-administered medications for this visit.      DETAILED EXAMINATION  09/27/18  :  Vitals:    09/27/18 1440   BP: 106/66   Patient Site: Right Arm   Patient Position: Sitting   Cuff Size: Adult Small   Pulse: 72   Weight: 122 lb (55.3 kg)     Alert oriented. Head including the face is examined for malar rash, heliotropes, scarring, lupus pernio. Eyes examined for redness such as in episcleritis/scleritis, periorbital lesions.   Neck/ Face examined for parotid gland swelling, range of motion of neck.  Left upper and lower and right upper and lower  extremities examined for tenderness, swelling, warmth of the appendicular joints, range of motion, edema, rash.  Some of the important findings included: She does not have synovitis in any of the upper extremity joints which are palpable.  She has warmth and JLT of the right knee without effusion.  Ambulates with the help of a cane.    LAB / IMAGING DATA:  ALT   Date Value Ref Range Status   09/11/2018 12 0 - 45 U/L Final   08/24/2018 <9 0 - 45 U/L Final   07/08/2018 10 0 - 45 U/L Final     Albumin   Date Value Ref Range Status   09/11/2018 3.1 (L) 3.5 - 5.0 g/dL Final   08/24/2018 3.5 3.5 - 5.0 g/dL Final   07/08/2018 3.5 3.5 - 5.0 g/dL Final     Creatinine   Date Value Ref Range Status   09/11/2018 0.66 0.60 - 1.10 mg/dL Final   09/10/2018 0.71 0.60 - 1.10 mg/dL Final   08/24/2018 1.00 0.60 - 1.10 mg/dL Final       WBC   Date Value Ref Range Status   09/11/2018 6.4 4.0 - 11.0 thou/uL Final   09/10/2018 7.4 4.0 - 11.0 thou/uL Final     Hemoglobin   Date Value Ref Range Status   09/11/2018 12.3 12.0 - 16.0 g/dL Final   09/10/2018 13.2 12.0 - 16.0 g/dL Final   08/24/2018 14.1 12.0 - 16.0 g/dL Final     Platelets   Date Value Ref Range Status   09/11/2018 189 140 - 440 thou/uL Final   09/10/2018 208 140 - 440 thou/uL Final   08/24/2018 251 140 - 440 thou/uL Final       Lab Results   Component Value Date    SEDRATE 3 05/08/2017

## 2021-06-21 NOTE — LETTER
Letter by Michael Arzate MD at      Author: Michael Arzate MD Service: -- Author Type: --    Filed:  Encounter Date: 12/15/2020 Status: (Other)         12/15/2020    Yamilex SMITH Lawrencen[place]   [address]  [place] [address]         Dear Yamilex,    It was good to see you in clinic.  I hope your questions were answered at the time of your visit.    The results of your tests from your visit were as follows:    Resulted Orders   HM2(CBC w/o Differential)   Result Value Ref Range    WBC 8.3 4.0 - 11.0 thou/uL    RBC 4.69 3.80 - 5.40 mill/uL    Hemoglobin 14.8 12.0 - 16.0 g/dL    Hematocrit 44.7 35.0 - 47.0 %    MCV 95 80 - 100 fL    MCH 31.6 27.0 - 34.0 pg    MCHC 33.1 32.0 - 36.0 g/dL    RDW 12.3 11.0 - 14.5 %    Platelets 186 140 - 440 thou/uL    MPV 7.3 7.0 - 10.0 fL   Comprehensive Metabolic Panel   Result Value Ref Range    Sodium 142 136 - 145 mmol/L    Potassium 4.7 3.5 - 5.0 mmol/L    Chloride 105 98 - 107 mmol/L    CO2 24 22 - 31 mmol/L    Anion Gap, Calculation 13 5 - 18 mmol/L    Glucose 87 70 - 125 mg/dL    BUN 20 8 - 28 mg/dL    Creatinine 0.74 0.60 - 1.10 mg/dL    GFR MDRD Af Amer >60 >60 mL/min/1.73m2    GFR MDRD Non Af Amer >60 >60 mL/min/1.73m2    Bilirubin, Total 0.4 0.0 - 1.0 mg/dL    Calcium 9.4 8.5 - 10.5 mg/dL    Protein, Total 6.5 6.0 - 8.0 g/dL    Albumin 3.9 3.5 - 5.0 g/dL    Alkaline Phosphatase 25 (L) 45 - 120 U/L    AST 22 0 - 40 U/L    ALT 14 0 - 45 U/L    Narrative    Fasting Glucose reference range is 70-99 mg/dL per  American Diabetes Association (ADA) guidelines.   Lipid Cascade RANDOM   Result Value Ref Range    Cholesterol 162 <=199 mg/dL    Triglycerides 128 <=149 mg/dL    HDL Cholesterol 49 (L) >=50 mg/dL    LDL Calculated 87 <=129 mg/dL    Patient Fasting > 8hrs? No    CK Total   Result Value Ref Range    CK, Total 46 30 - 190 U/L     A muscle enzyme test (which can be affected by cholesterol medication) was normal.     Your cholesterol is doing well.     Your  kidney tests are normal.  Your electrolytes are also normal.  There is no signs of diabetes.  Your liver tests are normal.      Your blood counts are normal and you are not anemic.     No concerning findings were noted.    Please follow up with me again on 3/15/2021 as you have already scheduled.  See me sooner if further issues.    If you have any questions regarding these results, please feel free to contact me at [phone].  I wish you the best of health!      Sincerely,       Michael Arzate MD  General Internal Medicine  [facility]

## 2021-06-22 NOTE — PROGRESS NOTES
ASSESSMENT AND PLAN:  Yamilex Keller 89 y.o. female is seen here on 01/08/19 for evaluation of worsening pain of her right knee.  She has well-documented osteoarthritis.  She has background of GCA.  She noted intermittent response to corticosteroid injections.  She has been using her 1 mg prednisone tablets from the past to help the knee pain.  We discussed options.  She wants to proceed with local injection.  40 mg of Kenalog injected into the right knee anterolateral approach.  On a variety of levels, it would be appropriate for her not to take anymore prednisone.  We will meet here in 3-4 months.  Diagnoses and all orders for this visit:    Bilateral primary osteoarthritis of knee  -     triamcinolone acetonide 40 mg/mL injection 40 mg (KENALOG-40)    Coronary artery disease involving native coronary artery of native heart without angina pectoris    GCA (giant cell arteritis) (H)          HISTORY OF PRESENTING ILLNESS ON 01/08/19 :  Yamilex Keller 89 y.o. is here for a moderately severe flare up of pain.  Joints affected include the right knee(s). This has gone on for several weeks ago. Pain is described as sharp. It is when active and at night/ wakes from sleep at night.  Her symptoms are moderate, severe. The symptoms are gradually worsening. Symptoms include pain, tenderness to touch, swelling.  Treatment to date has been without significant relief.    Further historical information, including ROS and limitation in activities as noted in the multidimensional health assessment questionnaire scanned in the EMR and in the assessment and plan section.    XR KNEES BILATERAL 1 OR 2 VWS10/19/2016 10:59 AMINDICATION: Bilateral primary osteoarthritis of kneeCOMPARISON: None.FINDINGS: Severe lateral joint space narrowing with subchondral sclerosis and large osteophytes in the right knee. Mild to moderate   narrowing of the medial patellofemoral compartments as well as all 3 compartments on the left. No fracture,  dislocation, or joint effusion on either side.This report was electronically interpreted by: Dr. Kyrie Reed MD ON 10/19/2016 at 14:45    ALLERGIES:Lisinopril and Losartan    PAST MEDICAL/ACTIVE PROBLEMS/MEDICATION/SOCIAL DATA  Past Medical History:   Diagnosis Date     Acute ST elevation myocardial infarction (STEMI) involving left anterior descending coronary artery (H) 2017     Basal Cell Carcinoma Of The Skin      Benign Adenomatous Polyp Of The Large Intestine     Andi Yady: colon @ [facility]  06--recheck 5      Chronic pain of right knee 10/19/2016     Coronary artery disease      Hyperlipidemia      Osteoporosis      Pneumonia      Temporal arteritis (H)      Social History     Tobacco Use   Smoking Status Former Smoker     Packs/day: 1.00     Years: 4.00     Pack years: 4.00     Types: Cigarettes     Last attempt to quit: 1953     Years since quittin.0   Smokeless Tobacco Never Used   Tobacco Comment    Quit 65 yrs     Patient Active Problem List   Diagnosis     Vitamin D Deficiency     Essential Hypercholesterolemia     Osteoporosis     Hearing Loss     GCA (giant cell arteritis) (H)     High risk medication use     Bilateral primary osteoarthritis of knee     Hypercalcemia     Acute ST elevation myocardial infarction (STEMI) involving left anterior descending coronary artery (H)     Ischemic cardiomyopathy     Coronary artery disease involving native coronary artery of native heart without angina pectoris     Angioedema     Abdominal pain, epigastric     Closed wedge compression fracture of seventh thoracic vertebra, initial encounter (H)     Abnormal CT of the abdomen     Sinus bradycardia     Pain     History of ST elevation myocardial infarction (STEMI)     Pyelonephritis     Acute pyelonephritis     Left flank pain     History of coronary artery disease     Giant cell arteritis (H)     Gastritis     Current Outpatient Medications   Medication Sig Dispense Refill     acetaminophen  (TYLENOL) 500 MG tablet Take 2 tablets (1,000 mg total) by mouth 3 (three) times a day.  0     atorvastatin (LIPITOR) 80 MG tablet Take 1 tablet (80 mg total) by mouth at bedtime. for cad and lipids 90 tablet 1     calcium, as carbonate, (OS-MARLYN) 500 mg calcium (1,250 mg) tablet Take 2 tablets by mouth at bedtime.       lidocaine 4 % patch Place 1 patch on the skin daily. Remove and discard patch with 12 hours or as directed by MD.for back pain from t7 fx (Patient taking differently: Place 1 patch on the skin daily as needed for pain. Remove and discard patch with 12 hours or as directed by MD.for back pain from t7 fx) 7 patch 0     metoprolol succinate (TOPROL-XL) 25 MG Take 0.5 tablets (12.5 mg total) by mouth daily. 45 tablet 3     nitroglycerin (NITROSTAT) 0.4 MG SL tablet Place 1 tablet (0.4 mg total) under the tongue every 5 (five) minutes as needed for chest pain. 3 Bottle 2     OMEGA-3/DHA/EPA/FISH OIL (FISH OIL-OMEGA-3 FATTY ACIDS) 300-1,000 mg capsule Take 1 g by mouth at bedtime.        omeprazole (PRILOSEC) 20 MG capsule Take 1 capsule (20 mg total) by mouth 2 (two) times a day before meals. 60 capsule 0     polyethylene glycol (MIRALAX) 17 gram packet Take 1 packet (17 g total) by mouth daily.  0     predniSONE (DELTASONE) 1 MG tablet Take 1 mg by mouth daily.        senna-docusate (PERICOLACE) 8.6-50 mg tablet Take 1 tablet by mouth 2 (two) times a day.  0     aspirin 81 MG EC tablet Take 1 tablet (81 mg total) by mouth at bedtime. For CAD 30 tablet 0     gabapentin (NEURONTIN) 100 MG capsule Take 100 mg by mouth 2 (two) times a day. 28 capsule 0     traMADol (ULTRAM) 50 mg tablet Take 1 tablet (50 mg total) by mouth every 6 (six) hours as needed for severe pain (7-10). 90 tablet 0     No current facility-administered medications for this visit.      DETAILED EXAMINATION  01/08/19  :  Vitals:    01/08/19 1223   BP: 126/64   Patient Site: Right Arm   Patient Position: Sitting   Cuff Size: Adult Small    Pulse: 64   Weight: 124 lb (56.2 kg)     Alert oriented. Head including the face is examined for malar rash, heliotropes, scarring, lupus pernio. Eyes examined for redness such as in episcleritis/scleritis, periorbital lesions.   Neck/ Face examined for parotid gland swelling, range of motion of neck.  Left upper and lower and right upper and lower extremities examined for tenderness, swelling, warmth of the appendicular joints, range of motion, edema, rash.  Some of the important findings included: Marked tenderness of the right knee joint line with warmth no effusion detected.  She has widespread Heberden's.        LAB / IMAGING DATA:  ALT   Date Value Ref Range Status   09/11/2018 12 0 - 45 U/L Final   08/24/2018 <9 0 - 45 U/L Final   07/08/2018 10 0 - 45 U/L Final     Albumin   Date Value Ref Range Status   09/11/2018 3.1 (L) 3.5 - 5.0 g/dL Final   08/24/2018 3.5 3.5 - 5.0 g/dL Final   07/08/2018 3.5 3.5 - 5.0 g/dL Final     Creatinine   Date Value Ref Range Status   12/21/2018 0.78 0.60 - 1.10 mg/dL Final   09/11/2018 0.66 0.60 - 1.10 mg/dL Final   09/10/2018 0.71 0.60 - 1.10 mg/dL Final       WBC   Date Value Ref Range Status   12/21/2018 7.6 4.0 - 11.0 thou/uL Final   09/11/2018 6.4 4.0 - 11.0 thou/uL Final     Hemoglobin   Date Value Ref Range Status   12/21/2018 15.3 12.0 - 16.0 g/dL Final   09/11/2018 12.3 12.0 - 16.0 g/dL Final   09/10/2018 13.2 12.0 - 16.0 g/dL Final     Platelets   Date Value Ref Range Status   12/21/2018 222 140 - 440 thou/uL Final   09/11/2018 189 140 - 440 thou/uL Final   09/10/2018 208 140 - 440 thou/uL Final       Lab Results   Component Value Date    SEDRATE 3 05/08/2017

## 2021-06-23 NOTE — TELEPHONE ENCOUNTER
"Per Dr Govea- he does not recommend going back on prednisone 1mg as her symptoms are not consistent and may not be her \"imaginatin\" per pt. Pt to call back if her symptoms get worse and keep follow up appt in April when she is back from [place]. Pt notified and verbalized understanding.  "

## 2021-06-23 NOTE — PROGRESS NOTES
[facility] Follow-up Note    Assessment & Plan        1. History of coronary artery disease -angiography July 18, 2017 showed normal left main, ostial left anterior descending 99% lesion that received a drug-coated stent, mid left anterior descending 70% lesion that received a drug-coated stent, distal 25% lesion, distal circumflex 25% lesion with a left AV groove 40% lesion, and proximal right coronary artery 20% lesion.  She is getting along well without any symptoms and was given Imdur 30 mg a day for what was felt to be stable angina which she never took and we have stopped.   In addition, she was to take an aspirin after stopping ticagrelor and has not done this.  I have asked her to start a baby aspirin every day or possibly every other day.    2. History of ST elevation myocardial infarction (STEMI) -this was of the anterior wall involving left anterior descending in July 2017 with initial ejection fraction 35-40% but has improved to 70% on stress test.  Continue current beta-blocker.   3. Ischemic cardiomyopathy -as above resolved.   4. Essential Hypercholesterolemia -on maximum dose of atorvastatin 80 mg but have not check cholesterol since July 2017.  Will arrange for fasting cholesterol and adjust atorvastatin downward if able.   5. Closed wedge compression fracture of seventh thoracic vertebra, initial encounter (H) -had recent vertebroplasty and getting along well.     Plan  1.  Check fasting lipid profile and adjust atorvastatin downward if possible.  2.  Discontinue furosemide.  3.  Start baby aspirin every day.  4.  Follow-up with me in 1 year or sooner if needed.    Subjective  CC: 89-year-old soon-to-be 89-year-old white female here for follow-up today.  She tells me the reason she is here is because I asked her to come back and see me.  She and her  still living independently but they [place] to [facility] [place] [facility].  They are independent living but she  "does not do much cooking or cleaning.  She is able to walk around but does admit to some retrosternal ache, that comes on with activity and last for about a minute.  She has taken 3 nitroglycerin in the last year and a half.  There is no significant shortness of breath, PND, orthopnea, syncope or dizziness.    Medications  Current Outpatient Medications   Medication Sig     acetaminophen (TYLENOL) 500 MG tablet Take 2 tablets (1,000 mg total) by mouth 3 (three) times a day.     atorvastatin (LIPITOR) 80 MG tablet Take 1 tablet (80 mg total) by mouth at bedtime. for cad and lipids     calcium, as carbonate, (OS-MARLYN) 500 mg calcium (1,250 mg) tablet Take 2 tablets by mouth at bedtime.     furosemide (LASIX) 20 MG tablet Take 20 mg by mouth as needed.     metoprolol succinate (TOPROL-XL) 25 MG Take 0.5 tablets (12.5 mg total) by mouth daily.     nitroglycerin (NITROSTAT) 0.4 MG SL tablet Place 1 tablet (0.4 mg total) under the tongue every 5 (five) minutes as needed for chest pain.     OMEGA-3/DHA/EPA/FISH OIL (FISH OIL-OMEGA-3 FATTY ACIDS) 300-1,000 mg capsule Take 1 g by mouth at bedtime.      senna-docusate (PERICOLACE) 8.6-50 mg tablet Take 1 tablet by mouth 2 (two) times a day.     aspirin 81 MG EC tablet Take 1 tablet (81 mg total) by mouth at bedtime. For CAD       Objective  /86 (Patient Site: Left Arm, Patient Position: Sitting, Cuff Size: Adult Regular)   Pulse 60   Resp 16   Ht 5' 3.5\" (1.613 m)   Wt 123 lb (55.8 kg)   BMI 21.45 kg/m      General Appearance:    Alert, cooperative, no distress, appears stated age   Head:    Normocephalic, without obvious abnormality, atraumatic   Throat:   Lips, mucosa, and tongue normal; teeth and gums normal   Neck:   Supple, symmetrical, trachea midline, no adenopathy;        thyroid:  No enlargement/tenderness/nodules; no carotid    bruit or JVD   Back:     Symmetric, no curvature, ROM normal, no CVA tenderness   Lungs:     Clear to auscultation bilaterally, " respirations unlabored   Chest wall:    No tenderness or deformity   Heart:    Regular rate and rhythm, S1 and S2 normal, no murmur, rub   or gallop   Abdomen:     Soft, non-tender, bowel sounds active all four quadrants,     no masses, no organomegaly   Extremities:   Normal, atraumatic, no cyanosis or edema   Pulses:   2+ and symmetric all extremities   Skin:   Skin color, texture, turgor normal, no rashes or lesions     Results    Lab Results personally reviewed   Lab Results   Component Value Date    CHOL 157 07/19/2017    CHOL 220 (H) 06/27/2017     Lab Results   Component Value Date    HDL 41 (L) 07/19/2017    HDL 53 06/27/2017     Lab Results   Component Value Date    LDLCALC 91 07/19/2017    LDLCALC 142 (H) 06/27/2017     Lab Results   Component Value Date    TRIG 124 07/19/2017    TRIG 125 06/27/2017     Lab Results   Component Value Date    WBC 7.6 12/21/2018    HGB 15.3 12/21/2018    HCT 43.6 12/21/2018     12/21/2018     Lab Results   Component Value Date    CREATININE 0.78 12/21/2018    BUN 17 12/21/2018     12/21/2018    K 4.1 12/21/2018    CO2 30 12/21/2018     Review of Systems:   General: WNL  Eyes: WNL  Ears/Nose/Throat: WNL  Lungs: WNL  Heart: WNL  Stomach: WNL  Bladder: WNL  Muscle/Joints: WNL  Skin: WNL  Nervous System: WNL  Mental Health: WNL     Blood: WNL

## 2021-06-23 NOTE — PATIENT INSTRUCTIONS - HE
It is my pleasure seeing you in the clinic today.    Please continue Prolia 60 mg subcutaneously every 6 months follow-up in 1 year check creatinine potassium calcium 25-hydroxy vitamin D before next visit do a bone DEXA scan 2 years from the last one.              Thank you for allowing me to participate in your care.        We work very hard to achieve the best quality of care.  We would be very grateful if you complete any survey you receive regarding this visit, so that we continue to improve our care.

## 2021-06-23 NOTE — PATIENT INSTRUCTIONS - HE
Ms Kaminski SARAH Keller,  I enjoyed visiting with you again today.  I am glad to hear you are doing well.  Per our conversation restart the baby aspirin and stop the lasix.  We will check the cholesterol.  I will plan on seeing you 1 year or sooner if needed.  Christopher Miranda

## 2021-06-23 NOTE — TELEPHONE ENCOUNTER
Pt wonders if she can go back on prednisone 1mg daily as she feels better when she is taking this. Pt states she sometimes has fuzzy vision when looking at TV, but said that could be her cataracts, pt thinks she may have some jaw discomfort but also willingly states it could be her imagination as well. Will have Dr Govea advise.     Pt is in [place] currently

## 2021-06-23 NOTE — TELEPHONE ENCOUNTER
----- Message from Alvina Freire sent at 1/29/2019 11:45 AM CST -----  Regarding: Medication questions  Contact: [phone]  Pt has questions about her prednisone.

## 2021-06-23 NOTE — PROGRESS NOTES
Progress Note    Reason for Visit:  Chief Complaint     Osteoporosis          Progress Note:    HPI:     The patient said that a few months ago she felt sharp pain in her back and she had an MRI which showed fractures of T7.     Acute fracture.     The patient has known about osteoporosis for same who many years and she has been on Fosamax 70 mg once a week for 10 years.     Her sister had osteoporosis and she broke her hip.     Patient is known to have polymyalgia rheumatica and has been on prednisone for 4 years and right now she is takes 1 mg daily and probably due to stop it in 1 month.     She has some heart burn and acid reflux.     She had an MI in July of last year  and she had 2 stents.     The patient takes aspirin and Brilinta.     She had a bone DEXA scan which showed T score of the spine is -1 at the left hip -2.7 on the right hip -2.5.     She also broke her wrist 15 years ago creatinine is normal at 0.68 calcium 9.2.     Patient does not smoke or drink she has 1 daughter.    Osteoporosis I have discussed the pathophysiology of the disease with the patient I did advise him to stop Fosamax since she has failed and had fracture while she is taking it and I advised her that we will contact insurance to approve Prolia 60 mg subcutaneously every 6 month I discussed side effects with the patient.     I did also advise her to take vitamin D 2000 units a day plus calcium 600 mg daily.     Patient takes aspirin 81 mg.     She has hyperlipidemia on Lipitor 80 mg she also takes Lasix 20 mg.     She has been on long-term prednisone because of polymyalgia rheumatica.     A bone DEXA scan shows improvement of 8.5 at the spine and -2.7 had left hip and -2.5 on the right.     Patient obviously failed the Fosamax and had a new fracture while she is taking it so we will switch to Prolia 60 mg subcutaneously every 6 months.     I would also check serum protein electrophoresis      Component      Latest Ref Rng & Units  9/10/2018 12/21/2018 1/17/2019   Sodium      136 - 145 mmol/L 141 142    Potassium      3.5 - 5.0 mmol/L 4.4 4.1    Chloride      98 - 107 mmol/L 108 (H) 102    CO2      22 - 31 mmol/L 24 30    Anion Gap, Calculation      5 - 18 mmol/L 9 10    Glucose      70 - 125 mg/dL 98 83    Calcium      8.5 - 10.5 mg/dL 9.5 9.9    BUN      8 - 28 mg/dL 15 17    Creatinine      0.60 - 1.10 mg/dL 0.71 0.78    GFR MDRD Af Amer      >60 mL/min/1.73m2 >60 >60    GFR MDRD Non Af Amer      >60 mL/min/1.73m2 >60 >60    Triglycerides      <=149 mg/dL   103   Cholesterol      <=199 mg/dL   180   LDL Calculated      <=129 mg/dL   103   HDL Cholesterol      >=50 mg/dL   56   Patient Fasting > 8hrs?         Yes   Vitamin D, Total (25-Hydroxy)      30.0 - 80.0 ng/mL   32.5   PTH      10 - 86 pg/mL   49       Review of Systems:    Nervous System: No headache, dizziness, fainting or memory loss. No tingling sensation of hand or feet.  Ears: No hearing loss or ringing in the ears  Eyes: No blurring of vision, redness, itching or dryness.  Nose: No nosebleed or loss of smell  Mouth: No mouth sores or loss of taste  Throat: No hoarseness or difficulty swallowing  Neck: No enlarged thyroid or lymph nodes.  Heart: No chest pain, palpitation or irregular heartbeat. No swelling of hands or feet  Lungs: No shortness of breath, cough, night sweats, wheezing or hemoptysis.  Gastrointestinal: No nausea or vomiting, constipation or diarrhea.  No acid reflux, abdominal pain or blood in stools.  Kidney/Bladdr: No polyuria, polydipsia, nocturia or hematuria.  Genital/Sexual: No loss of libido  Skin: No rash, hair loss or hirsutism.  No abnormal striae  Muscles/Joints/Bones: No morning stiffness, muscle aches and pain or loss of height.    Current Medications:  Current Outpatient Medications   Medication Sig     aspirin 81 MG EC tablet Take 1 tablet (81 mg total) by mouth at bedtime. For CAD     atorvastatin (LIPITOR) 80 MG tablet Take 1 tablet (80 mg total)  by mouth at bedtime. for cad and lipids     calcium, as carbonate, (OS-MARLYN) 500 mg calcium (1,250 mg) tablet Take 2 tablets by mouth at bedtime.     furosemide (LASIX) 20 MG tablet Take 20 mg by mouth as needed.     metoprolol succinate (TOPROL-XL) 25 MG Take 0.5 tablets (12.5 mg total) by mouth daily.     nitroglycerin (NITROSTAT) 0.4 MG SL tablet Place 1 tablet (0.4 mg total) under the tongue every 5 (five) minutes as needed for chest pain.     OMEGA-3/DHA/EPA/FISH OIL (FISH OIL-OMEGA-3 FATTY ACIDS) 300-1,000 mg capsule Take 1 g by mouth at bedtime.      acetaminophen (TYLENOL) 500 MG tablet Take 2 tablets (1,000 mg total) by mouth 3 (three) times a day.     senna-docusate (PERICOLACE) 8.6-50 mg tablet Take 1 tablet by mouth 2 (two) times a day.       Patients Active Problems:  Patient Active Problem List   Diagnosis     Vitamin D Deficiency     Essential Hypercholesterolemia     Osteoporosis     Hearing Loss     GCA (giant cell arteritis) (H)     High risk medication use     Bilateral primary osteoarthritis of knee     Hypercalcemia     Ischemic cardiomyopathy     Angioedema     Abdominal pain, epigastric     Closed wedge compression fracture of seventh thoracic vertebra, initial encounter (H)     Abnormal CT of the abdomen     Sinus bradycardia     Pain     History of ST elevation myocardial infarction (STEMI)     Pyelonephritis     Acute pyelonephritis     Left flank pain     History of coronary artery disease     Giant cell arteritis (H)     Gastritis       History:   reports that she quit smoking about 66 years ago. Her smoking use included cigarettes. She has a 4.00 pack-year smoking history. she has never used smokeless tobacco. She reports that she drinks alcohol. She reports that she does not use drugs.   reports that she quit smoking about 66 years ago. Her smoking use included cigarettes. She has a 4.00 pack-year smoking history. she has never used smokeless tobacco. She reports that she drinks alcohol.  She reports that she does not use drugs.  Social History     Tobacco Use   Smoking Status Former Smoker     Packs/day: 1.00     Years: 4.00     Pack years: 4.00     Types: Cigarettes     Last attempt to quit: 1953     Years since quittin.1   Smokeless Tobacco Never Used   Tobacco Comment    Quit 65 yrs      reports that she quit smoking about 66 years ago. Her smoking use included cigarettes. She has a 4.00 pack-year smoking history. she has never used smokeless tobacco. She reports that she drinks alcohol. She reports that she does not use drugs.  Social History     Substance and Sexual Activity   Sexual Activity Not Currently     Partners: Male     Past Medical History:   Diagnosis Date     Acute ST elevation myocardial infarction (STEMI) involving left anterior descending coronary artery (H) 2017     Basal Cell Carcinoma Of The Skin      Benign Adenomatous Polyp Of The Large Intestine     Yady Escamilla: colon @ [place]  --recheck 5      Chronic pain of right knee 10/19/2016     Coronary artery disease      Hyperlipidemia      Osteoporosis      Pneumonia      Temporal arteritis (H)      Family History   Problem Relation Age of Onset     Pacemaker Brother      Past Medical History:   Diagnosis Date     Acute ST elevation myocardial infarction (STEMI) involving left anterior descending coronary artery (H) 2017     Basal Cell Carcinoma Of The Skin      Benign Adenomatous Polyp Of The Large Intestine     Yady Escamilla: colon @ [place]  --recheck 5      Chronic pain of right knee 10/19/2016     Coronary artery disease      Hyperlipidemia      Osteoporosis      Pneumonia      Temporal arteritis (H)      Past Surgical History:   Procedure Laterality Date     CORONARY ANGIOPLASTY WITH STENT PLACEMENT  //     ESOPHAGOGASTRODUODENOSCOPY N/A 2018    Procedure: ESOPHAGOGASTRODUODENOSCOPY (EGD);  Surgeon: Prabuh Lin MD;  Location: [facility];  Service:      [place] CATH PLACEMENT & [place]X CORONARY ART  "ANGIO IMG S&I N/A 7/18/2017    Procedure: Coronary Angiogram;  Surgeon: Shelia Garcia MD;  Location: [facility] Cath Lab;  Service: Cardiology     [place] CATH PLMT L HRT & ARTS W/NJX & ANGIO IMG S&I Left 7/18/2017    Procedure: Left Heart Catheterization Without Left Ventriculogram;  Surgeon: Shelia Garcia MD;  Location: [facility] Cath Lab;  Service: Cardiology     [place] TEMPORAL ARTERY LIGATN OR BX Bilateral 10/7/2015    Procedure: BILATERAL TEMPORAL ARTERY BIOPSY;  Surgeon: Alfredito Jason MD;  Location: [facility] OR;  Service: General     TONSILLECTOMY         Vitals   height is 5' 2\" (1.575 m) and weight is 126 lb 3.2 oz (57.2 kg). Her blood pressure is 128/60.         Exam  General appearance: The patient looked well, not in acute distress.  Eyes: no evidence of thyroid eye disease.   Retinal exam: No evidence of diabetic retinopathy.  Mouth and Throat: Normal  Neck: No evidence of thyromegaly, enlarged lymph node or tenderness  Chest: Trachea is central. Chest is clear to auscultation and percussion. Breat sounds are normal.  Cardiovascular exam: JVP is not raised. Heart sounds are normal, no murmurs or rub  Peripheral pulses are palpable.   Abdomen: No masses or tenderness.    Back: No vertebral tenderness or kyphosis.  Extremities: No evidence of leg edema.   Skin: Normal to touch.  No abnormal striae  Neurologic exam:  Visual fields are intact by confrontation, grossly intact. No evidence of peripheral neuropathy.  Detailed foot exam normal.        Diagnosis:  No diagnosis found.    Orders:   No orders of the defined types were placed in this encounter.        Assessment and Plan: This patient is here for follow-up because of osteoporosis patient.  The patient has fracture of her back but had fracture of her back she has been on Fosamax for so many years he failed that.    Is currently on Prolia 60 mg subcutaneously every 6-month she took one dose back in September with no side effects we will " continue every 6-month.    She takes aspirin 81 mg every other day Lipitor 80 mg for hyperlipidemia lipid profile is well controlled HDL 56 LDL is 103    She takes calcium 1 pill a day vitamin D is adequate at 32.5 takes calcium and vitamin D    She takes Lasix 20-minute Lasix 20 mg every other day    For low 12.5 mg daily for hypertension blood pressure is controlled 128/60.    Creatinine is normal at 0.78 calcium is 9.9.    We will do a bone DEXA scan in 2 years.  Patient will return to clinic in 1 year.  She had was on prednisone and stopped that 3 weeks ago she was on 1 mg for polymyalgia rheumatica having some discomfort in his jaw and I did advise her to contact primary care to contact Dr. Carlos because of that.    I have reviewed and ordered clinical lab test    I have reviewed and ordered her medication as required.    I have reviewed her test results and advised with the performing physician.    I have reviewed the patient's old records.    I have reviewed and summarized the patient old records.

## 2021-06-24 NOTE — TELEPHONE ENCOUNTER
Called back patient to discuss concerns. Pt is currently living in [place] and seeing a cardiologist who prescribed Plavix to patient. No documentation of this is available to be viewed. Advised patient to call provider in [place] for refill of medication. Pt agreeable to plan, no further questions.

## 2021-06-24 NOTE — TELEPHONE ENCOUNTER
Called patient to discuss. Pt is currently no on clopidogrel. LM for patient to call back to discuss

## 2021-06-24 NOTE — TELEPHONE ENCOUNTER
----- Message from Martin Yeager sent at 3/1/2019 11:26 AM CST -----  Patient has already contacted their pharmacy. The medication or refill issue is below:    Primary Cardiologist:  Ruben   Medication: Clopidergrel 75 mg  Issue / Concern: Patient is currently in [place] and is asking for a 30 day supply sent to [email or web address] pharmacy  Preferred Pharmacy: [email or web address] phone: [phone]   Best Phone Number for Patient: [phone]     Additional Info:

## 2021-06-25 NOTE — PROGRESS NOTES
Progress Notes by Rupinder West CNP at 8/16/2017  2:50 PM     Author: Rupinder West CNP Service: -- Author Type: Nurse Practitioner    Filed: 8/16/2017  3:36 PM Encounter Date: 8/16/2017 Status: Signed    : Rupinder West CNP (Nurse Practitioner)           Click to link to [facility] HEART CARE NOTE    Assessment/Recommendations   Assessment:    1. Ischemic cardiomyopathy, heart failure with reduced ejection fraction, ejection fraction 35-40%, NYHA class II-III: Yamilex continues to feel fatigued and has shortness of breath with activity.  She denies any worsening since hospitalization.  She has no symptoms of fluid retention with stable weights and no edema.  She is tolerating cardiac rehab. We reviewed heart failure diagnosis, medications, treatment plan, low sodium diet, weight monitoring, and symptom monitoring.  Titration of medications will be limited due to hypotension.    2.  STEMI: Status post 2 drug-eluting stents to ostial mid LAD on July 18, 2017.  She denies any chest pain.  We again discussed the importance of ticagrelor and aspirin.    Plan:  1.   Heart failure medications:  - Beta blocker therapy with metroprolol succinate 12.5 mg daily  - ARB therapy with losartan 25 mg daily  - Diuretic therapy with furosemide 20 mg daily  2.  BMP pending  3.  Low-sodium diet and daily weights    Yamilex Keller will follow up Dr. Miranda on September 6 and in the heart failure clinic in 6 weeks.     History of Present Illness    Ms. Yamilex Keller is a 88 y.o. female seen at [facility] heart failure clinic today for hospital follow-up.  She was hospitalized July 18 - July 20 with a STEMI.  She received 2 drug-eluting stents to ostial and mid LAD.  She was rehospitalized July 28 - July 30 with shortness of breath and chest pain.  She was diagnosed with acute heart failure.  Echocardiogram from July 19, 2017 showed ejection fraction of 35-40%.   Her past medical history is also significant for hypertension, dyslipidemia, and temporal arteritis.    She is participating in cardiac rehab and tolerating this activity.  She still gets short of breath with activity but denies any worsening.  Blood pressures at cardiac rehab tend to be low following exercise with some systolics in the 90s.  She denies any lightheadedness or dizziness.  He continues to feel fatigued.  She denies orthopnea, chest pain and lower extremity edema.      She is monitoring home weights which are stable between 139-140 pounds.  She is working on following a low sodium diet but does eat out at restaurants frequently.    ECHO 7/19/17:     No previous study for comparison.    Definity contrast utilized.    Left ventricle ejection fraction is moderately decreased. Left ventricular ejection fraction visually estimated at 35-40%.    Large wall motion abnormality involving the mid to distal septum and anterior apical portion of the left ventricle which appears akinetic. Possible dyskinesis of the left ventricular apex.    Normal right ventricular size and systolic function suggested    Mild to moderate tricuspid valve regurgitation     Physical Examination Review of Systems   Vitals:    08/16/17 1440   BP: 110/50   Pulse: 72   Resp: 12     Body mass index is 24.96 kg/(m^2).  Wt Readings from Last 3 Encounters:   08/16/17 142 lb (64.4 kg)   08/03/17 140 lb (63.5 kg)   07/30/17 137 lb 6 oz (62.3 kg)       General Appearance:     Alert, cooperative and in no acute distress.   ENT/Mouth: membranes moist, no oral lesions or bleeding gums.      EYES:  no scleral icterus, normal conjunctivae   Chest/Lungs:   lungs are clear to auscultation, no rales or wheezing, respirations unlabored   Cardiovascular:   Regular. Normal first and second heart sounds with no murmurs, rubs, or gallops; the radial and posterior tibial pulses are intact, no edema bilateral lower extremities    Abdomen:  Soft, nontender,  nondistended, bowel sounds present   Extremities: no cyanosis or clubbing   Skin: warm, dry.  Bruises on forearms   Neurologic: mood and affect are appropriate, alert and oriented x3      General: WNL  Eyes: WNL  Ears/Nose/Throat: WNL  Lungs: Cough, Shortness of Breath  Heart: Shortness of Breath with activity  Stomach: Heartburn  Bladder: WNL  Muscle/Joints: WNL  Skin: Rash  Nervous System: Loss of Balance  Mental Health: Anxiety     Blood: Easy Bruising     Medical History  Surgical History Family History Social History   Past Medical History:   Diagnosis Date   ? Coronary artery disease    ? Hyperlipidemia    ? Osteoporosis    ? Pneumonia    ? Temporal arteritis     Past Surgical History:   Procedure Laterality Date   ? CORONARY ANGIOPLASTY WITH STENT PLACEMENT  //   ? [place] CATH PLACEMENT & [place]X CORONARY ART ANGIO IMG S&I N/A 7/18/2017    Procedure: Coronary Angiogram;  Surgeon: Shelia Garcia MD;  Location: [facility] Cath Lab;  Service: Cardiology   ? [place] CATH PLMT L HRT & ARTS W/NJX & ANGIO IMG S&I Left 7/18/2017    Procedure: Left Heart Catheterization Without Left Ventriculogram;  Surgeon: Shelia Garcia MD;  Location: [facility] Cath Lab;  Service: Cardiology   ? [place] TEMPORAL ARTERY LIGATN OR BX Bilateral 10/7/2015    Procedure: BILATERAL TEMPORAL ARTERY BIOPSY;  Surgeon: Alfredito Jason MD;  Location: [facility];  Service: General   ? TONSILLECTOMY      Family History   Problem Relation Age of Onset   ? Pacemaker Brother     Social History     Social History   ? Marital status:      Spouse name: N/A   ? Number of children: N/A   ? Years of education: N/A     Occupational History   ? Not on file.     Social History Main Topics   ? Smoking status: Former Smoker     Packs/day: 1.00     Years: 4.00     Quit date: 1/1/1953   ? Smokeless tobacco: Never Used   ? Alcohol use Yes      Comment: 1-2 drinks a week   ? Drug use: No   ? Sexual activity: No     Other Topics Concern   ? Not on file      Social History Narrative    Lives with her  - cantu in [place]          Medications  Allergies   Current Outpatient Prescriptions   Medication Sig Dispense Refill   ? alendronate (FOSAMAX) 70 MG tablet Take 70 mg by mouth every 7 days. Take in the morning on an empty stomach with a full glass of water 30 minutes before food, usually takes on Saturdays     ? aspirin 81 MG EC tablet Take 81 mg by mouth at bedtime.      ? atorvastatin (LIPITOR) 80 MG tablet Take 1 tablet (80 mg total) by mouth at bedtime. 90 tablet 3   ? CALCIUM CARBONATE/VITAMIN D3 (CALCIUM WITH VITAMIN D ORAL) Take 2 tablets by mouth at bedtime.      ? furosemide (LASIX) 20 MG tablet Take 1 tablet (20 mg total) by mouth daily. 90 tablet 3   ? losartan (COZAAR) 25 MG tablet Take 1 tablet (25 mg total) by mouth daily. 90 tablet 3   ? metoprolol succinate (TOPROL-XL) 25 MG Take 0.5 tablets (12.5 mg total) by mouth daily. 45 tablet 3   ? nitroglycerin (NITROSTAT) 0.4 MG SL tablet Place 1 tablet (0.4 mg total) under the tongue every 5 (five) minutes as needed for chest pain. 30 tablet 2   ? OMEGA-3/DHA/EPA/FISH OIL (FISH OIL-OMEGA-3 FATTY ACIDS) 300-1,000 mg capsule Take 1 g by mouth at bedtime.      ? predniSONE (DELTASONE) 2.5 MG tablet Take 1 tablet (2.5 mg total) by mouth daily. 90 tablet 0   ? ticagrelor (BRILINTA) 90 mg Tab Take 1 tablet (90 mg total) by mouth 2 (two) times a day. 180 tablet 3     No current facility-administered medications for this visit.       No Known Allergies      Lab Results    Chemistry CBC BNP   Lab Results   Component Value Date    CREATININE 0.71 07/28/2017    BUN 15 07/28/2017     07/28/2017    K 4.0 07/28/2017     (H) 07/28/2017    CO2 26 07/28/2017     Creatinine (mg/dL)   Date Value   07/28/2017 0.71   07/25/2017 0.69   07/20/2017 0.73   07/19/2017 0.58 (L)    Lab Results   Component Value Date    WBC 9.4 07/28/2017    HGB 14.2 07/28/2017    HCT 42.1 07/28/2017    MCV 93 07/28/2017      07/30/2017    Lab Results   Component Value Date     (H) 07/28/2017     BNP (pg/mL)   Date Value   07/28/2017 742 (H)              Rupinder West [facility]   Heart Failure Clinic

## 2021-06-25 NOTE — PROGRESS NOTES
Progress Notes by Rupinder West CNP at 10/4/2017  2:50 PM     Author: Rupinder West CNP Service: -- Author Type: Nurse Practitioner    Filed: 10/4/2017  3:29 PM Encounter Date: 10/4/2017 Status: Signed    : Rupinder West CNP (Nurse Practitioner)           Click to link to [facility] HEART CARE NOTE      Assessment/Recommendations   Assessment:    1. Ischemic cardiomyopathy, normalization of ejection fraction to 70%: Yamilex has no symptoms of acute heart failure.  She has chronic shortness of breath which is stable.  She would like to decrease her Lasix.    2. STEMI: Status post 2 drug-eluting stents to ostial mid LAD on July 18, 2017.  She denies any chest pain.  We again discussed the importance of ticagrelor and aspirin.      Plan:  1.  Decrease Lasix to every other day.  We reviewed symptoms of fluid retention to monitor for, and she will call with any concerns.  2.  Continue low-sodium diet and daily weights.    Yamilex Keller will follow up in the heart failure clinic in 1 month.     History of Present Illness    Ms. Yamilex Keller is a 88 y.o. female seen at [facility] heart failure clinic today for continued follow-up.  She was hospitalized July 18 - July 20 with a STEMI.  She received 2 drug-eluting stents to ostial and mid LAD.  She was rehospitalized July 28 - July 30 with shortness of breath and chest pain.  She was diagnosed with acute heart failure.  She had a stress test on September 14 which was negative for inducible ischemia and showed improvement of her ejection fraction to 70%.  Previous ejection fraction was 35-40%.  Her past medical history is also significant for hypertension, dyslipidemia, and temporal arteritis.    She was hospitalized September 15 - September 16 with angioedema.  This was thought to be related to either losartan or Tylenol.  Both have been added to her allergy list.  Swelling in her tongue has  resolved.    She denies any symptoms of acute heart failure.  She has been under stress due to her  recently having a stroke.  He is currently in TCU.  Yamilex has chronic shortness of breath but denies any worsening.   She denies lightheadedness, orthopnea, chest pain, abdominal fullness/bloating and lower extremity edema.      Her weight has decreased about 6 pounds due to stress and decreased appetite with her  being sick.  Home weight is now 133 pounds.         Physical Examination Review of Systems   Vitals:    10/04/17 1447   BP: 120/66   Pulse: 62   Resp: 16   SpO2: 95%     Body mass index is 24.09 kg/(m^2).  Wt Readings from Last 3 Encounters:   10/04/17 136 lb (61.7 kg)   10/04/17 135 lb (61.2 kg)   09/15/17 138 lb 12.8 oz (63 kg)       General Appearance:     Alert, cooperative and in no acute distress.   ENT/Mouth: membranes moist, no oral lesions or bleeding gums.      EYES:  no scleral icterus, normal conjunctivae   Chest/Lungs:   lungs are clear to auscultation, no rales or wheezing, respirations unlabored   Cardiovascular:   Regular. Normal first and second heart sounds with no murmurs, rubs, or gallops; the radial and posterior tibial pulses are intact, no edema bilateral lower extremities    Abdomen:  Soft, nontender, nondistended, bowel sounds present   Extremities: no cyanosis or clubbing   Skin: warm, dry.    Neurologic: mood and affect are appropriate, alert and oriented x3      General: WNL  Eyes: WNL  Ears/Nose/Throat: WNL  Lungs: WNL  Heart: Shortness of Breath with activity  Stomach: WNL  Bladder: WNL  Muscle/Joints: WNL  Skin: Poor Wound Healing  Nervous System: WNL  Mental Health: WNL     Blood: WNL     Medical History  Surgical History Family History Social History   Past Medical History:   Diagnosis Date   ? Acute ST elevation myocardial infarction (STEMI) involving left anterior descending coronary artery 7/18/2017   ? Basal Cell Carcinoma Of The Skin    ? Benign  Adenomatous Polyp Of The Large Intestine     Yady Escamilla: colon @ [place]  '06--recheck 5    ? Chronic pain of right knee 10/19/2016   ? Coronary artery disease    ? Hyperlipidemia    ? Osteoporosis    ? Pneumonia    ? Temporal arteritis     Past Surgical History:   Procedure Laterality Date   ? CORONARY ANGIOPLASTY WITH STENT PLACEMENT  //   ? [place] CATH PLACEMENT & NJX CORONARY ART ANGIO IMG S&I N/A 7/18/2017    Procedure: Coronary Angiogram;  Surgeon: Shelia Garcia MD;  Location: [facility] Cath Lab;  Service: Cardiology   ? VT CATH PLMT L HRT & ARTS W/NJX & ANGIO IMG S&I Left 7/18/2017    Procedure: Left Heart Catheterization Without Left Ventriculogram;  Surgeon: Shelia Garcia MD;  Location: [facility] Cath Lab;  Service: Cardiology   ? [place] TEMPORAL ARTERY LIGATN OR BX Bilateral 10/7/2015    Procedure: BILATERAL TEMPORAL ARTERY BIOPSY;  Surgeon: Alfredito Jason MD;  Location: [facility] OR;  Service: General   ? TONSILLECTOMY      Family History   Problem Relation Age of Onset   ? Pacemaker Brother     Social History     Social History   ? Marital status:      Spouse name: Aris   ? Number of children: N/A   ? Years of education: N/A     Occupational History   ? Not on file.     Social History Main Topics   ? Smoking status: Former Smoker     Packs/day: 1.00     Years: 4.00     Types: Cigarettes     Quit date: 1/1/1953   ? Smokeless tobacco: Never Used      Comment: Quit 65 yrs   ? Alcohol use Yes      Comment: Occassionally   ? Drug use: No   ? Sexual activity: Not Currently     Partners: Male     Other Topics Concern   ? Not on file     Social History Narrative    Lives with her  - winters in [place]          Medications  Allergies   Current Outpatient Prescriptions   Medication Sig Dispense Refill   ? alendronate (FOSAMAX) 70 MG tablet Take 70 mg by mouth every 7 days. Take in the morning on an empty stomach with a full glass of water 30 minutes before food, usually takes on Saturdays      ? aspirin 81 MG EC tablet Take 81 mg by mouth at bedtime.      ? atorvastatin (LIPITOR) 80 MG tablet Take 1 tablet (80 mg total) by mouth at bedtime. 90 tablet 3   ? CALCIUM CARBONATE/VITAMIN D3 (CALCIUM WITH VITAMIN D ORAL) Take 2 tablets by mouth at bedtime.      ? furosemide (LASIX) 20 MG tablet Take 1 tablet (20 mg total) by mouth every other day. 90 tablet 3   ? metoprolol succinate (TOPROL-XL) 25 MG Take 0.5 tablets (12.5 mg total) by mouth daily. 45 tablet 3   ? nitroglycerin (NITROSTAT) 0.4 MG SL tablet Place 1 tablet (0.4 mg total) under the tongue every 5 (five) minutes as needed for chest pain. 30 tablet 2   ? OMEGA-3/DHA/EPA/FISH OIL (FISH OIL-OMEGA-3 FATTY ACIDS) 300-1,000 mg capsule Take 1 g by mouth at bedtime.      ? predniSONE (DELTASONE) 2.5 MG tablet TAKE 1 TABLET DAILY (Patient taking differently: Takes every other day) 30 tablet 0   ? ticagrelor (BRILINTA) 90 mg Tab Take 1 tablet (90 mg total) by mouth 2 (two) times a day. 180 tablet 3     No current facility-administered medications for this visit.       Allergies   Allergen Reactions   ? Losartan Angiodema   ? Tylenol [Acetaminophen] Other (See Comments)     Lip swelling, tongue selling, patient taking both losartan and tylenol (per patient she took tylenol for first time) while she developed these symptoms. Unclear whether tylenol contributed to these symptoms or not.         Lab Results    Chemistry CBC BNP   Lab Results   Component Value Date    CREATININE 0.71 09/16/2017    BUN 17 09/16/2017     09/16/2017    K 3.6 09/16/2017     09/16/2017    CO2 25 09/16/2017     Creatinine (mg/dL)   Date Value   09/16/2017 0.71   09/15/2017 0.83   09/11/2017 0.85   08/16/2017 1.05    Lab Results   Component Value Date    WBC 11.9 (H) 09/16/2017    HGB 12.8 09/16/2017    HCT 37.7 09/16/2017    MCV 91 09/16/2017     09/16/2017    Lab Results   Component Value Date     (H) 07/28/2017     BNP (pg/mL)   Date Value   07/28/2017 742  (H)          20 minutes were spent with the patient with greater than 50% spent on education and counseling.      Rupinder West, [facility]   Heart Failure Clinic

## 2021-06-25 NOTE — PROGRESS NOTES
Progress Notes by Rupinder West CNP at 11/6/2017  1:30 PM     Author: Rupinder West CNP Service: -- Author Type: Nurse Practitioner    Filed: 11/6/2017  2:05 PM Encounter Date: 11/6/2017 Status: Signed    : Rupinder West CNP (Nurse Practitioner)           Click to link to [facility] HEART [facility] NOTE      Assessment/Recommendations   Assessment:    1. Ischemic cardiomyopathy with normalization of EF to 70%: She has no symptoms of fluid retention.  She increased lasix back to daily dosing due to lower extremity edema on every other day dosing.     2. STEMI: 2 NIKKI to ostial and mid LAD in July 2017.  She denies any chest pain.  We again discussed the importance of aspirin and brilinta.     Plan:  1.  Continue current medications.     Yamilex Keller will follow up with Dr. Miranda on December 6.     History of Present Illness    Ms. Yamilex Keller is a 88 y.o. female seen at [facility] heart failure clinic today for continued follow-up.   She was hospitalized July 18 - July 20 with a STEMI.  She received 2 drug-eluting stents to ostial and mid LAD.  She was rehospitalized July 28 - July 30 with shortness of breath and chest pain.  She was diagnosed with acute heart failure.  She had a stress test on September 14 which was negative for inducible ischemia and showed improvement of her ejection fraction to 70%.  Previous ejection fraction was 35-40%.  Her past medical history is also significant for hypertension, dyslipidemia, and temporal arteritis.    During the last clinic visit, her lasix was decreased to every other day.  She noted increased swelling in her ankles with this change so increased lasix back to 20 mg daily.  Swelling has resolved. She continues to feel fatigued but denies any worsening.  She denies lightheadedness, shortness of breath and chest pain.       Physical Examination Review of Systems   Vitals:    11/06/17 1332   BP:  122/56   Pulse: 64   Resp: 16     Body mass index is 23.1 kg/(m^2).  Wt Readings from Last 3 Encounters:   11/06/17 134 lb 9.6 oz (61.1 kg)   10/04/17 136 lb (61.7 kg)   10/04/17 135 lb (61.2 kg)       General Appearance:     Alert, cooperative and in no acute distress.   ENT/Mouth: membranes moist, no oral lesions or bleeding gums.      EYES:  no scleral icterus, normal conjunctivae   Chest/Lungs:   lungs are clear to auscultation, no rales or wheezing, respirations unlabored   Cardiovascular:   Regular. Normal first and second heart sounds with no murmurs, rubs, or gallops; no edema bilateral lower extremities    Abdomen:  Soft, nontender, nondistended, bowel sounds present   Extremities: no cyanosis or clubbing   Skin: warm, dry.    Neurologic: mood and affect are appropriate, alert and oriented x3      General: WNL  Eyes: WNL  Ears/Nose/Throat: WNL  Lungs: Shortness of Breath  Heart: WNL  Stomach: Constipation  Bladder: WNL  Muscle/Joints: WNL  Skin: WNL  Nervous System: WNL  Mental Health: WNL     Blood: WNL     Medical History  Surgical History Family History Social History   Past Medical History:   Diagnosis Date   ? Acute ST elevation myocardial infarction (STEMI) involving left anterior descending coronary artery 7/18/2017   ? Basal Cell Carcinoma Of The Skin    ? Benign Adenomatous Polyp Of The Large Intestine     Yady Escamilla: colon @ [place]  '06--recheck 5    ? Chronic pain of right knee 10/19/2016   ? Coronary artery disease    ? Hyperlipidemia    ? Osteoporosis    ? Pneumonia    ? Temporal arteritis     Past Surgical History:   Procedure Laterality Date   ? CORONARY ANGIOPLASTY WITH STENT PLACEMENT  //   ? [place] CATH PLACEMENT & NJX CORONARY ART ANGIO IMG S&I N/A 7/18/2017    Procedure: Coronary Angiogram;  Surgeon: Shelia Garcia MD;  Location: [facility] Cath Lab;  Service: Cardiology   ? MD CATH PLMT L HRT & ARTS W/NJX & ANGIO IMG S&I Left 7/18/2017    Procedure: Left Heart Catheterization Without  Left Ventriculogram;  Surgeon: Shelia Garcia MD;  Location: [facility] Cath Lab;  Service: Cardiology   ? [place] TEMPORAL ARTERY LIGATN OR BX Bilateral 10/7/2015    Procedure: BILATERAL TEMPORAL ARTERY BIOPSY;  Surgeon: Alfredito Jason MD;  Location: [facility] Main OR;  Service: General   ? TONSILLECTOMY      Family History   Problem Relation Age of Onset   ? Pacemaker Brother     Social History     Social History   ? Marital status:      Spouse name: Aris   ? Number of children: N/A   ? Years of education: N/A     Occupational History   ? Not on file.     Social History Main Topics   ? Smoking status: Former Smoker     Packs/day: 1.00     Years: 4.00     Types: Cigarettes     Quit date: 1/1/1953   ? Smokeless tobacco: Never Used      Comment: Quit 65 yrs   ? Alcohol use Yes      Comment: Occassionally   ? Drug use: No   ? Sexual activity: Not Currently     Partners: Male     Other Topics Concern   ? Not on file     Social History Narrative    Lives with her  - [place] in [place]          Medications  Allergies   Current Outpatient Prescriptions   Medication Sig Dispense Refill   ? alendronate (FOSAMAX) 70 MG tablet Take 70 mg by mouth every 7 days. Take in the morning on an empty stomach with a full glass of water 30 minutes before food, usually takes on Saturdays     ? aspirin 81 MG EC tablet Take 81 mg by mouth at bedtime.      ? atorvastatin (LIPITOR) 80 MG tablet Take 1 tablet (80 mg total) by mouth at bedtime. 90 tablet 3   ? CALCIUM CARBONATE/VITAMIN D3 (CALCIUM WITH VITAMIN D ORAL) Take 2 tablets by mouth at bedtime.      ? furosemide (LASIX) 20 MG tablet Take 1 tablet (20 mg total) by mouth daily. 90 tablet 3   ? metoprolol succinate (TOPROL-XL) 25 MG Take 0.5 tablets (12.5 mg total) by mouth daily. 45 tablet 3   ? nitroglycerin (NITROSTAT) 0.4 MG SL tablet Place 1 tablet (0.4 mg total) under the tongue every 5 (five) minutes as needed for chest pain. 30 tablet 2   ? OMEGA-3/DHA/EPA/FISH OIL  (FISH OIL-OMEGA-3 FATTY ACIDS) 300-1,000 mg capsule Take 1 g by mouth at bedtime.      ? predniSONE (DELTASONE) 2.5 MG tablet TAKE 1 TABLET DAILY (Patient taking differently: Takes every other day) 30 tablet 0   ? ticagrelor (BRILINTA) 90 mg Tab Take 1 tablet (90 mg total) by mouth 2 (two) times a day. 180 tablet 3     No current facility-administered medications for this visit.       Allergies   Allergen Reactions   ? Losartan Angiodema   ? Tylenol [Acetaminophen] Other (See Comments)     Lip swelling, tongue selling, patient taking both losartan and tylenol (per patient she took tylenol for first time) while she developed these symptoms. Unclear whether tylenol contributed to these symptoms or not.         Lab Results    Chemistry CBC BNP   Lab Results   Component Value Date    CREATININE 0.71 09/16/2017    BUN 17 09/16/2017     09/16/2017    K 3.6 09/16/2017     09/16/2017    CO2 25 09/16/2017     Creatinine (mg/dL)   Date Value   09/16/2017 0.71   09/15/2017 0.83   09/11/2017 0.85   08/16/2017 1.05    Lab Results   Component Value Date    WBC 11.9 (H) 09/16/2017    HGB 12.8 09/16/2017    HCT 37.7 09/16/2017    MCV 91 09/16/2017     09/16/2017    Lab Results   Component Value Date     (H) 07/28/2017     BNP (pg/mL)   Date Value   07/28/2017 742 (H)              Rupinder West, [facility]   Heart Failure Clinic

## 2021-06-26 NOTE — TELEPHONE ENCOUNTER
Called and spoke with patients  who stated they called back and got the results. Appointments have been scheduled and would like copy of results in mail

## 2021-06-26 NOTE — TELEPHONE ENCOUNTER
Please call patient -    ______________________________________________________________________     Home phone:  [phone] (home)     Cell phone:   Telephone Information:   Mobile [phone]       Other contacts:  Name Home Phone Work Phone Mobile Phone Relationship Lgl GrCLAUDIA Alfonso   [phone] Spouse    ANDRES ALANIS   [phone] Child      ______________________________________________________________________     Can also discuss with .    Your kidney tests are normal.  Your electrolytes are also normal.  There is no signs of diabetes.  Your liver tests are normal.      Your blood counts are normal and you are not anemic.     Your heart tests were very good.     Your thyroid tests are excellent.     I would recommend a stress test to look at the blood flow to her heart closer.    To schedule your appointment for this, please call the heart clinic at [phone].     I placed the order.    Michael Arzate MD  [facility]  6/18/2021, 7:53 AM     ______________________________________________________________________    Recent Results (from the past 240 hour(s))   Comprehensive Metabolic Panel   Result Value Ref Range    Sodium 144 136 - 145 mmol/L    Potassium 4.6 3.5 - 5.0 mmol/L    Chloride 108 (H) 98 - 107 mmol/L    CO2 24 22 - 31 mmol/L    Anion Gap, Calculation 12 5 - 18 mmol/L    Glucose 69 (L) 70 - 125 mg/dL    BUN 21 8 - 28 mg/dL    Creatinine 0.77 0.60 - 1.10 mg/dL    GFR MDRD Af Amer >60 >60 mL/min/1.73m2    GFR MDRD Non Af Amer >60 >60 mL/min/1.73m2    Bilirubin, Total 0.4 0.0 - 1.0 mg/dL    Calcium 9.6 8.5 - 10.5 mg/dL    Protein, Total 6.6 6.0 - 8.0 g/dL    Albumin 3.9 3.5 - 5.0 g/dL    Alkaline Phosphatase 25 (L) 45 - 120 U/L    AST 16 0 - 40 U/L    ALT 10 0 - 45 U/L   BNP(B-type Natriuretic Peptide)   Result Value Ref Range     0 - 167 pg/mL   HM2(CBC w/o Differential)   Result Value Ref Range    WBC 7.4 4.0 - 11.0 thou/uL    RBC 4.25 3.80 - 5.40  mill/uL    Hemoglobin 13.7 12.0 - 16.0 g/dL    Hematocrit 41.7 35.0 - 47.0 %    MCV 98 80 - 100 fL    MCH 32.2 27.0 - 34.0 pg    MCHC 32.9 32.0 - 36.0 g/dL    RDW 13.0 11.0 - 14.5 %    Platelets 212 140 - 440 thou/uL    MPV 9.1 7.0 - 10.0 fL   Thyroid Stimulating Hormone (TSH)   Result Value Ref Range    TSH 1.20 0.30 - 5.00 uIU/mL       ______________________________________________________________________

## 2021-06-26 NOTE — PATIENT INSTRUCTIONS - HE
Please follow up if you have any further issues.    You may contact me by phone or MyChart if you are worsening or if things are not improving.    ______________________________________________________________________     Please remember that you can call [phone] to schedule an appointment.     You can schedule appointments 24 hours a day, 7 days a week.  Sometimes the best time to schedule an appointment is after clinic hours when less people are calling in.  Weekends are another option for calling in to schedule appointments.

## 2021-06-28 ENCOUNTER — HOSPITAL ENCOUNTER (OUTPATIENT)
Dept: CARDIOLOGY | Facility: HOSPITAL | Age: 86
Discharge: HOME | End: 2021-06-28
Attending: INTERNAL MEDICINE
Payer: MEDICARE

## 2021-06-28 ENCOUNTER — HOSPITAL ENCOUNTER (OUTPATIENT)
Dept: NUCLEAR MEDICINE | Facility: HOSPITAL | Age: 86
Discharge: HOME | End: 2021-06-28
Attending: INTERNAL MEDICINE
Payer: MEDICARE

## 2021-06-28 ENCOUNTER — COMMUNICATION - HEALTHEAST (OUTPATIENT)
Dept: INTERNAL MEDICINE | Facility: CLINIC | Age: 86
End: 2021-06-28

## 2021-06-28 DIAGNOSIS — I25.10 CORONARY ARTERY DISEASE INVOLVING NATIVE CORONARY ARTERY OF NATIVE HEART WITHOUT ANGINA PECTORIS: ICD-10-CM

## 2021-06-28 DIAGNOSIS — R06.00 DYSPNEA, UNSPECIFIED TYPE: ICD-10-CM

## 2021-06-28 LAB
CV STRESS CURRENT BP HE: NORMAL
CV STRESS CURRENT HR HE: 59
CV STRESS CURRENT HR HE: 62
CV STRESS CURRENT HR HE: 85
CV STRESS CURRENT HR HE: 87
CV STRESS CURRENT HR HE: 88
CV STRESS CURRENT HR HE: 89
CV STRESS CURRENT HR HE: 91
CV STRESS CURRENT HR HE: 92
CV STRESS CURRENT HR HE: 92
CV STRESS CURRENT HR HE: 93
CV STRESS CURRENT HR HE: 94
CV STRESS DEVIATION TIME HE: NORMAL
CV STRESS ECHO PERCENT HR HE: NORMAL
CV STRESS EXERCISE STAGE HE: NORMAL
CV STRESS FINAL RESTING BP HE: NORMAL
CV STRESS FINAL RESTING HR HE: 88
CV STRESS MAX HR HE: 94
CV STRESS MAX TREADMILL GRADE HE: 0
CV STRESS MAX TREADMILL SPEED HE: 0
CV STRESS PEAK DIA BP HE: NORMAL
CV STRESS PEAK SYS BP HE: NORMAL
CV STRESS PHASE HE: NORMAL
CV STRESS PROTOCOL HE: NORMAL
CV STRESS RESTING PT POSITION HE: NORMAL
CV STRESS ST DEVIATION AMOUNT HE: NORMAL
CV STRESS ST DEVIATION ELEVATION HE: NORMAL
CV STRESS ST EVELATION AMOUNT HE: NORMAL
CV STRESS TEST TYPE HE: NORMAL
CV STRESS TOTAL STAGE TIME MIN 1 HE: NORMAL
NUC STRESS EJECTION FRACTION: 65 %
RATE PRESSURE PRODUCT: NORMAL
STRESS ECHO BASELINE DIASTOLIC HE: 65
STRESS ECHO BASELINE HR: 54
STRESS ECHO BASELINE SYSTOLIC BP: 147
STRESS ECHO CALCULATED PERCENT HR: 73 %
STRESS ECHO LAST STRESS DIASTOLIC BP: 86
STRESS ECHO LAST STRESS HR: 91
STRESS ECHO LAST STRESS SYSTOLIC BP: 199
STRESS ECHO TARGET HR: 128

## 2021-06-28 NOTE — PROGRESS NOTES
Progress Notes by Michael Arzate MD at 3/17/2020 10:30 AM     Author: Michael Arzate MD Service: -- Author Type: Physician    Filed: 3/19/2020  8:12 AM Encounter Date: 3/17/2020 Status: Signed    : Michael Arzate MD (Physician)              [facility]    Comprehensive and complex medical care - Chronic disease management - Shared decision making - Care coordination - Compassionate care    Patient advocacy - Rational deprescribing - Minimally disruptive medicine - Ethical focus - Customized care          Date of Service: 3/17/2020  Primary Provider: Michael Arzate MD    Patient Care Team:  Michael Arzate MD as PCP - General (Internal Medicine)  Ana Govea MBBS as Physician (Rheumatology)  Henny Miranda MD as Physician (Cardiology)  Rocío Sainz NP as Nurse Practitioner (Nurse Practitioner)  Simon Kaur MD as Physician (Dermatology)  Michael Arzate MD as Assigned PCP     ______________________________________________________________________     Patient's Pharmacy:    [id number] - [facility] [address] [facility]  [address] [place] [address]  Phone: [phone] Fax: [phone]    [facility] - [place] [address] [facility] [address]  Phone: [phone] Fax: [phone]     Patient's Contacts:  Name Home Phone Work Phone Mobile Phone Relationship Lgl CLAUDIA Gonzales [phone] Spouse    ANDRES ALANIS   [phone] Child          Patient's Insurance:    Payor: MEDICARE / Plan: MEDICARE A AND B / Product Type: Medicare /           Yamilex Keller is a 89 y.o. female who comes in today for:    Chief Complaint   Patient presents with   ? Rash     on back for shingles, was given a few bottles of itching cream for it so is unsure if should use it   ? Medication Questions     is it okay to take triamcinolone cream on back        Active Problem List:  Problem List as of 3/17/2020 Reviewed: 1/9/2020 11:21 PM by Michael Arzate MD       High    Full code status    CAD (coronary artery disease)       Medium    Bilateral primary osteoarthritis of knee    HTN (hypertension)       Low    Angioedema    GERD (gastroesophageal reflux disease)    Hearing loss    HLD (hyperlipidemia)    Osteoporosis       Unprioritized    Atypical chest pain    Chronic pain of right knee    High risk medication use    Senile purpura (H)    Uses walker           Current Outpatient Medications   Medication Sig   ? aspirin 81 MG EC tablet Take 1 tablet (81 mg total) by mouth at bedtime. For CAD   ? atorvastatin (LIPITOR) 80 MG tablet TAKE 1 TABLET AT BEDTIME FOR CORONARY ARTERY DISEASE AND LIPIDS   ? calcium-vitamin D 500 mg(1,250mg) -200 unit per tablet Take 1 tablet by mouth 2 (two) times a day with meals.   ? isosorbide mononitrate (IMDUR) 30 MG 24 hr tablet Take 1 tablet (30 mg total) by mouth every morning.   ? nitroglycerin (NITROSTAT) 0.4 MG SL tablet DISSOLVE 1 TABLET UNDER THE TONGUE EVERY 5 MINUTES AS NEEDED FOR CHEST PAIN   ? OMEGA-3/DHA/EPA/FISH OIL (FISH OIL-OMEGA-3 FATTY ACIDS) 300-1,000 mg capsule Take 1 g by mouth at bedtime.    ? pantoprazole (PROTONIX) 40 MG tablet Take 1 tablet (40 mg total) by mouth daily. Take for 2 months and then stop.   ? triamcinolone (KENALOG) 0.1 % cream Apply topically 2 (two) times a day.       Social History     Social History Narrative    Lives with her  - cantu in [place]         to  Aris.  1 daughter.         Subjective:     Roomed by: rosa maria leija    Refills needed? No    Do you have any forms that need to be filled out? No       Accompanied by  at today's visit.       Comes in today for follow up multiple issues.    First reviewed her left shingles (herpes zoster) on her low back and buttock.  This cropped up after our visit.  No ongoing symptoms related to this and no pain at this  time.    Has a rash on her upper back and other places that are itchy.  She has noticed this in last month.  Has a dermatologist who she has seen in the recent past as well.    We reviewed her coronary artery disease (CAD) and she has had no issues with angina symptoms.     Her right knee has continued to bother her and affect her gait.  She has not use the diclofenac (Voltaren) gel for this yet.    We reviewed her other issues noted in the assessment but not specifically addressed in the HPI above.     On review of systems, the patient denies any chest pain or shortness of breath.    Objective:     Wt Readings from Last 3 Encounters:   03/17/20 121 lb (54.9 kg)   01/09/20 122 lb (55.3 kg)   01/04/20 121 lb 11.2 oz (55.2 kg)       BP Readings from Last 3 Encounters:   03/17/20 132/80   01/09/20 126/64   01/04/20 129/59       /80   Pulse 77   Wt 121 lb (54.9 kg)   SpO2 95%   BMI 21.43 kg/m     The patient is comfortable, no acute distress.  Mood good.  Insight fair.   Eyes are nonicteric.  Neck is supple without mass.  No cervical adenopathy.  No thyromegaly. Heart regular rate and rhythm.  Lungs clear to auscultation bilaterally.  Respiratory effort is good.  Abdomen soft and nontender.  No hepatosplenomegaly.  Extremities no edema.  The skin shows nonspecific dermatitis in the upper back.  There is definite healing shingles (herpes zoster) on the left upper buttock.      Diagnostics:     Results for orders placed or performed during the hospital encounter of 01/04/20   HM2 (CBC W/O DIFF)   Result Value Ref Range    WBC 8.8 4.0 - 11.0 thou/uL    RBC 5.02 3.80 - 5.40 mill/uL    Hemoglobin 15.4 12.0 - 16.0 g/dL    Hematocrit 46.9 35.0 - 47.0 %    MCV 93 80 - 100 fL    MCH 30.7 27.0 - 34.0 pg    MCHC 32.8 32.0 - 36.0 g/dL    RDW 13.9 11.0 - 14.5 %    Platelets 236 140 - 440 thou/uL    MPV 8.6 8.5 - 12.5 fL   Basic Metabolic Panel   Result Value Ref Range    Sodium 143 136 - 145 mmol/L    Potassium 3.9 3.5 -  5.0 mmol/L    Chloride 106 98 - 107 mmol/L    CO2 28 22 - 31 mmol/L    Anion Gap, Calculation 9 5 - 18 mmol/L    Glucose 97 70 - 125 mg/dL    Calcium 10.1 8.5 - 10.5 mg/dL    BUN 17 8 - 28 mg/dL    Creatinine 0.74 0.60 - 1.10 mg/dL    GFR MDRD Af Amer >60 >60 mL/min/1.73m2    GFR MDRD Non Af Amer >60 >60 mL/min/1.73m2   Troponin I   Result Value Ref Range    Troponin I <0.01 0.00 - 0.29 ng/mL   INR   Result Value Ref Range    INR 0.95 0.90 - 1.10   APTT   Result Value Ref Range    PTT 28 24 - 37 seconds   BNP(B-type Natriuretic Peptide)   Result Value Ref Range    BNP 29 0 - 167 pg/mL   Troponin I   Result Value Ref Range    Troponin I 0.01 0.00 - 0.29 ng/mL   Troponin I   Result Value Ref Range    Troponin I <0.01 0.00 - 0.29 ng/mL   ECG 12 lead with MUSE   Result Value Ref Range    SYSTOLIC BLOOD PRESSURE      DIASTOLIC BLOOD PRESSURE      VENTRICULAR RATE 82 BPM    ATRIAL RATE 82 BPM    P-R INTERVAL 174 ms    QRS DURATION 84 ms    Q-T INTERVAL 354 ms    QTC CALCULATION (BEZET) 413 ms    P Axis 80 degrees    R AXIS -25 degrees    T AXIS 41 degrees    MUSE DIAGNOSIS       Normal sinus rhythm  Normal ECG  When compared with ECG of 10-SEP-2018 14:10,  No significant change was found  Confirmed by SEE ED PROVIDER NOTE FOR, ECG INTERPRETATION ([id number]),  Efrem Topete ([id number]) on 1/14/2020 7:00:28 PM     Echo Complete   Result Value Ref Range    LV volume diastolic 39.9 (!) 46 - 106 cm3    LV volume systolic 15 14 - 42 cm3    HR 77 bpm    IVSd 1.14 (!) 0.6 - 0.9 cm    LVIDd 3.89 3.8 - 5.2 cm    LVIDs 1.92 2.2 - 3.5 cm    LVOT diam 1.6 cm    LVOT mean gradient 1 mmHg    LVOT peak VTI 17.6 cm    LVOT mean brissa 48.4 cm/s    LVOT peak brissa 71.7 cm/s    LVOT peak gradient 2 mmHg    LV PWd 1.24 (!) 0.6 - 0.9 cm    MV E' lat brissa 9.19 cm/s    MV E' med brissa 6.19 cm/s    AV mean brissa 103 cm/s    AV mean gradient 5 mmHg    AV VTI 34.8 cm    AV peak brissa 167 cm/s    AO root 3 cm    LA size 2.9 cm    LA length 4.6 cm    MV  decel slope 2,540 mm/s2    MV decel time 313 ms    MV P 1/2 time 92 ms    MV peak A brissa 109 cm/s    MV peak E brissa 79.5 cm/s    MS peak brissa 146 cm/s    MS peak gradient 9 mmHg    TR peak brissa 262 cm/s    LA area 2 12.6 cm2    LA area 1 15.8 cm2    BSA 1.56 m2    Hieght 63 in    Weight 1,947.2 lbs    /70 mmHg    IVS/PW ratio 0.9     TR peak gradent 27.5 mmHg    LV FS 50.6 28 - 44 %    Echo LVEF calculated 62 55 - 75 %    LA volume 36.8 mL    LV mass 156.7 g    AV area 1.0 cm2    AV DIM IND brissa 0.4     MV area p 1/2 time 2.4 cm2    MV E/A Ratio 0.7     LVOT area 2.01 cm2    LVOT SV 35.4 cm3    AV peak gradient 11.2 mmHg    LV systolic volume index 9.6 8 - 24 cm3/m2    LV diastolic volume index 25.6 29 - 61 cm3/m2    LA volume index 23.6 mL/m2    LV mass index 100.5 g/m2    LV SVi 22.7 ml/m2    TAPSE 1.9 cm    MV med E/e' ratio 12.8     MV lat E/e' ratio 8.7     LV CO 2.7 l/min    LV Ci 1.7 l/min/m2    Height 63.0 in    Weight 122 lbs    MV Avg E/e' Ratio 10.3 cm/s    AV DIM IND VTI 0.5     Echo LVEF Estimated 65 %       Assessment:     1. Herpes zoster without complication    2. Primary osteoarthritis of right knee    3. Dermatitis    4. Coronary artery disease involving native coronary artery of native heart without angina pectoris        Quality review:     PHQ-2 Total Score: 0 (10/7/2019 11:00 AM)    No data recorded  ______________________________________________________________________     BMI Readings from Last 1 Encounters:   03/17/20 21.43 kg/m          Plan:     1. Use triamcinolone (Kenalog) for dermatitis of the skin.  2. See dermatology if not improving in 2 weeks.  3. Diclofenac (Voltaren) gel for the knee.  4. Otherwise stable.         Michael Arzate MD  General Internal Medicine  [facility]    Personal office fax - [phone]   Voice mail - [phone]  E-mail - [email or web address]     No follow-ups on file.     Future Appointments   Date Time Provider Department  Center   4/2/2020  2:45 PM [facility] ENDOCRINOLOGY [facility]   5/12/2020 10:55 AM Michael Arzate MD [facility]   9/22/2020  1:20 PM [facility] LAB [facility]   9/29/2020  1:00 PM Rocío Sainz NP [facility]         ______________________________________________________________________     Relevant ICD-10 codes and order associations:      ICD-10-CM    1. Herpes zoster without complication  B02.9    2. Primary osteoarthritis of right knee  M17.11    3. Dermatitis  L30.9    4. Coronary artery disease involving native coronary artery of native heart without angina pectoris  I25.10

## 2021-06-28 NOTE — PROGRESS NOTES
Progress Notes by Michael Arzate MD at 10/24/2019  2:05 PM     Author: Michael Arzate MD Service: -- Author Type: Physician    Filed: 10/24/2019  6:44 PM Encounter Date: 10/24/2019 Status: Signed    : Michael Arzate MD (Physician)            [facility]  [facility]       Comprehensive and complex medical care - Chronic disease management - Shared decision making - Care coordination - Compassionate care    Patient advocacy - Rational deprescribing - Minimally disruptive medicine - Ethical focus - Customized care          Date of Service: 10/24/2019  Primary Provider: Michael Arzate MD    Patient Care Team:  Michael Arzate MD as PCP - General (Internal Medicine)  Mulu Silveira CNP as Assigned PCP  Ana Govea MBBS as Physician (Rheumatology)  Henny Miranda MD as Physician (Cardiology)  Rocío Sainz NP as Nurse Practitioner (Nurse Practitioner)  Simon Kaur MD as Physician (Dermatology)     ______________________________________________________________________     Patient's Pharmacy:    [facility] - [place], [place] - [address] [facility]  [address] [facility] [address]  Phone: [phone] Fax: [phone]    [facility] - [place], [place] - [address] [place], [place] [address] [facility] [address]  Phone: [phone] Fax: [phone]     Patient's Contacts:  Name Home Phone Work Phone Mobile Phone Relationship Lgl CLAUDIA Gonzales [phone] Spouse    ANDRES ALANIS   [phone] Child      Patient's Insurance:    Payor: MEDICARE / Plan: MEDICARE A AND B / Product Type: Medicare /           Yamilex Keller is a 89 y.o. female who comes in today for:    Chief Complaint   Patient presents with   ? Suture / Staple Removal     5 removal mid left forearm   ? Test Results     bone scan   ? Follow-up     still acting up like she has Temporal arteritis       Active Problem  List:  Problem List as of 10/24/2019 Reviewed: 10/24/2019  6:36 PM by Michael Arzate MD       High    Former smoker    CAD (coronary artery disease)       Medium    Bilateral primary osteoarthritis of knee    Closed wedge compression fracture of seventh thoracic vertebra, initial encounter (H)    Last Assessment & Plan 8/1/2018 Office Visit Written 8/1/2018  2:40 PM by Erika Beck FNP     Continue bracing, pain control.  Plan for follow up with endocrinology.  Start Prolia as ordered.         HTN (hypertension)    Temporal arteritis (H), in remission       Low    Angioedema    Hearing loss    HLD (hyperlipidemia)    Osteoporosis       Unprioritized    High risk medication use           Current Outpatient Medications   Medication Sig   ? aspirin 81 MG EC tablet Take 1 tablet (81 mg total) by mouth at bedtime. For CAD   ? atorvastatin (LIPITOR) 80 MG tablet TAKE 1 TABLET AT BEDTIME FOR CORONARY ARTERY DISEASE AND LIPIDS   ? calcium, as carbonate, (OS-MARLYN) 500 mg calcium (1,250 mg) tablet Take 2 tablets by mouth at bedtime.   ? clopidogrel (PLAVIX) 75 mg tablet Take 1 tablet (75 mg total) by mouth daily.   ? isosorbide mononitrate (IMDUR) 30 MG 24 hr tablet Take 1 tablet (30 mg total) by mouth every morning.   ? nitroglycerin (NITROSTAT) 0.4 MG SL tablet DISSOLVE 1 TABLET UNDER THE TONGUE EVERY 5 MINUTES AS NEEDED FOR CHEST PAIN   ? OMEGA-3/DHA/EPA/FISH OIL (FISH OIL-OMEGA-3 FATTY ACIDS) 300-1,000 mg capsule Take 1 g by mouth at bedtime.    ? senna-docusate (PERICOLACE) 8.6-50 mg tablet Take 1 tablet by mouth 2 (two) times a day.     Social History     Patient does not qualify to have social determinant information on file (likely too young).   Social History Narrative    Lives with her  - cantu in [place]         to  Aris.  1 daughter.       Subjective:     Roomed by: rosa maria leija    Accompanied by Spouse    Refills needed? No    Do you have any forms that need to be filled out? No       First  reviewed her osteoarthritis (OA) of her right knee.  Her  recently saw Dr. Beasley at [facility] for this and was happy with the care he received related to his osteoarthritis (OA) of his knee.  He and she would like to have her have an opinion through him.  The last 2 corticosteroid injections through Dr. Ana Govea have not been lasting her very long.    She was in the emergency room (ER) recently for left arm laceration due to a door which hit her forearm.  Did get 5 sutures and it is generally healing.  Minimal discharge from the wound.    Reviewed the patient's osteoporosis and there are no current issues here.  We reviewed the recent results of her bone density scan (DEXA).  She continues on prolia.    She does have some zingers in her right scalp at times on the surface.  Short lived.  Not severe.  Not requiring medication at this time.  She would like to follow up with rheumatology because she has some concerns that this may be due to the temporal arteritis (TA), but recent blood work has been good.    We reviewed her other issues noted in the assessment but not specifically addressed in the HPI above.     On review of systems, the patient denies any chest pain or shortness of breath.    Objective:     Wt Readings from Last 3 Encounters:   10/24/19 125 lb (56.7 kg)   10/13/19 128 lb (58.1 kg)   10/07/19 124 lb (56.2 kg)     BP Readings from Last 3 Encounters:   10/24/19 136/68   10/13/19 (!) 181/92   10/07/19 136/80     /68   Pulse 79   Wt 125 lb (56.7 kg)   SpO2 95%   BMI 22.86 kg/m     The patient is comfortable, no acute distress.  Mood good.  Insight fair.  Eyes are nonicteric.  Neck is supple without mass.  No cervical adenopathy.  No thyromegaly. Heart regular rate and rhythm.  Lungs clear to auscultation bilaterally.  Respiratory effort is good.  Extremities no edema.  Her left forearms shows 5 buried sutures with some separation of the wound but no signs of  infection.  2  Diagnostics:     Results for orders placed or performed in visit on 09/24/19   HM2(CBC w/o Differential)   Result Value Ref Range    WBC 5.9 4.0 - 11.0 thou/uL    RBC 4.71 3.80 - 5.40 mill/uL    Hemoglobin 14.7 12.0 - 16.0 g/dL    Hematocrit 44.0 35.0 - 47.0 %    MCV 93 80 - 100 fL    MCH 31.3 27.0 - 34.0 pg    MCHC 33.5 32.0 - 36.0 g/dL    RDW 13.4 11.0 - 14.5 %    Platelets 202 140 - 440 thou/uL    MPV 7.0 7.0 - 10.0 fL   Cortisol   Result Value Ref Range    Cortisol 9.9 ug/dL   Sedimentation Rate   Result Value Ref Range    Sed Rate 6 0 - 20 mm/hr   C-Reactive Protein   Result Value Ref Range    CRP 0.2 0.0 - 0.8 mg/dL   Comprehensive Metabolic Panel   Result Value Ref Range    Sodium 142 136 - 145 mmol/L    Potassium 4.4 3.5 - 5.0 mmol/L    Chloride 104 98 - 107 mmol/L    CO2 29 22 - 31 mmol/L    Anion Gap, Calculation 9 5 - 18 mmol/L    Glucose 91 70 - 125 mg/dL    BUN 17 8 - 28 mg/dL    Creatinine 0.72 0.60 - 1.10 mg/dL    GFR MDRD Af Amer >60 >60 mL/min/1.73m2    GFR MDRD Non Af Amer >60 >60 mL/min/1.73m2    Bilirubin, Total 0.4 0.0 - 1.0 mg/dL    Calcium 10.0 8.5 - 10.5 mg/dL    Protein, Total 6.2 6.0 - 8.0 g/dL    Albumin 3.6 3.5 - 5.0 g/dL    Alkaline Phosphatase 26 (L) 45 - 120 U/L    AST 21 0 - 40 U/L    ALT 12 0 - 45 U/L   Lipid Cascade RANDOM   Result Value Ref Range    Cholesterol 163 <=199 mg/dL    Triglycerides 112 <=149 mg/dL    HDL Cholesterol 55 >=50 mg/dL    LDL Calculated 86 <=129 mg/dL    Patient Fasting > 8hrs? Unknown        ______________________________________________________________________     XR KNEES BILATERAL 1 OR 2 VWS10/19/2016 10:59 AMINDICATION: Bilateral primary osteoarthritis of kneeCOMPARISON: None.FINDINGS: Severe lateral joint space narrowing with subchondral sclerosis and large osteophytes in the right knee. Mild to moderate   narrowing of the medial patellofemoral compartments as well as all 3 compartments on the left. No fracture, dislocation, or joint  effusion on either side.This report was electronically interpreted by: Dr. Kyrie Reed MD ON 10/19/2016 at 14:45    Assessment:     1. Primary osteoarthritis of right knee    2. Forearm laceration, left, initial encounter    3. Encounter for removal of sutures    4. Age-related osteoporosis without current pathological fracture    5. Temporal arteritis (H), in remission    6. Cranial neuralgia      ______________________________________________________________________     PHQ-2 Total Score: 0 (10/7/2019 11:00 AM)    No data recorded  ______________________________________________________________________     BMI Readings from Last 1 Encounters:   10/24/19 22.86 kg/m        Plan:     1. Referral to [facility] (Dr. Beasley) for osteoarthritis (OA).  2. Sutures removed from the left arm today.  Slowly healing.  3. Continue Prolia.  4. No evidence of temporal arteritis (TA) at this time but follow up with rheumatology.  5. Pain in right scalp likely due to cranial neuralgia.       Michael Arzate MD  General Internal Medicine  [facility]    Return in about 6 months (around 4/24/2020) for visit and blood work.     Future Appointments   Date Time Provider Department Center   10/30/2019 11:20 AM Ana Govea MBBS [facility]HEUM [facility]   12/16/2019  1:10 PM Henny Miranda MD [facility]   9/22/2020  1:20 PM [facility] LAB [facility]   9/29/2020  1:00 PM Rocío Sainz NP [facility] ENDO [facility]         ______________________________________________________________________     Relevant ICD-10 codes and order associations:      ICD-10-CM    1. Primary osteoarthritis of right knee M17.11 Ambulatory referral to Orthopedics        2. Forearm laceration, left, initial encounter S51.030X    3. Encounter for removal of sutures Z48.02    4. Age-related osteoporosis without current pathological fracture M81.0    5. Temporal arteritis (H), in remission M31.6    6. Cranial neuralgia G52.9

## 2021-06-28 NOTE — PROGRESS NOTES
Progress Notes by Henny Miranda MD at 12/16/2019  1:10 PM     Author: Henny Miranda MD Service: -- Author Type: Physician    Filed: 12/16/2019  1:43 PM Encounter Date: 12/16/2019 Status: Signed    : Henny Miranda MD (Physician)           [facility]  [facility] Follow-up Note    Assessment & Plan        1. Coronary artery disease involving native coronary artery of native heart without angina pectoris  -angiography July 18, 2017 showed normal left main, ostial left anterior descending 99% lesion that received a drug-coated stent, mid left anterior descending 70% lesion that received a drug-coated stent, distal 25% lesion, distal circumflex 25% lesion with a left AV groove 40% lesion, and proximal right coronary artery 20% lesion. She had more chest discomfort in [place] in February 2018 and underwent repeat stress test showing anterior ischemia and angiography suggesting left main disease and apparently received stents into the LAD and circumflex at that time as well.  She is still bruising and we discussed the need to continue discontinue Plavix and go to aspirin every day.  We did repeat stress test in April 2019 which showed no ischemia or scar with preserved ejection fraction 70%.   2. Mixed hyperlipidemia -cholesterol 163 and currently on atorvastatin 80 mg.   3. Essential hypertension -under good control and if anything a little orthostatic and hopefully will improve off Plavix.  If does not we will see her in 6-month at which point time I might back off on Imdur.   4.  Peripheral ankle edema-stable and minimal.  Very hesitant to place her on diuretic given her history of orthostasis.    Plan  1.  Discontinue Plavix.  Go to aspirin every day.  2.  For cyst removal of right lower extremity hold aspirin for 3 days.  3.  Follow-up with me in 6 months or sooner if needed.  Still orthostatic cut dose of Imdur.    Subjective  CC: 89-year-old white female being seen in follow-up  "today.  She is here with her  today.  She is living independently with her  all needs a fair amount assistance to get by, she looks to be declining mentally.  She is declining physically as well.  She complains of shortness of breath on activity but this is unchanged for the last 1 to 2 years.  She has some mild bipedal edema.  There is no chest discomfort, palpitations, PND, orthopnea.    Medications  Current Outpatient Medications   Medication Sig   ? aspirin 81 MG EC tablet Take 1 tablet (81 mg total) by mouth at bedtime. For CAD   ? atorvastatin (LIPITOR) 80 MG tablet TAKE 1 TABLET AT BEDTIME FOR CORONARY ARTERY DISEASE AND LIPIDS   ? calcium, as carbonate, (OS-MARLYN) 500 mg calcium (1,250 mg) tablet Take 2 tablets by mouth at bedtime.   ? clopidogrel (PLAVIX) 75 mg tablet TAKE 1 TABLET DAILY   ? DULoxetine (CYMBALTA) 20 MG capsule Take 1 capsule (20 mg total) by mouth daily.   ? isosorbide mononitrate (IMDUR) 30 MG 24 hr tablet Take 1 tablet (30 mg total) by mouth every morning.   ? nitroglycerin (NITROSTAT) 0.4 MG SL tablet DISSOLVE 1 TABLET UNDER THE TONGUE EVERY 5 MINUTES AS NEEDED FOR CHEST PAIN   ? OMEGA-3/DHA/EPA/FISH OIL (FISH OIL-OMEGA-3 FATTY ACIDS) 300-1,000 mg capsule Take 1 g by mouth at bedtime.    ? senna-docusate (PERICOLACE) 8.6-50 mg tablet Take 1 tablet by mouth 2 (two) times a day.       Objective  /60   Pulse 80   Ht 5' 4\" (1.626 m)   Wt 124 lb (56.2 kg)   SpO2 95%   BMI 21.28 kg/m      General Appearance:    Alert, cooperative, no distress, appears stated age   Head:    Normocephalic, without obvious abnormality, atraumatic   Throat:   Lips, mucosa, and tongue normal; teeth and gums normal   Neck:   Supple, symmetrical, trachea midline, no adenopathy;        thyroid:  No enlargement/tenderness/nodules; no carotid    bruit or JVD   Back:     Symmetric, no curvature, ROM normal, no CVA tenderness   Lungs:     Clear to auscultation bilaterally, respirations unlabored "   Chest wall:    No tenderness or deformity   Heart:    Regular rate and rhythm, S1 and S2 normal, no murmur, rub   or gallop   Abdomen:     Soft, non-tender, bowel sounds active all four quadrants,     no masses, no organomegaly   Extremities:   Normal, atraumatic, no cyanosis, minimal bi ankle edema   Pulses:   2+ and symmetric all extremities   Skin:   Skin color, texture, turgor normal, no rashes or lesions     Results    Lab Results personally reviewed   Lab Results   Component Value Date    CHOL 163 09/24/2019    CHOL 180 01/17/2019     Lab Results   Component Value Date    HDL 55 09/24/2019    HDL 56 01/17/2019     Lab Results   Component Value Date    LDLCALC 86 09/24/2019    LDLCALC 103 01/17/2019     Lab Results   Component Value Date    TRIG 112 09/24/2019    TRIG 103 01/17/2019     Lab Results   Component Value Date    WBC 5.9 09/24/2019    HGB 14.7 09/24/2019    HCT 44.0 09/24/2019     09/24/2019     Lab Results   Component Value Date    CREATININE 0.72 09/24/2019    BUN 17 09/24/2019     09/24/2019    K 4.4 09/24/2019    CO2 29 09/24/2019       Review of Systems:   General: WNL  Eyes: WNL  Ears/Nose/Throat: WNL  Lungs: WNL  Heart: WNL  Stomach: WNL  Bladder: WNL  Muscle/Joints: WNL  Skin: WNL  Nervous System: WNL  Mental Health: WNL     Blood: WNL

## 2021-06-28 NOTE — PROGRESS NOTES
Progress Notes by Michael Arzate MD at 1/3/2020  1:25 PM     Author: Michael Arzate MD Service: -- Author Type: Physician    Filed: 1/5/2020  9:43 AM Encounter Date: 1/3/2020 Status: Signed    : Michael Arzate MD (Physician)            [facility]  Dr. Michael Arzate             Date of Service: 1/3/2020  Primary Provider: Michael Arzate MD    Patient Care Team:  Michael Arzate MD as PCP - General (Internal Medicine)  Mulu Silveira CNP as Assigned PCP  Ana Govea MBBS as Physician (Rheumatology)  Henny Miranda MD as Physician (Cardiology)  Rocío Sainz NP as Nurse Practitioner (Nurse Practitioner)  Simon Kaur MD as Physician (Dermatology)  Michael Arzate MD as Assigned PCP     ______________________________________________________________________     Patient's Pharmacy:    [id number] - [address] [address]  Phone: [phone] Fax: [phone]    [facility] - [address] [address]  Phone: [phone] Fax: [phone]     Patient's Contacts:  Name Home Phone Work Phone Mobile Phone Relationship Lgl Grd   CLAUDIA KELLER [phone] Spouse    ANDRES ALANIS   [phone] Child      Patient's Insurance:    Payor: MEDICARE / Plan: MEDICARE A AND B / Product Type: Medicare /            Yamilexshanna Keller is a 89 y.o. female who comes in today for:    Chief Complaint   Patient presents with   ? Test Results     x-ray   ? Arthritis     right knee going up the let   ? Back Pain   ? Follow-up     is going to have cyst removed in leg       Active Problem List:  Problem List as of 1/3/2020 Reviewed: 1/3/2020 11:26 PM by Michael Arzate MD       High    Former smoker    CAD (coronary artery disease)       Medium    Bilateral primary osteoarthritis of knee     Closed wedge compression fracture of seventh thoracic vertebra, initial encounter (H)    Last Assessment & Plan 8/1/2018 Office Visit Written 8/1/2018  2:40 PM by Erika Beck FNP     Continue bracing, pain control.  Plan for follow up with endocrinology.  Start Prolia as ordered.         HTN (hypertension)    Temporal arteritis (H), in remission       Low    Angioedema    Hearing loss    HLD (hyperlipidemia)    Osteoporosis       Unprioritized    Chronic pain of right knee    High risk medication use           Current Outpatient Medications   Medication Sig   ? atorvastatin (LIPITOR) 80 MG tablet TAKE 1 TABLET AT BEDTIME FOR CORONARY ARTERY DISEASE AND LIPIDS   ? isosorbide mononitrate (IMDUR) 30 MG 24 hr tablet Take 1 tablet (30 mg total) by mouth every morning.   ? nitroglycerin (NITROSTAT) 0.4 MG SL tablet DISSOLVE 1 TABLET UNDER THE TONGUE EVERY 5 MINUTES AS NEEDED FOR CHEST PAIN   ? aspirin 81 MG EC tablet Take 1 tablet (81 mg total) by mouth at bedtime. For CAD   ? calcium-vitamin D 500 mg(1,250mg) -200 unit per tablet Take 1 tablet by mouth 2 (two) times a day with meals.   ? OMEGA-3/DHA/EPA/FISH OIL (FISH OIL-OMEGA-3 FATTY ACIDS) 300-1,000 mg capsule Take 1 g by mouth at bedtime.    ? ranitidine (ZANTAC) 75 MG tablet Take 75 mg by mouth at bedtime.     Social History     Social History Narrative    Lives with her  - [place] in [place]         to  [place].  1 daughter.     Subjective:     Accompanied by  at today's visit.       First reviewed her left back pain and left sacroiliac (SI) joint pain.  This is no worse.  She is managing well with this at this time.  No radiation of the pain further.  Not affecting walking much.  She could have further studies but they are okay with following at this time.    Reviewed her hypertension today.  Blood pressure has been in the goal range.  Denies any excessive dizziness from the medication with this.     We reviewed her coronary artery disease  (CAD) and she has had no issues with angina symptoms.     Senile purpura no worse but she wanted to review these as well.    Reviewed the patient's osteoporosis and there are no current issues here.     We reviewed her other issues noted in the assessment but not specifically addressed in the HPI above.     On review of systems, the patient denies any chest pain or shortness of breath.    Objective:     Wt Readings from Last 3 Encounters:   01/04/20 121 lb 11.2 oz (55.2 kg)   01/03/20 123 lb (55.8 kg)   12/16/19 124 lb (56.2 kg)     BP Readings from Last 3 Encounters:   01/04/20 129/59   01/03/20 126/60   12/16/19 120/60     /60   Pulse 78   Wt 123 lb (55.8 kg)   SpO2 95%   BMI 21.11 kg/m     The patient is comfortable, no acute distress.  Mood good.  Insight fair.  Eyes are nonicteric.  Neck is supple without mass.  No cervical adenopathy.  No thyromegaly. Heart regular rate and rhythm.  Lungs clear to auscultation bilaterally.  Respiratory effort is good.  Abdomen soft and nontender.  No hepatosplenomegaly.  Extremities no edema.  There is some left sacroiliac (SI) joint pain identified on today's exam.    Diagnostics:     Results for orders placed or performed in visit on 09/24/19   HM2(CBC w/o Differential)   Result Value Ref Range    WBC 5.9 4.0 - 11.0 thou/uL    RBC 4.71 3.80 - 5.40 mill/uL    Hemoglobin 14.7 12.0 - 16.0 g/dL    Hematocrit 44.0 35.0 - 47.0 %    MCV 93 80 - 100 fL    MCH 31.3 27.0 - 34.0 pg    MCHC 33.5 32.0 - 36.0 g/dL    RDW 13.4 11.0 - 14.5 %    Platelets 202 140 - 440 thou/uL    MPV 7.0 7.0 - 10.0 fL   Cortisol   Result Value Ref Range    Cortisol 9.9 ug/dL   Sedimentation Rate   Result Value Ref Range    Sed Rate 6 0 - 20 mm/hr   C-Reactive Protein   Result Value Ref Range    CRP 0.2 0.0 - 0.8 mg/dL   Comprehensive Metabolic Panel   Result Value Ref Range    Sodium 142 136 - 145 mmol/L    Potassium 4.4 3.5 - 5.0 mmol/L    Chloride 104 98 - 107 mmol/L    CO2 29 22 - 31 mmol/L     Anion Gap, Calculation 9 5 - 18 mmol/L    Glucose 91 70 - 125 mg/dL    BUN 17 8 - 28 mg/dL    Creatinine 0.72 0.60 - 1.10 mg/dL    GFR MDRD Af Amer >60 >60 mL/min/1.73m2    GFR MDRD Non Af Amer >60 >60 mL/min/1.73m2    Bilirubin, Total 0.4 0.0 - 1.0 mg/dL    Calcium 10.0 8.5 - 10.5 mg/dL    Protein, Total 6.2 6.0 - 8.0 g/dL    Albumin 3.6 3.5 - 5.0 g/dL    Alkaline Phosphatase 26 (L) 45 - 120 U/L    AST 21 0 - 40 U/L    ALT 12 0 - 45 U/L   Lipid Cascade RANDOM   Result Value Ref Range    Cholesterol 163 <=199 mg/dL    Triglycerides 112 <=149 mg/dL    HDL Cholesterol 55 >=50 mg/dL    LDL Calculated 86 <=129 mg/dL    Patient Fasting > 8hrs? Unknown        ______________________________________________________________________    Pertinent radiology for this visit includes the following:    Echo Complete    Normal left ventricular size with borderline concentric hypertrophy. The   estimated left ventricular ejection fraction is 65%. E/e' 8 to 15, which   is equivocal for estimating LV filling pressures. Left ventricular   diastolic function is normal.    Normal right ventricular size and systolic function.    No hemodynamically significant valvular heart abnormalities.    Left atrial volume is mildly increased.    When compared to the previous study dated 7/19/2017, the anterior and   apical wall motion abnormality has resolved.     XR Chest 2 Views  Narrative: EXAM: XR CHEST 2 VIEWS  LOCATION: [facility]  DATE/TIME: 01/04/2020, 6:39 AM    INDICATION: Chest pain.  COMPARISON: 02/14/2019.    FINDINGS: The heart size is normal. Mild scarring at the lung bases. The lungs are otherwise clear. Thoracic aorta is calcified. Degenerative disease in the spine. Midthoracic vertebroplasty.  Impression: No acute abnormality.      ______________________________________________________________________    Assessment:     1. Bilateral primary osteoarthritis of knee    2. Senile purpkady (H) - No worsening.   3. Full code status     4. Coronary artery disease involving native coronary artery of native heart without angina pectoris    5. Essential hypertension    6. Age-related osteoporosis without current pathological fracture    7. Chronic left-sided low back pain with left-sided sciatica    8. Sacroiliac joint pain        Quality review:     PHQ-2 Total Score: 0 (10/7/2019 11:00 AM)    No data recorded  ______________________________________________________________________     BMI Readings from Last 1 Encounters:   01/04/20 21.56 kg/m      Plan:     1. Discussed code status with the patient and she does want a short trial.  2. Use diclofenac (Voltaren) gel for the knee.  3. Follow up with cardiologist as needed.  4. Agree off of clopidogrel (Plavix) at this time.  5. Could consider further imaging if needed for sacroiliac (SI) joint issues.  6. Continue current medications.         Michael Arzate MD  General Internal Medicine  [facility]    Return in about 4 months (around 5/3/2020) for follow up visit.     Future Appointments   Date Time Provider Department Center   1/9/2020  3:20 PM [facility]   5/12/2020 10:55 AM [facility]   9/22/2020  1:20 PM [facility]   9/29/2020  1:00 PM Rocío Sainz NP [facility]         ______________________________________________________________________     Relevant ICD-10 codes and order associations:      ICD-10-CM    1. Bilateral primary osteoarthritis of knee M17.0    2. Senile purpura (H) D69.2    3. Full code status Z78.9    4. Coronary artery disease involving native coronary artery of native heart without angina pectoris I25.10    5. Essential hypertension I10    6. Age-related osteoporosis without current pathological fracture M81.0    7. 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 Sacroiliac joint pain M53.3    9. Uses walker Z99.89

## 2021-06-28 NOTE — PROGRESS NOTES
Progress Notes by Michael Arzate MD at 1/9/2020  3:20 PM     Author: Michael Arzate MD Service: -- Author Type: Physician    Filed: 1/10/2020  6:40 AM Encounter Date: 1/9/2020 Status: Signed    : Michael Arzate MD (Physician)              [facility]    Comprehensive and complex medical care - Chronic disease management - Shared decision making - Care coordination - Compassionate care    Patient advocacy - Rational deprescribing - Minimally disruptive medicine - Ethical focus - Customized care          Date of Service: 1/9/2020  Primary Provider: Michael Arzate MD    Patient Care Team:  Michael Arzate MD as PCP - General (Internal Medicine)  Mulu Silveira CNP as Assigned PCP  Ana Govea MBBS as Physician (Rheumatology)  Henny Miranda MD as Physician (Cardiology)  Rocío Sainz NP as Nurse Practitioner (Nurse Practitioner)  Simon Kaur MD as Physician (Dermatology)  Michael Arzate MD as Assigned PCP     ______________________________________________________________________     Patient's Pharmacy:    [id number] - [place], [place] - [address] [facility]  [address] [facility] [address]  Phone: [phone] Fax: [phone]    [facility] - [place], [place] - [address] [place], [place] [address] [facility] [address]  Phone: [phone] Fax: [phone]     Patient's Contacts:  Name Home Phone Work Phone Mobile Phone Relationship Lgl Rosalva   CHUCKCLAUDIA [phone] Spouse    ANDRES ALANIS   [phone] Child      Patient's Insurance:    Payor: MEDICARE / Plan: MEDICARE A AND B / Product Type: Medicare /           Yamilex Keller is a 89 y.o. female who comes in today for:    Chief Complaint   Patient presents with   ? Hospital Visit Follow Up     chest pain   ? Acne     2 on back   ? Rash     on chest x a few days       Active Problem List:  Problem List as of  1/9/2020 Reviewed: 1/5/2020  9:37 AM by Michael Arzate MD       High    Full code status    CAD (coronary artery disease)       Medium    Bilateral primary osteoarthritis of knee    HTN (hypertension)       Low    Angioedema    Hearing loss    HLD (hyperlipidemia)    Osteoporosis       Unprioritized    Atypical chest pain    Chronic pain of right knee    Gastroesophageal reflux disease without esophagitis    High risk medication use    Senile purpura (H)    Uses walker           Current Outpatient Medications   Medication Sig   ? aspirin 81 MG EC tablet Take 1 tablet (81 mg total) by mouth at bedtime. For CAD   ? atorvastatin (LIPITOR) 80 MG tablet TAKE 1 TABLET AT BEDTIME FOR CORONARY ARTERY DISEASE AND LIPIDS   ? calcium-vitamin D 500 mg(1,250mg) -200 unit per tablet Take 1 tablet by mouth 2 (two) times a day with meals.   ? isosorbide mononitrate (IMDUR) 30 MG 24 hr tablet Take 1 tablet (30 mg total) by mouth every morning.   ? nitroglycerin (NITROSTAT) 0.4 MG SL tablet DISSOLVE 1 TABLET UNDER THE TONGUE EVERY 5 MINUTES AS NEEDED FOR CHEST PAIN   ? OMEGA-3/DHA/EPA/FISH OIL (FISH OIL-OMEGA-3 FATTY ACIDS) 300-1,000 mg capsule Take 1 g by mouth at bedtime.    ? pantoprazole (PROTONIX) 40 MG tablet Take 1 tablet (40 mg total) by mouth daily. Take for 2 months and then stop.   ? triamcinolone (KENALOG) 0.1 % cream Apply topically 2 (two) times a day.     Social History     Social History Narrative    Lives with her  - [place] in [place]         to  Aris.  1 daughter.       Subjective:     Accompanied by  at today's visit.       Reviewed her hospital stay for chest pain.  Had some burning sensation in the chest off and on which she used TUMS for and she has been burping more.  Went off ranitidine (Zantac) due to concerns about carcinogen.  She has had increased symptoms.  Went in to the emergency room (ER) when she was concerned it was her heart.  She took 3 nitroglycerin without much  relief.  No dysphagia.  It has not come back this severe and blood work from the hospital was reviewed today.    Has a couple of spots on left low back where they put the SALONPAS patch which are raised and itchy.    Has a couple of patches around the sternum that are itchy and patchy - on both sides and no pain here.    Reviewed the patient's osteoarthritis and things are stable here. She has swelling in the form of a Baker's cyst in the back of the knee and has valgus deformity.    We reviewed her coronary artery disease (CAD) and she has had no issues with angina symptoms.     Reviewed her hypertension today.  Blood pressure has been in the goal range.  Denies any excessive dizziness from the medication with this.     We reviewed her other issues noted in the assessment but not specifically addressed in the HPI above.     On review of systems, the patient denies any chest pain or shortness of breath.    Objective:     Wt Readings from Last 3 Encounters:   01/09/20 122 lb (55.3 kg)   01/04/20 121 lb 11.2 oz (55.2 kg)   01/03/20 123 lb (55.8 kg)     BP Readings from Last 3 Encounters:   01/09/20 126/64   01/04/20 129/59   01/03/20 126/60     /64   Pulse 78   Wt 122 lb (55.3 kg)   SpO2 94%   BMI 21.61 kg/m     The patient is comfortable, no acute distress.  Mood good.  Insight fair.  Eyes are nonicteric.  Neck is supple without mass.  No cervical adenopathy.  No thyromegaly. Heart regular rate and rhythm.  Lungs clear to auscultation bilaterally.  Respiratory effort is good.  Abdomen soft and nontender.  No hepatosplenomegaly.  Extremities no edema.  She has a couple 2-3 mm raised lesion on the low back - nonspecific.  May be an allergic reaction.  There is nonspecific dermatitis of the chest.  No signs of shingles (herpes zoster).    Diagnostics:     Results for orders placed or performed during the hospital encounter of 01/04/20   HM2 (CBC W/O DIFF)   Result Value Ref Range    WBC 8.8 4.0 - 11.0 thou/uL     RBC 5.02 3.80 - 5.40 mill/uL    Hemoglobin 15.4 12.0 - 16.0 g/dL    Hematocrit 46.9 35.0 - 47.0 %    MCV 93 80 - 100 fL    MCH 30.7 27.0 - 34.0 pg    MCHC 32.8 32.0 - 36.0 g/dL    RDW 13.9 11.0 - 14.5 %    Platelets 236 140 - 440 thou/uL    MPV 8.6 8.5 - 12.5 fL   Basic Metabolic Panel   Result Value Ref Range    Sodium 143 136 - 145 mmol/L    Potassium 3.9 3.5 - 5.0 mmol/L    Chloride 106 98 - 107 mmol/L    CO2 28 22 - 31 mmol/L    Anion Gap, Calculation 9 5 - 18 mmol/L    Glucose 97 70 - 125 mg/dL    Calcium 10.1 8.5 - 10.5 mg/dL    BUN 17 8 - 28 mg/dL    Creatinine 0.74 0.60 - 1.10 mg/dL    GFR MDRD Af Amer >60 >60 mL/min/1.73m2    GFR MDRD Non Af Amer >60 >60 mL/min/1.73m2   Troponin I   Result Value Ref Range    Troponin I <0.01 0.00 - 0.29 ng/mL   INR   Result Value Ref Range    INR 0.95 0.90 - 1.10   APTT   Result Value Ref Range    PTT 28 24 - 37 seconds   BNP(B-type Natriuretic Peptide)   Result Value Ref Range    BNP 29 0 - 167 pg/mL   Troponin I   Result Value Ref Range    Troponin I 0.01 0.00 - 0.29 ng/mL   Troponin I   Result Value Ref Range    Troponin I <0.01 0.00 - 0.29 ng/mL   Echo Complete   Result Value Ref Range    LV volume diastolic 39.9 (!) 46 - 106 cm3    LV volume systolic 15 14 - 42 cm3    HR 77 bpm    IVSd 1.14 (!) 0.6 - 0.9 cm    LVIDd 3.89 3.8 - 5.2 cm    LVIDs 1.92 2.2 - 3.5 cm    LVOT diam 1.6 cm    LVOT mean gradient 1 mmHg    LVOT peak VTI 17.6 cm    LVOT mean brissa 48.4 cm/s    LVOT peak brissa 71.7 cm/s    LVOT peak gradient 2 mmHg    LV PWd 1.24 (!) 0.6 - 0.9 cm    MV E' lat brissa 9.19 cm/s    MV E' med brissa 6.19 cm/s    AV mean brissa 103 cm/s    AV mean gradient 5 mmHg    AV VTI 34.8 cm    AV peak brissa 167 cm/s    AO root 3 cm    LA size 2.9 cm    LA length 4.6 cm    MV decel slope 2,540 mm/s2    MV decel time 313 ms    MV P 1/2 time 92 ms    MV peak A brissa 109 cm/s    MV peak E brissa 79.5 cm/s    ME peak brissa 146 cm/s    ME peak gradient 9 mmHg    TR peak brissa 262 cm/s    LA area 2 12.6 cm2     LA area 1 15.8 cm2    BSA 1.56 m2    Hieght 63 in    Weight 1,947.2 lbs    /70 mmHg    IVS/PW ratio 0.9     TR peak gradent 27.5 mmHg    LV FS 50.6 28 - 44 %    Echo LVEF calculated 62 55 - 75 %    LA volume 36.8 mL    LV mass 156.7 g    AV area 1.0 cm2    AV DIM IND brissa 0.4     MV area p 1/2 time 2.4 cm2    MV E/A Ratio 0.7     LVOT area 2.01 cm2    LVOT SV 35.4 cm3    AV peak gradient 11.2 mmHg    LV systolic volume index 9.6 8 - 24 cm3/m2    LV diastolic volume index 25.6 29 - 61 cm3/m2    LA volume index 23.6 mL/m2    LV mass index 100.5 g/m2    LV SVi 22.7 ml/m2    TAPSE 1.9 cm    MV med E/e' ratio 12.8     MV lat E/e' ratio 8.7     LV CO 2.7 l/min    LV Ci 1.7 l/min/m2    Height 63.0 in    Weight 122 lbs    MV Avg E/e' Ratio 10.3 cm/s    AV DIM IND VTI 0.5     Echo LVEF Estimated 65 %       Assessment:     1. Gastroesophageal reflux disease, esophagitis presence not specified    2. Dermatitis    3. Hospital discharge follow-up    4. Atypical chest pain    5. Bilateral primary osteoarthritis of knee    6. Essential hypertension        Quality review:     PHQ-2 Total Score: 0 (10/7/2019 11:00 AM)    No data recorded  ______________________________________________________________________     BMI Readings from Last 1 Encounters:   01/09/20 21.61 kg/m        Plan:     1. Add in pantoprazole for gastroesophageal reflux disease (GERD) at least for 2 months and reassess.  2. Triamcinolone (Kenalog) cream for dermatitis and follow up if worse.  3. Continue current medications otherwise.      Post Discharge Medication Reconciliation Status: discharge medications reconciled and changed, per note/orders (see AVS)         Michael Arzate MD  General Internal Medicine  [facility]    Personal office fax - [phone]   Voice mail - [phone]  E-mail - [email or web address]     Return in about 4 months (around 5/9/2020) for follow up visit.     Future Appointments   Date Time  Provider Department Swiss   5/12/2020 10:55 AM Michael Arzate MD [facility]   9/22/2020  1:20 PM [facility] LAB [facility]   9/29/2020  1:00 PM Rocío Sainz NP [facility] ENDO [facility]         ______________________________________________________________________     Relevant ICD-10 codes and order associations:      ICD-10-CM    1. Gastroesophageal reflux disease, esophagitis presence not specified K21.9 pantoprazole (PROTONIX) 40 MG tablet   2. Dermatitis L30.9 triamcinolone (KENALOG) 0.1 % cream   3. Hospital discharge follow-up Z09    4. Atypical chest pain R07.89    5. Bilateral primary osteoarthritis of knee M17.0    6. Essential hypertension I10

## 2021-06-28 NOTE — PROGRESS NOTES
Progress Notes by Michael Arzate MD at 10/7/2019 11:20 AM     Author: Michael Arzate MD Service: -- Author Type: Physician    Filed: 10/8/2019  8:04 AM Encounter Date: 10/7/2019 Status: Signed    : Michael Arzate MD (Physician)             [facility]    Comprehensive and complex medical care - Chronic disease management - Shared decision making - Care coordination - Compassionate care    Patient advocacy - Rational deprescribing - Minimally disruptive medicine - Ethical focus - Customized care    ______________________________________________________________________     Date of Service: 10/7/2019  Primary Provider: Michael Arzate MD    Patient Care Team:  Michael Arzate MD as PCP - General (Internal Medicine)  Mulu Silveira CNP as Assigned PCP  Ana Govea MBBS as Physician (Rheumatology)  Henny Miranda MD as Physician (Cardiology)  Rocío Sainz NP as Nurse Practitioner (Nurse Practitioner)  Simon Kaur MD as Physician (Dermatology)     ______________________________________________________________________     Patient's Pharmacy:    [facility] - [facility] [address] [facility]  [address] [facility] [address]  Phone: [phone] Fax: [phone]    [facility] - [place], [place] - [facility] [facility]  [address] [facility] [address]  Phone: [phone] Fax: [phone]     Patient's Contacts:  Name Home Phone Work Phone Mobile Phone Relationship Lgl CLAUDIA Gonzales [phone]  [phone] Spouse    ANDRES ALANIS   [phone] Child      Patient's Insurance:    Payor: MEDICARE / Plan: MEDICARE A AND B / Product Type: Medicare /   ______________________________________________________________________   ______________________________________________________________________     Yamilex SMITH Krishna is a 89 y.o. female who comes in today for:     Chief Complaint    Patient presents with   ? Establish Care   ? Drooling     x 6 months   ? Medication Questions     what should she take in place of ranitidine if anything   ? Medication Management     is having bone scan done next week what medications should she stop   ? Follow-up     could have Temporal arteritis come back, feels like has the same symptoms       Active Problem List:  Problem List as of 10/7/2019 Reviewed: 10/7/2019  4:24 PM by Michael Arzate MD       High    Former smoker    CAD (coronary artery disease)       Medium    Bilateral primary osteoarthritis of knee    Closed wedge compression fracture of seventh thoracic vertebra, initial encounter (H)    Last Assessment & Plan 8/1/2018 Office Visit Written 8/1/2018  2:40 PM by Erika Beck FNP     Continue bracing, pain control.  Plan for follow up with endocrinology.  Start Prolia as ordered.         HTN (hypertension)    Temporal arteritis (H), in remission       Low    Angioedema    Hearing loss    HLD (hyperlipidemia)    Osteoporosis       Unprioritized    High risk medication use           Past Medical History:   Diagnosis Date   ? Acute ST elevation myocardial infarction (STEMI) involving left anterior descending coronary artery (H) 7/18/2017   ? CAD (coronary artery disease)    ? Closed wedge compression fracture of seventh thoracic vertebra, initial encounter (H) 7/8/2018   ? Former smoker    ? Hearing loss    ? HLD (hyperlipidemia)    ? HTN (hypertension) 4/1/2019   ? Osteoporosis    ? Temporal arteritis (H), in remission    ? Tubular adenoma of colon     Last colonoscopy 2011      Current Outpatient Medications   Medication Sig   ? aspirin 81 MG EC tablet Take 1 tablet (81 mg total) by mouth at bedtime. For CAD   ? atorvastatin (LIPITOR) 80 MG tablet TAKE 1 TABLET AT BEDTIME FOR CORONARY ARTERY DISEASE AND LIPIDS   ? calcium, as carbonate, (OS-MARLYN) 500 mg calcium (1,250 mg) tablet Take 2 tablets by mouth at bedtime.   ? clopidogrel (PLAVIX) 75 mg tablet  Take 1 tablet (75 mg total) by mouth daily.   ? isosorbide mononitrate (IMDUR) 30 MG 24 hr tablet Take 1 tablet (30 mg total) by mouth every morning.   ? nitroglycerin (NITROSTAT) 0.4 MG SL tablet DISSOLVE 1 TABLET UNDER THE TONGUE EVERY 5 MINUTES AS NEEDED FOR CHEST PAIN   ? OMEGA-3/DHA/EPA/FISH OIL (FISH OIL-OMEGA-3 FATTY ACIDS) 300-1,000 mg capsule Take 1 g by mouth at bedtime.    ? senna-docusate (PERICOLACE) 8.6-50 mg tablet Take 1 tablet by mouth 2 (two) times a day.     Social History     Occupational History     Employer: RETIRED   Tobacco Use   ? Smoking status: Former Smoker     Packs/day: 1.00     Years: 4.00     Pack years: 4.00     Types: Cigarettes     Last attempt to quit: 1953     Years since quittin.8   ? Smokeless tobacco: Never Used   ? Tobacco comment: Quit 65 yrs   Substance and Sexual Activity   ? Alcohol use: Yes     Comment: Occassionally   ? Drug use: No   ? Sexual activity: Not Currently     Partners: Male      Social History     Patient does not qualify to have social determinant information on file (likely too young).   Social History Narrative    Lives with her  - [place] in [place]         to  Aris.  1 daughter.      Family History   Problem Relation Age of Onset   ? Liver cancer Mother    ? Prostate cancer Father    ? Pacemaker Brother    ? Lung cancer Brother    ? Lung cancer Brother    ? Uterine cancer Sister       Family history is otherwise noncontributory.    ______________________________________________________________________     History of present illness:    Patient is new to internal medicine.    She comes in today with her .  She is in to establish care.    We reviewed her sebaceous cyst on her right leg.  Her dermatologist wonders about her going off of Plavix.  She does had a stent in her left main and should not go off of Plavix nor aspirin.  She could go off fish oil if needed.  This is an optional treatment and this is not giving her  "problems so I would not push her to proceed with this.    She does have some drooling out of the corner of her mouth but it is not severe and she has not noticed any loss of strength in the face.    She was started on ranitidine in the past for reflux symptoms.  She has been off of it as the ranitidine she has been on was recalled.  She would rather see how she does off of it.  She does denies any dysphagia.    We reviewed her osteoporosis and she is on Prolia for this.  She has had a compression fracture in the past.    She is had a history of temporal arteritis diagnosed in 2015.  She occasionally gets a frontal type headache.  She was concerned that it was coming back.  We ordered blood work prior to the visit today and this was negative for elevation in her sed rate or CRP.  She follows with Dr. Govea for this and for osteoarthritis of her knees.  She is not so certain that the injections help as much for the knees as a had previously.    We reviewed her osteoarthritis of her knees in relationship to this.    She has some spots on her arms with bruising/purplish spots and had questions related to this.  We reviewed that she is on clopidogrel which may accentuate this.    Her blood pressure has generally been doing well.    We reviewed her other issues noted in the assessment but not specifically addressed in the HPI above.     Comprehensive review of systems was performed today with no major problems noted except as above.   ______________________________________________________________________     Exam:    Wt Readings from Last 3 Encounters:   10/07/19 124 lb (56.2 kg)   10/01/19 128 lb (58.1 kg)   07/29/19 125 lb (56.7 kg)     BP Readings from Last 3 Encounters:   10/07/19 136/80   10/01/19 140/68   07/29/19 124/70      /80   Pulse 67   Ht 5' 2\" (1.575 m)   Wt 124 lb (56.2 kg)   SpO2 97%   BMI 22.68 kg/m    The patient is comfortable, no acute distress.  Mood good.  Insight is fair to good.  She is " soft spoken.  No skin lesions or nodules of concern.  She has senile purpura of her forearms.  She has a sebaceous cyst which is about 1 cm which is not inflamed over her right lateral leg near her calf.  Ears clear.  Eyes are nonicteric.  Pupils equal and reactive.  Throat is clear.  Neck is supple without mass, no thyromegaly.  Carotids are clear.  No cervical or epitrochlear adenopathy.  Heart regular rate and rhythm.  Lungs clear to auscultation bilaterally.  Respiratory effort good.  Abdomen soft and nontender.  No hepatosplenomegaly.  Extremities show no edema.  The right knee shows osteoarthritic changes and does have a small effusion with some warmth.    ______________________________________________________________________    Diagnostics:    Results for orders placed or performed in visit on 09/24/19   2(CBC w/o Differential)   Result Value Ref Range    WBC 5.9 4.0 - 11.0 thou/uL    RBC 4.71 3.80 - 5.40 mill/uL    Hemoglobin 14.7 12.0 - 16.0 g/dL    Hematocrit 44.0 35.0 - 47.0 %    MCV 93 80 - 100 fL    MCH 31.3 27.0 - 34.0 pg    MCHC 33.5 32.0 - 36.0 g/dL    RDW 13.4 11.0 - 14.5 %    Platelets 202 140 - 440 thou/uL    MPV 7.0 7.0 - 10.0 fL   Cortisol   Result Value Ref Range    Cortisol 9.9 ug/dL   Sedimentation Rate   Result Value Ref Range    Sed Rate 6 0 - 20 mm/hr   C-Reactive Protein   Result Value Ref Range    CRP 0.2 0.0 - 0.8 mg/dL   Comprehensive Metabolic Panel   Result Value Ref Range    Sodium 142 136 - 145 mmol/L    Potassium 4.4 3.5 - 5.0 mmol/L    Chloride 104 98 - 107 mmol/L    CO2 29 22 - 31 mmol/L    Anion Gap, Calculation 9 5 - 18 mmol/L    Glucose 91 70 - 125 mg/dL    BUN 17 8 - 28 mg/dL    Creatinine 0.72 0.60 - 1.10 mg/dL    GFR MDRD Af Amer >60 >60 mL/min/1.73m2    GFR MDRD Non Af Amer >60 >60 mL/min/1.73m2    Bilirubin, Total 0.4 0.0 - 1.0 mg/dL    Calcium 10.0 8.5 - 10.5 mg/dL    Protein, Total 6.2 6.0 - 8.0 g/dL    Albumin 3.6 3.5 - 5.0 g/dL    Alkaline Phosphatase 26 (L) 45 -  120 U/L    AST 21 0 - 40 U/L    ALT 12 0 - 45 U/L   Lipid Cascade RANDOM   Result Value Ref Range    Cholesterol 163 <=199 mg/dL    Triglycerides 112 <=149 mg/dL    HDL Cholesterol 55 >=50 mg/dL    LDL Calculated 86 <=129 mg/dL    Patient Fasting > 8hrs? Unknown      ______________________________________________________________________     Assessment:    1. Coronary artery disease involving native coronary artery of native heart without angina pectoris    2. Mixed hyperlipidemia    3. Vitamin D deficiency    4. Age-related osteoporosis without current pathological fracture    5. Former smoker    6. Temporal arteritis (H), in remission    7. Encounter to establish care with new doctor    8. Bilateral primary osteoarthritis of knee    9. Closed wedge compression fracture of seventh thoracic vertebra, initial encounter (H)    10. Essential hypertension    11. Angioedema, initial encounter    12. Sensorineural hearing loss (SNHL) of both ears    13. Senile purpura (H)    14. Sebaceous cyst      ______________________________________________________________________      PHQ-2 Total Score: 0 (10/7/2019 11:00 AM)    No data recorded  ______________________________________________________________________     BMI Readings from Last 1 Encounters:   10/07/19 22.68 kg/m      ______________________________________________________________________    Plan:    1. She will follow-up with her specialist as planned.  2. Continue current medications  3. Blood work done prior to the visit today was reviewed with her.  4. There is no signs of recurrence of temporal arteritis.  5. We reviewed health care directive and limits of care.  It sounds generally she would be DNR but does not commit to this today.  We will continue to follow this and advise.  6. She received flu shot already.  7. See how she does off the ranitidine (Zantac).  8. Keep on aspirin and clopidogrel (Plavix).         Michael Arzate MD  General Internal Medicine  [facility]  [facility]    Personal office fax - [phone]   Voice mail - [phone]  E-mail - [email or web address]     Return in about 6 months (around 4/7/2020), or if symptoms worsen or fail to improve, for follow up visit.     Future Appointments   Date Time Provider Department Center   10/14/2019  1:30 PM [facility]   10/30/2019 11:20 AM Ana Govea MBBS [facility] RHEUM [facility]   9/22/2020  1:20 PM [facility]   9/29/2020  1:00 PM Rocío Sainz NP [facility] ENDO [facility]         ______________________________________________________________________     Relevant ICD-10 codes and order associations:      ICD-10-CM    1. Coronary artery disease involving native coronary artery of native heart without angina pectoris I25.10    2. Mixed hyperlipidemia E78.2    3. Vitamin D deficiency E55.9    4. Age-related osteoporosis without current pathological fracture M81.0    5. Former smoker Z87.891    6. Temporal arteritis (H), in remission M31.6    7. Encounter to establish care with new doctor Z76.89    8. Bilateral primary osteoarthritis of knee M17.0    9. Closed wedge compression fracture of seventh thoracic vertebra, initial encounter (H) S22.060A    10. Essential hypertension I10    11. Angioedema, initial encounter T78.3XXA    12. Sensorineural hearing loss (SNHL) of both ears H90.3    13. Senile purpura (H) D69.2    14. Sebaceous cyst L72.3

## 2021-06-29 RX ORDER — CLOPIDOGREL BISULFATE 75 MG/1
75 TABLET ORAL DAILY
Qty: 90 TABLET | Refills: 0 | Status: SHIPPED | OUTPATIENT
Start: 2021-06-29 | End: 2021-10-26

## 2021-06-29 NOTE — PROGRESS NOTES
Progress Notes by Georges Martin PharmD at 9/3/2020 10:30 AM     Author: Georges Martin PharmD Service: -- Author Type: Pharmacist    Filed: 9/3/2020 11:42 AM Date of Service: 9/3/2020 10:30 AM Status: Signed    : Georges Martin PharmD (Pharmacist)       [facility] Transition of Care Follow Up Encounter  Assessment & Plan                                                     Medication Adherence/Access: No concerns noted today- has appropriately implemented changes from PCP visit.     Chest Pain/CAD: Partially improved- no repeat episodes of chest pain since returning home from hospital. Pt does note significant bruising concerns - likely from DAPT - has appropriately stopped fish oil. Reviewed with pt that benefits of DAPT outweigh bruising concerns. Reviewed signs/symptoms of bleed that would require attention, otherwise would benefit from continuing as prescribed  -Continue current therapy      GERD: Improved - Appropriately using PPI. Pt not having frequent symptoms, but likely best to continue given age + DAPT use.   -Continue current therapy     Hypertension: Improved - Last clinic BP at goal. <130/80. Pulse at goal . Denies adverse effects.   -Continue current therapy     Shingles: Improved -has appropriately stopped Acyclovir per PCP recommendation. Rash has improved.   -Continue current therapy     Osteoporosis: Stable - last DXA was stable. Last Vit D WNl. Appropriately using calcium supplement.   -Continue current therapy       Follow Up  No follow-ups on file.  Schedule with PharmD as needed     Subjective & Objective                                                       Yamilex Keller is a 89 y.o. female called for a transitions of care visit. she was discharged from [facility] on 8/26/20 for Chest Pain.  Aris is on the phone as well.     Patient consented to a telehealth visit: Yes    Chief Complaint: Went to doctor yesterday - he agreed with stopping Fish Oil. He is also stopping the  "400 mg Zovirax.       Medication Adherence/Access: Aris helps pt to manage her medications.    Hasn't taken the new meds yet because they weren't sure what they were for.         Chest Pain/CAD: Extensive h.o CAD, recently underwent angiogram and PCI of SIPKE, LCA with 3 drug-eluting stent -discharged on 8/22. Returned to ED with chest pains. Cardiac workup negative. Chest pain thought to be due to hiatal hernia and GERD (see below). Pt continued on Aspirin 81 mg daily, Atorvastatin 80 mg daily, Isosorbide Mononitrate 30 mg ER daily, Nitroglycerin 0.4 mg as needed, and Brilinta 90 mg tabs two times a day.      Just got a fresh bottle of Nitroglycerin tablets yesterday in mail. Pt was able to verbalize directions.     No chest pains since coming home.     Pt bruised with Aspirin and Brilinta 90 mg. Denies dark tarry stools.     Lab Results   Component Value Date    LDLCALC 86 08/21/2020      Lab Results   Component Value Date    CHOL 161 08/21/2020    CHOL 163 09/24/2019    CHOL 180 01/17/2019     Lab Results   Component Value Date    HDL 59 08/21/2020    HDL 55 09/24/2019    HDL 56 01/17/2019     Lab Results   Component Value Date    LDLCALC 86 08/21/2020    LDLCALC 86 09/24/2019    LDLCALC 103 01/17/2019     Lab Results   Component Value Date    TRIG 80 08/21/2020    TRIG 112 09/24/2019    TRIG 103 01/17/2019     No components found for: CHOLHDL       GERD: Pt was started on Omeprazole 20 mg daily at discharge. Continues Tums 500 mg as needed. Uses \"every so often\" bought a bottle years ago, and still has it. Notes that something else she was using was recalled because of cancer in it.       Hypertension: Continues on Isosorbide Mononitrate 30 mg ER daily, metoprolol Succinate 25 mg daily was added at discharge.     BP Readings from Last 3 Encounters:   09/01/20 122/50   08/26/20 144/67   08/22/20 147/60     Pulse Readings from Last 3 Encounters:   09/01/20 61   08/26/20 69   08/22/20 69        Shingles: PCP " stopped the Zovirax. Rash is better. Had a small area on her chest around the breast and that has gotten better.       Osteoporosis: Presribed Calcium-Vitamin D 500-200 mg-units two times a day. Receives Prolia 60 mg injection Q6 months. Last received in April.     Vitamin D, Total (25-Hydroxy)   Date Value Ref Range Status   2019 32.5 30.0 - 80.0 ng/mL Final      10/14/2019 DXA:         PMH: reviewed in EPIC   Allergies/ADRs: reviewed in EPIC   Alcohol:   Social History     Substance and Sexual Activity   Alcohol Use Yes    Comment: Occassionally       Tobacco:   Social History     Tobacco Use   Smoking Status Former Smoker   ? Packs/day: 1.00   ? Years: 4.00   ? Pack years: 4.00   ? Types: Cigarettes   ? Last attempt to quit: 1953   ? Years since quittin.7   Smokeless Tobacco Never Used     Recent Vitals:   BP Readings from Last 3 Encounters:   20 122/50   20 144/67   20 147/60      Wt Readings from Last 3 Encounters:   20 120 lb (54.4 kg)   20 117 lb 4.8 oz (53.2 kg)   20 119 lb 11.2 oz (54.3 kg)     ----------------    The patient was given a summary of these recommendations by mail    I spent 12 minutes with this patient today;  . All changes were made via collaborative practice agreement with Michael Arzate MD. A copy of the visit note was provided to the patient's provider.     Georges Martin PharmD  Medication Therapy Management (MTM) Pharmacist  [facility]      Telemedicine Visit Details    Type of service:  Telephone     Start Time: 10:35 AM   End Time (time video/phone call stopped): 10:47 AM     Originating Location (pt. Location): Home    Distant Location (provider location):  [facility]     Mode of Communication:   Telephone     Current Outpatient Medications   Medication Sig Dispense Refill   ? aspirin 81 MG EC tablet Take 1 tablet (81 mg total) by mouth at bedtime. For CAD 30 tablet 0    ? atorvastatin (LIPITOR) 80 MG tablet TAKE 1 TABLET AT BEDTIME FOR CORONARY ARTERY DISEASE AND LIPIDS 90 tablet 0   ? calcium, as carbonate, (TUMS) 200 mg calcium (500 mg) chewable tablet Chew 1 tablet as needed for heartburn.     ? calcium-vitamin D 500 mg(1,250mg) -200 unit per tablet Take 1 tablet by mouth 2 (two) times a day with meals.     ? isosorbide mononitrate (IMDUR) 30 MG 24 hr tablet TAKE 1 TABLET EVERY MORNING 90 tablet 1   ? metoprolol succinate (TOPROL-XL) 25 MG Take 1 tablet (25 mg total) by mouth daily. 90 tablet 3   ? nitroglycerin (NITROSTAT) 0.4 MG SL tablet Place 1 tablet (0.4 mg total) under the tongue every 5 (five) minutes as needed for chest pain. 25 tablet 2   ? omeprazole (PRILOSEC) 20 MG capsule Take 1 capsule (20 mg total) by mouth daily before breakfast. 90 capsule 3   ? ticagrelor (BRILINTA) 90 mg Tab Take 1 tablet (90 mg total) by mouth 2 (two) times a day. 60 tablet 11   ? triamcinolone (KENALOG) 0.1 % cream Apply 1 application topically as needed.       Current Facility-Administered Medications   Medication Dose Route Frequency Provider Last Rate Last Dose   ? denosumab 60 mg (PROLIA 60 mg/ml)  60 mg Subcutaneous Q6 Months Rocío Sainz NP   60 mg at 04/02/20 [id number]

## 2021-06-29 NOTE — PROGRESS NOTES
Progress Notes by Michael Arzate MD at 2020  4:30 PM     Author: Michael Arzate MD Service: -- Author Type: Physician    Filed: 2020  8:09 AM Encounter Date: 2020 Status: Signed    : Michael Arzate MD (Physician)              [facility]    Comprehensive and complex medical care - Chronic disease management - Shared decision making - Care coordination - Compassionate care    Patient advocacy - Rational deprescribing - Minimally disruptive medicine - Ethical focus - Customized care          Date of Service: 2020  Primary Provider: Michael Arzate MD    Patient Care Team:  Michael Arzate MD as PCP - General (Internal Medicine)  Ana Govea MBBS as Physician (Rheumatology)  Henny Miranda MD as Physician (Cardiology)  Rocío Sainz NP as Nurse Practitioner (Nurse Practitioner)  Simon Kaur MD as Physician (Dermatology)  Michael Arzate MD as Assigned PCP     ______________________________________________________________________     Patient's Pharmacy:      [id number] - [facility] [address]  [address] [address]  Phone: [phone] Fax: [phone]    [facility] - [address] [address]  Phone: [phone] Fax: [phone]     Patient's Contacts:  Name Home Phone Work Phone Mobile Phone Relationship Lgl CLAUDIA Gonzales   [phone] Spouse    ANDRES ALANIS   [phone] Child        Patient's Insurance:    Payor/Plan Subscr  Sex Relation Sub. Ins. ID Effective Group Num   1.  FOR SURENDRA CAMACHO 1927 Male  [id number]                                    [address]   2. MEDICARE - ME* SHAZIA KELLER 3/25/1929 Female  [id number] 3/[birth year]/1994                                    [facility], [place] [address]          Shazia Keller is 89 y.o. female who comes in today for:     Chief  Complaint   Patient presents with   ? Hospital Visit Follow Up     has not had anymore chest pain, started with a few red spots then was started to be treated for shingles, rash from medication given   ? Medication Management     was told to not take fish oil       Patient Active Problem List   Diagnosis   ? Osteoporosis   ? Hearing loss   ? High risk medication use   ? Bilateral primary osteoarthritis of knee   ? Chronic pain of right knee   ? Angioedema   ? HTN (hypertension)   ? HLD (hyperlipidemia)   ? CAD (coronary artery disease)   ? Senile purpura (H)   ? Full code status   ? Uses walker   ? GERD (gastroesophageal reflux disease)       Past Medical History:   Diagnosis Date   ? Acute ST elevation myocardial infarction (STEMI) involving left anterior descending coronary artery (H) 7/18/2017   ? CAD (coronary artery disease)    ? Closed wedge compression fracture of seventh thoracic vertebra, initial encounter (H) 7/8/2018   ? Full code status    ? GERD (gastroesophageal reflux disease) 1/10/2020   ? Hearing loss    ? HLD (hyperlipidemia)    ? HTN (hypertension) 4/1/2019   ? Osteoporosis    ? Temporal arteritis (H), in remission    ? Tubular adenoma of colon     Last colonoscopy 2011   ? Uses walker       Current Outpatient Medications   Medication Sig   ? aspirin 81 MG EC tablet Take 1 tablet (81 mg total) by mouth at bedtime. For CAD   ? atorvastatin (LIPITOR) 80 MG tablet TAKE 1 TABLET AT BEDTIME FOR CORONARY ARTERY DISEASE AND LIPIDS   ? calcium, as carbonate, (TUMS) 200 mg calcium (500 mg) chewable tablet Chew 1 tablet as needed for heartburn.   ? calcium-vitamin D 500 mg(1,250mg) -200 unit per tablet Take 1 tablet by mouth 2 (two) times a day with meals.   ? isosorbide mononitrate (IMDUR) 30 MG 24 hr tablet TAKE 1 TABLET EVERY MORNING   ? metoprolol succinate (TOPROL-XL) 25 MG Take 1 tablet (25 mg total) by mouth daily.   ? nitroglycerin (NITROSTAT) 0.4 MG SL tablet Place 1 tablet (0.4 mg total) under the  tongue every 5 (five) minutes as needed for chest pain.   ? omeprazole (PRILOSEC) 20 MG capsule Take 1 capsule (20 mg total) by mouth daily before breakfast.   ? ticagrelor (BRILINTA) 90 mg Tab Take 1 tablet (90 mg total) by mouth 2 (two) times a day.   ? triamcinolone (KENALOG) 0.1 % cream Apply 1 application topically as needed.       Allergies   Allergen Reactions   ? Lisinopril Swelling     Swelling of lips/mouth    ? Losartan Angioedema       Social History     Occupational History     Employer: RETIRED   Tobacco Use   ? Smoking status: Former Smoker     Packs/day: 1.00     Years: 4.00     Pack years: 4.00     Types: Cigarettes     Last attempt to quit: 1953     Years since quittin.7   ? Smokeless tobacco: Never Used   Substance and Sexual Activity   ? Alcohol use: Yes     Comment: Occassionally   ? Drug use: No   ? Sexual activity: Not Currently     Partners: Male       Social History     Social History Narrative    Lives with her  - pepe in [place]         to  Aris.  1 daughter.      Family History   Problem Relation Age of Onset   ? Liver cancer Mother    ? Prostate cancer Father    ? Pacemaker Brother    ? Lung cancer Brother    ? Lung cancer Brother    ? Uterine cancer Sister       Family history is otherwise noncontributory.      Subjective:     Patient comes in today for a hospital follow up visit.    We reviewed her hospital stay and the records from her visit were reviewed today.  I personally reviewed the lab tests, radiology and medical tests pertinent to that stay.    Roomed by: rosa maria leija    Accompanied by Spouse    Refills needed? No    Do you have any forms that need to be filled out? No       Reviewed her chest pain.    Admitted to the hospital and had angiogram times two.  Single intervention with stent placement.  She did have central chest pain which was sharp.  Poking.  May have had some other pains.  Also has some gastroesophageal reflux disease (GERD) which  complicates things.  Reviewed her follow up with cardiology and anticoagulation with this.  No issues with catheter site healing.    Reviewed reflux and there are no issues of concern.  Does need a refill of omeprazole (Prilosec).    Nose runs frequently and would like to try something for this.    Right osteoarthritis (OA) of knee hurting more over the kneecap and would like to do corticosteroid injection.  Helps somewhat but not great.  No other options.        We reviewed her other issues noted in the assessment but not specifically addressed in the HPI above.     Comprehensive review of systems performed today with no major problems noted except as above.     Objective:     Wt Readings from Last 3 Encounters:   09/01/20 120 lb (54.4 kg)   08/26/20 117 lb 4.8 oz (53.2 kg)   08/22/20 119 lb 11.2 oz (54.3 kg)       BP Readings from Last 3 Encounters:   09/01/20 122/50   08/26/20 144/67   08/22/20 147/60      /50   Pulse 61   Wt 120 lb (54.4 kg)   SpO2 95%   BMI 21.26 kg/m     The patient is comfortable, no acute distress.  Mood good.  Insight is fair.  No skin lesions or nodules of concern.  Actinic purpura of the arms noted.  Ears clear.  Eyes are nonicteric.  Pupils equal and reactive.  Throat is clear.  Neck is supple without mass, no thyromegaly.  Carotids are clear.  No cervical or epitrochlear adenopathy.  Heart regular rate and rhythm.  Lungs clear to auscultation bilaterally.  Respiratory effort good.  Abdomen soft and nontender.  No hepatosplenomegaly.  Extremities show no edema.  Right knee osteoarthritis (OA) changes with mild effusion.    Diagnostics:     Results for orders placed or performed during the hospital encounter of 08/24/20   Basic Metabolic Panel   Result Value Ref Range    Sodium 140 136 - 145 mmol/L    Potassium 4.1 3.5 - 5.0 mmol/L    Chloride 106 98 - 107 mmol/L    CO2 27 22 - 31 mmol/L    Anion Gap, Calculation 7 5 - 18 mmol/L    Glucose 98 70 - 125 mg/dL    Calcium 10.2 8.5 -  10.5 mg/dL    BUN 17 8 - 28 mg/dL    Creatinine 0.72 0.60 - 1.10 mg/dL    GFR MDRD Af Amer >60 >60 mL/min/1.73m2    GFR MDRD Non Af Amer >60 >60 mL/min/1.73m2   HM2 (CBC W/O DIFF)   Result Value Ref Range    WBC 6.8 4.0 - 11.0 thou/uL    RBC 4.76 3.80 - 5.40 mill/uL    Hemoglobin 14.3 12.0 - 16.0 g/dL    Hematocrit 44.8 35.0 - 47.0 %    MCV 94 80 - 100 fL    MCH 30.0 27.0 - 34.0 pg    MCHC 31.9 (L) 32.0 - 36.0 g/dL    RDW 13.3 11.0 - 14.5 %    Platelets 173 140 - 440 thou/uL    MPV 9.0 8.5 - 12.5 fL   Troponin I   Result Value Ref Range    Troponin I 0.13 0.00 - 0.29 ng/mL   BNP(B-type Natriuretic Peptide)   Result Value Ref Range    BNP 29 0 - 167 pg/mL   COVID-19 Virus PCR MRF    Specimen: Nasopharyngeal   Result Value Ref Range    COVID-19 VIRUS SPECIMEN SOURCE Nasopharyngeal     2019-nCOV Not Detected    Creatinine   Result Value Ref Range    Creatinine 0.75 0.60 - 1.10 mg/dL    GFR MDRD Af Amer >60 >60 mL/min/1.73m2    GFR MDRD Non Af Amer >60 >60 mL/min/1.73m2   Platelet Count   Result Value Ref Range    Platelets 188 140 - 440 thou/uL   Troponin I   Result Value Ref Range    Troponin I 0.16 0.00 - 0.29 ng/mL   Troponin I   Result Value Ref Range    Troponin I 0.17 0.00 - 0.29 ng/mL   Comprehensive metabolic panel   Result Value Ref Range    Sodium 140 136 - 145 mmol/L    Potassium 4.0 3.5 - 5.0 mmol/L    Chloride 107 98 - 107 mmol/L    CO2 25 22 - 31 mmol/L    Anion Gap, Calculation 8 5 - 18 mmol/L    Glucose 82 70 - 125 mg/dL    BUN 15 8 - 28 mg/dL    Creatinine 0.66 0.60 - 1.10 mg/dL    GFR MDRD Af Amer >60 >60 mL/min/1.73m2    GFR MDRD Non Af Amer >60 >60 mL/min/1.73m2    Bilirubin, Total 0.6 0.0 - 1.0 mg/dL    Calcium 9.2 8.5 - 10.5 mg/dL    Protein, Total 5.8 (L) 6.0 - 8.0 g/dL    Albumin 3.1 (L) 3.5 - 5.0 g/dL    Alkaline Phosphatase 20 (L) 45 - 120 U/L    AST 19 0 - 40 U/L    ALT 9 0 - 45 U/L   HM1 (CBC with Diff)   Result Value Ref Range    WBC 7.2 4.0 - 11.0 thou/uL    RBC 4.39 3.80 - 5.40 mill/uL     Hemoglobin 13.6 12.0 - 16.0 g/dL    Hematocrit 41.1 35.0 - 47.0 %    MCV 94 80 - 100 fL    MCH 31.0 27.0 - 34.0 pg    MCHC 33.1 32.0 - 36.0 g/dL    RDW 13.4 11.0 - 14.5 %    Platelets 175 140 - 440 thou/uL    MPV 9.3 8.5 - 12.5 fL    Neutrophils % 57 50 - 70 %    Lymphocytes % 20 20 - 40 %    Monocytes % 14 (H) 2 - 10 %    Eosinophils % 8 (H) 0 - 6 %    Basophils % 1 0 - 2 %    Immature Granulocyte % 0 <=0 %    Neutrophils Absolute 4.0 2.0 - 7.7 thou/uL    Lymphocytes Absolute 1.5 0.8 - 4.4 thou/uL    Monocytes Absolute 1.0 (H) 0.0 - 0.9 thou/uL    Eosinophils Absolute 0.6 (H) 0.0 - 0.4 thou/uL    Basophils Absolute 0.1 0.0 - 0.2 thou/uL    Immature Granulocyte Absolute 0.0 <=0.0 thou/uL   Platelet Count - every other day x 3   Result Value Ref Range    Platelets 171 140 - 440 thou/uL   ECG 12 lead nursing unit performed   Result Value Ref Range    SYSTOLIC BLOOD PRESSURE 149 mmHg    DIASTOLIC BLOOD PRESSURE 69 mmHg    VENTRICULAR RATE 80 BPM    ATRIAL RATE 80 BPM    P-R INTERVAL 164 ms    QRS DURATION 90 ms    Q-T INTERVAL 370 ms    QTC CALCULATION (BEZET) 426 ms    P Axis 74 degrees    R AXIS -32 degrees    T AXIS 50 degrees    MUSE DIAGNOSIS       Normal sinus rhythm  Left axis deviation  Abnormal ECG  When compared with ECG of 24-AUG-2020 12:37,  No significant change was found  Confirmed by SEE ED PROVIDER NOTE FOR, ECG INTERPRETATION ([id number]),  Viviana Banks ([id number]) on 8/24/2020 12:44:12 PM     ECG 12 lead MUSE   Result Value Ref Range    SYSTOLIC BLOOD PRESSURE      DIASTOLIC BLOOD PRESSURE      VENTRICULAR RATE 74 BPM    ATRIAL RATE 74 BPM    P-R INTERVAL 182 ms    QRS DURATION 80 ms    Q-T INTERVAL 348 ms    QTC CALCULATION (BEZET) 386 ms    P Axis 84 degrees    R AXIS -29 degrees    T AXIS 39 degrees    MUSE DIAGNOSIS       Normal sinus rhythm  Normal ECG  When compared with ECG of 24-AUG-2020 12:42,  T wave amplitude has increased in Anterior leads  Confirmed by WILLIAM FRITZ, LES LOC:[facility] ([id number]) on  8/25/2020 2:07:52 PM       ______________________________________________________________________    Pertinent radiology for this visit includes the following:    Cardiac Catheterization  89 yo F with extensive h/o CAD, underwent complex PCI of LM, ostial LAD,   ostial Cx and mid Cx a week ago, and now presenting with recurrent CP. CP   is atypical with no objective evidence of ischemia but given her recent   PCI and known complex anatomy, further risk stratification with coronary   angiography was requested    LM mild dz with patent stent  LAD 50% eccentric lesion at the ostium within prior stent, with mild to   moderate mid/distal dz  LCx large dominant with 40% ostial stenosis, patent mid Cx stent, moderate   distal dz  RCA small, nondominant with moderate dz    EDP 9mmHg    Both the ostial LAD and LCx lesions were assessed with FFR as angiography   maybe misleading given prior stents and eccentric ISR, and both areas   found to be nonobstructive. FFR at peak hyperemia 0.86 in the LAD and 0.92   in the Cx      ______________________________________________________________________      Assessment:     1. Bilateral primary osteoarthritis of knee    2. Gastroesophageal reflux disease, esophagitis presence not specified    3. Essential hypertension    4. Coronary artery disease involving native coronary artery of native heart without angina pectoris    5. Other forms of angina pectoris (H)    6. ST elevation myocardial infarction (STEMI), unspecified artery (H)    7. Unstable angina pectoris (H)    8. Chronic pain of right knee    9. Hospital discharge follow-up    10. Chronic rhinitis      ______________________________________________________________________     PHQ-2 Total Score: 0 (9/1/2020  4:00 PM)      No data recorded  ______________________________________________________________________     BMI Readings from Last 1 Encounters:   09/01/20 21.26 kg/m        Plan:     1. Refilled  medications.  2. Corticosteroid injection of the right knee done today.  3. Continue current medications.  4. We reviewed her hospital stay and the records from her visit were reviewed today.  I personally reviewed the lab tests, radiology and medical tests pertinent to that stay.   5. Consider barium esophagram if chest symptoms persist or worsen.  6. Follow up 3 weeks with me.  7. Agree with stopping fish oil.    Post Discharge Medication Reconciliation Status: discharge medications reconciled, continue medications without change         Michael Arzate MD  General Internal Medicine  [facility]    Personal office fax - [phone]   Voice mail - [phone]  E-mail - [email or web address]      Return in about 3 weeks (around 9/22/2020) for follow up visit.     Future Appointments   Date Time Provider Department Center   9/3/2020 10:30 AM Georges Martin, PharmD OPS PAIN MTM    9/4/2020  1:30 PM [facility]    9/22/2020  1:20 PM [facility]   9/22/2020  1:50 PM Michael Arzate MD [facility] INTMED [facility]   10/6/2020  1:40 PM Rocío Sainz NP [facility] ENDO [facility]         ______________________________________________________________________     Relevant ICD-10 codes and order associations:      ICD-10-CM    1. Bilateral primary osteoarthritis of knee  M17.0 methylPREDNISolone acetate injection 80 mg (DEPO-MEDROL)     Injection - Other   2. Gastroesophageal reflux disease, esophagitis presence not specified  K21.9 omeprazole (PRILOSEC) 20 MG capsule   3. Essential hypertension  I10 metoprolol succinate (TOPROL-XL) 25 MG   4. Coronary artery disease involving native coronary artery of native heart without angina pectoris  I25.10    5. Other forms of angina pectoris (H)  I20.8    6. ST elevation myocardial infarction (STEMI), unspecified artery (H)  I21.3    7. Unstable angina pectoris (H)  I20.0    8. Chronic pain of right knee  M25.561  methylPREDNISolone acetate injection 80 mg (DEPO-MEDROL)    G89.29 Injection - Other   9. Hospital discharge follow-up  Z09    10. Chronic rhinitis  J31.0

## 2021-06-29 NOTE — PROGRESS NOTES
Progress Notes by Henny Miranda MD at 9/10/2020  2:30 PM     Author: Henny Miranda MD Service: -- Author Type: Physician    Filed: 9/10/2020  3:10 PM Encounter Date: 9/10/2020 Status: Signed    : Henny Miranda MD (Physician)           [facility]  Heart [facility] Clinic Follow-up Note    Assessment & Plan        1. Coronary artery disease involving native coronary artery of native heart without angina pectoris -angiography August 2020 showed patent left main stent with ostial in-stent stenosis of LAD 85% there was intervened upon, ostial circumflex 95% with a new proximal 80% circumflex lesions which were intervened upon, nondominant right coronary artery with a mid 60% lesion.  She had recurrent chest discomfort and underwent repeat angiography showing patent stents based on flow reserve.  Best course of therapy would probably be bypass surgery but given her age we are not going to pursue that.  Did have discussion with family today,  and wife, that we are left with medical therapy.  If needed, would up the dose of Imdur to 60 mg, or consider adding Ranexa.   2. Mixed hyperlipidemia -cholesterol 161 with an LDL of 86 and on maximum dose atorvastatin 80 mg.  Could consider adding PCSK9 inhibitor.   3. Essential hypertension -under good control on current medications.   4. Dementia (H)-she has an issue with memory, does not quite remember episodes of chest discomfort and tells me they all came on during Buddhist.  I am concerned she is not remembering much of her chest pain.     Plan  1.  Continue current medical therapy.  If increased angina try Imdur 60 mg or Ranexa.  2.  Follow-up with me in about 4 months or sooner if needed.  3.  Did briefly discuss goals of care with her and her , would suggest primary continue conversation.  4.  Given bruising will decrease aspirin to every other day a month out from stent, approximately 22 September.    Subjective  CC: 89-year-old white  "female being seen in post hospital discharge follow-up.  Due to increased chest discomfort I sent in for repeat angiography showing occlusion of distal left main stent with intervention on the LAD and circumflex ostial lesions.  She had repeat chest discomfort prompted admission and [facility] showing no significant stenoses.  She did receive a proton pump inhibitor.  She is at home, tells me she does not have much energy, does not do much activity.  They are in senior independent living but have meals brought to them.  There is no chest discomfort that she can remember, palpitations, shortness of breath, PND, orthopnea or peripheral edema.  Her biggest issue is bruising from the ticagrelor and aspirin.    Medications  Current Outpatient Medications   Medication Sig   ? aspirin 81 MG EC tablet Take 1 tablet (81 mg total) by mouth at bedtime. For CAD   ? atorvastatin (LIPITOR) 80 MG tablet TAKE 1 TABLET AT BEDTIME FOR CORONARY ARTERY DISEASE AND LIPIDS   ? calcium, as carbonate, (TUMS) 200 mg calcium (500 mg) chewable tablet Chew 1 tablet as needed for heartburn.   ? calcium-vitamin D 500 mg(1,250mg) -200 unit per tablet Take 1 tablet by mouth 2 (two) times a day with meals.   ? isosorbide mononitrate (IMDUR) 30 MG 24 hr tablet TAKE 1 TABLET EVERY MORNING   ? metoprolol succinate (TOPROL-XL) 25 MG Take 1 tablet (25 mg total) by mouth daily.   ? nitroglycerin (NITROSTAT) 0.4 MG SL tablet Place 1 tablet (0.4 mg total) under the tongue every 5 (five) minutes as needed for chest pain.   ? omeprazole (PRILOSEC) 20 MG capsule Take 1 capsule (20 mg total) by mouth daily before breakfast.   ? ticagrelor (BRILINTA) 90 mg Tab Take 1 tablet (90 mg total) by mouth 2 (two) times a day.   ? triamcinolone (KENALOG) 0.1 % cream Apply 1 application topically as needed.       Objective  /72 (Patient Site: Right Arm, Patient Position: Sitting, Cuff Size: Adult Regular)   Pulse 68   Resp 16   Ht 5' 3\" (1.6 m)   Wt 120 lb (54.4 " kg)   BMI 21.26 kg/m      General Appearance:    Alert, cooperative, no distress, appears stated age   Head:    Normocephalic, without obvious abnormality, atraumatic   Throat:   Lips, mucosa, and tongue normal; teeth and gums normal   Neck:   Supple, symmetrical, trachea midline, no adenopathy;        thyroid:  No enlargement/tenderness/nodules; no carotid    bruit or JVD   Back:     Symmetric, no curvature, ROM normal, no CVA tenderness   Lungs:     Clear to auscultation bilaterally, respirations unlabored   Chest wall:    No tenderness or deformity   Heart:    Regular rate and rhythm, S1 and S2 normal, no murmur, rub   or gallop   Abdomen:     Soft, non-tender, bowel sounds active all four quadrants,     no masses, no organomegaly   Extremities:   Normal, atraumatic, no cyanosis or edema   Pulses:   2+ and symmetric all extremities   Skin:   Skin color, texture, turgor normal, no rashes or lesions     Results    Lab Results personally reviewed   Lab Results   Component Value Date    CHOL 161 08/21/2020    CHOL 163 09/24/2019     Lab Results   Component Value Date    HDL 59 08/21/2020    HDL 55 09/24/2019     Lab Results   Component Value Date    LDLCALC 86 08/21/2020    LDLCALC 86 09/24/2019     Lab Results   Component Value Date    TRIG 80 08/21/2020    TRIG 112 09/24/2019     Lab Results   Component Value Date    WBC 7.2 08/25/2020    HGB 13.6 08/25/2020    HCT 41.1 08/25/2020     08/26/2020     Lab Results   Component Value Date    CREATININE 0.66 08/25/2020    BUN 15 08/25/2020     08/25/2020    K 4.0 08/25/2020    CO2 25 08/25/2020     Review of Systems:   General: WNL  Eyes: WNL  Ears/Nose/Throat: WNL  Lungs: WNL  Heart: WNL  Stomach: Heartburn  Bladder: Frequent Urination at Night  Muscle/Joints: WNL  Skin: WNL  Nervous System: WNL  Mental Health: WNL     Blood: WNL

## 2021-06-29 NOTE — PROGRESS NOTES
Progress Notes by Michael Arzate MD at 2020  1:50 PM     Author: Michael Arzate MD Service: -- Author Type: Physician    Filed: 2020 10:48 PM Encounter Date: 2020 Status: Signed    : Michael Arzate MD (Physician)              [facility]    Comprehensive and complex medical care - Chronic disease management - Shared decision making - Care coordination - Compassionate care    Patient advocacy - Rational deprescribing - Minimally disruptive medicine - Ethical focus - Customized care          Date of Service: 2020  Primary Provider: Michael Arzate MD    Patient Care Team:  Michael Arzate MD as PCP - General (Internal Medicine)  Ana Govea MBBS as Physician (Rheumatology)  Henny Miranda MD as Physician (Cardiology)  Rocío Sainz NP as Nurse Practitioner (Nurse Practitioner)  Simon Kaur MD as Physician (Dermatology)  Michael Arzate MD as Assigned PCP     ______________________________________________________________________     Patient's Pharmacy:      [facility] - [address] [address]  Phone: [phone] Fax: [phone]     Patient's Contacts:  Name Home Phone Work Phone Mobile Phone Relationship Lgl Grd   CLAUDIA KELLER   [phone] Spouse    ANDRES ALANIS   [phone] Child        Patient's Insurance:    Payor/Plan Subscr  Sex Relation Sub. Ins. ID Effective Group Num   1.  FOR JORDAN CAMACHOARD 1927 Male  [id number]                                    [address]   2. MEDICARE - MESHAZIA KURTZ 3/25/1929 Female  [id number] 3/1/1994                                    [facility], [address]           Shazia Keller is a 89 y.o. female who comes in today for:    Chief Complaint   Patient presents with   ? Follow-up     right knee osteoarthritis, osteoporosis, heart disease, chest pain   ? Tremors     right hand        Active Problem List:  Problem List as of 9/22/2020 Reviewed: 9/10/2020  3:05 PM by Henny Miranda MD       High    Full code status    CAD (coronary artery disease)       Medium    Bilateral primary osteoarthritis of knee    HTN (hypertension)       Low    Angioedema    GERD (gastroesophageal reflux disease)    Hearing loss    HLD (hyperlipidemia)    Osteoporosis       Unprioritized    Chronic pain of right knee    Dementia (H)    High risk medication use    Senile purpura (H)    Uses walker           Current Outpatient Medications   Medication Sig   ? aspirin 81 MG EC tablet Take 1 tablet (81 mg total) by mouth at bedtime. For CAD   ? atorvastatin (LIPITOR) 80 MG tablet TAKE 1 TABLET AT BEDTIME FOR CORONARY ARTERY DISEASE AND LIPIDS   ? calcium, as carbonate, (TUMS) 200 mg calcium (500 mg) chewable tablet Chew 1 tablet as needed for heartburn.   ? calcium-vitamin D 500 mg(1,250mg) -200 unit per tablet Take 1 tablet by mouth 2 (two) times a day with meals.   ? isosorbide mononitrate (IMDUR) 30 MG 24 hr tablet Take 1 tablet (30 mg total) by mouth every morning.   ? metoprolol succinate (TOPROL-XL) 25 MG Take 1 tablet (25 mg total) by mouth daily.   ? nitroglycerin (NITROSTAT) 0.4 MG SL tablet Place 1 tablet (0.4 mg total) under the tongue every 5 (five) minutes as needed for chest pain.   ? omeprazole (PRILOSEC) 20 MG capsule Take 1 capsule (20 mg total) by mouth daily before breakfast.   ? ticagrelor (BRILINTA) 90 mg Tab Take 1 tablet (90 mg total) by mouth 2 (two) times a day.   ? triamcinolone (KENALOG) 0.1 % cream Apply 1 application topically as needed.       Social History     Social History Narrative    Lives with her  - pe[place] in [place]         to  Ar[place].  1 daughter.         Subjective:     She comes in today for follow-up of a number of issues.    We first reviewed her knee osteoarthritis.  This is doing a bit better.  She still has significant issues with this.    We reviewed  her coronary disease.  She has not noticed any chest pain or chest pressure.  They were confused by a recent exchange with Dr. Miranda.    We reviewed her high blood pressure and this seems to be doing well.    She has noticed some tremoring in her right hand at rest.  Her  has also noticed this.  It is a rolling tremor.  She really does not have any other significant signs of parkinsonism.  She does not really want to do more with this at this time.    We reviewed her other issues noted in the assessment but not specifically addressed in the HPI above.     On review of systems, the patient denies any chest pain or shortness of breath.    Objective:     Wt Readings from Last 3 Encounters:   09/22/20 121 lb (54.9 kg)   09/10/20 120 lb (54.4 kg)   09/01/20 120 lb (54.4 kg)       BP Readings from Last 3 Encounters:   09/22/20 136/82   09/10/20 140/72   09/01/20 122/50       /82   Pulse 63   Wt 121 lb (54.9 kg)   SpO2 95%   BMI 21.43 kg/m     The patient is comfortable, no acute distress.  Mood good.  Insight fair.  Eyes are nonicteric.  Neck is supple without mass.  No cervical adenopathy.  No thyromegaly. Heart regular rate and rhythm.  Lungs clear to auscultation bilaterally.  Respiratory effort is good.  Abdomen soft and nontender.  No hepatosplenomegaly.  Extremities no edema.      Diagnostics:     Results for orders placed or performed in visit on 09/22/20   Vitamin D, Total (25-Hydroxy)   Result Value Ref Range    Vitamin D, Total (25-Hydroxy) 34.5 30.0 - 80.0 ng/mL       Assessment:     1. Bilateral primary osteoarthritis of knee    2. Coronary artery disease involving native coronary artery of native heart without angina pectoris    3. S/P primary angioplasty with coronary stent    4. Essential hypertension    5. Acute ST elevation myocardial infarction (STEMI) involving left anterior descending coronary artery (H)    6. Gastroesophageal reflux disease, esophagitis presence not specified    7.  Immunization due    8. Resting tremor        Quality review:     PHQ-2 Total Score: 0 (9/1/2020  4:00 PM)      No data recorded  ______________________________________________________________________     BMI Readings from Last 1 Encounters:   09/22/20 21.43 kg/m          Plan:     1. Flu shot done today  2. Continue current medications.  3. Follow-up again in 3 months.  4. Consider blood work at next visit.  5. No further work-up at this point.  6. We decided not to do blood work today because of her overall results from last hospital stay.         Michael Arzate MD  General Internal Medicine  [facility]    Personal office fax - [phone]   Voice mail - [phone]  E-mail - [email or web address]     Return in about 3 months (around 12/22/2020), or if symptoms worsen or fail to improve, for follow up visit.     Future Appointments   Date Time Provider Department Center   10/6/2020  1:40 PM Rocío Sainz NP [facility]   12/15/2020  3:05 PM Michael Arzate MD [facility]         ______________________________________________________________________     Relevant ICD-10 codes and order associations:      ICD-10-CM    1. Bilateral primary osteoarthritis of knee  M17.0    2. Coronary artery disease involving native coronary artery of native heart without angina pectoris  I25.10 ticagrelor (BRILINTA) 90 mg Tab   3. S/P primary angioplasty with coronary stent  Z95.5 ticagrelor (BRILINTA) 90 mg Tab   4. Essential hypertension  I10 metoprolol succinate (TOPROL-XL) 25 MG   5. Acute ST elevation myocardial infarction (STEMI) involving left anterior descending coronary artery (H)  I21.02 atorvastatin (LIPITOR) 80 MG tablet     isosorbide mononitrate (IMDUR) 30 MG 24 hr tablet   6. Gastroesophageal reflux disease, esophagitis presence not specified  K21.9 omeprazole (PRILOSEC) 20 MG capsule   7. Immunization due  Z23 Influenza,Quad,High Dose,PF 65 YR+   8. Resting  tremor  G25.2

## 2021-06-29 NOTE — PROGRESS NOTES
Progress Notes by Henny Miranda MD at 8/17/2020  4:10 PM     Author: Henny Miranda MD Service: -- Author Type: Physician    Filed: 8/17/2020  4:43 PM Encounter Date: 8/17/2020 Status: Signed    : Henny Miranda MD (Physician)           [facility]  Heart Care Clinic Follow-up Note    Assessment & Plan        1. Coronary artery disease involving native coronary artery of native heart without angina pectoris  -angiography July 18, 2017 showed normal left main, ostial left anterior descending 99% lesion that received a drug-coated stent, mid left anterior descending 70% lesion that received a drug-coated stent, distal 25% lesion, distal circumflex 25% lesion with a left AV groove 40% lesion, and proximal right coronary artery 20% lesion. She had more chest discomfort in [place] in February 2018 and underwent repeat stress test showing anterior ischemia and angiography suggesting left main disease and apparently received stents into the LAD and circumflex at that time as well.  She is  having recurrent episodes requiring emergency department visit on 2 separate occasions within the last month, given this, will pursue repeat angiography with possible intervention.  She has been told to go to the emergency department should she have recurrent chest discomfort that does not go away.  I have renewed her nitroglycerin.  We did repeat stress test in April 2019 which showed no ischemia or scar with preserved ejection fraction 70%.   2. STEMI (ST elevation myocardial infarction) (H) -this was of the anterior wall in July 2017.   3. Atypical chest pain -2 episodes of burning, concerning enough, in addition increased fatigue.  Concerned about worsening blockages and pursue angiography as above.  Doubt valve issues since echo looked good in January.   4. Essential hypertension -under good control currently.   5. Mixed hyperlipidemia -cholesterol 163 with an LDL of 86 and on maximum dose of atorvastatin  "currently.  Will most likely add Zetia considering her numerous episodes of chest discomfort.     Plan  1.  Coronary angiography and possible intervention.  2.  Follow-up with me thereafter.    Subjective  CC: 89-year-old white female with history of coronary artery disease here for add-on visit with her .  He tells me sometime in July she had chest discomfort and he was concerned and then again in August 2.  She was getting ready for Conf[employer], had a burning in the chest that came on while dressing, did not go away with a Tums, presented to the emergency department at [facility], had relief following a nitroglycerin.  She has generalized fatigue, no energy to do much of anything currently.  No sustained chest discomfort nor palpitations, shortness breath, PND, orthopnea or peripheral edema.    Medications  Current Outpatient Medications   Medication Sig   ? aspirin 81 MG EC tablet Take 1 tablet (81 mg total) by mouth at bedtime. For CAD   ? atorvastatin (LIPITOR) 80 MG tablet TAKE 1 TABLET AT BEDTIME FOR CORONARY ARTERY DISEASE AND LIPIDS   ? calcium-vitamin D 500 mg(1,250mg) -200 unit per tablet Take 1 tablet by mouth 2 (two) times a day with meals.   ? isosorbide mononitrate (IMDUR) 30 MG 24 hr tablet TAKE 1 TABLET EVERY MORNING   ? nitroglycerin (NITROSTAT) 0.4 MG SL tablet Place 1 tablet (0.4 mg total) under the tongue every 5 (five) minutes as needed for chest pain.   ? OMEGA-3/DHA/EPA/FISH OIL (FISH OIL-OMEGA-3 FATTY ACIDS) 300-1,000 mg capsule Take 1 g by mouth daily.    ? triamcinolone (KENALOG) 0.1 % cream Apply 1 application topically as needed.       Objective  /80 (Patient Site: Left Arm, Patient Position: Sitting, Cuff Size: Adult Regular)   Pulse 86   Resp 16   Ht 5' 3\" (1.6 m)   Wt 122 lb (55.3 kg)   BMI 21.61 kg/m      General Appearance:    Alert, cooperative, no distress, appears stated age   Head:    Normocephalic, without obvious abnormality, atraumatic   Throat:   Lips, mucosa, " and tongue normal; teeth and gums normal   Neck:   Supple, symmetrical, trachea midline, no adenopathy;        thyroid:  No enlargement/tenderness/nodules; no carotid    bruit or JVD   Back:     Symmetric, no curvature, ROM normal, no CVA tenderness   Lungs:     Clear to auscultation bilaterally, respirations unlabored   Chest wall:    No tenderness or deformity   Heart:    Regular rate and rhythm, S1 and S2 normal, 1/6 systolic ejection murmur, no rub   or gallop   Abdomen:     Soft, non-tender, bowel sounds active all four quadrants,     no masses, no organomegaly   Extremities:   Normal, atraumatic, no cyanosis or edema   Pulses:   2+ and symmetric all extremities   Skin:   Skin color, texture, turgor normal, no rashes or lesions     Results    Lab Results personally reviewed   Lab Results   Component Value Date    CHOL 163 09/24/2019    CHOL 180 01/17/2019     Lab Results   Component Value Date    HDL 55 09/24/2019    HDL 56 01/17/2019     Lab Results   Component Value Date    LDLCALC 86 09/24/2019    LDLCALC 103 01/17/2019     Lab Results   Component Value Date    TRIG 112 09/24/2019    TRIG 103 01/17/2019     Lab Results   Component Value Date    WBC 6.7 08/02/2020    HGB 15.5 08/02/2020    HCT 47.3 (H) 08/02/2020     08/02/2020     Lab Results   Component Value Date    CREATININE 0.69 08/02/2020    BUN 17 08/02/2020     08/02/2020    K 4.2 08/02/2020    CO2 23 08/02/2020     Review of Systems:   General: WNL  Eyes: WNL  Ears/Nose/Throat: WNL  Lungs: WNL  Heart: Chest Pain  Stomach: WNL  Bladder: Frequent Urination at Night  Muscle/Joints: WNL  Skin: WNL  Nervous System: WNL  Mental Health: WNL     Blood: Easy Bruising

## 2021-06-29 NOTE — PROGRESS NOTES
Progress Notes by Georges Martin PharmD at 8/27/2020 10:30 AM     Author: Georges Martin PharmD Service: -- Author Type: Pharmacist    Filed: 8/27/2020 11:46 AM Date of Service: 8/27/2020 10:30 AM Status: Signed    : Georges Martin PharmD (Pharmacist)       [facility] Transition of Care Encounter  Assessment & Plan                                                     Post Discharge Medication Reconciliation Status: discharge medications reconciled and changed, per note/orders    Medication Adherence/Access: Other than some confusion with the new medicines, adherence is appropriate. Per pt request will send them a medication schedule with suggested administration times.       Chest Pain/CAD: Partially improved- no repeat episodes of chest pain since returning home from hospital. Appropriately verbalized directions for nitroglycerin should she need to use. Pt does not significant bruising concerns - likely from DAPT - can be worsened by fish oil. As trigs have been WNL and pt is already on high intensity statin, likely little benefit from continuing fish oil.   -Stop Fish Oil       GERD: Partially improved - reviewed indication for Omeprazole and when to use. Pt took first dose during the phone visit today.   -Continue current therapy       Hypertension: Partially improved - Last BP at discharge was above goal. <130/80. Pulse at goal . Has not yet started Metoprolol. reviewed indication for Metoprolol ER and when to use. Pt took first dose during the phone visit today.    -Continue current therapy        Shingles: Needs improvement- some confusion as pt was prescribed 200 mg tabs, but pharmacy filled with 400 mg tabs. Reviewed the new directions of 2 tabs 5 times daily (Martín verified this is what was written on the bottle). Updated pt's med list to reflect this. Pt unsure that she has shingles - discussed that I am unable to diagnose, but based on the assessment made in the hospital, I recommend pt complete the  course of therapy. Discussed if it is shingles and we wait, treatment might not be as effective after 72 hour period. Reviewed relatively low side effect risk. Pt agreeable to competing the 7 day course. Will follow-up in 1 week to see if rash has improved or if pt needs further evaluation prior to next PCP visit.   -Pt educated on Acyclovir- Reviewed updated directions of Acyclovir 400 mg tabs - 2 tabs by mouth 5 times daily.       Osteoporosis: Stable - last DXA was stable. Last Vit D WNl. Appropriately using calcium supplement.   -Continue current therapy       Follow Up  Return in about 1 week (around 9/3/2020) for Medication Management Pharmacist, Via Phone.  9/22 with Michael Arzate MD       Subjective & Objective                                                       Yamilex Keller is a 89 y.o. female called for a transitions of care visit. she was discharged from [facility] on 8/26/20 for Chest Pain.  Aris is on the phone as well.     Patient consented to a telehealth visit: Yes    Chief Complaint: 3 new medications - what are they for - Toprol-XL       Medication Adherence/Access: Aris helps pt to manage her medications.    Hasn't taken the new meds yet because they weren't sure what they were for.         Chest Pain/CAD: Extensive h.o CAD, recently underwent angiogram and PCI of SPIKE, LCA with 3 drug-eluting stent -discharged on 8/22. Returned to ED with chest pains. Cardiac workup negative. Chest pain thought to be due to hiatal hernia and GERD (see below). Pt continued on Aspirin 81 mg daily, Atorvastatin 80 mg daily, Isosorbide Mononitrate 30 mg ER daily, Nitroglycerin 0.4 mg as needed, and Brilinta 90 mg tabs two times a day.      Just got a fresh bottle of Nitroglycerin tablets yesterday in mail. Pt was able to verbalize directions.     No chest pains since coming home.     Pt is having quite a bit of bruising - both arms and legs. Denies any bleeding.   Also taking OTC fish oil.  "    Lab Results   Component Value Date    LDLCALC 86 08/21/2020      Lab Results   Component Value Date    CHOL 161 08/21/2020    CHOL 163 09/24/2019    CHOL 180 01/17/2019     Lab Results   Component Value Date    HDL 59 08/21/2020    HDL 55 09/24/2019    HDL 56 01/17/2019     Lab Results   Component Value Date    LDLCALC 86 08/21/2020    LDLCALC 86 09/24/2019    LDLCALC 103 01/17/2019     Lab Results   Component Value Date    TRIG 80 08/21/2020    TRIG 112 09/24/2019    TRIG 103 01/17/2019     No components found for: CHOLHDL       GERD: Pt was started on Omeprazole 20 mg daily at discharge. Continues Tums 500 mg as needed. Uses \"every so often\" bought a bottle years ago, and still has it. Notes that something else she was using was recalled because of cancer in it.       Hypertension: Continues on Isosorbide Mononitrate 30 mg ER daily, metoprolol Succinate 25 mg daily was added at discharge.     BP Readings from Last 3 Encounters:   08/26/20 144/67   08/22/20 147/60   08/17/20 136/80     Pulse Readings from Last 3 Encounters:   08/26/20 69   08/22/20 69   08/17/20 86        Shingles: New onset. Prescribed Acyclovir 200 mg 4 caps 5 times daily for total of 7 days (27 doses at discharge). Aris reports they received Acyclovir 400 mg tabs.     Had shingles 2 years it ago. It oozed. At that time, it was on the back of the left hip.     This time, pt reports red spots on her skin. Most of the rash is on the chest and partly on both sides. Wondering if the hospital gowns could have caused the rash. Doesn't think it's shingles or rash. It's red and itches a lot.     Put on Cerave moisturizing lotion this morning. Feels slightly better but is noticing some pimples there. Used Hydrocortisone 1% anti-itch cream and feels like that made it worse.       Osteoporosis: Presribed Calcium-Vitamin D 500-200 mg-units two times a day. Receives Prolia 60 mg injection Q6 months. Last received in April.     Vitamin D, Total " (25-Hydroxy)   Date Value Ref Range Status   2019 32.5 30.0 - 80.0 ng/mL Final      10/14/2019 DXA:         PMH: reviewed in EPIC   Allergies/ADRs: reviewed in EPIC   Alcohol:   Social History     Substance and Sexual Activity   Alcohol Use Yes    Comment: Occassionally       Tobacco:   Social History     Tobacco Use   Smoking Status Former Smoker   ? Packs/day: 1.00   ? Years: 4.00   ? Pack years: 4.00   ? Types: Cigarettes   ? Last attempt to quit: 1953   ? Years since quittin.6   Smokeless Tobacco Never Used     Recent Vitals:   BP Readings from Last 3 Encounters:   20 144/67   20 147/60   20 136/80      Wt Readings from Last 3 Encounters:   20 117 lb 4.8 oz (53.2 kg)   20 119 lb 11.2 oz (54.3 kg)   20 122 lb (55.3 kg)     ----------------    The patient was given a summary of these recommendations by mail    I spent 45 minutes with this patient today;  . All changes were made via collaborative practice agreement with Michael Arzate MD. A copy of the visit note was provided to the patient's provider.     Renetta WrightD  Medication Therapy Management (MTM) Pharmacist  [facility]      Telemedicine Visit Details    Type of service:  Telephone     Start Time: 10:42 AM   End Time (time video/phone call stopped): 11:33 AM     Originating Location (pt. Location): Home    Distant Location (provider location):  [facility]     Mode of Communication:   Telephone     Current Outpatient Medications   Medication Sig Dispense Refill   ? acyclovir (ZOVIRAX) 400 MG tablet Take 2 tablets by mouth 5 (five) times a day.     ? aspirin 81 MG EC tablet Take 1 tablet (81 mg total) by mouth at bedtime. For CAD 30 tablet 0   ? atorvastatin (LIPITOR) 80 MG tablet TAKE 1 TABLET AT BEDTIME FOR CORONARY ARTERY DISEASE AND LIPIDS 90 tablet 0   ? calcium, as carbonate, (TUMS) 200 mg calcium (500 mg) chewable tablet Chew 1  tablet as needed for heartburn.     ? calcium-vitamin D 500 mg(1,250mg) -200 unit per tablet Take 1 tablet by mouth 2 (two) times a day with meals.     ? isosorbide mononitrate (IMDUR) 30 MG 24 hr tablet TAKE 1 TABLET EVERY MORNING 90 tablet 1   ? nitroglycerin (NITROSTAT) 0.4 MG SL tablet Place 1 tablet (0.4 mg total) under the tongue every 5 (five) minutes as needed for chest pain. 25 tablet 2   ? ticagrelor (BRILINTA) 90 mg Tab Take 1 tablet (90 mg total) by mouth 2 (two) times a day. 60 tablet 11   ? metoprolol succinate (TOPROL-XL) 25 MG Take 1 tablet (25 mg total) by mouth daily. 30 tablet 0   ? omeprazole (PRILOSEC) 20 MG capsule Take 1 capsule (20 mg total) by mouth daily before breakfast. 30 capsule 0   ? triamcinolone (KENALOG) 0.1 % cream Apply 1 application topically as needed.       Current Facility-Administered Medications   Medication Dose Route Frequency Provider Last Rate Last Dose   ? denosumab 60 mg (PROLIA 60 mg/ml)  60 mg Subcutaneous Q6 Months Rocío Sainz NP   60 mg at 04/02/20 [id number]

## 2021-06-29 NOTE — PROGRESS NOTES
Progress Notes by Michael Arzate MD at 2020  2:00 PM     Author: Michael Arzate MD Service: -- Author Type: Physician    Filed: 2020  4:38 PM Encounter Date: 2020 Status: Signed    : Michael Arzate MD (Physician)              [facility]    Comprehensive and complex medical care - Chronic disease management - Shared decision making - Care coordination - Compassionate care    Patient advocacy - Rational deprescribing - Minimally disruptive medicine - Ethical focus - Customized care          Date of Service: 2020  Primary Provider: Michael Arzate MD    Patient Care Team:  Michael Arzate MD as PCP - General (Internal Medicine)  Ana Govea MBBS as Physician (Rheumatology)  Henny Miranda MD as Physician (Cardiology)  Rocío Sainz NP as Nurse Practitioner (Nurse Practitioner)  Simon Kaur MD as Physician (Dermatology)  Michael Arzate MD as Assigned PCP     ______________________________________________________________________     Patient's Pharmacy:      [id number] - [facility] [address]  [address] [address]  Phone: [phone] Fax: [phone]    [facility] - [place] [address] [address]  Phone: [phone] Fax: [phone]     Patient's Contacts:  Name Home Phone Work Phone Mobile Phone Relationship Lgl CLAUDIA Gonzales [phone] Spouse    ANDRES ALANIS   [phone] Child        Patient's Insurance:    Payor/Plan Subscr  Sex Relation Sub. Ins. ID Effective Group Num   1.  FOR SURENDRA CAMACHO 1927 Male  [id number]                                    [address]   2. MEDICARE - ME* JULISSANY,SHAZIA SMITH 3/25/1929 Female  [id number] [birth year]/1994                                    [facility], [address]           Shaziashanna Keller is a 89 y.o. female who comes in  today for:    Chief Complaint   Patient presents with   ? Follow-up   ? Knee Pain     Discuss cortisone injection R knee       Active Problem List:  Problem List as of 5/18/2020 Reviewed: 5/18/2020  4:30 PM by Michael Arzate MD       High    Full code status    CAD (coronary artery disease)       Medium    Bilateral primary osteoarthritis of knee    HTN (hypertension)       Low    Angioedema    GERD (gastroesophageal reflux disease)    Hearing loss    HLD (hyperlipidemia)    Osteoporosis       Unprioritized    Atypical chest pain    Chronic pain of right knee    High risk medication use    Senile purpura (H)    Uses walker           Current Outpatient Medications   Medication Sig   ? aspirin 81 MG EC tablet Take 1 tablet (81 mg total) by mouth at bedtime. For CAD   ? atorvastatin (LIPITOR) 80 MG tablet TAKE 1 TABLET AT BEDTIME FOR CORONARY ARTERY DISEASE AND LIPIDS   ? calcium-vitamin D 500 mg(1,250mg) -200 unit per tablet Take 1 tablet by mouth 2 (two) times a day with meals.   ? isosorbide mononitrate (IMDUR) 30 MG 24 hr tablet Take 1 tablet (30 mg total) by mouth every morning.   ? nitroglycerin (NITROSTAT) 0.4 MG SL tablet DISSOLVE 1 TABLET UNDER THE TONGUE EVERY 5 MINUTES AS NEEDED FOR CHEST PAIN   ? OMEGA-3/DHA/EPA/FISH OIL (FISH OIL-OMEGA-3 FATTY ACIDS) 300-1,000 mg capsule Take 1 g by mouth at bedtime.    ? pantoprazole (PROTONIX) 40 MG tablet Take 1 tablet (40 mg total) by mouth daily. Take for 2 months and then stop.   ? triamcinolone (KENALOG) 0.1 % cream Apply topically 2 (two) times a day.       Social History     Social History Narrative    Lives with her  - cantu in [place]         to  Aris.  1 daughter.       Subjective:     Patient comes in today for follow-up.  She is with her .    We reviewed her right knee osteoarthritis.  This has been doing worse for her.  She would like an injection for this.  Her last injection was with Dr. Govea.    She is having increasing  "pain with walking.  It is over the whole knee.  Her previous x-ray was reviewed.    We reviewed her heart disease and she is not had any recent exertional chest pain.    We reviewed her high blood pressure and this is doing okay at this time.    Reviewed her osteoporosis and she recently had a Prolia shot.  She has had no other bone symptoms.    Reviewed her hyperlipidemia and things are stable here.    We reviewed her other issues noted in the assessment but not specifically addressed in the HPI above.     On review of systems, the patient denies any chest pain or shortness of breath.    Objective:     Wt Readings from Last 3 Encounters:   05/18/20 121 lb 1.6 oz (54.9 kg)   03/17/20 121 lb (54.9 kg)   01/09/20 122 lb (55.3 kg)       BP Readings from Last 3 Encounters:   05/18/20 136/84   03/17/20 132/80   01/09/20 126/64       /84 (Patient Site: Left Arm, Patient Position: Sitting, Cuff Size: Adult Regular)   Pulse 70   Ht 5' 3\" (1.6 m)   Wt 121 lb 1.6 oz (54.9 kg)   SpO2 96%   Breastfeeding No   BMI 21.45 kg/m     The patient is comfortable, no acute distress.  Mood good.  Insight fair.  Eyes are nonicteric.  Neck is supple without mass.  No cervical adenopathy.  No thyromegaly. Heart regular rate and rhythm.  Lungs clear to auscultation bilaterally.  Respiratory effort is good.  Abdomen soft and nontender.  No hepatosplenomegaly.  Extremities no edema.  She has a valgus deformity of her right leg at the knee.  There is a moderate effusion present and some degenerative changes of the knee noted on exam.    Diagnostics:     Results for orders placed or performed during the hospital encounter of 01/04/20   HM2 (CBC W/O DIFF)   Result Value Ref Range    WBC 8.8 4.0 - 11.0 thou/uL    RBC 5.02 3.80 - 5.40 mill/uL    Hemoglobin 15.4 12.0 - 16.0 g/dL    Hematocrit 46.9 35.0 - 47.0 %    MCV 93 80 - 100 fL    MCH 30.7 27.0 - 34.0 pg    MCHC 32.8 32.0 - 36.0 g/dL    RDW 13.9 11.0 - 14.5 %    Platelets 236 140 - " 440 thou/uL    MPV 8.6 8.5 - 12.5 fL   Basic Metabolic Panel   Result Value Ref Range    Sodium 143 136 - 145 mmol/L    Potassium 3.9 3.5 - 5.0 mmol/L    Chloride 106 98 - 107 mmol/L    CO2 28 22 - 31 mmol/L    Anion Gap, Calculation 9 5 - 18 mmol/L    Glucose 97 70 - 125 mg/dL    Calcium 10.1 8.5 - 10.5 mg/dL    BUN 17 8 - 28 mg/dL    Creatinine 0.74 0.60 - 1.10 mg/dL    GFR MDRD Af Amer >60 >60 mL/min/1.73m2    GFR MDRD Non Af Amer >60 >60 mL/min/1.73m2   Troponin I   Result Value Ref Range    Troponin I <0.01 0.00 - 0.29 ng/mL   INR   Result Value Ref Range    INR 0.95 0.90 - 1.10   APTT   Result Value Ref Range    PTT 28 24 - 37 seconds   BNP(B-type Natriuretic Peptide)   Result Value Ref Range    BNP 29 0 - 167 pg/mL   Troponin I   Result Value Ref Range    Troponin I 0.01 0.00 - 0.29 ng/mL   Troponin I   Result Value Ref Range    Troponin I <0.01 0.00 - 0.29 ng/mL   ECG 12 lead with MUSE   Result Value Ref Range    SYSTOLIC BLOOD PRESSURE      DIASTOLIC BLOOD PRESSURE      VENTRICULAR RATE 82 BPM    ATRIAL RATE 82 BPM    P-R INTERVAL 174 ms    QRS DURATION 84 ms    Q-T INTERVAL 354 ms    QTC CALCULATION (BEZET) 413 ms    P Axis 80 degrees    R AXIS -25 degrees    T AXIS 41 degrees    MUSE DIAGNOSIS       Normal sinus rhythm  Normal ECG  When compared with ECG of 10-SEP-2018 14:10,  No significant change was found  Confirmed by SEE ED PROVIDER NOTE FOR, ECG INTERPRETATION ([id number]),  Efrem Topete ([id number]) on 1/14/2020 7:00:28 PM     Echo Complete   Result Value Ref Range    LV volume diastolic 39.9 (!) 46 - 106 cm3    LV volume systolic 15 14 - 42 cm3    HR 77 bpm    IVSd 1.14 (!) 0.6 - 0.9 cm    LVIDd 3.89 3.8 - 5.2 cm    LVIDs 1.92 2.2 - 3.5 cm    LVOT diam 1.6 cm    LVOT mean gradient 1 mmHg    LVOT peak VTI 17.6 cm    LVOT mean brissa 48.4 cm/s    LVOT peak brissa 71.7 cm/s    LVOT peak gradient 2 mmHg    LV PWd 1.24 (!) 0.6 - 0.9 cm    MV E' lat brissa 9.19 cm/s    MV E' med brissa 6.19 cm/s    AV mean brissa  103 cm/s    AV mean gradient 5 mmHg    AV VTI 34.8 cm    AV peak brissa 167 cm/s    AO root 3 cm    LA size 2.9 cm    LA length 4.6 cm    MV decel slope 2,540 mm/s2    MV decel time 313 ms    MV P 1/2 time 92 ms    MV peak A brissa 109 cm/s    MV peak E brissa 79.5 cm/s    NH peak brissa 146 cm/s    NH peak gradient 9 mmHg    TR peak brissa 262 cm/s    LA area 2 12.6 cm2    LA area 1 15.8 cm2    BSA 1.56 m2    Hieght 63 in    Weight 1,947.2 lbs    /70 mmHg    IVS/PW ratio 0.9     TR peak gradent 27.5 mmHg    LV FS 50.6 28 - 44 %    Echo LVEF calculated 62 55 - 75 %    LA volume 36.8 mL    LV mass 156.7 g    AV area 1.0 cm2    AV DIM IND brissa 0.4     MV area p 1/2 time 2.4 cm2    MV E/A Ratio 0.7     LVOT area 2.01 cm2    LVOT SV 35.4 cm3    AV peak gradient 11.2 mmHg    LV systolic volume index 9.6 8 - 24 cm3/m2    LV diastolic volume index 25.6 29 - 61 cm3/m2    LA volume index 23.6 mL/m2    LV mass index 100.5 g/m2    LV SVi 22.7 ml/m2    TAPSE 1.9 cm    MV med E/e' ratio 12.8     MV lat E/e' ratio 8.7     LV CO 2.7 l/min    LV Ci 1.7 l/min/m2    Height 63.0 in    Weight 122 lbs    MV Avg E/e' Ratio 10.3 cm/s    AV DIM IND VTI 0.5     Echo LVEF Estimated 65 %       Quality review:     PHQ-2 Total Score: 0 (10/7/2019 11:00 AM)      No data recorded  ______________________________________________________________________     BMI Readings from Last 1 Encounters:   05/18/20 21.45 kg/m        Assessment and Plan:     1. Effusion of right knee  An aspiration of the joint was done along with injection today.  Fluid sent off for studies.    - Synovial fluid, cell count  - Synovial fluid, crystal  - Joint Fluid Exam  - Glucose, Body Fluid  - Protein, Body Fluid  - methylPREDNISolone acetate injection 80 mg (DEPO-MEDROL)  - Injection - Other    2. Primary osteoarthritis of right knee  As above.    - methylPREDNISolone acetate injection 80 mg (DEPO-MEDROL)  - Injection - Other    3. Coronary artery disease involving native coronary  artery of native heart without angina pectoris  Asymptomatic at this time.    4. Essential hypertension  Doing well.    5. Age-related osteoporosis without current pathological fracture  Continue with Prolia.  No change in current treatment plan.    6. Mixed hyperlipidemia  Full blood work next visit.          Michael Arzate MD  General Internal Medicine  [facility]     Personal office fax - [phone]   Voice mail - [phone]  E-mail - [email or web address]     Return in about 4 months (around 9/18/2020) for visit and blood work.     Future Appointments   Date Time Provider Department Center   9/22/2020  1:20 PM [facility]   9/22/2020  1:50 PM Michael Arzate MD [facility]   10/6/2020  1:40 PM Rocío Sainz NP [facility]         HCC issues resolved at this visit.

## 2021-06-30 NOTE — PROGRESS NOTES
Progress Notes by Michael Arzate MD at 12/15/2020  3:05 PM     Author: Michael Arzate MD Service: -- Author Type: Physician    Filed: 2020 12:11 AM Encounter Date: 12/15/2020 Status: Signed    : Michael Arzate MD (Physician)              [facility]    Comprehensive and complex medical care - Chronic disease management - Shared decision making - Care coordination - Compassionate care    Patient advocacy - Rational deprescribing - Minimally disruptive medicine - Ethical focus - Customized care          Date of Service: 12/15/2020  Primary Provider: Michael Arzate MD    Patient Care Team:  Michael Arzate MD as PCP - General (Internal Medicine)  Ana Govea MBBS as Physician (Rheumatology)  Rocío Sainz NP as Nurse Practitioner (Nurse Practitioner)  Simon Kaur MD as Physician (Dermatology)  Michael Arzate MD as Assigned PCP  Henny Miranda MD as Assigned Heart and Vascular Provider  Howard Navarro MD as Physician (Cardiology)     ______________________________________________________________________     Patient's Pharmacy:    [facility] - [address] [address]  Phone: [phone] Fax: [phone]     Patient's Contacts:  Name Home Phone Work Phone Mobile Phone Relationship Lgl Grd   CLAUDIA KELLER   [phone] Spouse    ANDRES ALANIS   [phone] Child      Patient's Insurance:    Payor/Plan Subscr  Sex Relation Sub. Ins. ID Effective Group Num   1.  FOR SURENDRA CAMACHO 1927 Male Spouse [id number]                                    [address]   2. MEDICARE - SHAZIA WHITLEY 3/25/1929 Female Self [id number] [birth year]/1994                                    [place], [address]           Shazia Keller is a 89 y.o. female who comes in today for:    Chief Complaint   Patient presents with   ? Follow-up     knee osteoarthritis,  coronary disease, tremor in right hand at rest not as bad has sometimes, doing better on medications but feeling weak       Active Problem List:  Problem List as of 12/15/2020 Reviewed: 9/23/2020 10:44 PM by Michael Arzate MD       High    Full code status    CAD (coronary artery disease)       Medium    Bilateral primary osteoarthritis of knee    HTN (hypertension)       Low    Angioedema    GERD (gastroesophageal reflux disease)    Hearing loss    HLD (hyperlipidemia)    Osteoporosis       Unprioritized    Chronic pain of right knee    Dementia (H)    High risk medication use    Senile purpura (H)    Uses walker           Current Outpatient Medications   Medication Sig   ? aspirin 81 MG EC tablet Take 1 tablet (81 mg total) by mouth at bedtime. For CAD   ? atorvastatin (LIPITOR) 80 MG tablet TAKE 1 TABLET AT BEDTIME FOR CORONARY ARTERY DISEASE AND LIPIDS   ? calcium, as carbonate, (TUMS) 200 mg calcium (500 mg) chewable tablet Chew 1 tablet as needed for heartburn.   ? calcium-vitamin D 500 mg(1,250mg) -200 unit per tablet Take 1 tablet by mouth 2 (two) times a day with meals.   ? isosorbide mononitrate (IMDUR) 30 MG 24 hr tablet Take 1 tablet (30 mg total) by mouth every morning.   ? metoprolol succinate (TOPROL-XL) 25 MG Take 1 tablet (25 mg total) by mouth daily.   ? nitroglycerin (NITROSTAT) 0.4 MG SL tablet Place 1 tablet (0.4 mg total) under the tongue every 5 (five) minutes as needed for chest pain.   ? omeprazole (PRILOSEC) 20 MG capsule Take 1 capsule (20 mg total) by mouth daily before breakfast.   ? ticagrelor (BRILINTA) 90 mg Tab Take 1 tablet (90 mg total) by mouth 2 (two) times a day.   ? triamcinolone (KENALOG) 0.1 % cream Apply 1 application topically as needed.       Social History     Social History Narrative    Lives with her  - cantu in [place]         to  Aris.  1 daughter.         Subjective:     Roomed by: rosa maria leija    Accompanied by Spouse    Refills needed? No     Do you have any forms that need to be filled out? No       Comes in for follow up multiple issues.    Her heart disease is doing okay at this time.  She is not having any anginal symptoms and her last angiogram was reviewed.  They were displeased with their visit with Dr. Miranda who they felt said she should just go on hospice if she has further issues.  They would still like to follow up with a cardiologist and request Dr. Navarro when I when through some of the doctors they have seen.    She is taking the isosorbide mononitrate (Imdur) without issues.    Reviewed the patient's osteoarthritis and things are stable here.     Reviewed her hypertension today.  Blood pressure has been in the goal range.  Denies any excessive dizziness from the medication with this.     She follows for her osteoporosis (OP) and this is stable.  No increased issues.    Does feel weaker overall but no shortness of breath.    We reviewed her other issues noted in the assessment but not specifically addressed in the HPI above.     On review of systems, the patient denies any chest pain or shortness of breath.    Objective:     Wt Readings from Last 3 Encounters:   12/15/20 119 lb (54 kg)   09/22/20 121 lb (54.9 kg)   09/10/20 120 lb (54.4 kg)       BP Readings from Last 3 Encounters:   12/15/20 130/64   09/22/20 136/82   09/10/20 140/72       /64   Pulse (!) 57   Wt 119 lb (54 kg)   SpO2 96%   BMI 21.08 kg/m     The patient is comfortable, no acute distress.  Mood good.  Insight good.  Eyes are nonicteric.  Neck is supple without mass.  No cervical adenopathy.  No thyromegaly. Heart regular rate and rhythm.  Lungs clear to auscultation bilaterally.  Respiratory effort is good.  Abdomen soft and nontender.  No hepatosplenomegaly.  Extremities no edema.      Diagnostics:     Results for orders placed or performed in visit on 12/15/20   HM2(CBC w/o Differential)   Result Value Ref Range    WBC 8.3 4.0 - 11.0 thou/uL    RBC 4.69  3.80 - 5.40 mill/uL    Hemoglobin 14.8 12.0 - 16.0 g/dL    Hematocrit 44.7 35.0 - 47.0 %    MCV 95 80 - 100 fL    MCH 31.6 27.0 - 34.0 pg    MCHC 33.1 32.0 - 36.0 g/dL    RDW 12.3 11.0 - 14.5 %    Platelets 186 140 - 440 thou/uL    MPV 7.3 7.0 - 10.0 fL   Comprehensive Metabolic Panel   Result Value Ref Range    Sodium 142 136 - 145 mmol/L    Potassium 4.7 3.5 - 5.0 mmol/L    Chloride 105 98 - 107 mmol/L    CO2 24 22 - 31 mmol/L    Anion Gap, Calculation 13 5 - 18 mmol/L    Glucose 87 70 - 125 mg/dL    BUN 20 8 - 28 mg/dL    Creatinine 0.74 0.60 - 1.10 mg/dL    GFR MDRD Af Amer >60 >60 mL/min/1.73m2    GFR MDRD Non Af Amer >60 >60 mL/min/1.73m2    Bilirubin, Total 0.4 0.0 - 1.0 mg/dL    Calcium 9.4 8.5 - 10.5 mg/dL    Protein, Total 6.5 6.0 - 8.0 g/dL    Albumin 3.9 3.5 - 5.0 g/dL    Alkaline Phosphatase 25 (L) 45 - 120 U/L    AST 22 0 - 40 U/L    ALT 14 0 - 45 U/L   Lipid Cascade RANDOM   Result Value Ref Range    Cholesterol 162 <=199 mg/dL    Triglycerides 128 <=149 mg/dL    HDL Cholesterol 49 (L) >=50 mg/dL    LDL Calculated 87 <=129 mg/dL    Patient Fasting > 8hrs? No    CK Total   Result Value Ref Range    CK, Total 46 30 - 190 U/L       Assessment:     1. Coronary artery disease involving native coronary artery of native heart without angina pectoris    2. Bilateral primary osteoarthritis of knee    3. Essential hypertension    4. Mixed hyperlipidemia    5. Age-related osteoporosis without current pathological fracture    6. Generalized weakness        Quality review:     PHQ-2 Total Score: 0 (9/1/2020  4:00 PM)      No data recorded  ______________________________________________________________________     BMI Readings from Last 1 Encounters:   12/15/20 21.08 kg/m          Plan:       They do not wish to see Dr. Miranda for her cardiac issues.    Referral to Dr. Navarro at their preference.    Currently symptoms doing well on medications.  Could increase isosorbide mononitrate (Imdur) in the future if  needed.    Check blood work today.  See relevant orders and diagnosis associations at the bottom of this note.     Follow up sooner if issues.    Review advanced directive in the future again.       Michael Arzate MD  General Internal Medicine  [facility]    Return in about 3 months (around 3/15/2021), or if symptoms worsen or fail to improve, for follow up visit.     Future Appointments   Date Time Provider Department Center   3/15/2021  1:40 PM Michael Arzate MD [facility]   9/29/2021  2:00 PM [facility]   10/6/2021  2:00 PM Rocío Sainz NP [facility]O [facility]         ______________________________________________________________________     Relevant ICD-10 codes and order associations:      ICD-10-CM    1. Coronary artery disease involving native coronary artery of native heart without angina pectoris  I25.10 HM2(CBC w/o Differential)     Comprehensive Metabolic Panel     Lipid Cascade RANDOM     CK Total     Ambulatory referral to Cardiology     CANCELED: Lipid Cascade FASTING   2. Bilateral primary osteoarthritis of knee  M17.0    3. Essential hypertension  I10    4. Mixed hyperlipidemia  E78.2    5. Age-related osteoporosis without current pathological fracture  M81.0    6. Generalized weakness  R53.1

## 2021-06-30 NOTE — PROGRESS NOTES
Progress Notes by Gilbert Queen DO at 2/26/2021  3:30 PM     Author: Gilbert Queen DO Service: -- Author Type: Physician    Filed: 2/26/2021  4:20 PM Encounter Date: 2/26/2021 Status: Signed    : Gilbert Queen DO (Physician)         Thank you, Dr. Arzate, for asking the [facility] team to see Ms. Yamilex Keller to evaluate coronary artery disease.      Assessment/Recommendations   Assessment:    1. Coronary artery disease, recent percutaneous coronary intervention of ostial left anterior descending artery and left circumflex artery with 3 drug-eluting stent placed in August 2020.  Normal LVEF. Stable anginal symptoms have improved with imdur.  2. Hyperlipidemia   3. Fatigue, stable.   4. Hypertension, controlled    Plan:  1  Continue atrovastatin to 40 mg daily.   2. Continue ASA 81 mg DAILY and Brillinta.  Could consider switch to plavix 75 mg daily   3. Continue metoprolol XL 25 mg daily.    4. Continue imdur 30 mg daily.       Follow-up in 6 months.        History of Present Illness/Subjective    Ms. Yamilex Keller is a 89 y.o. female with advanced coronary disease status post recent complex percutaneous coronary mention, hypertension, hyperlipidemia who presents to cardiology clinic presents in follow-up with her coronary artery disease.    Currently patient denies any active chest pain symptoms.  She denies any significant dyspnea with exertion.  She takes her time when walking with her walker.  Denies any severe lightheadedness or syncope.  Her fatigue continues but did not seem to change much with her change in her atorvastatin.  Remain on 40 mg daily.  Currently is on metoprolol with a controlled blood pressure.  No significant bruising with her aspirin and Brilinta at this time.      On discussion with the patient and her  regarding side effects of each medication.  Reviewed does not appear to have any active side effects.         ECHOCARDIOGRAM: Report was reviewed.     Normal left ventricular size with borderline concentric hypertrophy. The estimated left ventricular ejection fraction is 65%. E/e' 8 to 15, which is equivocal for estimating LV filling pressures. Left ventricular diastolic function is normal.    Normal right ventricular size and systolic function.    No hemodynamically significant valvular heart abnormalities.    Left atrial volume is mildly increased.    When compared to the previous study dated 7/19/2017, the anterior and apical wall motion abnormality has resolved.     Physical Examination Review of Systems   Vitals:    02/26/21 1520   BP: 138/72   Pulse: 60   Resp: 16     Body mass index is 21.58 kg/m .  Wt Readings from Last 3 Encounters:   02/26/21 121 lb 12.8 oz (55.2 kg)   01/19/21 118 lb 3.2 oz (53.6 kg)   12/15/20 119 lb (54 kg)       General Appearance:   no distress, normal body habitus, pleasant.   ENT/Mouth: membranes moist, no oral lesions or bleeding gums.      EYES:  no scleral icterus, normal conjunctivae   Neck: no carotid bruits or thyromegaly   Chest/Lungs:   lungs are clear to auscultation, no rales or wheezing, equal chest wall expansion    Cardiovascular:   Regular. Normal first and second heart sounds with no murmurs, rubs, or gallops; the carotid, radial and posterior tibial pulses are intact, Jugular venous pressure normal, no pitting  edema bilaterally    Abdomen:  no tenderness; bowel sounds are present   Extremities: no cyanosis or clubbing   Skin: no xanthelasma, warm.    Neurologic: normal  bilateral, no tremors     Psychiatric: alert and oriented x3, calm     General: WNL  Eyes: WNL  Ears/Nose/Throat: Hearing Loss  Lungs: WNL  Stomach: Heartburn  Bladder: WNL  Muscle/Joints: Joint Pain  Skin: WNL  Nervous System: Daytime Sleepiness, Dizziness, Loss of Balance  Mental Health: WNL     Blood: Easy Bruising     Medical History  Surgical History Family History Social History   Past  Medical History:   Diagnosis Date   ? Acute ST elevation myocardial infarction (STEMI) involving left anterior descending coronary artery (H) 7/18/2017   ? CAD (coronary artery disease)    ? Closed wedge compression fracture of seventh thoracic vertebra, initial encounter 7/8/2018   ? Full code status    ? GERD (gastroesophageal reflux disease) 1/10/2020   ? Hearing loss    ? HLD (hyperlipidemia)    ? HTN (hypertension) 4/1/2019   ? Osteoporosis    ? Temporal arteritis (H), in remission    ? Tubular adenoma of colon     Last colonoscopy 2011   ? Uses walker     Past Surgical History:   Procedure Laterality Date   ? BLEPHAROPLASTY Bilateral    ? CORONARY ANGIOPLASTY WITH STENT PLACEMENT  //   ? CV CORONARY ANGIOGRAM N/A 8/25/2020    Procedure: Coronary Angiogram;  Surgeon: Jennifer Peck MD;  Location: [facility];  Service: Cardiology   ? CV CORONARY ANGIOGRAM N/A 8/21/2020    Procedure: Coronary Angiogram;  Surgeon: Howard Navarro MD;  Location: [facility];  Service: Cardiology   ? CV LEFT HEART CATHETERIZATION WO LEFT VETRICULOGRAM Left 8/25/2020    Procedure: Left Heart Catheterization Without Left Ventriculogram;  Surgeon: Jennifer Peck MD;  Location: [facility];  Service: Cardiology   ? CV LEFT HEART CATHETERIZATION WO LEFT VETRICULOGRAM Left 8/21/2020    Procedure: Left Heart Catheterization Without Left Ventriculogram;  Surgeon: Howard Navarro MD;  Location: [facility];  Service: Cardiology   ? ESOPHAGOGASTRODUODENOSCOPY N/A 7/9/2018    Procedure: ESOPHAGOGASTRODUODENOSCOPY (EGD);  Surgeon: Prabhu Lin MD;  Location: [facility];  Service:    ? OR CATH PLACEMENT & NJX CORONARY ART ANGIO IMG S&I N/A 7/18/2017    Procedure: Coronary Angiogram;  Surgeon: Shelia Garcia MD;  Location: [facility];  Service: Cardiology   ? OR CATH PLMT L HRT & ARTS W/NJX & ANGIO IMG S&I Left 7/18/2017    Procedure: Left Heart Catheterization Without Left  Ventriculogram;  Surgeon: Shelia Garcia MD;  Location: [facility];  Service: Cardiology   ? AL TEMPORAL ARTERY LIGATN OR BX Bilateral 10/7/2015    Procedure: BILATERAL TEMPORAL ARTERY BIOPSY;  Surgeon: Alfredito Jason MD;  Location: [facility] Main OR;  Service: General   ? TONSILLECTOMY      Family History   Problem Relation Age of Onset   ? Liver cancer Mother    ? Prostate cancer Father    ? Pacemaker Brother    ? Lung cancer Brother    ? Lung cancer Brother    ? Uterine cancer Sister     Social History     Socioeconomic History   ? Marital status:      Spouse name: Aris   ? Number of children: 1   ? Years of education: Not on file   ? Highest education level: Not on file   Occupational History     Employer: RETIRED   Social Needs   ? Financial resource strain: Not on file   ? Food insecurity     Worry: Not on file     Inability: Not on file   ? Transportation needs     Medical: Not on file     Non-medical: Not on file   Tobacco Use   ? Smoking status: Former Smoker     Packs/day: 1.00     Years: 4.00     Pack years: 4.00     Types: Cigarettes     Quit date: 1953     Years since quittin.2   ? Smokeless tobacco: Never Used   Substance and Sexual Activity   ? Alcohol use: Yes     Comment: Occassionally   ? Drug use: No   ? Sexual activity: Not Currently     Partners: Male   Lifestyle   ? Physical activity     Days per week: Not on file     Minutes per session: Not on file   ? Stress: Not on file   Relationships   ? Social connections     Talks on phone: Not on file     Gets together: Not on file     Attends [employer]ism service: Not on file     Active member of club or organization: Not on file     Attends meetings of clubs or organizations: Not on file     Relationship status: Not on file   ? Intimate partner violence     Fear of current or ex partner: Not on file     Emotionally abused: Not on file     Physically abused: Not on file     Forced sexual activity: Not on file   Other  Topics Concern   ? Not on file   Social History Narrative    Lives with her  - pe[place] in [place]         to  Aris.  1 daughter.          Medications  Allergies   Current Outpatient Medications   Medication Sig Dispense Refill   ? aspirin 81 MG EC tablet Take 1 tablet (81 mg total) by mouth at bedtime. For CAD 30 tablet 0   ? atorvastatin (LIPITOR) 40 MG tablet TAKE 1 TABLET AT BEDTIME FOR CORONARY ARTERY DISEASE AND LIPIDS 90 tablet 3   ? calcium, as carbonate, (TUMS) 200 mg calcium (500 mg) chewable tablet Chew 1 tablet as needed for heartburn.     ? calcium-vitamin D 500 mg(1,250mg) -200 unit per tablet Take 1 tablet by mouth 2 (two) times a day with meals.     ? isosorbide mononitrate (IMDUR) 30 MG 24 hr tablet Take 1 tablet (30 mg total) by mouth every morning. 90 tablet 3   ? metoprolol succinate (TOPROL-XL) 25 MG Take 1 tablet (25 mg total) by mouth daily. 90 tablet 3   ? nitroglycerin (NITROSTAT) 0.4 MG SL tablet Place 1 tablet (0.4 mg total) under the tongue every 5 (five) minutes as needed for chest pain. 25 tablet 2   ? omeprazole (PRILOSEC) 20 MG capsule Take 1 capsule (20 mg total) by mouth daily before breakfast. 90 capsule 3   ? ticagrelor (BRILINTA) 90 mg Tab Take 1 tablet (90 mg total) by mouth 2 (two) times a day. 180 tablet 3   ? triamcinolone (KENALOG) 0.1 % cream Apply 1 application topically as needed.       Current Facility-Administered Medications   Medication Dose Route Frequency Provider Last Rate Last Admin   ? denosumab 60 mg (PROLIA 60 mg/ml)  60 mg Subcutaneous Q6 Months Rocío Sainz, NP   60 mg at 04/02/20 1512    Allergies   Allergen Reactions   ? Lisinopril Swelling     Swelling of lips/mouth    ? Losartan Angioedema         Lab Results    Chemistry/lipid CBC Cardiac Enzymes/BNP/TSH/INR   Lab Results   Component Value Date    CHOL 162 12/15/2020    HDL 49 (L) 12/15/2020    LDLCALC 87 12/15/2020    TRIG 128 12/15/2020    CREATININE 0.74 12/15/2020    BUN 20  12/15/2020    K 4.7 12/15/2020     12/15/2020     12/15/2020    CO2 24 12/15/2020    Lab Results   Component Value Date    WBC 8.3 12/15/2020    HGB 14.8 12/15/2020    HCT 44.7 12/15/2020    MCV 95 12/15/2020     12/15/2020    Lab Results   Component Value Date    CKTOTAL 46 12/15/2020    TROPONINI 0.17 08/25/2020    BNP 29 08/24/2020    TSH 1.21 09/10/2018    INR 0.95 01/04/2020

## 2021-06-30 NOTE — PROGRESS NOTES
Progress Notes by Michael Arzate MD at 3/15/2021  1:40 PM     Author: Michael Arzate MD Service: -- Author Type: Physician    Filed: 3/15/2021  3:24 PM Encounter Date: 3/15/2021 Status: Signed    : Michael Arzate MD (Physician)              [facility]    Comprehensive and complex medical care - Chronic disease management - Shared decision making - Care coordination - Compassionate care    Patient advocacy - Rational deprescribing - Minimally disruptive medicine - Ethical focus - Customized care          Date of Service: 3/15/2021  Primary Provider: Michael Arzate MD    Patient Care Team:  Michael Arzate MD as PCP - General (Internal Medicine)  Ana Govea MBBS as Physician (Rheumatology)  Rocío Sainz NP as Nurse Practitioner (Nurse Practitioner)  Simon Kaur MD as Physician (Dermatology)  Michael Arzate MD as Assigned PCP  Henny Miranda MD as Assigned Heart and Vascular Provider  Howard Navarro MD as Physician (Cardiology)  Gilbert Queen DO as Physician (Cardiology)     ______________________________________________________________________     Patient's Pharmacy:    [facility] - [address] [address]  Phone: [phone] Fax: [phone]     Patient's Contacts:  Name Home Phone Work Phone Mobile Phone Relationship Lgl Grd   CLAUDIA WOODS   [phone] Spouse    ANDRES ALANIS   [phone] Child      Patient's Insurance:    Payor/Plan Subscr  Sex Relation Sub. Ins. ID Effective Group Num   1.  FOR SURENDRA CAMACHO 1927 Male Spouse [id number]                                    [address]   2. MEDICARE - SHAZIA WHITLEY 3/25/1929 Female Self [id number] 3/1/1994                                    [facility],  [address]           Shazia SMITH Krishna is a 89 y.o. female who comes in today for:    Chief Complaint   Patient  presents with   ? Follow-up     heart disease, osteoarthritis       Active Problem List:  Problem List as of 3/15/2021 Reviewed: 3/15/2021  3:21 PM by Michael Arzate MD       High    Full code status    CAD (coronary artery disease)       Medium    Bilateral primary osteoarthritis of knee    HTN (hypertension)       Low    Angioedema    GERD (gastroesophageal reflux disease)    Hearing loss    HLD (hyperlipidemia)    Osteoporosis       Unprioritized    Chronic pain of right knee    Dementia (H)    High risk medication use    Senile purpura (H)    Uses walker           Current Outpatient Medications   Medication Sig   ? aspirin 81 MG EC tablet Take 1 tablet (81 mg total) by mouth at bedtime. For CAD   ? atorvastatin (LIPITOR) 40 MG tablet TAKE 1 TABLET AT BEDTIME FOR CORONARY ARTERY DISEASE AND LIPIDS   ? calcium, as carbonate, (TUMS) 200 mg calcium (500 mg) chewable tablet Chew 1 tablet as needed for heartburn.   ? calcium-vitamin D 500 mg(1,250mg) -200 unit per tablet Take 1 tablet by mouth 2 (two) times a day with meals.   ? isosorbide mononitrate (IMDUR) 30 MG 24 hr tablet Take 1 tablet (30 mg total) by mouth every morning.   ? metoprolol succinate (TOPROL-XL) 25 MG Take 1 tablet (25 mg total) by mouth daily.   ? nitroglycerin (NITROSTAT) 0.4 MG SL tablet Place 1 tablet (0.4 mg total) under the tongue every 5 (five) minutes as needed for chest pain.   ? omeprazole (PRILOSEC) 20 MG capsule Take 1 capsule (20 mg total) by mouth daily before breakfast.   ? omeprazole (PRILOSEC) 20 MG capsule Take 1 capsule (20 mg total) by mouth daily before breakfast.   ? ticagrelor (BRILINTA) 90 mg Tab Take 1 tablet (90 mg total) by mouth 2 (two) times a day.   ? triamcinolone (KENALOG) 0.1 % cream Apply 1 application topically as needed.       Social History     Social History Narrative    Lives with her  - cantu in [place]         to  Aris.  1 daughter.       Subjective:     Patient comes in today with  her  for follow-up.    We reviewed her heart disease. She has followed up with Dr. Quene related to this. She is doing okay in relationship to this. She has no major concerns at this time. Her medications were reviewed.    We reviewed her high blood pressure and her blood pressure is doing well without issue.    Reviewed her osteoarthritis and she wonders about wearing a sleeve or knee brace. She has not had good success with injections like her  has.    We reviewed her other issues noted in the assessment but not specifically addressed in the HPI above.     Objective:     Wt Readings from Last 3 Encounters:   03/15/21 120 lb (54.4 kg)   02/26/21 121 lb 12.8 oz (55.2 kg)   01/19/21 118 lb 3.2 oz (53.6 kg)     BP Readings from Last 3 Encounters:   03/15/21 134/72   02/26/21 138/72   01/19/21 130/64     /72   Pulse (!) 54   Wt 120 lb (54.4 kg)   SpO2 94%   BMI 21.26 kg/m     The patient is comfortable, no acute distress.  Mood good.  Insight fair.  Eyes are nonicteric.  Neck is supple without mass.  No cervical adenopathy.  No thyromegaly. Heart regular rate and rhythm.  Lungs clear to auscultation bilaterally.  Respiratory effort is good.  Abdomen soft and nontender.  No hepatosplenomegaly.  Extremities no edema. Her knee shows bony changes very consistent with osteoarthritis of the knee.      Diagnostics:     Results for orders placed or performed in visit on 12/15/20   HM2(CBC w/o Differential)   Result Value Ref Range    WBC 8.3 4.0 - 11.0 thou/uL    RBC 4.69 3.80 - 5.40 mill/uL    Hemoglobin 14.8 12.0 - 16.0 g/dL    Hematocrit 44.7 35.0 - 47.0 %    MCV 95 80 - 100 fL    MCH 31.6 27.0 - 34.0 pg    MCHC 33.1 32.0 - 36.0 g/dL    RDW 12.3 11.0 - 14.5 %    Platelets 186 140 - 440 thou/uL    MPV 7.3 7.0 - 10.0 fL   Comprehensive Metabolic Panel   Result Value Ref Range    Sodium 142 136 - 145 mmol/L    Potassium 4.7 3.5 - 5.0 mmol/L    Chloride 105 98 - 107 mmol/L    CO2 24 22 - 31 mmol/L     Anion Gap, Calculation 13 5 - 18 mmol/L    Glucose 87 70 - 125 mg/dL    BUN 20 8 - 28 mg/dL    Creatinine 0.74 0.60 - 1.10 mg/dL    GFR MDRD Af Amer >60 >60 mL/min/1.73m2    GFR MDRD Non Af Amer >60 >60 mL/min/1.73m2    Bilirubin, Total 0.4 0.0 - 1.0 mg/dL    Calcium 9.4 8.5 - 10.5 mg/dL    Protein, Total 6.5 6.0 - 8.0 g/dL    Albumin 3.9 3.5 - 5.0 g/dL    Alkaline Phosphatase 25 (L) 45 - 120 U/L    AST 22 0 - 40 U/L    ALT 14 0 - 45 U/L   Lipid Cascade RANDOM   Result Value Ref Range    Cholesterol 162 <=199 mg/dL    Triglycerides 128 <=149 mg/dL    HDL Cholesterol 49 (L) >=50 mg/dL    LDL Calculated 87 <=129 mg/dL    Patient Fasting > 8hrs? No    CK Total   Result Value Ref Range    CK, Total 46 30 - 190 U/L       Quality review:     PHQ-2 Total Score: 0 (9/1/2020  4:00 PM)    No data recorded  ______________________________________________________________________     BMI Readings from Last 1 Encounters:   03/15/21 21.26 kg/m        Assessment and Plan:     1. Dementia without behavioral disturbance, unspecified dementia type (H)  No signs of worsening. Continue to monitor.    2. Senile purpura (H)  Doing okay considering she is on dual acting antiplatelet therapy    3. Coronary artery disease involving native coronary artery of native heart without angina pectoris  Continue to follow-up with cardiology.    4. Gastroesophageal reflux disease, unspecified whether esophagitis present  Refilled medication.    - omeprazole (PRILOSEC) 20 MG capsule; Take 1 capsule (20 mg total) by mouth daily before breakfast.  Dispense: 30 capsule; Refill: 1    5. Bilateral primary osteoarthritis of knee  Try sleeve at this point and could do a brace if needed in the future.    6. Essential hypertension  Continue current medication.          Michael Arzate MD  General Internal Medicine  [facility]      Return in about 3 months (around 6/15/2021), or if symptoms worsen or fail to improve, for follow up  visit.     Future Appointments   Date Time Provider Department Center   6/15/2021  2:15 PM Michael Arzate MD [facility]   9/29/2021  2:00 PM [facility]   10/6/2021  2:00 PM Rocío Sainz NP [facility]         HCC issues resolved at this visit.

## 2021-06-30 NOTE — PROGRESS NOTES
Progress Notes by Gilbert Queen DO at 1/19/2021 11:30 AM     Author: Gilbert Queen DO Service: -- Author Type: Physician    Filed: 1/19/2021 12:33 PM Encounter Date: 1/19/2021 Status: Signed    : Gilbert Queen DO (Physician)         Thank you, Dr. Arzate, for asking the [facility] team to see Ms. Yamilex Keller to evaluate coronary artery disease.      Assessment/Recommendations   Assessment:    1. Coronary artery disease, recent percutaneous coronary intervention of ostial left anterior descending artery and left circumflex artery with 3 drug-eluting stent placed in August 2020.  Normal LVEF. Stable anginal symptoms.     2. Hyperlipidemia   3. Fatigue   4. Hypertension    Plan:  1. Reduce atrovastatin to 40 mg daily. To determine if fatigue is medication related.     2. Continue ASA and Brillinta.  Could consider switch to plavix 75 mg daily.    3. Continue metoprolol XL 25 mg daily.    4. Continue imdur 30 mg daily, may stop in near future given no anginal chest pain.   5. Close cardiology follow-up in 6 weeks to readdress symptoms and concern for medications.           History of Present Illness/Subjective    Ms. Yamilex Keller is a 89 y.o. female with advanced coronary disease status post recent complex percutaneous coronary mention, hypertension, hyperlipidemia who presents to cardiology clinic presents in follow-up to establish care with myself in follow-up of her coronary disease.    From a cardiac standpoint Mrs. Keller has been stable.  She denies any active chest pain with ambulating or dyspnea exertion.  She does note fatigue with prolonged activities.  Overall she just feels fatigued with a low appetite.  She is concerned along with her  that some of this could be related to medication side effects.  We will try to slowly adjust her cardiac medications see if we can determine if there is any side effects from her medications.  We will start  with reducing her statin therapy to 40 mg of atorvastatin daily.    Currently denies any orthopnea or PND symptoms.  Denies any palpitations.  Denies any significant bleeding issues.  She does have significant bruising with her aspirin and Brilinta.  She had bruising on her left arm with her Covid virus vaccine.    ECHOCARDIOGRAM: Report was reviewed.     Normal left ventricular size with borderline concentric hypertrophy. The estimated left ventricular ejection fraction is 65%. E/e' 8 to 15, which is equivocal for estimating LV filling pressures. Left ventricular diastolic function is normal.    Normal right ventricular size and systolic function.    No hemodynamically significant valvular heart abnormalities.    Left atrial volume is mildly increased.    When compared to the previous study dated 7/19/2017, the anterior and apical wall motion abnormality has resolved.     Physical Examination Review of Systems   Vitals:    01/19/21 1116   BP: 130/64   Pulse: 68   Resp: 16     Body mass index is 20.94 kg/m .  Wt Readings from Last 3 Encounters:   01/19/21 118 lb 3.2 oz (53.6 kg)   12/15/20 119 lb (54 kg)   09/22/20 121 lb (54.9 kg)       General Appearance:   no distress, normal body habitus   ENT/Mouth: membranes moist, no oral lesions or bleeding gums.      EYES:  no scleral icterus, normal conjunctivae   Neck: no carotid bruits or thyromegaly   Chest/Lungs:   lungs are clear to auscultation, no rales or wheezing, equal chest wall expansion    Cardiovascular:   Regular. Normal first and second heart sounds with no murmurs, rubs, or gallops; the carotid, radial and posterior tibial pulses are intact, Jugular venous pressure normal, no pitting  edema bilaterally    Abdomen:  no tenderness; bowel sounds are present   Extremities: no cyanosis or clubbing   Skin: no xanthelasma, warm.    Neurologic: normal  bilateral, no tremors     Psychiatric: alert and oriented x3, calm     General: WNL  Eyes:  WNL  Ears/Nose/Throat: WNL  Lungs: WNL  Stomach: Heartburn  Bladder: WNL  Muscle/Joints: WNL  Skin: WNL  Nervous System: Daytime Sleepiness, Dizziness  Mental Health: WNL     Blood: Easy Bruising     Medical History  Surgical History Family History Social History   Past Medical History:   Diagnosis Date   ? Acute ST elevation myocardial infarction (STEMI) involving left anterior descending coronary artery (H) 7/18/2017   ? CAD (coronary artery disease)    ? Closed wedge compression fracture of seventh thoracic vertebra, initial encounter 7/8/2018   ? Full code status    ? GERD (gastroesophageal reflux disease) 1/10/2020   ? Hearing loss    ? HLD (hyperlipidemia)    ? HTN (hypertension) 4/1/2019   ? Osteoporosis    ? Temporal arteritis (H), in remission    ? Tubular adenoma of colon     Last colonoscopy 2011   ? Uses walker     Past Surgical History:   Procedure Laterality Date   ? BLEPHAROPLASTY Bilateral    ? CORONARY ANGIOPLASTY WITH STENT PLACEMENT  //   ? CV CORONARY ANGIOGRAM N/A 8/25/2020    Procedure: Coronary Angiogram;  Surgeon: Jennifer Peck MD;  Location: [facility] Lab;  Service: Cardiology   ? CV CORONARY ANGIOGRAM N/A 8/21/2020    Procedure: Coronary Angiogram;  Surgeon: Howard Navarro MD;  Location: [facility];  Service: Cardiology   ? CV LEFT HEART CATHETERIZATION WO LEFT VETRICULOGRAM Left 8/25/2020    Procedure: Left Heart Catheterization Without Left Ventriculogram;  Surgeon: Jennifer Peck MD;  Location: [facility] Lab;  Service: Cardiology   ? CV LEFT HEART CATHETERIZATION WO LEFT VETRICULOGRAM Left 8/21/2020    Procedure: Left Heart Catheterization Without Left Ventriculogram;  Surgeon: Howard Navarro MD;  Location: [facility] Lab;  Service: Cardiology   ? ESOPHAGOGASTRODUODENOSCOPY N/A 7/9/2018    Procedure: ESOPHAGOGASTRODUODENOSCOPY (EGD);  Surgeon: Prabhu Lin MD;  Location: [facility];  Service:    ? NC CATH PLACEMENT & NJX CORONARY ART  ANGIO IMG S&I N/A 2017    Procedure: Coronary Angiogram;  Surgeon: Shelia Garcia MD;  Location: [facility] Lab;  Service: Cardiology   ? LA CATH PLMT L HRT & ARTS W/NJX & ANGIO IMG S&I Left 2017    Procedure: Left Heart Catheterization Without Left Ventriculogram;  Surgeon: Shelia Garcia MD;  Location: [facility] Lab;  Service: Cardiology   ? LA TEMPORAL ARTERY LIGATN OR BX Bilateral 10/7/2015    Procedure: BILATERAL TEMPORAL ARTERY BIOPSY;  Surgeon: Alfredito Jason MD;  Location: [facility] Main OR;  Service: General   ? TONSILLECTOMY      Family History   Problem Relation Age of Onset   ? Liver cancer Mother    ? Prostate cancer Father    ? Pacemaker Brother    ? Lung cancer Brother    ? Lung cancer Brother    ? Uterine cancer Sister     Social History     Socioeconomic History   ? Marital status:      Spouse name: Aris   ? Number of children: 1   ? Years of education: Not on file   ? Highest education level: Not on file   Occupational History     Employer: RETIRED   Social Needs   ? Financial resource strain: Not on file   ? Food insecurity     Worry: Not on file     Inability: Not on file   ? Transportation needs     Medical: Not on file     Non-medical: Not on file   Tobacco Use   ? Smoking status: Former Smoker     Packs/day: 1.00     Years: 4.00     Pack years: 4.00     Types: Cigarettes     Quit date: 1953     Years since quittin.0   ? Smokeless tobacco: Never Used   Substance and Sexual Activity   ? Alcohol use: Yes     Comment: Occassionally   ? Drug use: No   ? Sexual activity: Not Currently     Partners: Male   Lifestyle   ? Physical activity     Days per week: Not on file     Minutes per session: Not on file   ? Stress: Not on file   Relationships   ? Social connections     Talks on phone: Not on file     Gets together: Not on file     Attends [employer] service: Not on file     Active member of club or organization: Not on file     Attends meetings of clubs  or organizations: Not on file     Relationship status: Not on file   ? Intimate partner violence     Fear of current or ex partner: Not on file     Emotionally abused: Not on file     Physically abused: Not on file     Forced sexual activity: Not on file   Other Topics Concern   ? Not on file   Social History Narrative    Lives with her  - pepe in [place]         to  Aris.  1 daughter.          Medications  Allergies   Current Outpatient Medications   Medication Sig Dispense Refill   ? aspirin 81 MG EC tablet Take 1 tablet (81 mg total) by mouth at bedtime. For CAD 30 tablet 0   ? atorvastatin (LIPITOR) 40 MG tablet TAKE 1 TABLET AT BEDTIME FOR CORONARY ARTERY DISEASE AND LIPIDS 45 tablet 3   ? calcium, as carbonate, (TUMS) 200 mg calcium (500 mg) chewable tablet Chew 1 tablet as needed for heartburn.     ? calcium-vitamin D 500 mg(1,250mg) -200 unit per tablet Take 1 tablet by mouth 2 (two) times a day with meals.     ? isosorbide mononitrate (IMDUR) 30 MG 24 hr tablet Take 1 tablet (30 mg total) by mouth every morning. 90 tablet 3   ? metoprolol succinate (TOPROL-XL) 25 MG Take 1 tablet (25 mg total) by mouth daily. 90 tablet 3   ? nitroglycerin (NITROSTAT) 0.4 MG SL tablet Place 1 tablet (0.4 mg total) under the tongue every 5 (five) minutes as needed for chest pain. 25 tablet 2   ? omeprazole (PRILOSEC) 20 MG capsule Take 1 capsule (20 mg total) by mouth daily before breakfast. 90 capsule 3   ? ticagrelor (BRILINTA) 90 mg Tab Take 1 tablet (90 mg total) by mouth 2 (two) times a day. 180 tablet 3   ? triamcinolone (KENALOG) 0.1 % cream Apply 1 application topically as needed.       Current Facility-Administered Medications   Medication Dose Route Frequency Provider Last Rate Last Admin   ? denosumab 60 mg (PROLIA 60 mg/ml)  60 mg Subcutaneous Q6 Months Rocío Sainz NP   60 mg at 04/02/20 1512    Allergies   Allergen Reactions   ? Lisinopril Swelling     Swelling of lips/mouth    ?  Losartan Angioedema         Lab Results    Chemistry/lipid CBC Cardiac Enzymes/BNP/TSH/INR   Lab Results   Component Value Date    CHOL 162 12/15/2020    HDL 49 (L) 12/15/2020    LDLCALC 87 12/15/2020    TRIG 128 12/15/2020    CREATININE 0.74 12/15/2020    BUN 20 12/15/2020    K 4.7 12/15/2020     12/15/2020     12/15/2020    CO2 24 12/15/2020    Lab Results   Component Value Date    WBC 8.3 12/15/2020    HGB 14.8 12/15/2020    HCT 44.7 12/15/2020    MCV 95 12/15/2020     12/15/2020    Lab Results   Component Value Date    CKTOTAL 46 12/15/2020    TROPONINI 0.17 08/25/2020    BNP 29 08/24/2020    TSH 1.21 09/10/2018    INR 0.95 01/04/2020

## 2021-07-03 NOTE — ADDENDUM NOTE
Addendum Note by Lady Terrell MD at 6/27/2017  9:27 PM     Author: Lady Terrell MD Service: -- Author Type: Physician    Filed: 6/27/2017  9:27 PM Encounter Date: 6/27/2017 Status: Signed    : Lady Terrell MD (Physician)    Addended by: LADY TERRELL on: 6/27/2017 09:27 PM        Modules accepted: Orders

## 2021-07-03 NOTE — ADDENDUM NOTE
Addendum Note by Darlene Cervantes RN at 9/24/2019  3:50 PM     Author: Darlene Cervantes RN Service: -- Author Type: Registered Nurse    Filed: 9/30/2019 10:13 AM Encounter Date: 9/24/2019 Status: Signed    : Darlene Cervantes RN (Registered Nurse)    Addended by: DARLENE CERVANTES on: 9/30/2019 10:13 AM        Modules accepted: Orders

## 2021-07-03 NOTE — ADDENDUM NOTE
Addendum Note by Dionte Palencia MD at 5/18/2020  2:00 PM     Author: Dionte Palencia MD Service: -- Author Type: Physician    Filed: 5/18/2020  9:25 PM Encounter Date: 5/18/2020 Status: Signed    : Dionte Palencia MD (Physician)    Addended by: DIONTE PALENCIA on: 5/18/2020 09:25 PM        Modules accepted: Orders

## 2021-07-03 NOTE — ADDENDUM NOTE
Addendum Note by Heather Higuera MD at 8/24/2018  8:49 PM     Author: Heather Higuera MD Service: -- Author Type: Physician    Filed: 8/24/2018  8:49 PM Encounter Date: 8/24/2018 Status: Signed    : Heather Higuera MD (Physician)    Addended by: HEATHER HIGUERA on: 8/24/2018 08:49 PM        Modules accepted: Orders

## 2021-07-03 NOTE — ADDENDUM NOTE
Addendum Note by Heather Higuera MD at 8/27/2018 11:18 AM     Author: Heather Higuera MD Service: -- Author Type: Physician    Filed: 8/27/2018 11:18 AM Encounter Date: 8/24/2018 Status: Signed    : Heather Higuera MD (Physician)    Addended by: HEATHER HIGUERA on: 8/27/2018 11:18 AM        Modules accepted: Orders

## 2021-07-04 NOTE — LETTER
Letter by Michael Arzate MD at      Author: Michael Arzate MD Service: -- Author Type: --    Filed:  Encounter Date: 6/18/2021 Status: (Other)         Yamilex Keller  [address]  [place] [address]      06/18/21      Dear Yamilex,      Thank you for choosing [facility] as your Primary Care Provider.  Here are the results from your recent Office Visit with Dr. Arzate:      Office Visit on 06/15/2021   Component Date Value Ref Range Status   ? Sodium 06/15/2021 144  136 - 145 mmol/L Final   ? Potassium 06/15/2021 4.6  3.5 - 5.0 mmol/L Final   ? Chloride 06/15/2021 108* 98 - 107 mmol/L Final   ? CO2 06/15/2021 24  22 - 31 mmol/L Final   ? Anion Gap, Calculation 06/15/2021 12  5 - 18 mmol/L Final   ? Glucose 06/15/2021 69* 70 - 125 mg/dL Final   ? BUN 06/15/2021 21  8 - 28 mg/dL Final   ? Creatinine 06/15/2021 0.77  0.60 - 1.10 mg/dL Final   ? GFR MDRD Af Amer 06/15/2021 >60  >60 mL/min/1.73m2 Final   ? GFR MDRD Non Af Amer 06/15/2021 >60  >60 mL/min/1.73m2 Final   ? Bilirubin, Total 06/15/2021 0.4  0.0 - 1.0 mg/dL Final   ? Calcium 06/15/2021 9.6  8.5 - 10.5 mg/dL Final   ? Protein, Total 06/15/2021 6.6  6.0 - 8.0 g/dL Final   ? Albumin 06/15/2021 3.9  3.5 - 5.0 g/dL Final   ? Alkaline Phosphatase 06/15/2021 25* 45 - 120 U/L Final   ? AST 06/15/2021 16  0 - 40 U/L Final   ? ALT 06/15/2021 10  0 - 45 U/L Final   ? BNP 06/15/2021 109  0 - 167 pg/mL Final   ? WBC 06/15/2021 7.4  4.0 - 11.0 thou/uL Final   ? RBC 06/15/2021 4.25  3.80 - 5.40 mill/uL Final   ? Hemoglobin 06/15/2021 13.7  12.0 - 16.0 g/dL Final   ? Hematocrit 06/15/2021 41.7  35.0 - 47.0 % Final   ? MCV 06/15/2021 98  80 - 100 fL Final   ? MCH 06/15/2021 32.2  27.0 - 34.0 pg Final   ? MCHC 06/15/2021 32.9  32.0 - 36.0 g/dL Final   ? RDW 06/15/2021 13.0  11.0 - 14.5 % Final   ? Platelets 06/15/2021 212  140 - 440 thou/uL Final   ? MPV 06/15/2021 9.1  7.0 - 10.0 fL Final   ? TSH 06/15/2021 1.20  0.30 - 5.00 uIU/mL Final       Your  kidney tests are normal.  Your electrolytes are also normal.  There is no signs of diabetes.  Your liver tests are normal.       Your blood counts are normal and you are not anemic.      Your heart tests were very good.      Your thyroid tests are excellent.      I would recommend a stress test to look at the blood flow to her heart closer.     To schedule your appointment for this, please call the heart clinic at [phone].      I placed the order.          Please call [phone] if you have any questions or need to schedule an appointment.    We believe that a strong preventative care program, including regular physicals and follow-up care is an important part of a healthy lifestyle and we are committed to helping you maintain your health.    Thank you for choosing us as your health care provider.    Sincerely,    Caro Posada LPN - [place]  [facility]  [address] [id number] [phone]

## 2021-07-04 NOTE — ADDENDUM NOTE
Addendum Note by Dionte Palencia MD at 3/8/2021 11:25 AM     Author: Dionte Palencia MD Service: -- Author Type: Physician    Filed: 3/8/2021 11:25 AM Encounter Date: 3/8/2021 Status: Signed    : Dionte Palencia MD (Physician)    Addended by: DIONTE PALENCIA on: 3/8/2021 11:25 AM        Modules accepted: Orders

## 2021-07-04 NOTE — PROGRESS NOTES
Progress Notes by Michael Arzate MD at 6/15/2021  2:15 PM     Author: Michael Arzate MD Service: -- Author Type: Physician    Filed: 6/15/2021  9:22 PM Encounter Date: 6/15/2021 Status: Signed    : Michael Arzate MD (Physician)              [facility]    Comprehensive and complex medical care - Chronic disease management - Shared decision making - Care coordination - Compassionate care    Patient advocacy - Rational deprescribing - Minimally disruptive medicine - Ethical focus - Customized care          Date of Service: 6/15/2021  Primary Provider: Michael Arzate MD    Patient Care Team:  Michael Arzate MD as PCP - General (Internal Medicine)  Ana Govea MBBS as Physician (Rheumatology)  Rocío Sainz NP as Nurse Practitioner (Nurse Practitioner)  Simon Kaur MD as Physician (Dermatology)  Michael Arzate MD as Assigned PCP  Henny Miranda MD as Assigned Heart and Vascular Provider  Howard Navarro MD as Physician (Cardiology)  Gilbert Queen DO as Physician (Cardiology)     ______________________________________________________________________     Patient's Pharmacy:    [facility] - [address] [address]  Phone: [phone] Fax: [phone]     Patient's Contacts:  Name Home Phone Work Phone Mobile Phone Relationship Lgl Grd   CLAUDIA WOODS   [phone] Spouse    ANDRES ALANIS   [phone] Child      Patient's Insurance:    Payor/Plan Subscr  Sex Relation Sub. Ins. ID Effective Group Num   1.  FOR SURENDRA CAMACHO 1927 Male Spouse [id number]                                    [address]   2. MEDICARE - SHAZIA WHITLEY 3/25/1929 Female Self [id number] 3/1/1994                                    [facility],  [address]           Shazia SMITH Krishna is a 89 y.o. female who comes in today for:    Chief Complaint   Patient  presents with   ? Advice Only     does need another cleaning of the stent   ? hard abd     not painful, all of abd feels hard   ? Follow-up     heart disease, bp, osteoarthritis   ? Bleeding/Bruising       Active Problem List:  Problem List as of 6/15/2021 Reviewed: 6/15/2021  9:17 PM by Michael Arzate MD       High    Full code status    CAD (coronary artery disease)       Medium    Bilateral primary osteoarthritis of knee    HTN (hypertension)       Low    Angioedema    GERD (gastroesophageal reflux disease)    Hearing loss    HLD (hyperlipidemia)    Osteoporosis       Unprioritized    Chronic pain of right knee    Dementia (H)    High risk medication use    Senile purpura (H)    Uses walker           Current Outpatient Medications   Medication Sig   ? aspirin 81 MG EC tablet Take 1 tablet (81 mg total) by mouth at bedtime. For CAD   ? atorvastatin (LIPITOR) 40 MG tablet TAKE 1 TABLET AT BEDTIME FOR CORONARY ARTERY DISEASE AND LIPIDS   ? calcium, as carbonate, (TUMS) 200 mg calcium (500 mg) chewable tablet Chew 1 tablet as needed for heartburn.   ? calcium-vitamin D 500 mg(1,250mg) -200 unit per tablet Take 1 tablet by mouth 2 (two) times a day with meals.   ? isosorbide mononitrate (IMDUR) 30 MG 24 hr tablet Take 1 tablet (30 mg total) by mouth every morning.   ? metoprolol succinate (TOPROL-XL) 25 MG Take 1 tablet (25 mg total) by mouth daily.   ? nitroglycerin (NITROSTAT) 0.4 MG SL tablet Place 1 tablet (0.4 mg total) under the tongue every 5 (five) minutes as needed for chest pain.   ? omeprazole (PRILOSEC) 20 MG capsule Take 1 capsule (20 mg total) by mouth daily before breakfast.   ? omeprazole (PRILOSEC) 20 MG capsule Take 1 capsule (20 mg total) by mouth daily before breakfast.   ? ticagrelor (BRILINTA) 90 mg Tab Take 1 tablet (90 mg total) by mouth 2 (two) times a day.   ? triamcinolone (KENALOG) 0.1 % cream Apply 1 application topically as needed.       Social History     Social History Narrative     Lives with her  - cantu in [place]         to  Aris.  1 daughter.         Subjective:     Roomed by: rosa maria leija    Accompanied by Spouse    Refills needed? No    Do you have any forms that need to be filled out? No       First concerned about coronary artery disease (CAD).  Does have some dyspnea on exertion (HARDING).  No chest pain or pressure.  Breathing a persistent issue.  Reviewed cardiology noted.  They are still anxious there is a undefined process.    We reviewed the possible role of ticagrelor (Brilinta) or age in her shortness of breath.  Will look for other causes.    Feels fatigued and a lack of pep.  Discussed role of age in this as well.    No lower extremity edema.  Blood pressure has been good.    Reviewed her actinic purpura and issues with this.  Bothersome to her.    Abdomen feels hard at times but not persistent.   does not feel it is hard when she has complained.  No pain in abdomen.  No weight loss.  No bloating.  No other issues with bowel movement.    We reviewed her other issues noted in the assessment but not specifically addressed in the HPI above.     Objective:     Wt Readings from Last 3 Encounters:   06/15/21 119 lb (54 kg)   03/15/21 120 lb (54.4 kg)   02/26/21 121 lb 12.8 oz (55.2 kg)     BP Readings from Last 3 Encounters:   06/15/21 126/64   03/15/21 134/72   02/26/21 138/72     /64   Pulse 60   Wt 119 lb (54 kg)   SpO2 96%   BMI 21.08 kg/m     The patient is comfortable, no acute distress.  Mood good.  Insight gfair.  Eyes are nonicteric.  Neck is supple without mass.  No cervical adenopathy.  No thyromegaly. Heart regular rate and rhythm.  No murmur.  Lungs clear to auscultation bilaterally.  Respiratory effort is good.  Abdomen soft and nontender.  No hepatosplenomegaly.  Extremities no edema.      Diagnostics:     Results for orders placed or performed in visit on 06/15/21   HM2(CBC w/o Differential)   Result Value Ref Range    WBC 7.4 4.0 -  11.0 thou/uL    RBC 4.25 3.80 - 5.40 mill/uL    Hemoglobin 13.7 12.0 - 16.0 g/dL    Hematocrit 41.7 35.0 - 47.0 %    MCV 98 80 - 100 fL    MCH 32.2 27.0 - 34.0 pg    MCHC 32.9 32.0 - 36.0 g/dL    RDW 13.0 11.0 - 14.5 %    Platelets 212 140 - 440 thou/uL    MPV 9.1 7.0 - 10.0 fL       Assessment:     1. Dyspnea, unspecified type    2. Fatigue, unspecified type    3. Coronary artery disease involving native coronary artery of native heart without angina pectoris    4. Senile purpura (H)    5. Abdominal fullness    6. Long term current use of ticagrelor therapy    7. Bilateral primary osteoarthritis of knee        Quality review:     PHQ-2 Total Score: 0 (9/1/2020  4:00 PM)    No data recorded  ______________________________________________________________________     BMI Readings from Last 1 Encounters:   06/15/21 21.08 kg/m          Plan:     1. Check blood work today.  See relevant orders and diagnosis associations at the bottom of this note.   2. Will likely do a MPI with LEXISCAN if blood work unrevealing.  3. Consider the role of the ticagrelor (Brilinta) in her symptoms and consider a therapeutic change to clopidogrel (Plavix).  4. Follow up if abdomen worsens but check blood work as well.  5. Continue current medications otherwise.  6. Follow up sooner if issues.         Michael Arzate MD  General Internal Medicine  [facility]    Return in about 4 months (around 10/15/2021), or if symptoms worsen or fail to improve, for follow up visit.     Future Appointments   Date Time Provider Department Center   9/29/2021  2:00 PM [facility]   10/6/2021  2:00 PM Rocío Sainz NP [facility]         ______________________________________________________________________     Relevant ICD-10 codes and order associations:      ICD-10-CM    1. Dyspnea, unspecified type  R06.00 BNP(B-type Natriuretic Peptide)     HM2(CBC w/o Differential)   2. Fatigue, unspecified type   R53.83 Comprehensive Metabolic Panel     Thyroid Stimulating Hormone (TSH)   3. Coronary artery disease involving native coronary artery of native heart without angina pectoris  I25.10    4. Senile purpura (H)  D69.2    5. Abdominal fullness  R19.8    6. Long term current use of ticagrelor therapy  Z79.899    7. Bilateral primary osteoarthritis of knee  M17.0

## 2021-07-06 VITALS
SYSTOLIC BLOOD PRESSURE: 126 MMHG | HEART RATE: 60 BPM | DIASTOLIC BLOOD PRESSURE: 64 MMHG | BODY MASS INDEX: 21.08 KG/M2 | OXYGEN SATURATION: 96 % | WEIGHT: 119 LBS

## 2021-07-07 NOTE — TELEPHONE ENCOUNTER
Spouse is calling back.  States he did not have anything to write with would like a copy of the 6/28/21 test mailed to their home address.

## 2021-07-07 NOTE — TELEPHONE ENCOUNTER
Called and relayed message to both patient and spouse.    They agreed to make the switch in medication and would like a 30 day supply to [facility] and [facility] to [facility].    They will call back to schedule appointment

## 2021-07-07 NOTE — TELEPHONE ENCOUNTER
Please call patient -    ______________________________________________________________________     Home phone:  [phone] (home)     Cell phone:   Telephone Information:   Mobile [phone]       Other contacts:  Name Home Phone Work Phone Mobile Phone Relationship Lgl GrCLAUDIA Alfonso   [phone] Spouse    ANDRES ALANIS   [phone] Child      ______________________________________________________________________     Please also involve the  as the patient's memory has issues.    Her stress test look good overall.  There is no signs of worsening blood flow to her heart.    Due to the shortness of breath that can happen with the Brilinta, I would recommend a switch to clopidogrel and stopping the Brilinta.  We can then see if this helps with her breathing issue.    Please see what they think.  If they would like to follow-up on this, they could see me in 4 to 6 weeks to see how this is going.    Please route back to me once they decide.    Michael Arzate MD  [facility]  6/28/2021, 10:06 PM

## 2021-07-07 NOTE — TELEPHONE ENCOUNTER
Done.    Michael Arzate MD  General Internal Medicine  [facility]  6/29/2021, 9:49 AM

## 2021-07-14 PROBLEM — I25.10 ATHEROSCLEROSIS OF LEFT ANTERIOR DESCENDING (LAD) ARTERY: Status: RESOLVED | Noted: 2019-02-16 | Resolved: 2019-10-04

## 2021-07-14 PROBLEM — I25.10 CORONARY ARTERY DISEASE INVOLVING NATIVE CORONARY ARTERY OF NATIVE HEART WITHOUT ANGINA PECTORIS: Status: RESOLVED | Noted: 2020-08-17 | Resolved: 2020-09-02

## 2021-07-14 PROBLEM — I21.3 STEMI (ST ELEVATION MYOCARDIAL INFARCTION) (H): Status: RESOLVED | Noted: 2020-08-17 | Resolved: 2020-09-02

## 2021-07-14 PROBLEM — I50.20 HEART FAILURE WITH REDUCED EJECTION FRACTION (H): Status: RESOLVED | Noted: 2017-08-16 | Resolved: 2017-10-04

## 2021-07-14 PROBLEM — I20.0 UNSTABLE ANGINA PECTORIS (H): Status: RESOLVED | Noted: 2020-08-24 | Resolved: 2020-09-02

## 2021-07-14 PROBLEM — I25.2 HISTORY OF ST ELEVATION MYOCARDIAL INFARCTION (STEMI): Status: RESOLVED | Noted: 2018-08-01 | Resolved: 2019-10-04

## 2021-07-16 ENCOUNTER — TELEPHONE (OUTPATIENT)
Dept: INTERNAL MEDICINE | Facility: CLINIC | Age: 86
End: 2021-07-16

## 2021-07-16 NOTE — TELEPHONE ENCOUNTER
Patient called and advised okay to wait    Patient expressed understanding          Okay to wait.    Michael Arzate MD  General Internal Medicine  [facility]  7/16/2021, 2:18 PM

## 2021-07-16 NOTE — TELEPHONE ENCOUNTER
Reason for Call:  Other appointment    Detailed comments: Matrín and Yamilex are wondering if it is ok for Yamilex to wait until 8/5/2021 for her appointment or if she should be seen sooner.    Phone Number Patient can be reached at: Home number on file [phone] (home)    Best Time: any    Can we leave a detailed message on this number? YES    Call taken on 7/16/2021 at 1:33 PM by Brook Murdock

## 2021-07-20 ENCOUNTER — TRANSCRIBE ORDERS (OUTPATIENT)
Dept: ENDOCRINOLOGY | Facility: CLINIC | Age: 86
End: 2021-07-20

## 2021-07-20 DIAGNOSIS — Z92.29 PERSONAL HISTORY OF OTHER DRUG THERAPY: ICD-10-CM

## 2021-07-20 DIAGNOSIS — M81.0 AGE-RELATED OSTEOPOROSIS WITHOUT CURRENT PATHOLOGICAL FRACTURE: Primary | ICD-10-CM

## 2021-07-20 NOTE — PROGRESS NOTES
As part of the required manual data conversion process for integration, this encounter was created to document a CAM (Clinic Administered Medication) order. This information was copied from the [facility] patient's chart to the [facility] patient chart.     Kary Meng PharmD  July 20, 2021

## 2021-07-26 ENCOUNTER — TELEPHONE (OUTPATIENT)
Dept: INTERNAL MEDICINE | Facility: CLINIC | Age: 86
End: 2021-07-26

## 2021-07-26 NOTE — TELEPHONE ENCOUNTER
[facility] calling vd RX for Omeprazole.      Received Drug Interaction alert for Omeprazole and Plavix.    Plavix is the new medication for the patient.    Need to make PCP aware of the alert and for PCP to determine how to proceed.      If PCP chooses to proceed with both medications pharmacy will need authorization and acknowledgement from PCP.

## 2021-08-03 PROBLEM — S22.060A CLOSED WEDGE COMPRESSION FRACTURE OF T7 VERTEBRA, INITIAL ENCOUNTER (H): Status: RESOLVED | Noted: 2018-07-08 | Resolved: 2020-01-04

## 2021-08-05 ENCOUNTER — OFFICE VISIT (OUTPATIENT)
Dept: INTERNAL MEDICINE | Facility: CLINIC | Age: 86
End: 2021-08-05
Payer: MEDICARE

## 2021-08-05 VITALS
HEART RATE: 56 BPM | SYSTOLIC BLOOD PRESSURE: 118 MMHG | BODY MASS INDEX: 21.12 KG/M2 | RESPIRATION RATE: 16 BRPM | DIASTOLIC BLOOD PRESSURE: 62 MMHG | HEIGHT: 63 IN | OXYGEN SATURATION: 95 % | WEIGHT: 119.2 LBS | TEMPERATURE: 98.2 F

## 2021-08-05 DIAGNOSIS — I10 ESSENTIAL HYPERTENSION: ICD-10-CM

## 2021-08-05 DIAGNOSIS — I25.10 CORONARY ARTERY DISEASE INVOLVING NATIVE CORONARY ARTERY OF NATIVE HEART WITHOUT ANGINA PECTORIS: ICD-10-CM

## 2021-08-05 DIAGNOSIS — M25.561 CHRONIC PAIN OF RIGHT KNEE: ICD-10-CM

## 2021-08-05 DIAGNOSIS — G89.29 CHRONIC PAIN OF RIGHT KNEE: ICD-10-CM

## 2021-08-05 DIAGNOSIS — M17.0 BILATERAL PRIMARY OSTEOARTHRITIS OF KNEE: Primary | ICD-10-CM

## 2021-08-05 PROCEDURE — 99214 OFFICE O/P EST MOD 30 MIN: CPT | Performed by: INTERNAL MEDICINE

## 2021-08-05 ASSESSMENT — MIFFLIN-ST. JEOR: SCORE: 919.82

## 2021-08-05 ASSESSMENT — PATIENT HEALTH QUESTIONNAIRE - PHQ9: SUM OF ALL RESPONSES TO PHQ QUESTIONS 1-9: 2

## 2021-08-07 NOTE — PATIENT INSTRUCTIONS
Future Appointments   Date Time Provider Department Center   9/29/2021  2:00 PM [facility] LAB [facility]   10/6/2021  2:00 PM Rocío Sainz NP MDENDO [facility]   11/22/2021  1:30 PM [facility]

## 2021-08-07 NOTE — PROGRESS NOTES
[facility]    Comprehensive and complex medical care - Chronic disease management - Shared decision making - Care coordination - Compassionate care    Patient advocacy - Rational deprescribing - Minimally disruptive medicine - Ethical focus - Customized care         Date of Service: 8/5/2021  Primary Provider: Michael Arzate    Patient Care Team:  Michael Arzate MD as PCP - General  Michael Arzate MD as Assigned PCP  Gilbert Queen DO as Assigned Heart and Vascular Provider  Rocío Sainz NP as Nurse Practitioner  Simon Kaur MD as MD (Dermatology)          Patient's Pharmacy:    [facility] - [place], [place] - [facility]  [address] [address]  Phone: [phone] Fax: [phone]     Patient's Contacts:  Name Home Phone Work Phone Mobile Phone Relationship Lgl Grd   CLAUDIA WOODS   [phone] Spouse    ANDRES ALANIS   [phone] Other      Patient's Insurance:    Payor: MEDICARE / Plan: MEDICARE / Product Type: Medicare /            Active Problem List:  Problem List as of 8/5/2021 Never Reviewed       Other    Osteoporosis    Hearing loss    High risk medication use    Bilateral primary osteoarthritis of knee    Chronic pain of right knee    Angioedema    HTN (hypertension)    HLD (hyperlipidemia)    Senile purpura (H)    Uses walker    Dementia (H)       Other    Full code status    CAD (coronary artery disease)       Other    GERD (gastroesophageal reflux disease)           Current Outpatient Medications   Medication Instructions     aspirin (ASA) 81 mg, Oral, AT BEDTIME     atorvastatin (LIPITOR) 40 MG tablet [ATORVASTATIN (LIPITOR) 40 MG TABLET] TAKE 1 TABLET AT BEDTIME FOR CORONARY ARTERY DISEASE AND LIPIDS     calcium, as carbonate, (TUMS) 200 mg calcium (500 mg) chewable tablet 1 tablet, PRN     calcium-vitamin D 500 mg(1,250mg) -200 unit per tablet 1 tablet, 2 TIMES DAILY WITH  "MEALS     clopidogrel (PLAVIX) 75 mg, Oral, DAILY     isosorbide mononitrate (IMDUR) 30 mg, Oral, EVERY MORNING     metoprolol succinate ER (TOPROL-XL) 25 mg, Oral, DAILY     nitroGLYcerin (NITROSTAT) 0.4 mg, Sublingual, EVERY 5 MIN PRN     omeprazole (PRILOSEC) 20 mg, Oral, Daily before breakfast     triamcinolone (KENALOG) 0.1 % cream 1 Application, PRN     Social History     Social History Narrative     to  Aris.  1 daughter.        Patient of Dr. Arzate since 2019.        Subjective:     Yamilex Keller is a 89 year old female who comes in today for:    Chief Complaint   Patient presents with     Clinic Care Coordination - Follow-up     had stress test, this is follow up. Also has arthritis making it difficult to walk, right knee is swollen considerably, very painful.        Accompanied by  at today's visit.       In for follow up multiple issues.    Generally doing okay overall.  No major issues.    Right knee has been bothering her more and has been swollen at times.  Has not responded to corticosteroid injections.  Discussed bracing and they will consider trying diclofenac (Voltaren) gel.    Reviewed her heart and recent stress test.  No new anginal symptoms.  Still frustrated at this time as Dr. Miranda had told them to consider hospice in the past.    Reviewed her hypertension today.  Blood pressure has been in the goal range.  Denies any excessive dizziness from the medication with this.     We reviewed her other issues noted in the assessment but not specifically addressed in the HPI above.     Objective:     Wt Readings from Last 3 Encounters:   08/05/21 54.1 kg (119 lb 3.2 oz)   06/15/21 54 kg (119 lb)   03/15/21 54.4 kg (120 lb)     BP Readings from Last 3 Encounters:   08/05/21 118/62   06/15/21 126/64   03/15/21 134/72     /62   Pulse 56   Temp 98.2  F (36.8  C)   Resp 16   Ht 1.6 m (5' 3\")   Wt 54.1 kg (119 lb 3.2 oz)   SpO2 95%   BMI 21.12 kg/m     The patient " is comfortable, no acute distress.  Mood good.  Insight fair.  Eyes are nonicteric.  Neck is supple without mass.  No cervical adenopathy.  No thyromegaly. Heart regular rate and rhythm.  Lungs clear to auscultation bilaterally.  Respiratory effort is good.  Abdomen soft and nontender.  No hepatosplenomegaly.  Extremities no edema.  Slight pill rolling tremor on the right hand.    Diagnostics:     Office Visit - [facility] on 06/15/2021   Component Date Value Ref Range Status     Sodium 06/15/2021 144  136 - 145 mmol/L Final     Potassium 06/15/2021 4.6  3.5 - 5.0 mmol/L Final     Chloride 06/15/2021 108* 98 - 107 mmol/L Final     Carbon Dioxide (CO2) 06/15/2021 24  22 - 31 mmol/L Final     Anion Gap 06/15/2021 12  5 - 18 mmol/L Final     Glucose 06/15/2021 69* 70 - 125 mg/dL Final     Urea Nitrogen 06/15/2021 21  8 - 28 mg/dL Final     Creatinine 06/15/2021 0.77  0.60 - 1.10 mg/dL Final     GFR Estimate If Black 06/15/2021 >60  >60 mL/min/1.73m2 Final     GFR Estimate 06/15/2021 >60  >60 mL/min/1.73m2 Final     Bilirubin Total 06/15/2021 0.4  0.0 - 1.0 mg/dL Final     Calcium 06/15/2021 9.6  8.5 - 10.5 mg/dL Final     Protein Total 06/15/2021 6.6  6.0 - 8.0 g/dL Final     Albumin 06/15/2021 3.9  3.5 - 5.0 g/dL Final     Alkaline Phosphatase 06/15/2021 25* 45 - 120 U/L Final     AST 06/15/2021 16  0 - 40 U/L Final     ALT 06/15/2021 10  0 - 45 U/L Final     BNP 06/15/2021 109  0 - 167 pg/mL Final     WBC 06/15/2021 7.4  4.0 - 11.0 thou/uL Final     RBC Count 06/15/2021 4.25  3.80 - 5.40 mill/uL Final     Hemoglobin 06/15/2021 13.7  12.0 - 16.0 g/dL Final     Hematocrit 06/15/2021 41.7  35.0 - 47.0 % Final     MCV 06/15/2021 98  80 - 100 fL Final     MCH 06/15/2021 32.2  27.0 - 34.0 pg Final     MCHC 06/15/2021 32.9  32.0 - 36.0 g/dL Final     RDW 06/15/2021 13.0  11.0 - 14.5 % Final     Platelet Count 06/15/2021 212  140 - 440 thou/uL Final     Mean Platelet Volume 06/15/2021 9.1  7.0 - 10.0 fL Final     TSH  06/15/2021 1.20  0.30 - 5.00 uIU/mL Final        No results found for any visits on 08/05/21.    PHQ-2 Score:    PHQ-2 ( 1999 [employer]) 8/5/2021   Q1: Little interest or pleasure in doing things 0   Q2: Feeling down, depressed or hopeless 0   PHQ-2 Score 0       Assessment:     1. Bilateral primary osteoarthritis of knee    2. Chronic pain of right knee    3. Coronary artery disease involving native coronary artery of native heart without angina pectoris    4. Essential hypertension         Plan:     1. Continue to monitor.  2. Blood work reviewed.  3. Stress test reviewed.  4. Try diclofenac (Voltaren) gel to knee.  5. Continue current medications otherwise.  6. Follow up sooner if issues.      Michael Arzate MD  General Internal Medicine  [facility]    Return in about 4 months (around 11/22/2021), or if symptoms worsen or fail to improve, for follow up visit.     Future Appointments   Date Time Provider Department Center   9/29/2021  2:00 PM [facility] LAB [facility]   10/6/2021  2:00 PM Rocío Sainz, NP MDENDO [facility]   11/22/2021  1:30 PM [facility]

## 2021-09-23 ENCOUNTER — HOSPITAL ENCOUNTER (EMERGENCY)
Facility: HOSPITAL | Age: 86
Discharge: HOME | End: 2021-09-23
Attending: EMERGENCY MEDICINE | Admitting: EMERGENCY MEDICINE
Payer: MEDICARE

## 2021-09-23 ENCOUNTER — APPOINTMENT (OUTPATIENT)
Dept: RADIOLOGY | Facility: HOSPITAL | Age: 86
End: 2021-09-23
Attending: FAMILY MEDICINE
Payer: MEDICARE

## 2021-09-23 ENCOUNTER — NURSE TRIAGE (OUTPATIENT)
Dept: NURSING | Facility: CLINIC | Age: 86
End: 2021-09-23

## 2021-09-23 ENCOUNTER — DOCUMENTATION ONLY (OUTPATIENT)
Dept: ADMINISTRATIVE | Facility: CLINIC | Age: 86
End: 2021-09-23

## 2021-09-23 VITALS
RESPIRATION RATE: 17 BRPM | HEIGHT: 63 IN | WEIGHT: 120 LBS | HEART RATE: 66 BPM | SYSTOLIC BLOOD PRESSURE: 157 MMHG | OXYGEN SATURATION: 98 % | BODY MASS INDEX: 21.26 KG/M2 | DIASTOLIC BLOOD PRESSURE: 67 MMHG | TEMPERATURE: 97.8 F

## 2021-09-23 DIAGNOSIS — R60.0 LEG EDEMA: ICD-10-CM

## 2021-09-23 DIAGNOSIS — M81.0 OSTEOPOROSIS: Primary | ICD-10-CM

## 2021-09-23 LAB
ANION GAP SERPL CALCULATED.3IONS-SCNC: 9 MMOL/L (ref 5–18)
BASOPHILS # BLD AUTO: 0.1 10E3/UL (ref 0–0.2)
BASOPHILS NFR BLD AUTO: 1 %
BNP SERPL-MCNC: 117 PG/ML (ref 0–167)
BUN SERPL-MCNC: 18 MG/DL (ref 8–28)
CALCIUM SERPL-MCNC: 9.8 MG/DL (ref 8.5–10.5)
CHLORIDE BLD-SCNC: 105 MMOL/L (ref 98–107)
CO2 SERPL-SCNC: 28 MMOL/L (ref 22–31)
CREAT SERPL-MCNC: 0.77 MG/DL (ref 0.6–1.1)
EOSINOPHIL # BLD AUTO: 0.3 10E3/UL (ref 0–0.7)
EOSINOPHIL NFR BLD AUTO: 3 %
ERYTHROCYTE [DISTWIDTH] IN BLOOD BY AUTOMATED COUNT: 13.1 % (ref 10–15)
GFR SERPL CREATININE-BSD FRML MDRD: 67 ML/MIN/1.73M2
GLUCOSE BLD-MCNC: 95 MG/DL (ref 70–125)
HCT VFR BLD AUTO: 46.3 % (ref 35–47)
HGB BLD-MCNC: 14.9 G/DL (ref 11.7–15.7)
IMM GRANULOCYTES # BLD: 0 10E3/UL
IMM GRANULOCYTES NFR BLD: 0 %
LYMPHOCYTES # BLD AUTO: 1.6 10E3/UL (ref 0.8–5.3)
LYMPHOCYTES NFR BLD AUTO: 21 %
MAGNESIUM SERPL-MCNC: 2 MG/DL (ref 1.8–2.6)
MCH RBC QN AUTO: 31.9 PG (ref 26.5–33)
MCHC RBC AUTO-ENTMCNC: 32.2 G/DL (ref 31.5–36.5)
MCV RBC AUTO: 99 FL (ref 78–100)
MONOCYTES # BLD AUTO: 1 10E3/UL (ref 0–1.3)
MONOCYTES NFR BLD AUTO: 13 %
NEUTROPHILS # BLD AUTO: 4.8 10E3/UL (ref 1.6–8.3)
NEUTROPHILS NFR BLD AUTO: 62 %
NRBC # BLD AUTO: 0 10E3/UL
NRBC BLD AUTO-RTO: 0 /100
PLATELET # BLD AUTO: 197 10E3/UL (ref 150–450)
POTASSIUM BLD-SCNC: 4.3 MMOL/L (ref 3.5–5)
RBC # BLD AUTO: 4.67 10E6/UL (ref 3.8–5.2)
SODIUM SERPL-SCNC: 142 MMOL/L (ref 136–145)
TROPONIN I SERPL-MCNC: <0.01 NG/ML (ref 0–0.29)
WBC # BLD AUTO: 7.7 10E3/UL (ref 4–11)

## 2021-09-23 PROCEDURE — 99285 EMERGENCY DEPT VISIT HI MDM: CPT | Mod: 25

## 2021-09-23 PROCEDURE — 85025 COMPLETE CBC W/AUTO DIFF WBC: CPT | Performed by: EMERGENCY MEDICINE

## 2021-09-23 PROCEDURE — 83735 ASSAY OF MAGNESIUM: CPT | Performed by: FAMILY MEDICINE

## 2021-09-23 PROCEDURE — 83880 ASSAY OF NATRIURETIC PEPTIDE: CPT | Performed by: FAMILY MEDICINE

## 2021-09-23 PROCEDURE — 71046 X-RAY EXAM CHEST 2 VIEWS: CPT

## 2021-09-23 PROCEDURE — 80048 BASIC METABOLIC PNL TOTAL CA: CPT | Performed by: EMERGENCY MEDICINE

## 2021-09-23 PROCEDURE — 93005 ELECTROCARDIOGRAM TRACING: CPT | Performed by: FAMILY MEDICINE

## 2021-09-23 PROCEDURE — 84484 ASSAY OF TROPONIN QUANT: CPT | Performed by: EMERGENCY MEDICINE

## 2021-09-23 PROCEDURE — 36415 COLL VENOUS BLD VENIPUNCTURE: CPT | Performed by: EMERGENCY MEDICINE

## 2021-09-23 ASSESSMENT — MIFFLIN-ST. JEOR: SCORE: 923.45

## 2021-09-23 NOTE — ED TRIAGE NOTES
Pt has been having increasing SOB with increasing leg edema for the past 2 days. Denies CP, but does get some when walking for the past few days.Pt has h/o MI (3-5 years ago), heart stents, and CHF

## 2021-09-23 NOTE — TELEPHONE ENCOUNTER
Pt  called in states Pt need appointment.  The pt has history of heart disease.  Pt has ankle swelling.  The swelling is on both leg.  The swelling is moderate.  There is no pain.  No fever.  The pt has no leg swelling before.  The Pt has shortness of breathing when she move.  The disposition is to be seen today.  Care advice given per protocol.  Patient  agrees with care advice given.  The caller states he will find transportation and send the Pt to the Clinic.  Agreed to call back if he has additional symptoms or questions.         Jhon Daniels Gibson Nurse Advisor 9/23/2021 3:59 PM        Reason for Disposition    MODERATE swelling of both ankles (e.g., swelling extends up to the knees) AND new onset or worsening    Additional Information    Negative: Sounds like a life-threatening emergency to the triager    Negative: Chest pain    Negative: Small area of swelling and followed an insect bite to the area    Negative: Followed a knee injury    Negative: Ankle or foot injury    Negative: Pregnant with leg swelling or edema    Negative: Difficulty breathing at rest    Negative: Entire foot is cool or blue in comparison to other side    Negative: SEVERE swelling (e.g., swelling extends above knee, entire leg is swollen, weeping fluid)    Negative: Thigh or calf pain and only 1 side and present > 1 hour    Negative: Thigh, calf, or ankle swelling in only one leg    Negative: Thigh, calf, or ankle swelling in both legs, but one side is definitely more swollen (Exception: longstanding difference between legs)    Negative: Cast on leg or ankle and has increasing pain    Negative: Can't walk or can barely stand (new onset)    Negative: Fever and red area (or area very tender to touch)    Negative: Patient sounds very sick or weak to the triager    Negative: Swelling of face, arm or hands (Exception: slight puffiness of fingers during hot weather)    Negative: Pregnant > 20 weeks and sudden weight gain  (i.e., > 2 lbs, 1 kg in one week)    Protocols used: LEG SWELLING AND EDEMA-A-OH

## 2021-09-24 LAB
ATRIAL RATE - MUSE: 70 BPM
DIASTOLIC BLOOD PRESSURE - MUSE: NORMAL MMHG
INTERPRETATION ECG - MUSE: NORMAL
P AXIS - MUSE: 80 DEGREES
PR INTERVAL - MUSE: 166 MS
QRS DURATION - MUSE: 80 MS
QT - MUSE: 390 MS
QTC - MUSE: 421 MS
R AXIS - MUSE: -26 DEGREES
SYSTOLIC BLOOD PRESSURE - MUSE: NORMAL MMHG
T AXIS - MUSE: 19 DEGREES
VENTRICULAR RATE- MUSE: 70 BPM

## 2021-09-24 NOTE — ED PROVIDER NOTES
EMERGENCY DEPARTMENT ENCOUNTER      NAME: Yamilex Keller  AGE: 89 year old female  YOB: 1929  MRN: [id number]  EVALUATION DATE & TIME: 2021  7:01 PM    PCP: Michael Arzate    ED PROVIDER: Ronit Geller MD    Chief Complaint   Patient presents with     Increasing SOB         FINAL IMPRESSION:  1. Leg edema          ED COURSE & MEDICAL DECISION MAKING:    Pertinent Labs & Imaging studies reviewed. (See chart for details)  89 year old female with history of CAD with previous STEMI, HTN, HLD, GERD who presents to the Emergency Department for evaluation of 2 days of leg edema.  She notes very minimal shortness of breath when she exerts herself.  She is not on a diuretic.  On exam she has very minimal edema.  Differential includes venous stasis, lymphedema, CHF.  Concern for symptomatic anemia, arrhythmia, ACS.    Patient placed on monitor, IV established and blood obtained.  Twelve-lead EKG shows sinus rhythm.  CBC, BMP, BNP, troponin, magnesium unremarkable.  Recommend to patient and her daughter that she trial compression stockings and leg elevation for the next 3 to 4 days.  Patient herself would not like to be on a diuretic because she has a  to go to this week and does not want to be urinating the whole time.  Encouraged to follow-up with her PCP on Monday/Tuesday if her symptoms are not improving.  Warning signs return to ED discussed.         7:05 PM I met with the patient to gather history and to perform my initial exam. I discussed the plan for care while in the Emergency Department. PPE (gloves, glasses, surgical mask) was worn during patient encounters.  8:00 PM I updated patient on imaging and lab results.        At the conclusion of the encounter I discussed the results of all of the tests and the disposition. The questions were answered. The patient or family acknowledged understanding and was agreeable with the care plan.      MEDICATIONS GIVEN IN THE EMERGENCY:  Medications -  No data to display    NEW PRESCRIPTIONS STARTED AT TODAY'S ER VISIT  New Prescriptions    No medications on file          =================================================================    HPI    Patient information was obtained from: Patient    Use of Intrepreter: N/A       Yamilex Keller is a 89 year old female with pertinent medical history of GERD, HLD, HTN,  CAD, and STEMI (s/p catherization 2017) who presents for evaluation of shortness of breath and leg swelling.    Per chart review, patient was admitted to [facility] from 8/24-8/26/2020 (~1 year ago) for chest pain. CT chest/abdominal/pelvis showed pulmonary arteries are well-opacified and there is no evidence for pulmonary embolus. Bilateral cylindrical bronchiectasis in the lower lobes, left greater than right. At least moderate coronary artery calcification. EKG was negative. Negative troponin. Patient underwent coronary angiogram, which showed no obstructive disease. She was discharged in stable condition with prescriptions for 200 mg of Acyclovir to take 5 times a day, 25 mg of Metoprolol succinate daily, and 20 mg Omeprazole daily.     Patient reports that for the past 4-5 days she has noticed increased swelling in her bilateral ankles and increased shortness of breath. The shortness of breath has limited the patient's activity. She notes mild change in weight. She currently lives in a senior living facility and normally her  takes care of her, but he recently had a stroke and is in rehab, so her daughter has been helping. Patient notes chest pain, but states it only occurs when she is anxious. Patient is not on a diuretic or has had any recent medication changes.  Denies cough, fever, congestion, or additional medical concerns or complaints at this time.     Of note, patient is anticoagulating on blood thinners.       REVIEW OF SYSTEMS  Constitutional:  Denies fever, chills, weight loss or weakness. Endorses decreased  activity.  Respiratory: No wheeze or cough. Endorses SOB.  Cardiovascular:  No palpitations. Endorse chest pain with anxiety.  GI:  Denies abdominal pain, nausea, vomiting, diarrhea  : Denies dysuria, denies hematuria  Musculoskeletal:  Denies any new muscle/joint pain, swelling or loss of function.  Psych: Endorses anxious mood.    All other systems negative unless noted in HPI.      PAST MEDICAL HISTORY:  Past Medical History:   Diagnosis Date     Acute ST elevation myocardial infarction (STEMI) involving left anterior descending coronary artery (H) 7/18/2017     CAD (coronary artery disease)      Closed wedge compression fracture of seventh thoracic vertebra, initial encounter 7/8/2018     Full code status      GERD (gastroesophageal reflux disease) 1/10/2020     Hearing loss      HLD (hyperlipidemia)      HTN (hypertension) 4/1/2019     Osteoporosis      Temporal arteritis (H)      Tubular adenoma of colon     Last colonoscopy 2011     Uses walker        PAST SURGICAL HISTORY:  Past Surgical History:   Procedure Laterality Date     ANGIOPLASTY  //     BLEPHAROPLASTY Bilateral      CV CORONARY ANGIOGRAM N/A 8/25/2020    Procedure: Coronary Angiogram;  Surgeon: Jennifer Peck MD;  Location: [facility] Cath Lab;  Service: Cardiology     CV CORONARY ANGIOGRAM N/A 8/21/2020    Procedure: Coronary Angiogram;  Surgeon: Howard Navarro MD;  Location: [facility] Cath Lab;  Service: Cardiology     CV LEFT HEART CATHETERIZATION WITHOUT LEFT VENTRICULOGRAM Left 8/25/2020    Procedure: Left Heart Catheterization Without Left Ventriculogram;  Surgeon: Jennifer Peck MD;  Location: [facility] Cath Lab;  Service: Cardiology     CV LEFT HEART CATHETERIZATION WITHOUT LEFT VENTRICULOGRAM Left 8/21/2020    Procedure: Left Heart Catheterization Without Left Ventriculogram;  Surgeon: Howard Navarro MD;  Location: [facility] Cath Lab;  Service: Cardiology     ESOPHAGOSCOPY, GASTROSCOPY, DUODENOSCOPY (EGD), COMBINED  N/A 7/9/2018    Procedure: ESOPHAGOGASTRODUODENOSCOPY (EGD);  Surgeon: Prabhu Lin MD;  Location: [facility] GI;  Service:      IR THORACIC VERTEBROPLASTY  8/17/2018     RI CATH PLACEMENT & NJX CORONARY ART ANGIO IMG S&I N/A 7/18/2017    Procedure: Coronary Angiogram;  Surgeon: Shelia Garcia MD;  Location: [facility] Cath Lab;  Service: Cardiology     RI CATH PLMT L HRT & ARTS W/NJX & ANGIO IMG S&I Left 7/18/2017    Procedure: Left Heart Catheterization Without Left Ventriculogram;  Surgeon: Shelia Garcia MD;  Location: [facility] Cath Lab;  Service: Cardiology     RI TEMPORAL ARTERY LIGATN OR BX Bilateral 10/7/2015    Procedure: BILATERAL TEMPORAL ARTERY BIOPSY;  Surgeon: Alfredito Jason MD;  Location: [facility] Main OR;  Service: General     TONSILLECTOMY         CURRENT MEDICATIONS:    Prior to Admission Medications   Prescriptions Last Dose Informant Patient Reported? Taking?   aspirin 81 MG EC tablet   No No   Sig: [ASPIRIN 81 MG EC TABLET] Take 1 tablet (81 mg total) by mouth at bedtime. For CAD   atorvastatin (LIPITOR) 40 MG tablet   No No   Sig: [ATORVASTATIN (LIPITOR) 40 MG TABLET] TAKE 1 TABLET AT BEDTIME FOR CORONARY ARTERY DISEASE AND LIPIDS   calcium, as carbonate, (TUMS) 200 mg calcium (500 mg) chewable tablet   Yes No   Sig: [CALCIUM, AS CARBONATE, (TUMS) 200 MG CALCIUM (500 MG) CHEWABLE TABLET] Chew 1 tablet as needed for heartburn.   calcium-vitamin D 500 mg(1,250mg) -200 unit per tablet   Yes No   Sig: [CALCIUM-VITAMIN D 500 MG(1,250MG) -200 UNIT PER TABLET] Take 1 tablet by mouth 2 (two) times a day with meals.   clopidogreL (PLAVIX) 75 mg tablet   No No   Sig: [CLOPIDOGREL (PLAVIX) 75 MG TABLET] Take 1 tablet (75 mg total) by mouth daily. Okay to take for 90 days more then stop.   isosorbide mononitrate (IMDUR) 30 MG 24 hr tablet   No No   Sig: [ISOSORBIDE MONONITRATE (IMDUR) 30 MG 24 HR TABLET] Take 1 tablet (30 mg total) by mouth every morning.   metoprolol succinate (TOPROL-XL)  "25 MG   No No   Sig: [METOPROLOL SUCCINATE (TOPROL-XL) 25 MG] Take 1 tablet (25 mg total) by mouth daily.   nitroglycerin (NITROSTAT) 0.4 MG SL tablet   No No   Sig: [NITROGLYCERIN (NITROSTAT) 0.4 MG SL TABLET] Place 1 tablet (0.4 mg total) under the tongue every 5 (five) minutes as needed for chest pain.   omeprazole (PRILOSEC) 20 MG capsule   No No   Sig: [OMEPRAZOLE (PRILOSEC) 20 MG CAPSULE] Take 1 capsule (20 mg total) by mouth daily before breakfast.   triamcinolone (KENALOG) 0.1 % cream   Yes No   Sig: [TRIAMCINOLONE (KENALOG) 0.1 % CREAM] Apply 1 application topically as needed.      Facility-Administered Medications Last Administration Doses Remaining   denosumab (PROLIA) injection 60 mg None recorded           ALLERGIES:  Allergies   Allergen Reactions     Lisinopril Swelling     Swelling of lips/mouth      Losartan Angioedema       FAMILY HISTORY:  Family History   Problem Relation Age of Onset     Liver Cancer Mother      Prostate Cancer Father      Pacemaker Brother      Lung Cancer Brother      Lung Cancer Brother      Uterine Cancer Sister        SOCIAL HISTORY:  Social History     Tobacco Use     Smoking status: Former Smoker     Packs/day: 1.00     Years: 4.00     Pack years: 4.00     Types: Cigarettes, Cigarettes     Quit date: 1953     Years since quittin.7     Smokeless tobacco: Never Used   Substance Use Topics     Alcohol use: Yes     Comment: Alcoholic Drinks/day: Occassionally     Drug use: No        VITALS:  Patient Vitals for the past 24 hrs:   BP Temp Temp src Pulse Resp SpO2 Height Weight   21 1715 (!) 148/68 97.8  F (36.6  C) Oral 75 16 95 % 1.6 m (5' 3\") 54.4 kg (120 lb)       PHYSICAL EXAM    General Appearance: no acute distress pleasant, elderly female, hypertensive  Head:  Normocephalic, atraumatic  Eyes:  conjunctiva/corneas clear  ENT:  membranes are moist without pallor  Neck:  Supple  Chest:  No tenderness or deformity, no crepitus  Cardio:  Regular rate and " rhythm, no murmur/gallop/rub, 2+ pulses symmetric in all extremities  Pulm:  No respiratory distress, clear to auscultation bilaterally  Back:  No CVA tenderness, normal ROM  Abdomen:  Soft, non-tender, non distended,no rebound or guarding.  Extremities:  Extremities normal, atraumatic, no cyanosis, trace to 1+ edema with venous statis changes in skin,  full ROM and motor tone intact, bilateral pulses intact upper and lower.   Skin:  Skin warm, dry, no rashes  Neuro:  Alert and oriented ×3, moving all extremities, no gross sensory defects     RADIOLOGY/LABS:  Reviewed all pertinent imaging. Please see official radiology report. All pertinent labs reviewed and interpreted.    Results for orders placed or performed during the hospital encounter of 09/23/21   XR Chest 2 Views    Impression    IMPRESSION: Heart size and pulmonary vascularity normal. The lungs are clear. Coronary artery stents. Atherosclerotic disease thoracic aorta. Midthoracic vertebroplasty.       Basic metabolic panel   Result Value Ref Range    Sodium 142 136 - 145 mmol/L    Potassium 4.3 3.5 - 5.0 mmol/L    Chloride 105 98 - 107 mmol/L    Carbon Dioxide (CO2) 28 22 - 31 mmol/L    Anion Gap 9 5 - 18 mmol/L    Urea Nitrogen 18 8 - 28 mg/dL    Creatinine 0.77 0.60 - 1.10 mg/dL    Calcium 9.8 8.5 - 10.5 mg/dL    Glucose 95 70 - 125 mg/dL    GFR Estimate 67 >60 mL/min/1.73m2   Result Value Ref Range    Troponin I <0.01 0.00 - 0.29 ng/mL   Result Value Ref Range    Magnesium 2.0 1.8 - 2.6 mg/dL   B-Type Natriuretic Peptide ([facility] Only)   Result Value Ref Range     0 - 167 pg/mL   CBC with platelets and differential   Result Value Ref Range    WBC Count 7.7 4.0 - 11.0 10e3/uL    RBC Count 4.67 3.80 - 5.20 10e6/uL    Hemoglobin 14.9 11.7 - 15.7 g/dL    Hematocrit 46.3 35.0 - 47.0 %    MCV 99 78 - 100 fL    MCH 31.9 26.5 - 33.0 pg    MCHC 32.2 31.5 - 36.5 g/dL    RDW 13.1 10.0 - 15.0 %    Platelet Count 197 150 - 450 10e3/uL    % Neutrophils 62 %     % Lymphocytes 21 %    % Monocytes 13 %    % Eosinophils 3 %    % Basophils 1 %    % Immature Granulocytes 0 %    NRBCs per 100 WBC 0 <1 /100    Absolute Neutrophils 4.8 1.6 - 8.3 10e3/uL    Absolute Lymphocytes 1.6 0.8 - 5.3 10e3/uL    Absolute Monocytes 1.0 0.0 - 1.3 10e3/uL    Absolute Eosinophils 0.3 0.0 - 0.7 10e3/uL    Absolute Basophils 0.1 0.0 - 0.2 10e3/uL    Absolute Immature Granulocytes 0.0 <=0.0 10e3/uL    Absolute NRBCs 0.0 10e3/uL       EKG:  Performed at: 17:41    Impression: Sinus rhythm, normal EKG    Rate: 70  Rhythm: Sinus rhythm  Axis: -26  MN Interval: 166  QRS Interval: 80  QTc Interval: 421  ST Changes: None  Comparison: August 24, 2020  I have independently reviewed and interpreted the EKG(s) documented above.      The creation of this record is based on the scribe s observations of the work being performed by Ronit Geller MD and the provider s statements to them. It was created on his behalf by Aisha Abdi, a trained medical scribe. This document has been checked and approved by the attending provider.    Ronit Geller MD  Emergency Medicine  [facility] EMERGENCY DEPARTMENT  [facility] [facility] [id number] [phone]  Dept: [phone]       Ronit Geller MD  09/23/21 2007

## 2021-10-06 ENCOUNTER — TELEPHONE (OUTPATIENT)
Dept: ENDOCRINOLOGY | Facility: CLINIC | Age: 86
End: 2021-10-06

## 2021-10-06 NOTE — TELEPHONE ENCOUNTER
Pito- daughter called.  Patient has an appointment with Tori scheduled on Thursday 12.02.2021. Patient will plan to have labs done during her visit with her PCP on 11.22.2021.    Patient also has her prolia injection appointment scheduled for 10.26.2021 and would like to know if she can keep that appointment.     Pito @ [phone]

## 2021-10-07 NOTE — TELEPHONE ENCOUNTER
Daughter return missed call.   Please call her again, if you reach voice mail ok to leave detail message, since she works nights and will be sleeping during the day.

## 2021-10-25 DIAGNOSIS — I21.02 ACUTE ST ELEVATION MYOCARDIAL INFARCTION (STEMI) INVOLVING LEFT ANTERIOR DESCENDING CORONARY ARTERY (H): ICD-10-CM

## 2021-10-25 RX ORDER — ISOSORBIDE MONONITRATE 30 MG/1
TABLET, EXTENDED RELEASE ORAL
Qty: 90 TABLET | Refills: 3 | Status: SHIPPED | OUTPATIENT
Start: 2021-10-25 | End: 2022-01-26

## 2021-10-26 ENCOUNTER — ALLIED HEALTH/NURSE VISIT (OUTPATIENT)
Dept: ENDOCRINOLOGY | Facility: CLINIC | Age: 86
End: 2021-10-26
Payer: MEDICARE

## 2021-10-26 ENCOUNTER — TELEPHONE (OUTPATIENT)
Dept: INTERNAL MEDICINE | Facility: CLINIC | Age: 86
End: 2021-10-26

## 2021-10-26 DIAGNOSIS — M81.0 POST-MENOPAUSAL OSTEOPOROSIS: Primary | ICD-10-CM

## 2021-10-26 PROCEDURE — 96372 THER/PROPH/DIAG INJ SC/IM: CPT | Performed by: PHARMACIST

## 2021-10-26 PROCEDURE — 99207 PR NO CHARGE NURSE ONLY: CPT

## 2021-10-26 NOTE — PROGRESS NOTES
"Prolia Injection Phone Screen      Screening questions have been asked 2-3 days prior to administration visit for Prolia. If any questions are answered with \"Yes,\" this phone encounter were will routed to ordering provider for further evaluation.     1.  When was the last injection?  04/20/21    2.  Has insurance for this injection been verified?  Yes    3.  Did you experience any new onset achiness or rashes that lasted for over a month with your previous Prolia injection?   No    4.  Do you have a fever over 101?F or a new deep cough that is unusual for you today? No    5.  Have you started any new medications in the last 6 months that you were told could affect your immune system? These may have been prescribed by oncologist, transplant, rheumatology, or dermatology.   No    6.  In the last 6 months have you have gastric bypass or parathyroid surgery?   No    7.  Do you plan dental work requiring drilling into the bone such as implants/extractions or oral surgery in the next 2-3 months?   No    8. Do you have new insurance since the last injection?    Patient informed if symptoms discussed above present prior to their administration appointment, they are to notify clinic immediately.     Darlene Dixon RN          The following steps were completed to comply with the REMS program for Prolia:  1. Ordering provider has previously reviewed information in the Medication Guide and Patient Counseling Chart, including the serious risks of Prolia  and the symptoms of each risk and have been advised to seek prompt medical attention if they have signs or symptoms of any of the serious risks.  2. Provided each patient a copy of the Medication Guide and Patient Brochure.  See MAR for administration details.   Indication: Prolia  (denosumab) is a prescription medicine used to treat osteoporosis in patients who:   Are at high risk for fracture, meaning patients who have had a fracture related to osteoporosis, or who have " multiple risk factors for fracture; Cannot use another osteoporosis medicine or other osteoporosis medicines did not work well.   The timeline for early/late injections would be 4 weeks early and any time after the 6 month sarwat. If a patient receives their injection late, then the subsequent injection would be 6 months from the date that they actually received the injection    Have the screening questions been asked prior to this administration? Yes      The following medication was given:     MEDICATION: Denosumab (Prolia) 60 mg/ml SOLN  ROUTE: SQ  SITE: Arm - Left  DOSE: 60 mg  LOT #: [id number]  :  Zenput  EXPIRATION DATE:  02/24  NDC#: see mar

## 2021-10-26 NOTE — TELEPHONE ENCOUNTER
Contacted patient and informed her she can stop the plavix per PCP.  Patient verbalized understanding.    Also attempted to contact daughter by number is not valid.

## 2021-10-26 NOTE — TELEPHONE ENCOUNTER
----- Message from Michael Arzate MD sent at 10/26/2021 12:28 PM CDT -----  Regarding: RE: Plavix duration  She can stop this.  I updated the patient list.    Please notify them if needed.    Michael Arzate MD  General Internal Medicine  [facility]  10/26/2021, 12:28 PM    ----- Message -----  From: Anthony Bonilla RN  Sent: 10/26/2021  11:56 AM CDT  To: Michael Arzate MD  Subject: Plavix duration                                    ----- Message -----  From: Darlene Dixon RN  Sent: 10/26/2021  11:46 AM CDT  To: [facility] Care Team Pool    Helo,  I has Yamilex in for a prolia today and she believs Dr Arzate told her to discontinue her plavix. I am not seeing thi information in the chart. Please reiew, advise, and contact the pt ad pt's daughter.  Thanks,  aDrlene RN

## 2021-11-02 DIAGNOSIS — I10 ESSENTIAL HYPERTENSION: ICD-10-CM

## 2021-11-02 RX ORDER — METOPROLOL SUCCINATE 25 MG/1
TABLET, EXTENDED RELEASE ORAL
Qty: 90 TABLET | Refills: 3 | Status: SHIPPED | OUTPATIENT
Start: 2021-11-02 | End: 2022-01-26

## 2021-11-22 ENCOUNTER — OFFICE VISIT (OUTPATIENT)
Dept: INTERNAL MEDICINE | Facility: CLINIC | Age: 86
End: 2021-11-22
Payer: MEDICARE

## 2021-11-22 VITALS — HEART RATE: 66 BPM | OXYGEN SATURATION: 95 % | DIASTOLIC BLOOD PRESSURE: 82 MMHG | SYSTOLIC BLOOD PRESSURE: 138 MMHG

## 2021-11-22 DIAGNOSIS — G89.29 CHRONIC PAIN OF RIGHT KNEE: ICD-10-CM

## 2021-11-22 DIAGNOSIS — Z23 NEED FOR COVID-19 VACCINE: ICD-10-CM

## 2021-11-22 DIAGNOSIS — M25.561 CHRONIC PAIN OF RIGHT KNEE: ICD-10-CM

## 2021-11-22 DIAGNOSIS — I10 PRIMARY HYPERTENSION: ICD-10-CM

## 2021-11-22 DIAGNOSIS — G20.C PARKINSONISM, UNSPECIFIED PARKINSONISM TYPE (H): ICD-10-CM

## 2021-11-22 DIAGNOSIS — K21.9 GASTROESOPHAGEAL REFLUX DISEASE, UNSPECIFIED WHETHER ESOPHAGITIS PRESENT: ICD-10-CM

## 2021-11-22 DIAGNOSIS — M17.11 PRIMARY OSTEOARTHRITIS OF RIGHT KNEE: Primary | ICD-10-CM

## 2021-11-22 DIAGNOSIS — I25.10 CORONARY ARTERY DISEASE INVOLVING NATIVE CORONARY ARTERY OF NATIVE HEART WITHOUT ANGINA PECTORIS: ICD-10-CM

## 2021-11-22 DIAGNOSIS — M17.0 BILATERAL PRIMARY OSTEOARTHRITIS OF KNEE: ICD-10-CM

## 2021-11-22 PROCEDURE — 91306 COVID-19,PF,MODERNA (18+ YRS BOOSTER .25ML): CPT | Performed by: INTERNAL MEDICINE

## 2021-11-22 PROCEDURE — 0064A COVID-19,PF,MODERNA (18+ YRS BOOSTER .25ML): CPT | Performed by: INTERNAL MEDICINE

## 2021-11-22 PROCEDURE — 99214 OFFICE O/P EST MOD 30 MIN: CPT | Performed by: INTERNAL MEDICINE

## 2021-11-22 NOTE — PATIENT INSTRUCTIONS
Future Appointments   Date Time Provider Department Center   12/2/2021 11:00 AM Rocío Sainz NP [facility]   2/22/2022  2:30 PM [facility]

## 2021-11-23 NOTE — PROGRESS NOTES
[facility] - Primary Care Specialists    Comprehensive and complex medical care - Chronic disease management - Shared decision making - Care coordination - Compassionate care    Patient advocacy - Rational deprescribing - Minimally disruptive medicine - Ethical focus - Customized care         Date of Service: 11/22/2021  Primary Provider: Michael Arzate    Patient Care Team:  Michael Arzate MD as PCP - General  Michael Arzate MD as Assigned PCP  Gilbert Queen DO as Assigned Heart and Vascular Provider  Rocío Sainz NP as Nurse Practitioner  Simon Kaur MD as MD (Dermatology)          Patient's Pharmacy:    [facility] - [place], [place] - [facility] [facility]  [address] [facility] [address]  Phone: [phone] Fax: [phone]    [id number] - [place] [id number] [facility] [address] [place] [address] & [address]  [facility] [address]  Phone: [phone] Fax: [phone]     Patient's Contacts:  Name Home Phone Work Phone Mobile Phone Relationship Lgl Grd   CLAUDIA WOODS   [phone] Spouse    ANDRES ALANIS   [phone] Other      Patient's Insurance:    Payor: MEDICARE / Plan: MEDICARE / Product Type: Medicare /           Active Problem List:  Problem List as of 11/22/2021 Reviewed: 8/6/2021 11:20 PM by Michael Arzate MD       High    Full code status    CAD (coronary artery disease)       Medium    Bilateral primary osteoarthritis of knee    Chronic pain of right knee    HTN (hypertension)    Parkinsonism, unspecified Parkinsonism type (H)       Low    Osteoporosis    Hearing loss    Angioedema    HLD (hyperlipidemia)    Senile purpura (H)    Uses walker    GERD (gastroesophageal reflux disease)    Dementia (H)           Current Outpatient Medications   Medication Instructions     aspirin (ASA) 81 mg, Oral, AT BEDTIME     atorvastatin (LIPITOR) 40 MG tablet [ATORVASTATIN (LIPITOR)  40 MG TABLET] TAKE 1 TABLET AT BEDTIME FOR CORONARY ARTERY DISEASE AND LIPIDS     calcium, as carbonate, (TUMS) 200 mg calcium (500 mg) chewable tablet 1 tablet, PRN     calcium-vitamin D 500 mg(1,250mg) -200 unit per tablet 1 tablet, 2 TIMES DAILY WITH MEALS     diclofenac (VOLTAREN) 2 g, Topical, 4 TIMES DAILY     isosorbide mononitrate (IMDUR) 30 MG 24 hr tablet TAKE 1 TABLET EVERY MORNING     metoprolol succinate ER (TOPROL-XL) 25 MG 24 hr tablet TAKE 1 TABLET DAILY     nitroGLYcerin (NITROSTAT) 0.4 mg, Sublingual, EVERY 5 MIN PRN     omeprazole (PRILOSEC) 20 mg, Oral, Daily before breakfast     triamcinolone (KENALOG) 0.1 % cream 1 Application, PRN     Social History     Social History Narrative     to  Aris.  1 daughter.        Patient of Dr. Arzate since 2019.        Subjective:     Yamilex Keller is a 89 year old female who comes in today for:    Chief Complaint   Patient presents with     RECHECK     right knee pain, ostoarthritis      Tremors     in hands      Accompanied by  at today's visit.       Mainly bothered by osteoarthritis (OA) of the knees especially the right.  More pain.  Synvisc and corticosteroid injection not helpful.  Limited in mobility but in a wheelchair today but usually uses a walker.    Has tremor at rest in the right hand for over a year.  Not so much when doing something.  Rhythmic.  No other Parkinson's disease symptoms.    We reviewed her coronary artery disease (CAD) and she has had no issues with angina symptoms.     Reviewed her hypertension today.  Blood pressure has been in the goal range.  Denies any excessive dizziness from the medication with this.     We reviewed her other issues noted in the assessment but not specifically addressed in the HPI above.     Objective:     Wt Readings from Last 3 Encounters:   09/23/21 54.4 kg (120 lb)   08/05/21 54.1 kg (119 lb 3.2 oz)   06/15/21 54 kg (119 lb)     BP Readings from Last 3 Encounters:   11/22/21  138/82   09/23/21 (!) 157/67   08/05/21 118/62     /82   Pulse 66   SpO2 95%    The patient is comfortable, no acute distress.  Mood good.  Insightfair.  Eyes are nonicteric.  Neck is supple without mass.  No cervical adenopathy.  No thyromegaly. Heart regular rate and rhythm.  Lungs clear to auscultation bilaterally.  Respiratory effort is good.  Extremities no edema.  Right knee shows no effusion but does have significant osteoarthritis (OA) changes, especially medially.    Diagnostics:     Admission on 09/23/2021, Discharged on 09/23/2021   Component Date Value Ref Range Status     Sodium 09/23/2021 142  136 - 145 mmol/L Final     Potassium 09/23/2021 4.3  3.5 - 5.0 mmol/L Final     Chloride 09/23/2021 105  98 - 107 mmol/L Final     Carbon Dioxide (CO2) 09/23/2021 28  22 - 31 mmol/L Final     Anion Gap 09/23/2021 9  5 - 18 mmol/L Final     Urea Nitrogen 09/23/2021 18  8 - 28 mg/dL Final     Creatinine 09/23/2021 0.77  0.60 - 1.10 mg/dL Final     Calcium 09/23/2021 9.8  8.5 - 10.5 mg/dL Final     Glucose 09/23/2021 95  70 - 125 mg/dL Final     GFR Estimate 09/23/2021 67  >60 mL/min/1.73m2 Final    As of July 11, 2021, eGFR is calculated by the CKD-EPI creatinine equation, without race adjustment. eGFR can be influenced by muscle mass, exercise, and diet. The reported eGFR is an estimation only and is only applicable if the renal function is stable.     Troponin I 09/23/2021 <0.01  0.00 - 0.29 ng/mL Final     Ventricular Rate 09/23/2021 70  BPM Final     Atrial Rate 09/23/2021 70  BPM Final     WA Interval 09/23/2021 166  ms Final     QRS Duration 09/23/2021 80  ms Final     QT 09/23/2021 390  ms Final     QTc 09/23/2021 421  ms Final     P Axis 09/23/2021 80  degrees Final     R AXIS 09/23/2021 -26  degrees Final     T Axis 09/23/2021 19  degrees Final     Interpretation ECG 09/23/2021    Final                    Value: Poor data quality, interpretation may be adversely affected  Sinus rhythm  Normal  ECG  When compared with ECG of 24-AUG-2020 20:45,  No significant change was found  Confirmed by SEE ED PROVIDER NOTE FOR, ECG INTERPRETATION ([id number]),  CANDY SANDOVAL ([id number]) on 9/24/2021 7:52:15 AM       Magnesium 09/23/2021 2.0  1.8 - 2.6 mg/dL Final     BNP 09/23/2021 117  0 - 167 pg/mL Final     WBC Count 09/23/2021 7.7  4.0 - 11.0 10e3/uL Final     RBC Count 09/23/2021 4.67  3.80 - 5.20 10e6/uL Final     Hemoglobin 09/23/2021 14.9  11.7 - 15.7 g/dL Final     Hematocrit 09/23/2021 46.3  35.0 - 47.0 % Final     MCV 09/23/2021 99  78 - 100 fL Final     MCH 09/23/2021 31.9  26.5 - 33.0 pg Final     MCHC 09/23/2021 32.2  31.5 - 36.5 g/dL Final     RDW 09/23/2021 13.1  10.0 - 15.0 % Final     Platelet Count 09/23/2021 197  150 - 450 10e3/uL Final     % Neutrophils 09/23/2021 62  % Final     % Lymphocytes 09/23/2021 21  % Final     % Monocytes 09/23/2021 13  % Final     % Eosinophils 09/23/2021 3  % Final     % Basophils 09/23/2021 1  % Final     % Immature Granulocytes 09/23/2021 0  % Final     NRBCs per 100 WBC 09/23/2021 0  <1 /100 Final     Absolute Neutrophils 09/23/2021 4.8  1.6 - 8.3 10e3/uL Final     Absolute Lymphocytes 09/23/2021 1.6  0.8 - 5.3 10e3/uL Final     Absolute Monocytes 09/23/2021 1.0  0.0 - 1.3 10e3/uL Final     Absolute Eosinophils 09/23/2021 0.3  0.0 - 0.7 10e3/uL Final     Absolute Basophils 09/23/2021 0.1  0.0 - 0.2 10e3/uL Final     Absolute Immature Granulocytes 09/23/2021 0.0  <=0.0 10e3/uL Final     Absolute NRBCs 09/23/2021 0.0  10e3/uL Final       No results found for any visits on 11/22/21.    Assessment:     1. Primary osteoarthritis of right knee    2. Need for COVID-19 vaccine    3. Coronary artery disease involving native coronary artery of native heart without angina pectoris    4. Bilateral primary osteoarthritis of knee    5. Chronic pain of right knee    6. Primary hypertension    7. Gastroesophageal reflux disease, unspecified whether esophagitis present    8.  Parkinsonism, unspecified Parkinsonism type (H)        Plan:     1. Trial of diclofenac (Voltaren) gel for the knee.  Never did do in the past.  2. COVID booster done today   3. No issues with heart at this time.  4. No need to treat mild Parkinson's disease symptoms at this time.  5. Blood work next visit.  6. Continue current medications otherwise.  7. Follow up sooner if issues.       Michael Arzate MD  General Internal Medicine  [facility]    Return in about 3 months (around 2/22/2022), or if symptoms worsen or fail to improve, for follow up visit.     Future Appointments   Date Time Provider Department Center   12/2/2021 11:00 AM [facility]   2/22/2022  2:30 PM [facility]

## 2021-12-29 ENCOUNTER — TELEPHONE (OUTPATIENT)
Dept: INTERNAL MEDICINE | Facility: CLINIC | Age: 86
End: 2021-12-29
Payer: MEDICARE

## 2021-12-29 DIAGNOSIS — I25.118 CORONARY ARTERY DISEASE OF NATIVE ARTERY OF NATIVE HEART WITH STABLE ANGINA PECTORIS (H): ICD-10-CM

## 2021-12-29 DIAGNOSIS — E78.2 MIXED HYPERLIPIDEMIA: ICD-10-CM

## 2021-12-29 NOTE — TELEPHONE ENCOUNTER
Patient came into office to change Pharmacy to [address], [place] [address].Thank you

## 2022-01-01 ENCOUNTER — TELEPHONE (OUTPATIENT)
Dept: INTERNAL MEDICINE | Facility: CLINIC | Age: 87
End: 2022-01-01

## 2022-01-01 ENCOUNTER — MEDICAL CORRESPONDENCE (OUTPATIENT)
Dept: HEALTH INFORMATION MANAGEMENT | Facility: CLINIC | Age: 87
End: 2022-01-01

## 2022-01-01 ENCOUNTER — TRANSITIONAL CARE UNIT VISIT (OUTPATIENT)
Dept: GERIATRICS | Facility: CLINIC | Age: 87
End: 2022-01-01
Payer: MEDICARE

## 2022-01-01 ENCOUNTER — TELEPHONE (OUTPATIENT)
Dept: NURSING | Facility: CLINIC | Age: 87
End: 2022-01-01

## 2022-01-01 ENCOUNTER — TRANSFERRED RECORDS (OUTPATIENT)
Dept: HEALTH INFORMATION MANAGEMENT | Facility: CLINIC | Age: 87
End: 2022-01-01

## 2022-01-01 VITALS
WEIGHT: 109.2 LBS | SYSTOLIC BLOOD PRESSURE: 119 MMHG | DIASTOLIC BLOOD PRESSURE: 55 MMHG | HEIGHT: 65 IN | BODY MASS INDEX: 18.19 KG/M2 | HEART RATE: 58 BPM | OXYGEN SATURATION: 93 % | RESPIRATION RATE: 18 BRPM | TEMPERATURE: 98.9 F

## 2022-01-01 DIAGNOSIS — I25.83 CORONARY ARTERY DISEASE DUE TO LIPID RICH PLAQUE: ICD-10-CM

## 2022-01-01 DIAGNOSIS — M62.81 GENERALIZED MUSCLE WEAKNESS: Primary | ICD-10-CM

## 2022-01-01 DIAGNOSIS — N30.00 ACUTE CYSTITIS WITHOUT HEMATURIA: ICD-10-CM

## 2022-01-01 DIAGNOSIS — I10 ESSENTIAL HYPERTENSION: ICD-10-CM

## 2022-01-01 DIAGNOSIS — Z53.9 ERRONEOUS ENCOUNTER--DISREGARD: Primary | ICD-10-CM

## 2022-01-01 DIAGNOSIS — I25.10 CORONARY ARTERY DISEASE DUE TO LIPID RICH PLAQUE: ICD-10-CM

## 2022-01-01 PROCEDURE — 99309 SBSQ NF CARE MODERATE MDM 30: CPT | Performed by: FAMILY MEDICINE

## 2022-01-01 RX ORDER — METOPROLOL SUCCINATE 25 MG/1
25 TABLET, EXTENDED RELEASE ORAL DAILY
Qty: 90 TABLET | Refills: 0 | Status: SHIPPED | OUTPATIENT
Start: 2022-01-01

## 2022-01-17 ENCOUNTER — TELEPHONE (OUTPATIENT)
Dept: INTERNAL MEDICINE | Facility: CLINIC | Age: 87
End: 2022-01-17
Payer: MEDICARE

## 2022-01-17 NOTE — TELEPHONE ENCOUNTER
Daughter Pito calling.  Attempting to move patient into Assisted Liviving at [facility].    Need the following:  Recent Diagnosis, 6 months of chart notes and signed Medication List      FAX: [facility]   Please sent to the attention of Sheyla Sierra

## 2022-01-17 NOTE — TELEPHONE ENCOUNTER
Medication list signed and faxed with message for the rest of records to be received from medical records at [phone].  Received successful fax confirmation report.

## 2022-01-19 ENCOUNTER — TELEPHONE (OUTPATIENT)
Dept: INTERNAL MEDICINE | Facility: CLINIC | Age: 87
End: 2022-01-19

## 2022-01-19 NOTE — TELEPHONE ENCOUNTER
Pt Daughter came in to get all documents on mother account printed, she needs it for the assisted living housing paperwork..  She tried to get them all faxed over but it will take a week and she insist to get it as soon as possible.     Adv her that  can only do so much for her, since she urges to try to get them all today. 1/19/2022    Pt said she will come in tmr 1/20/2022 to ask again.

## 2022-01-26 ENCOUNTER — TELEPHONE (OUTPATIENT)
Dept: FAMILY MEDICINE | Facility: CLINIC | Age: 87
End: 2022-01-26
Payer: MEDICARE

## 2022-01-26 DIAGNOSIS — I21.02 ACUTE ST ELEVATION MYOCARDIAL INFARCTION (STEMI) INVOLVING LEFT ANTERIOR DESCENDING CORONARY ARTERY (H): ICD-10-CM

## 2022-01-26 DIAGNOSIS — K21.9 GASTROESOPHAGEAL REFLUX DISEASE: ICD-10-CM

## 2022-01-26 DIAGNOSIS — I10 ESSENTIAL HYPERTENSION: ICD-10-CM

## 2022-01-26 RX ORDER — METOPROLOL SUCCINATE 25 MG/1
25 TABLET, EXTENDED RELEASE ORAL DAILY
Qty: 90 TABLET | Refills: 2 | Status: SHIPPED | OUTPATIENT
Start: 2022-01-26 | End: 2022-01-01

## 2022-01-26 RX ORDER — ISOSORBIDE MONONITRATE 30 MG/1
TABLET, EXTENDED RELEASE ORAL
Qty: 90 TABLET | Refills: 2 | Status: SHIPPED | OUTPATIENT
Start: 2022-01-26 | End: 2023-01-01

## 2022-01-26 NOTE — TELEPHONE ENCOUNTER
Patient's daughter presented to clinic requesting refills of Metoprolol, Imdur, and omeprazole.   Patient does have refills on file with [facility]  Will send remaining refills to [facility] as requested.  Patient seeing PCP on 2/24/22.

## 2022-02-24 ENCOUNTER — OFFICE VISIT (OUTPATIENT)
Dept: INTERNAL MEDICINE | Facility: CLINIC | Age: 87
End: 2022-02-24
Payer: MEDICARE

## 2022-02-24 ENCOUNTER — MEDICAL CORRESPONDENCE (OUTPATIENT)
Dept: HEALTH INFORMATION MANAGEMENT | Facility: CLINIC | Age: 87
End: 2022-02-24

## 2022-02-24 VITALS
WEIGHT: 108 LBS | BODY MASS INDEX: 19.14 KG/M2 | DIASTOLIC BLOOD PRESSURE: 84 MMHG | SYSTOLIC BLOOD PRESSURE: 132 MMHG | HEIGHT: 63 IN | OXYGEN SATURATION: 95 % | HEART RATE: 65 BPM

## 2022-02-24 DIAGNOSIS — Z99.89 USES WALKER: ICD-10-CM

## 2022-02-24 DIAGNOSIS — M81.0 AGE-RELATED OSTEOPOROSIS WITHOUT CURRENT PATHOLOGICAL FRACTURE: ICD-10-CM

## 2022-02-24 DIAGNOSIS — I25.118 CORONARY ARTERY DISEASE OF NATIVE ARTERY OF NATIVE HEART WITH STABLE ANGINA PECTORIS (H): Primary | ICD-10-CM

## 2022-02-24 DIAGNOSIS — M25.561 CHRONIC PAIN OF RIGHT KNEE: ICD-10-CM

## 2022-02-24 DIAGNOSIS — M17.0 BILATERAL PRIMARY OSTEOARTHRITIS OF KNEE: ICD-10-CM

## 2022-02-24 DIAGNOSIS — K21.9 GASTROESOPHAGEAL REFLUX DISEASE, UNSPECIFIED WHETHER ESOPHAGITIS PRESENT: ICD-10-CM

## 2022-02-24 DIAGNOSIS — G20.C PARKINSONISM, UNSPECIFIED PARKINSONISM TYPE (H): ICD-10-CM

## 2022-02-24 DIAGNOSIS — G89.29 CHRONIC PAIN OF RIGHT KNEE: ICD-10-CM

## 2022-02-24 DIAGNOSIS — E78.2 MIXED HYPERLIPIDEMIA: ICD-10-CM

## 2022-02-24 DIAGNOSIS — F03.90 DEMENTIA WITHOUT BEHAVIORAL DISTURBANCE, UNSPECIFIED DEMENTIA TYPE: ICD-10-CM

## 2022-02-24 DIAGNOSIS — D69.2 SENILE PURPURA (H): ICD-10-CM

## 2022-02-24 LAB
ALBUMIN SERPL-MCNC: 3.8 G/DL (ref 3.5–5)
ALP SERPL-CCNC: 22 U/L (ref 45–120)
ALT SERPL W P-5'-P-CCNC: 13 U/L (ref 0–45)
ANION GAP SERPL CALCULATED.3IONS-SCNC: 10 MMOL/L (ref 5–18)
AST SERPL W P-5'-P-CCNC: 14 U/L (ref 0–40)
BILIRUB SERPL-MCNC: 0.8 MG/DL (ref 0–1)
BUN SERPL-MCNC: 17 MG/DL (ref 8–28)
CALCIUM SERPL-MCNC: 9.8 MG/DL (ref 8.5–10.5)
CHLORIDE BLD-SCNC: 103 MMOL/L (ref 98–107)
CO2 SERPL-SCNC: 29 MMOL/L (ref 22–31)
CREAT SERPL-MCNC: 0.75 MG/DL (ref 0.6–1.1)
ERYTHROCYTE [DISTWIDTH] IN BLOOD BY AUTOMATED COUNT: 12.7 % (ref 10–15)
GFR SERPL CREATININE-BSD FRML MDRD: 74 ML/MIN/1.73M2
GLUCOSE BLD-MCNC: 81 MG/DL (ref 70–125)
HCT VFR BLD AUTO: 47.5 % (ref 35–47)
HGB BLD-MCNC: 15.1 G/DL (ref 11.7–15.7)
MCH RBC QN AUTO: 32.1 PG (ref 26.5–33)
MCHC RBC AUTO-ENTMCNC: 31.8 G/DL (ref 31.5–36.5)
MCV RBC AUTO: 101 FL (ref 78–100)
PLATELET # BLD AUTO: 202 10E3/UL (ref 150–450)
POTASSIUM BLD-SCNC: 4.5 MMOL/L (ref 3.5–5)
PROT SERPL-MCNC: 6.3 G/DL (ref 6–8)
RBC # BLD AUTO: 4.7 10E6/UL (ref 3.8–5.2)
SODIUM SERPL-SCNC: 142 MMOL/L (ref 136–145)
WBC # BLD AUTO: 9.8 10E3/UL (ref 4–11)

## 2022-02-24 PROCEDURE — 85027 COMPLETE CBC AUTOMATED: CPT | Performed by: INTERNAL MEDICINE

## 2022-02-24 PROCEDURE — 36415 COLL VENOUS BLD VENIPUNCTURE: CPT | Performed by: INTERNAL MEDICINE

## 2022-02-24 PROCEDURE — 80053 COMPREHEN METABOLIC PANEL: CPT | Performed by: INTERNAL MEDICINE

## 2022-02-24 PROCEDURE — 99214 OFFICE O/P EST MOD 30 MIN: CPT | Performed by: INTERNAL MEDICINE

## 2022-02-24 NOTE — LETTER
2/25/2022    Yamilex Keller   [address]   [place] [address]         Dear Yamilex,    It was good to see you in clinic.  I hope your questions were answered at the time of your visit.    The results of your tests from your visit were as follows:    Resulted Orders   CBC with platelets   Result Value Ref Range    WBC Count 9.8 4.0 - 11.0 10e3/uL    RBC Count 4.70 3.80 - 5.20 10e6/uL    Hemoglobin 15.1 11.7 - 15.7 g/dL    Hematocrit 47.5 (H) 35.0 - 47.0 %     (H) 78 - 100 fL    MCH 32.1 26.5 - 33.0 pg    MCHC 31.8 31.5 - 36.5 g/dL    RDW 12.7 10.0 - 15.0 %    Platelet Count 202 150 - 450 10e3/uL   Comprehensive metabolic panel   Result Value Ref Range    Sodium 142 136 - 145 mmol/L    Potassium 4.5 3.5 - 5.0 mmol/L    Chloride 103 98 - 107 mmol/L    Carbon Dioxide (CO2) 29 22 - 31 mmol/L    Anion Gap 10 5 - 18 mmol/L    Urea Nitrogen 17 8 - 28 mg/dL    Creatinine 0.75 0.60 - 1.10 mg/dL    Calcium 9.8 8.5 - 10.5 mg/dL    Glucose 81 70 - 125 mg/dL    Alkaline Phosphatase 22 (L) 45 - 120 U/L    AST 14 0 - 40 U/L    ALT 13 0 - 45 U/L    Protein Total 6.3 6.0 - 8.0 g/dL    Albumin 3.8 3.5 - 5.0 g/dL    Bilirubin Total 0.8 0.0 - 1.0 mg/dL    GFR Estimate 74 >60 mL/min/1.73m2      Comment:      Effective December 21, 2021 eGFRcr in adults is calculated using the 2021 CKD-EPI creatinine equation which includes age and gender (Nathan et al., NEJM, DOI: 10.1056/SPDIjp9654839)     Your blood counts are normal and you are not anemic.     Your kidney tests are normal.  Your electrolytes are also normal.  There is no signs of diabetes.  Your liver tests are normal.      Please follow up again in 6 months, sooner if issues.    If you have any questions regarding these results, please feel free to contact me at [phone].  I wish you the best of health!      Sincerely,       Michael Arzate MD  General Internal Medicine  [facility]

## 2022-02-25 PROBLEM — F03.90 DEMENTIA (H): Status: ACTIVE | Noted: 2020-09-10

## 2022-02-25 NOTE — PATIENT INSTRUCTIONS
Please follow up if you have any further issues.    You may contact me by phone or MyChart if you are worsening or if things are not improving.    ______________________________________________________________________     Please remember that you can call [phone] to schedule an appointment.     You can schedule appointments 24 hours a day, 7 days a week.  Sometimes the best time to schedule an appointment is after clinic hours when less people are calling in.  Weekends are another option for calling in to schedule appointments.

## 2022-02-25 NOTE — PROGRESS NOTES
[facility] - Primary Care Specialists    Comprehensive and complex medical care - Chronic disease management - Shared decision making - Care coordination - Compassionate care    Patient advocacy - Rational deprescribing - Minimally disruptive medicine - Ethical focus - Customized care         Date of Service: 2/24/2022  Primary Provider: Michael Arzate    Patient Care Team:  Michael Arzate MD as PCP - General  Michael Arzate MD as Assigned PCP  Gilbert Queen DO as Assigned Heart and Vascular Provider  Rocío Sainz NP as Nurse Practitioner  Simon Kaur MD as MD (Dermatology)          Patient's Pharmacy:    [id number] - [place] [address] [facility] [address] [place] [address] & [address]  [facility] [address]  Phone: [phone] Fax: [phone]     Patient's Contacts:  Name Home Phone Work Phone Mobile Phone Relationship Lgl Grd   CLAUDIA WOODS   [phone] Spouse    ANDRES ALANIS   [phone] Other      Patient's Insurance:    Payor: MEDICARE / Plan: MEDICARE / Product Type: Medicare /           Active Problem List:  Problem List as of 2/24/2022 Reviewed: 8/6/2021 11:20 PM by Michael Arzate MD       Medium    Parkinsonism, unspecified Parkinsonism type (H)       Low    Osteoporosis    Hearing loss    Angioedema    HLD (hyperlipidemia)    Senile purpura (H)    Uses walker    Dementia (H)       Other    Bilateral primary osteoarthritis of knee    Chronic pain of right knee    HTN (hypertension)       Other    Full code status    CAD (coronary artery disease)       Other    GERD (gastroesophageal reflux disease)           Current Outpatient Medications   Medication Instructions     aspirin (ASA) 81 mg, Oral, AT BEDTIME     atorvastatin (LIPITOR) 40 MG tablet [ATORVASTATIN (LIPITOR) 40 MG TABLET] TAKE 1 TABLET AT BEDTIME FOR CORONARY ARTERY DISEASE AND LIPIDS     calcium, as carbonate, (TUMS) 200 mg calcium (500  SPOKE WITH DR HARRY RE B/P OF 91/64, INSTRUCTED TO GIVE METOPROLOL BUT HOLD
LISINOPRIL THIS AM. OK TO DC ORTHOSTATIC BP. CONT. TELE AT THIS TIME. mg) chewable tablet 1 tablet, PRN     calcium-vitamin D 500 mg(1,250mg) -200 unit per tablet 1 tablet, 2 TIMES DAILY WITH MEALS     isosorbide mononitrate (IMDUR) 30 MG 24 hr tablet TAKE 1 TABLET EVERY MORNING     metoprolol succinate ER (TOPROL-XL) 25 mg, Oral, DAILY     nitroGLYcerin (NITROSTAT) 0.4 mg, Sublingual, EVERY 5 MIN PRN     omeprazole (PRILOSEC) 20 mg, Oral, Daily before breakfast     triamcinolone (KENALOG) 0.1 % cream 1 Application, PRN     Social History     Social History Narrative     to  Aris.  1 daughter.        Patient of Dr. Arzate since 2019.         Allergies   Allergen Reactions     Lisinopril Swelling     Swelling of lips/mouth      Losartan Angioedema      Subjective:     Yamilex Keller is a 89 year old female who comes in today for:    Chief Complaint   Patient presents with     RECHECK     osteoarthritis, CAD, tremor in right hand     Forms     assisted living      Patient comes in today with her daughter.    She comes in today for exam for going into assisted living.  She is currently in independent living at the [facility].    Her   in the recent past and he was helping her with her daily self-care.  She will need some increased assistance.    We reviewed the POLST form today.  We went through this in detail.  She wishes to be DNR.  We will make a copy of this.  She also wishes DNI.  She does not want tube feeding.  She would be okay with hospitalization if needed with IV antibiotics if needed and IV fluids if needed.    She does have difficulty with walking and moving her legs.  She has some signs of parkinsonism but has very severe knee arthritis.  She also has some back issues.  Her daughter, the patient and I reviewed this today.  She could always see neurology if she wished but at this time they wish to follow.    She has not had any chest pain or chest pressure related to her heart disease.  She is happy with how this is going.    Her  "blood pressure seems to be doing well.    She remains on cholesterol medication and medication for her reflux and these are going okay.    We are going to go ahead and do blood work today.    We reviewed her other issues noted in the assessment but not specifically addressed in the HPI above.     Objective:     Wt Readings from Last 3 Encounters:   02/24/22 49 kg (108 lb)   09/23/21 54.4 kg (120 lb)   08/05/21 54.1 kg (119 lb 3.2 oz)     BP Readings from Last 3 Encounters:   02/24/22 132/84   11/22/21 138/82   09/23/21 (!) 157/67     /84   Pulse 65   Ht 1.6 m (5' 3\")   Wt 49 kg (108 lb)   SpO2 95%   Breastfeeding No   BMI 19.13 kg/m     The patient is comfortable, no acute distress.  Mood good.  Insight fair.  Eyes are nonicteric.  Neck is supple without mass.  No cervical adenopathy.  No thyromegaly. Heart regular rate and rhythm.  Lungs clear to auscultation bilaterally.  Respiratory effort is good.  Abdomen soft and nontender.  No hepatosplenomegaly.  Extremities no edema.  Gait is slow.  Minimal tremor in the right hand which is parkinsonian.      Diagnostics:     Admission on 09/23/2021, Discharged on 09/23/2021   Component Date Value Ref Range Status     Sodium 09/23/2021 142  136 - 145 mmol/L Final     Potassium 09/23/2021 4.3  3.5 - 5.0 mmol/L Final     Chloride 09/23/2021 105  98 - 107 mmol/L Final     Carbon Dioxide (CO2) 09/23/2021 28  22 - 31 mmol/L Final     Anion Gap 09/23/2021 9  5 - 18 mmol/L Final     Urea Nitrogen 09/23/2021 18  8 - 28 mg/dL Final     Creatinine 09/23/2021 0.77  0.60 - 1.10 mg/dL Final     Calcium 09/23/2021 9.8  8.5 - 10.5 mg/dL Final     Glucose 09/23/2021 95  70 - 125 mg/dL Final     GFR Estimate 09/23/2021 67  >60 mL/min/1.73m2 Final    As of July 11, 2021, eGFR is calculated by the CKD-EPI creatinine equation, without race adjustment. eGFR can be influenced by muscle mass, exercise, and diet. The reported eGFR is an estimation only and is only applicable if the " renal function is stable.     Troponin I 09/23/2021 <0.01  0.00 - 0.29 ng/mL Final     Ventricular Rate 09/23/2021 70  BPM Final     Atrial Rate 09/23/2021 70  BPM Final     DE Interval 09/23/2021 166  ms Final     QRS Duration 09/23/2021 80  ms Final     QT 09/23/2021 390  ms Final     QTc 09/23/2021 421  ms Final     P Axis 09/23/2021 80  degrees Final     R AXIS 09/23/2021 -26  degrees Final     T Axis 09/23/2021 19  degrees Final     Interpretation ECG 09/23/2021    Final                    Value: Poor data quality, interpretation may be adversely affected  Sinus rhythm  Normal ECG  When compared with ECG of 24-AUG-2020 20:45,  No significant change was found  Confirmed by SEE ED PROVIDER NOTE FOR, ECG INTERPRETATION ([id number]),  CANDY SANDOVAL ([id number]) on 9/24/2021 7:52:15 AM       Magnesium 09/23/2021 2.0  1.8 - 2.6 mg/dL Final     BNP 09/23/2021 117  0 - 167 pg/mL Final     WBC Count 09/23/2021 7.7  4.0 - 11.0 10e3/uL Final     RBC Count 09/23/2021 4.67  3.80 - 5.20 10e6/uL Final     Hemoglobin 09/23/2021 14.9  11.7 - 15.7 g/dL Final     Hematocrit 09/23/2021 46.3  35.0 - 47.0 % Final     MCV 09/23/2021 99  78 - 100 fL Final     MCH 09/23/2021 31.9  26.5 - 33.0 pg Final     MCHC 09/23/2021 32.2  31.5 - 36.5 g/dL Final     RDW 09/23/2021 13.1  10.0 - 15.0 % Final     Platelet Count 09/23/2021 197  150 - 450 10e3/uL Final     % Neutrophils 09/23/2021 62  % Final     % Lymphocytes 09/23/2021 21  % Final     % Monocytes 09/23/2021 13  % Final     % Eosinophils 09/23/2021 3  % Final     % Basophils 09/23/2021 1  % Final     % Immature Granulocytes 09/23/2021 0  % Final     NRBCs per 100 WBC 09/23/2021 0  <1 /100 Final     Absolute Neutrophils 09/23/2021 4.8  1.6 - 8.3 10e3/uL Final     Absolute Lymphocytes 09/23/2021 1.6  0.8 - 5.3 10e3/uL Final     Absolute Monocytes 09/23/2021 1.0  0.0 - 1.3 10e3/uL Final     Absolute Eosinophils 09/23/2021 0.3  0.0 - 0.7 10e3/uL Final     Absolute Basophils 09/23/2021 0.1   0.0 - 0.2 10e3/uL Final     Absolute Immature Granulocytes 09/23/2021 0.0  <=0.0 10e3/uL Final     Absolute NRBCs 09/23/2021 0.0  10e3/uL Final       Results for orders placed or performed in visit on 02/24/22   CBC with platelets     Status: Abnormal   Result Value Ref Range    WBC Count 9.8 4.0 - 11.0 10e3/uL    RBC Count 4.70 3.80 - 5.20 10e6/uL    Hemoglobin 15.1 11.7 - 15.7 g/dL    Hematocrit 47.5 (H) 35.0 - 47.0 %     (H) 78 - 100 fL    MCH 32.1 26.5 - 33.0 pg    MCHC 31.8 31.5 - 36.5 g/dL    RDW 12.7 10.0 - 15.0 %    Platelet Count 202 150 - 450 10e3/uL   Comprehensive metabolic panel     Status: Abnormal   Result Value Ref Range    Sodium 142 136 - 145 mmol/L    Potassium 4.5 3.5 - 5.0 mmol/L    Chloride 103 98 - 107 mmol/L    Carbon Dioxide (CO2) 29 22 - 31 mmol/L    Anion Gap 10 5 - 18 mmol/L    Urea Nitrogen 17 8 - 28 mg/dL    Creatinine 0.75 0.60 - 1.10 mg/dL    Calcium 9.8 8.5 - 10.5 mg/dL    Glucose 81 70 - 125 mg/dL    Alkaline Phosphatase 22 (L) 45 - 120 U/L    AST 14 0 - 40 U/L    ALT 13 0 - 45 U/L    Protein Total 6.3 6.0 - 8.0 g/dL    Albumin 3.8 3.5 - 5.0 g/dL    Bilirubin Total 0.8 0.0 - 1.0 mg/dL    GFR Estimate 74 >60 mL/min/1.73m2       Assessment:     1. Coronary artery disease of native artery of native heart with stable angina pectoris (H)-no current symptoms and doing well.  Continue Imdur.   2. Mixed hyperlipidemia    3. Parkinsonism, unspecified Parkinsonism type (H)-continue to monitor.  Consider medications in the future or neurology consult if needed.   4. Dementia without behavioral disturbance, unspecified dementia type (H)-continue to monitor.  Agree with assisted living.   5. Senile purpura (H)-no signs of worsening.  Continue to monitor.   6. Bilateral primary osteoarthritis of knee    7. Chronic pain of right knee    8. Gastroesophageal reflux disease, unspecified whether esophagitis present    9. Age-related osteoporosis without current pathological fracture    10. Uses  walker        Plan:     1. Good candidate for assisted living at this point.  2. POLST form completed and will be placed into the chart.  Copy will be forwarded onto the pillars.  3. Follow-up with me every 6 months.  4. Blood work done today.  5. Continue current medications otherwise.  6. Follow up sooner if issues.       Michael Arzate MD  General Internal Medicine  [facility]    Return in about 6 months (around 8/24/2022), or if symptoms worsen or fail to improve, for visit and blood work.     Future Appointments   Date Time Provider Department Center   5/5/2022 10:00 AM Rocío Sainz, CECILIA BLANCHARD [facility]

## 2022-03-02 ENCOUNTER — MEDICAL CORRESPONDENCE (OUTPATIENT)
Dept: HEALTH INFORMATION MANAGEMENT | Facility: CLINIC | Age: 87
End: 2022-03-02
Payer: MEDICARE

## 2022-03-03 ENCOUNTER — MEDICAL CORRESPONDENCE (OUTPATIENT)
Dept: HEALTH INFORMATION MANAGEMENT | Facility: CLINIC | Age: 87
End: 2022-03-03
Payer: MEDICARE

## 2022-03-04 ENCOUNTER — TELEPHONE (OUTPATIENT)
Dept: INTERNAL MEDICINE | Facility: CLINIC | Age: 87
End: 2022-03-04
Payer: MEDICARE

## 2022-03-04 NOTE — TELEPHONE ENCOUNTER
Batsheva burris [facility] .  Received orders for home health care, PT, OT, SN, and home health aide.    Requesting that the 02/24/22 office visit notes are amended to include the need for home carlos services.

## 2022-03-06 ENCOUNTER — MEDICAL CORRESPONDENCE (OUTPATIENT)
Dept: HEALTH INFORMATION MANAGEMENT | Facility: CLINIC | Age: 87
End: 2022-03-06
Payer: MEDICARE

## 2022-03-07 ENCOUNTER — TELEPHONE (OUTPATIENT)
Dept: INTERNAL MEDICINE | Facility: CLINIC | Age: 87
End: 2022-03-07

## 2022-03-07 ENCOUNTER — TELEPHONE (OUTPATIENT)
Dept: INTERNAL MEDICINE | Facility: CLINIC | Age: 87
End: 2022-03-07
Payer: MEDICARE

## 2022-03-07 NOTE — TELEPHONE ENCOUNTER
[facility] calling to request verbal orders for the following:    Skilled nursinx per week for 3 weeks.    Also need verbal okay to use home health vital sign parameters.    [facility] can be reached at [phone].  Secure VM if needed.

## 2022-03-07 NOTE — TELEPHONE ENCOUNTER
Vicente with [facility] calling  requesting verbal orders.    Continue OT  2 visits per week for 1 week, then  1 visit per week for 5 weeks.  ADL retraining, strengthening, balance and home safety.    Okay to leave message .  Voice mail is secure and confidential.

## 2022-03-14 ENCOUNTER — MEDICAL CORRESPONDENCE (OUTPATIENT)
Dept: HEALTH INFORMATION MANAGEMENT | Facility: CLINIC | Age: 87
End: 2022-03-14
Payer: MEDICARE

## 2022-03-16 ENCOUNTER — DOCUMENTATION ONLY (OUTPATIENT)
Dept: OTHER | Facility: CLINIC | Age: 87
End: 2022-03-16
Payer: MEDICARE

## 2022-03-30 ENCOUNTER — TELEPHONE (OUTPATIENT)
Dept: ENDOCRINOLOGY | Facility: CLINIC | Age: 87
End: 2022-03-30
Payer: MEDICARE

## 2022-03-30 NOTE — TELEPHONE ENCOUNTER
"I called the pt to schedule her for her next prolia and she informs me that she is getting close to the end of the road and is ready to \"go upstairs\". Py declined to schedule and told me to take her off of my list. I called the pt's daughter to discuss. She will talk to the pt and contact us back. I did inform that she can cx the appt 5/5 if the pt no longer wishes to receive prolia.   "

## 2022-04-07 DIAGNOSIS — E78.2 MIXED HYPERLIPIDEMIA: ICD-10-CM

## 2022-04-07 DIAGNOSIS — I25.118 CORONARY ARTERY DISEASE OF NATIVE ARTERY OF NATIVE HEART WITH STABLE ANGINA PECTORIS (H): ICD-10-CM

## 2022-04-07 RX ORDER — ATORVASTATIN CALCIUM 40 MG/1
TABLET, FILM COATED ORAL
Qty: 90 TABLET | Refills: 3 | Status: SHIPPED | OUTPATIENT
Start: 2022-04-07

## 2022-04-15 ENCOUNTER — LAB REQUISITION (OUTPATIENT)
Dept: LAB | Facility: CLINIC | Age: 87
End: 2022-04-15
Payer: MEDICARE

## 2022-04-15 DIAGNOSIS — Z03.818 ENCOUNTER FOR OBSERVATION FOR SUSPECTED EXPOSURE TO OTHER BIOLOGICAL AGENTS RULED OUT: ICD-10-CM

## 2022-04-16 PROCEDURE — U0003 INFECTIOUS AGENT DETECTION BY NUCLEIC ACID (DNA OR RNA); SEVERE ACUTE RESPIRATORY SYNDROME CORONAVIRUS 2 (SARS-COV-2) (CORONAVIRUS DISEASE [COVID-19]), AMPLIFIED PROBE TECHNIQUE, MAKING USE OF HIGH THROUGHPUT TECHNOLOGIES AS DESCRIBED BY CMS-2020-01-R: HCPCS | Mod: ORL | Performed by: INTERNAL MEDICINE

## 2022-04-17 LAB — SARS-COV-2 RNA RESP QL NAA+PROBE: NEGATIVE

## 2022-04-18 ENCOUNTER — TELEPHONE (OUTPATIENT)
Dept: INTERNAL MEDICINE | Facility: CLINIC | Age: 87
End: 2022-04-18
Payer: MEDICARE

## 2022-04-18 NOTE — TELEPHONE ENCOUNTER
Left message to call back.    If pt calls back please inform pt of Dr. Arzate's message and assist as needed.        Please call the patient and let her know her test for covid was normal.    Michael Arzate MD  General Internal Medicine  [facility]  4/18/2022, 7:15 AM

## 2022-04-20 ENCOUNTER — LAB REQUISITION (OUTPATIENT)
Dept: LAB | Facility: CLINIC | Age: 87
End: 2022-04-20
Payer: MEDICARE

## 2022-04-20 DIAGNOSIS — Z03.818 ENCOUNTER FOR OBSERVATION FOR SUSPECTED EXPOSURE TO OTHER BIOLOGICAL AGENTS RULED OUT: ICD-10-CM

## 2022-04-22 PROCEDURE — U0003 INFECTIOUS AGENT DETECTION BY NUCLEIC ACID (DNA OR RNA); SEVERE ACUTE RESPIRATORY SYNDROME CORONAVIRUS 2 (SARS-COV-2) (CORONAVIRUS DISEASE [COVID-19]), AMPLIFIED PROBE TECHNIQUE, MAKING USE OF HIGH THROUGHPUT TECHNOLOGIES AS DESCRIBED BY CMS-2020-01-R: HCPCS | Mod: ORL | Performed by: INTERNAL MEDICINE

## 2022-04-23 LAB — SARS-COV-2 RNA RESP QL NAA+PROBE: NEGATIVE

## 2022-04-26 ENCOUNTER — LAB REQUISITION (OUTPATIENT)
Dept: LAB | Facility: CLINIC | Age: 87
End: 2022-04-26
Payer: MEDICARE

## 2022-04-26 DIAGNOSIS — Z03.818 ENCOUNTER FOR OBSERVATION FOR SUSPECTED EXPOSURE TO OTHER BIOLOGICAL AGENTS RULED OUT: ICD-10-CM

## 2022-04-27 PROCEDURE — U0003 INFECTIOUS AGENT DETECTION BY NUCLEIC ACID (DNA OR RNA); SEVERE ACUTE RESPIRATORY SYNDROME CORONAVIRUS 2 (SARS-COV-2) (CORONAVIRUS DISEASE [COVID-19]), AMPLIFIED PROBE TECHNIQUE, MAKING USE OF HIGH THROUGHPUT TECHNOLOGIES AS DESCRIBED BY CMS-2020-01-R: HCPCS | Mod: ORL | Performed by: INTERNAL MEDICINE

## 2022-04-27 NOTE — DISCHARGE INSTRUCTIONS
Compression stockings to both legs.  Elevate the extremities.  If swelling is not improving, call your primary care doctor on Monday or Tuesday.  They may need to start a low-dose diuretic.   Daily Note     Today's date: 2022  Patient name: Maribeth Herron  : 1958  MRN: [id number]  Referring provider: Solomon Meraz PA-C  Dx:   Encounter Diagnosis     ICD-10-CM    1  Chronic right-sided low back pain with right-sided sciatica  M54 41     G89 29           Subjective: Pt noted that she felt significant pain after the last session, however 2 days later "I felt really good "     Objective: See treatment diary below    Assessment: Tolerated treatment well  Patient demonstrated fatigue post treatment, exhibited good technique with therapeutic exercises and would benefit from continued PT  Plan: Continue per plan of care        Precautions:   Lipoma of torso   Low bone density for age     [facility]      STM lumbar/thoracic JZ RICHARD AZAR mobs lumbar thoracic JZ        STM R piriformis  JZ JW JZ JZ     Piriformis, ITB, HS stretch         MRE hamstring curl     JZ     MRE hip abd supine    JZ JZ              Neuro Re-Ed      Plank   :30x3 Straight arm :30x2     Side plank from knees    :30x2 ea :30x2 ea     Pallof press 18/ 2x10 ea        Pallof rot         Supine bicycles  3x10 ea        Fire hydrant O 3x10 ea O 3x10 ea        Updated HEP   5' 5'              Ther Ex      ANTONY         Supine piriformis stretch         Squat with hip ER, TB around knees    B 3x10     Supine clamshell TB, hip angles 90, 70, 45, 25     B 10x ea angle     Prone press up :30x3  :30x2 :30x2     Reverse clamshell   O 3x10      Seated b/l hip IR   O 3x10      S/l hip abd   3x10 ea 2x10 ea     Hamstring stretch   :30x3        ITB stretch seated, box    :30x3 :30x2 ea                       Ther Activity      RB 5' lvl3 5' lvl3        Elliptical    5'                                Modalities                CT

## 2022-04-28 LAB — SARS-COV-2 RNA RESP QL NAA+PROBE: NEGATIVE

## 2022-05-02 ENCOUNTER — LAB REQUISITION (OUTPATIENT)
Dept: LAB | Facility: CLINIC | Age: 87
End: 2022-05-02
Payer: MEDICARE

## 2022-05-02 DIAGNOSIS — Z03.818 ENCOUNTER FOR OBSERVATION FOR SUSPECTED EXPOSURE TO OTHER BIOLOGICAL AGENTS RULED OUT: ICD-10-CM

## 2022-05-03 PROCEDURE — U0005 INFEC AGEN DETEC AMPLI PROBE: HCPCS | Mod: ORL | Performed by: INTERNAL MEDICINE

## 2022-05-04 LAB — SARS-COV-2 RNA RESP QL NAA+PROBE: NEGATIVE

## 2022-05-17 ENCOUNTER — MEDICAL CORRESPONDENCE (OUTPATIENT)
Dept: HEALTH INFORMATION MANAGEMENT | Facility: CLINIC | Age: 87
End: 2022-05-17

## 2022-06-16 ENCOUNTER — MEDICAL CORRESPONDENCE (OUTPATIENT)
Dept: HEALTH INFORMATION MANAGEMENT | Facility: CLINIC | Age: 87
End: 2022-06-16

## 2022-06-21 ENCOUNTER — MEDICAL CORRESPONDENCE (OUTPATIENT)
Dept: HEALTH INFORMATION MANAGEMENT | Facility: CLINIC | Age: 87
End: 2022-06-21

## 2022-07-17 ENCOUNTER — HEALTH MAINTENANCE LETTER (OUTPATIENT)
Age: 87
End: 2022-07-17

## 2022-09-15 ENCOUNTER — APPOINTMENT (OUTPATIENT)
Dept: OCCUPATIONAL THERAPY | Facility: HOSPITAL | Age: 87
End: 2022-09-15
Attending: INTERNAL MEDICINE
Payer: MEDICARE

## 2022-09-15 ENCOUNTER — HOSPITAL ENCOUNTER (OUTPATIENT)
Facility: HOSPITAL | Age: 87
Setting detail: OBSERVATION
Discharge: NURSING, REHAB OR LONG-TERM CARE | End: 2022-09-20
Attending: EMERGENCY MEDICINE | Admitting: EMERGENCY MEDICINE
Payer: MEDICARE

## 2022-09-15 ENCOUNTER — APPOINTMENT (OUTPATIENT)
Dept: PHYSICAL THERAPY | Facility: HOSPITAL | Age: 87
End: 2022-09-15
Attending: INTERNAL MEDICINE
Payer: MEDICARE

## 2022-09-15 DIAGNOSIS — G20.C PARKINSONISM, UNSPECIFIED PARKINSONISM TYPE (H): ICD-10-CM

## 2022-09-15 DIAGNOSIS — N30.00 ACUTE CYSTITIS WITHOUT HEMATURIA: ICD-10-CM

## 2022-09-15 DIAGNOSIS — M62.81 GENERALIZED MUSCLE WEAKNESS: Primary | ICD-10-CM

## 2022-09-15 LAB
ALBUMIN UR-MCNC: NEGATIVE MG/DL
ANION GAP SERPL CALCULATED.3IONS-SCNC: 7 MMOL/L (ref 5–18)
APPEARANCE UR: ABNORMAL
BACTERIA #/AREA URNS HPF: ABNORMAL /HPF
BILIRUB UR QL STRIP: NEGATIVE
BUN SERPL-MCNC: 21 MG/DL (ref 8–28)
CALCIUM SERPL-MCNC: 10.3 MG/DL (ref 8.5–10.5)
CHLORIDE BLD-SCNC: 107 MMOL/L (ref 98–107)
CO2 SERPL-SCNC: 29 MMOL/L (ref 22–31)
COLOR UR AUTO: ABNORMAL
CREAT SERPL-MCNC: 0.73 MG/DL (ref 0.6–1.1)
ERYTHROCYTE [DISTWIDTH] IN BLOOD BY AUTOMATED COUNT: 13.2 % (ref 10–15)
GFR SERPL CREATININE-BSD FRML MDRD: 76 ML/MIN/1.73M2
GLUCOSE BLD-MCNC: 90 MG/DL (ref 70–125)
GLUCOSE UR STRIP-MCNC: NEGATIVE MG/DL
HCT VFR BLD AUTO: 43.6 % (ref 35–47)
HGB BLD-MCNC: 14.6 G/DL (ref 11.7–15.7)
HGB UR QL STRIP: ABNORMAL
KETONES UR STRIP-MCNC: NEGATIVE MG/DL
LEUKOCYTE ESTERASE UR QL STRIP: ABNORMAL
MCH RBC QN AUTO: 31.9 PG (ref 26.5–33)
MCHC RBC AUTO-ENTMCNC: 33.5 G/DL (ref 31.5–36.5)
MCV RBC AUTO: 95 FL (ref 78–100)
NITRATE UR QL: POSITIVE
PH UR STRIP: 6 [PH] (ref 5–7)
PLATELET # BLD AUTO: 177 10E3/UL (ref 150–450)
POTASSIUM BLD-SCNC: 3.7 MMOL/L (ref 3.5–5)
RBC # BLD AUTO: 4.58 10E6/UL (ref 3.8–5.2)
RBC URINE: 1 /HPF
SARS-COV-2 RNA RESP QL NAA+PROBE: NEGATIVE
SODIUM SERPL-SCNC: 143 MMOL/L (ref 136–145)
SP GR UR STRIP: 1.02 (ref 1–1.03)
SQUAMOUS EPITHELIAL: <1 /HPF
UROBILINOGEN UR STRIP-MCNC: <2 MG/DL
WBC # BLD AUTO: 7.4 10E3/UL (ref 4–11)
WBC URINE: 3 /HPF

## 2022-09-15 PROCEDURE — 99219 PR INITIAL OBSERVATION CARE,LEVEL II: CPT | Performed by: INTERNAL MEDICINE

## 2022-09-15 PROCEDURE — G0378 HOSPITAL OBSERVATION PER HR: HCPCS

## 2022-09-15 PROCEDURE — 97530 THERAPEUTIC ACTIVITIES: CPT | Mod: GP

## 2022-09-15 PROCEDURE — 250N000011 HC RX IP 250 OP 636: Performed by: EMERGENCY MEDICINE

## 2022-09-15 PROCEDURE — 80048 BASIC METABOLIC PNL TOTAL CA: CPT | Performed by: EMERGENCY MEDICINE

## 2022-09-15 PROCEDURE — U0005 INFEC AGEN DETEC AMPLI PROBE: HCPCS | Performed by: EMERGENCY MEDICINE

## 2022-09-15 PROCEDURE — 258N000003 HC RX IP 258 OP 636: Performed by: INTERNAL MEDICINE

## 2022-09-15 PROCEDURE — C9803 HOPD COVID-19 SPEC COLLECT: HCPCS

## 2022-09-15 PROCEDURE — 97166 OT EVAL MOD COMPLEX 45 MIN: CPT | Mod: GO

## 2022-09-15 PROCEDURE — 250N000013 HC RX MED GY IP 250 OP 250 PS 637: Performed by: INTERNAL MEDICINE

## 2022-09-15 PROCEDURE — 87186 SC STD MICRODIL/AGAR DIL: CPT | Performed by: EMERGENCY MEDICINE

## 2022-09-15 PROCEDURE — 96365 THER/PROPH/DIAG IV INF INIT: CPT

## 2022-09-15 PROCEDURE — 85014 HEMATOCRIT: CPT | Performed by: EMERGENCY MEDICINE

## 2022-09-15 PROCEDURE — 93005 ELECTROCARDIOGRAM TRACING: CPT | Performed by: EMERGENCY MEDICINE

## 2022-09-15 PROCEDURE — 99285 EMERGENCY DEPT VISIT HI MDM: CPT | Mod: 25

## 2022-09-15 PROCEDURE — 81001 URINALYSIS AUTO W/SCOPE: CPT | Performed by: EMERGENCY MEDICINE

## 2022-09-15 PROCEDURE — 36415 COLL VENOUS BLD VENIPUNCTURE: CPT | Performed by: EMERGENCY MEDICINE

## 2022-09-15 PROCEDURE — 97162 PT EVAL MOD COMPLEX 30 MIN: CPT | Mod: GP

## 2022-09-15 RX ORDER — ACETAMINOPHEN 325 MG/1
650 TABLET ORAL EVERY 4 HOURS PRN
Status: DISCONTINUED | OUTPATIENT
Start: 2022-09-15 | End: 2022-09-20 | Stop reason: HOSPADM

## 2022-09-15 RX ORDER — CEFTRIAXONE 1 G/1
1 INJECTION, POWDER, FOR SOLUTION INTRAMUSCULAR; INTRAVENOUS ONCE
Status: COMPLETED | OUTPATIENT
Start: 2022-09-15 | End: 2022-09-15

## 2022-09-15 RX ORDER — CEPHALEXIN 500 MG/1
500 CAPSULE ORAL EVERY 8 HOURS SCHEDULED
Status: COMPLETED | OUTPATIENT
Start: 2022-09-16 | End: 2022-09-18

## 2022-09-15 RX ORDER — PANTOPRAZOLE SODIUM 40 MG/1
40 TABLET, DELAYED RELEASE ORAL
Status: DISCONTINUED | OUTPATIENT
Start: 2022-09-15 | End: 2022-09-20 | Stop reason: HOSPADM

## 2022-09-15 RX ORDER — ISOSORBIDE MONONITRATE 30 MG/1
30 TABLET, EXTENDED RELEASE ORAL DAILY
Status: DISCONTINUED | OUTPATIENT
Start: 2022-09-15 | End: 2022-09-20 | Stop reason: HOSPADM

## 2022-09-15 RX ORDER — METOPROLOL SUCCINATE 25 MG/1
25 TABLET, EXTENDED RELEASE ORAL DAILY
Status: DISCONTINUED | OUTPATIENT
Start: 2022-09-15 | End: 2022-09-20 | Stop reason: HOSPADM

## 2022-09-15 RX ORDER — ONDANSETRON 4 MG/1
4 TABLET, ORALLY DISINTEGRATING ORAL EVERY 6 HOURS PRN
Status: DISCONTINUED | OUTPATIENT
Start: 2022-09-15 | End: 2022-09-20 | Stop reason: HOSPADM

## 2022-09-15 RX ORDER — ATORVASTATIN CALCIUM 40 MG/1
40 TABLET, FILM COATED ORAL AT BEDTIME
Status: DISCONTINUED | OUTPATIENT
Start: 2022-09-15 | End: 2022-09-20 | Stop reason: HOSPADM

## 2022-09-15 RX ORDER — ONDANSETRON 2 MG/ML
4 INJECTION INTRAMUSCULAR; INTRAVENOUS EVERY 6 HOURS PRN
Status: DISCONTINUED | OUTPATIENT
Start: 2022-09-15 | End: 2022-09-20 | Stop reason: HOSPADM

## 2022-09-15 RX ORDER — NITROGLYCERIN 0.4 MG/1
0.4 TABLET SUBLINGUAL EVERY 5 MIN PRN
Status: DISCONTINUED | OUTPATIENT
Start: 2022-09-15 | End: 2022-09-20 | Stop reason: HOSPADM

## 2022-09-15 RX ORDER — ASPIRIN 81 MG/1
81 TABLET ORAL AT BEDTIME
Status: DISCONTINUED | OUTPATIENT
Start: 2022-09-15 | End: 2022-09-20 | Stop reason: HOSPADM

## 2022-09-15 RX ADMIN — SODIUM CHLORIDE 500 ML: 9 INJECTION, SOLUTION INTRAVENOUS at 08:16

## 2022-09-15 RX ADMIN — ATORVASTATIN CALCIUM 40 MG: 40 TABLET, FILM COATED ORAL at 20:53

## 2022-09-15 RX ADMIN — ACETAMINOPHEN 650 MG: 325 TABLET, FILM COATED ORAL at 17:51

## 2022-09-15 RX ADMIN — PANTOPRAZOLE SODIUM 40 MG: 40 TABLET, DELAYED RELEASE ORAL at 10:15

## 2022-09-15 RX ADMIN — ISOSORBIDE MONONITRATE 30 MG: 30 TABLET, EXTENDED RELEASE ORAL at 10:15

## 2022-09-15 RX ADMIN — METOPROLOL SUCCINATE 25 MG: 25 TABLET, EXTENDED RELEASE ORAL at 10:15

## 2022-09-15 RX ADMIN — Medication 81 MG: at 20:52

## 2022-09-15 RX ADMIN — CEFTRIAXONE SODIUM 1 G: 1 INJECTION, POWDER, FOR SOLUTION INTRAMUSCULAR; INTRAVENOUS at 08:29

## 2022-09-15 ASSESSMENT — ACTIVITIES OF DAILY LIVING (ADL)
ADLS_ACUITY_SCORE: 37
DEPENDENT_IADLS:: CLEANING;COOKING;LAUNDRY;SHOPPING;MEAL PREPARATION;MEDICATION MANAGEMENT;TRANSPORTATION
ADLS_ACUITY_SCORE: 39
ADLS_ACUITY_SCORE: 35
ADLS_ACUITY_SCORE: 37
ADLS_ACUITY_SCORE: 37
ADLS_ACUITY_SCORE: 35
ADLS_ACUITY_SCORE: 37

## 2022-09-15 NOTE — PROGRESS NOTES
Occupational Therapy       09/15/22 1130   Quick Adds   Quick Adds Certification   Type of Visit Initial Occupational Therapy Evaluation   Living Environment   People in Home alone  (receives some assistance from [facility] staff)   Current Living Arrangements assisted living   Home Accessibility no concerns   Living Environment Comments Per family, patient was independent with dressing, toileting, bathing but had assistance with ambulation to/from dinning lemus   Self-Care   Current Activity Tolerance poor   Equipment Currently Used at Home walker, rolling  (4WW)   Fall history within last six months yes   Number of times patient has fallen within last six months 1   General Information   Onset of Illness/Injury or Date of Surgery 09/15/22   Referring Physician Ko Vasquez MD   Patient/Family Therapy Goal Statement (OT) get home   Additional Occupational Profile Info/Pertinent History of Current Problem Yamilex Keller is a 89 yo F with h/o CAD, osteoporosis, [place], HTN, HLD, dementia, giant cell arteritis, GERD, and parkinsonism who presents with generalized weakness and is found to have a possible UTI. Will place on observation and start antibiotics.  Hydrate gently.   Existing Precautions/Restrictions fall   Cognitive Status Examination   Orientation Status person;place   Follows Commands WNL   Range of Motion Comprehensive   Comment, General Range of Motion Decreased shoulder ROM bilaterally, no new since fall   Strength Comprehensive (MMT)   Comment, General Manual Muscle Testing (MMT) Assessment generalized weakness   Bed Mobility   Bed Mobility supine-sit;sit-supine   Supine-Sit [place] (Bed Mobility) moderate assist (50% patient effort)  (HOB elevated, use of bedrail)   Sit-Supine [place] (Bed Mobility) moderate assist (50% patient effort)   Transfers   Transfers sit-stand transfer   Sit-Stand Transfer   Sit-Stand [place] (Transfers) moderate assist (50% patient effort)   Sit/Stand Transfer Comments  Patient dependent for LE dressing while seated EOB.   Balance   Balance Comments Min-Mod A for static standing with FWW @ EOB. Mod A for side steps to HOB with FWW   Clinical Impression   Criteria for Skilled Therapeutic Interventions Met (OT) Yes, treatment indicated   OT Diagnosis Decreased ADL independence   Influenced by the following impairments fall   OT Problem List-Impairments impacting ADL problems related to;activity tolerance impaired;balance;strength   Assessment of Occupational Performance 3-5 Performance Deficits   Identified Performance Deficits bed mob, trsfs, dressing, toileting   Planned Therapy Interventions (OT) ADL retraining;balance training;bed mobility training;transfer training   Clinical Decision Making Complexity (OT) moderate complexity   Risk & Benefits of therapy have been explained evaluation/treatment results reviewed;care plan/treatment goals reviewed   OT Discharge Planning   OT Discharge Recommendation (DC Rec) Transitional Care Facility;home with home care occupational therapy   OT Rationale for DC Rec Recommend TCU at discharge due to significant weakness and low tolerance for ADLs. Due to these deficits patient presents as high falls risk. If patient discharges home, patient will need assistance with all ADLs and  mobility including bed mobiltiy, trsfs, toileting, dressing, functional mobilitiy, g/h and continued assistance with IADLs such as cooking, cleaning and medication management.   Therapy Certification   Start of Care Date 09/15/22   Certification date from 09/15/22   Certification date to 09/22/22   Medical Diagnosis fall   Total Evaluation Time (Minutes)   Total Evaluation Time (Minutes) 20   OT Goals   Therapy Frequency (OT) Daily   OT Predicted Duration/Target Date for Goal Attainment 09/22/22   OT Goals Bed Mobility;Transfers;Toilet Transfer/Toileting   OT: Bed Mobility Minimal assist   OT: Transfer Minimal assist   OT: Toilet Transfer/Toileting Minimal assist

## 2022-09-15 NOTE — H&P
"[facility]    History and Physical - Hospitalist Service       Date of Admission:  9/15/2022    Assessment & Plan      Yamilex Keller is a 89 yo F with h/o CAD, osteoporosis, [place], HTN, HLD, dementia, giant cell arteritis, GERD, and parkinsonism who presents with generalized weakness and is found to have a possible UTI. Will place on observation and start antibiotics.  Hydrate gently. Hopefully can return to [facility] later today or tomorrow.    Principal Problem:    Acute cystitis without hematuria - rocephin in ED, switch oral keflex tomorrow am.    Generalized muscle weakness - this is causing mobility issues at her facility, she is needing more help there.  I think this is causing some anxiety that contributed to her calling for help this morning.  Will get PT/OT eval and have CM/SW eval.    Grief- her  passed away in the past year and she states \"I'm ready to go to [facility] - I'm not sure why I'm still here\".    Active Problems:    CAD (coronary artery disease) - continue lipitor, ASA, toprol, and Imdur as at home, no signs of ischemia, she initially told staff at her facility when she initially called for help but she thought she was having a heart attack apparently but when they arrived to her room she denied any chest pain or shortness of breath at seem to be more anxiety about getting to the bathroom.  EKG is completely normal.  I doubt any current ischemia.    Osteoporosis - calcium at home    Hearing loss    HTN (hypertension) - continue toprol and Imdur    HLD (hyperlipidemia) - continue lipitor as at home    Dementia, mild - this is very mild    h/o GCA (giant cell arteritis) - no issues currently    Parkinsonism, unspecified Parkinsonism type - no medications for this, PT/OT eval    GERD (gastroesophageal reflux disease) - continue PPI and follow         Diet:   regular  DVT Prophylaxis: Pneumatic Compression Devices  Fontanez Catheter: Not present  Central Lines: None  Cardiac " "Monitoring: None  Code Status:   no CPR no intubation    Clinically Significant Risk Factors Present on Admission          # Hypercalcemia: Ca = 10.3 mg/dL (Ref range: 8.5 - 10.5 mg/dL) and/or iCa = N/A on admission, will monitor as appropriate                 Disposition Plan    today or tomorrow if ok with PT/OT     The patient's care was discussed with the Bedside Nurse, Patient and Patient's Family.    Ko Vasquez MD  Hospitalist Service  [facility]  Securely message with the Vocera Web Console (learn more here)  Text page via [employer] Paging/Directory         ______________________________________________________________________    Chief Complaint   weakness    History is obtained from the patient, electronic health record and patient's daughter    History of Present Illness   Yamilex Keller is a 89 yo F with h/o CAD, osteoporosis, [place], HTN, HLD, dementia, giant cell arteritis, GERD, and parkinsonism who presents with generalized weakness.  The patient has had a slow decline over the past several years but more so since her  passed away at the end of last year.  She states \"[facility] and I am not sure why I am still here\".  She apparently has been having more mobility issues at her assisted living facility and had a care conference yesterday with the family and the decision was to get her more help in the facility.  This morning she reportedly called staff and told him she thought she was having a heart attack but when paramedics arrived she denied any chest pain or shortness of breath and stated she was just having trouble getting to the bathroom.  She denies any cold or flu symptoms recently.  No cough or fevers or chills or nausea vomiting or diarrhea.  She may have a little bit of constipation but her last BM was 2 days ago.  No headache or sore throat or runny nose.  She again denies any chest pain to me as well.  No edema or dyspnea.  She does state that " she has pain in her right knee that does limit her mobility due to arthritis.    Review of Systems    The 10 point Review of Systems is negative other than noted in the HPI    Past Medical History    I have reviewed this patient's medical history and updated it with pertinent information if needed.   Past Medical History:   Diagnosis Date     Acute ST elevation myocardial infarction (STEMI) involving left anterior descending coronary artery (H) 7/18/2017     CAD (coronary artery disease)      Closed wedge compression fracture of seventh thoracic vertebra, initial encounter 7/8/2018     Full code status      GERD (gastroesophageal reflux disease) 1/10/2020     Hearing loss      HLD (hyperlipidemia)      HTN (hypertension) 4/1/2019     Osteoporosis      Temporal arteritis (H)      Tubular adenoma of colon     Last colonoscopy 2011     Uses walker        Past Surgical History   I have reviewed this patient's surgical history and updated it with pertinent information if needed.  Past Surgical History:   Procedure Laterality Date     ANGIOPLASTY  //     BLEPHAROPLASTY Bilateral      CV CORONARY ANGIOGRAM N/A 8/25/2020    Procedure: Coronary Angiogram;  Surgeon: Jennifer Peck MD;  Location: [facility] Cath Lab;  Service: Cardiology     CV CORONARY ANGIOGRAM N/A 8/21/2020    Procedure: Coronary Angiogram;  Surgeon: Howard Navarro MD;  Location: [facility] Cath Lab;  Service: Cardiology     CV LEFT HEART CATHETERIZATION WITHOUT LEFT VENTRICULOGRAM Left 8/25/2020    Procedure: Left Heart Catheterization Without Left Ventriculogram;  Surgeon: Jennifer Peck MD;  Location: [facility] Cath Lab;  Service: Cardiology     CV LEFT HEART CATHETERIZATION WITHOUT LEFT VENTRICULOGRAM Left 8/21/2020    Procedure: Left Heart Catheterization Without Left Ventriculogram;  Surgeon: Howard Navarro MD;  Location: [facility] Cath Lab;  Service: Cardiology     ESOPHAGOSCOPY, GASTROSCOPY, DUODENOSCOPY (EGD), COMBINED N/A  2018    Procedure: ESOPHAGOGASTRODUODENOSCOPY (EGD);  Surgeon: Prabhu Lin MD;  Location: [facility] GI;  Service:      IR THORACIC VERTEBROPLASTY  2018     MS CATH PLACEMENT & NJX CORONARY ART ANGIO IMG S&I N/A 2017    Procedure: Coronary Angiogram;  Surgeon: Shelia Garcia MD;  Location: [facility] Cath Lab;  Service: Cardiology     MS CATH PLMT L HRT & ARTS W/NJX & ANGIO IMG S&I Left 2017    Procedure: Left Heart Catheterization Without Left Ventriculogram;  Surgeon: Shelia Garcia MD;  Location: [facility] Cath Lab;  Service: Cardiology     MS TEMPORAL ARTERY LIGATN OR BX Bilateral 10/7/2015    Procedure: BILATERAL TEMPORAL ARTERY BIOPSY;  Surgeon: Alfredito Jason MD;  Location: [facility] OR;  Service: General     TONSILLECTOMY         Social History   I have reviewed this patient's social history and updated it with pertinent information if needed.  Social History     Tobacco Use     Smoking status: Former Smoker     Packs/day: 1.00     Years: 4.00     Pack years: 4.00     Types: Cigarettes, Cigarettes     Quit date: 1953     Years since quittin.7     Smokeless tobacco: Never Used   Substance Use Topics     Alcohol use: Yes     Comment: Alcoholic Drinks/day: Occassionally     Drug use: No       Family History   I have reviewed this patient's family history and updated it with pertinent information if needed.  Family History   Problem Relation Age of Onset     Liver Cancer Mother      Prostate Cancer Father      Pacemaker Brother      Lung Cancer Brother      Lung Cancer Brother      Uterine Cancer Sister        Prior to Admission Medications   Prior to Admission Medications   Prescriptions Last Dose Informant Patient Reported? Taking?   aspirin 81 MG EC tablet   No No   Sig: [ASPIRIN 81 MG EC TABLET] Take 1 tablet (81 mg total) by mouth at bedtime. For CAD   atorvastatin (LIPITOR) 40 MG tablet   No No   Sig: TAKE 1 TABLET BY MOUTH EVERY DAY.   calcium, as  carbonate, (TUMS) 200 mg calcium (500 mg) chewable tablet   Yes No   Sig: [CALCIUM, AS CARBONATE, (TUMS) 200 MG CALCIUM (500 MG) CHEWABLE TABLET] Chew 1 tablet as needed for heartburn.   calcium-vitamin D 500 mg(1,250mg) -200 unit per tablet   Yes No   Sig: [CALCIUM-VITAMIN D 500 MG(1,250MG) -200 UNIT PER TABLET] Take 1 tablet by mouth 2 (two) times a day with meals.   isosorbide mononitrate (IMDUR) 30 MG 24 hr tablet   No No   Sig: TAKE 1 TABLET EVERY MORNING   metoprolol succinate ER (TOPROL-XL) 25 MG 24 hr tablet   No No   Sig: Take 1 tablet (25 mg) by mouth daily   nitroglycerin (NITROSTAT) 0.4 MG SL tablet   No No   Sig: [NITROGLYCERIN (NITROSTAT) 0.4 MG SL TABLET] Place 1 tablet (0.4 mg total) under the tongue every 5 (five) minutes as needed for chest pain.   omeprazole (PRILOSEC) 20 MG DR capsule   No No   Sig: TAKE 1 CAPSULE(20 MG) BY MOUTH DAILY BEFORE BREAKFAST   triamcinolone (KENALOG) 0.1 % cream   Yes No   Sig: [TRIAMCINOLONE (KENALOG) 0.1 % CREAM] Apply 1 application topically as needed.      Facility-Administered Medications Last Administration Doses Remaining   denosumab (PROLIA) injection 60 mg 10/26/2021 11:51 AM         Allergies   Allergies   Allergen Reactions     Lisinopril Swelling     Swelling of lips/mouth      Losartan Angioedema       Physical Exam   Vital Signs: Temp: 97.9  F (36.6  C) Temp src: Oral BP: (!) 143/63 Pulse: 61   Resp: 14 SpO2: 98 %      Weight: 110 lbs 3.68 oz    General Appearance: Elderly female in no acute distress  Eyes: PERRL, EOMI, conjunctive are clear and moist  HEENT: NC/AT, ears and nose clear without discharge, oropharynx is clear without exudates or erythema, neck is supple  Respiratory: Clear to auscultation with normal excursion  Cardiovascular: Regular rate and rhythm S1 and S2 1 out of 6 systolic murmur, no JVD, no edema  GI: Positive bowel sounds soft, nontender nondistended without hepatosplenomegaly appreciated  Lymph/Hematologic: No lymphadenopathy  noted in neck or groin  Skin: No rashes or lesions noted on on exposed areas  Musculoskeletal: Extremities are warm and nontender, no joint swelling or erythema  Neurologic: Alert, oriented x3, motor strength is 5- out of 5 upper and lower extremities symmetric, sensory grossly intact to light touch, cranial nerves II through XII intact  Psychiatric: Calm and normal affect    Data   Data reviewed today: I reviewed all medications, new labs and imaging results over the last 24 hours.     Recent Labs   Lab 09/15/22  0514   WBC 7.4   HGB 14.6   MCV 95         POTASSIUM 3.7   CHLORIDE 107   CO2 29   BUN 21   CR 0.73   ANIONGAP 7   MARLYN 10.3   GLC 90     No results found for this or any previous visit (from the past 24 hour(s)).

## 2022-09-15 NOTE — PROGRESS NOTES
[facility]      OUTPATIENT OCCUPATIONAL THERAPY  EVALUATION  PLAN OF TREATMENT FOR OUTPATIENT REHABILITATION  (COMPLETE FOR INITIAL CLAIMS ONLY)  Patient's Last Name, First Name, M.I.  YOB: 1929  Yamilex Keller                          Provider's Name  [facility] Medical Record No.  [id number]                               Onset Date:  09/15/22   Start of Care Date:  (P) 09/15/22     Type:     ___PT   _X_OT   ___SLP Medical Diagnosis:  (P) fall                        OT Diagnosis:  (P) Decreased ADL independence   Visits from SOC:  1   _________________________________________________________________________________  Plan of Treatment/Functional Goals    Planned Interventions: (P) ADL retraining, balance training, bed mobility training, transfer training   Goals: See Occupational Therapy Goals on Care Plan in [facility] electronic health record.    Therapy Frequency: (P) Daily  Predicted Duration of Therapy Intervention: (P) 09/22/22  _________________________________________________________________________________    I CERTIFY THE NEED FOR THESE SERVICES FURNISHED UNDER        THIS PLAN OF TREATMENT AND WHILE UNDER MY CARE     (Physician co-signature of this document indicates review and certification of the therapy plan).              Certification date from: (P) 09/15/22, Certification date to: (P) 09/22/22    Referring Physician: Ko Vasquez MD            Initial Assessment        See Occupational Therapy evaluation dated (P) 09/15/22 in Epic electronic health record.

## 2022-09-15 NOTE — PROGRESS NOTES
09/15/22 1010   Quick Adds   Quick Adds Certification   Type of Visit Initial PT Evaluation   Living Environment   People in Home facility resident   Current Living Arrangements assisted living   Home Accessibility no concerns   Transportation Anticipated health plan transportation   Self-Care   Usual Activity Tolerance moderate   Current Activity Tolerance poor   Equipment Currently Used at Home walker, rolling;wheelchair, manual  (4WW)   General Information   Onset of Illness/Injury or Date of Surgery 09/15/22   Referring Physician Ko Vasquez MD   Patient/Family Therapy Goals Statement (PT) go to TCU   Pertinent History of Current Problem (include personal factors and/or comorbidities that impact the POC) Yamilex Keller is a 89 yo F with h/o CAD, osteoporosis, [place], HTN, HLD, dementia, giant cell arteritis, GERD, and parkinsonism who presents with generalized weakness and is found to have a possible UTI.   Existing Precautions/Restrictions (S)  fall   Strength (Manual Muscle Testing)   Strength (Manual Muscle Testing) Deficits observed during functional mobility   Bed Mobility   Bed Mobility supine-sit;sit-supine   Supine-Sit [place] (Bed Mobility) moderate assist (50% patient effort)   Sit-Supine [place] (Bed Mobility) maximum assist (25% patient effort)   Bed Mobility Limitations decreased ability to use arms for pushing/pulling;decreased ability to use legs for bridging/pushing;impaired ability to control trunk for mobility   Transfers   Transfers sit-stand transfer   Sit-Stand Transfer   Sit-Stand [place] (Transfers) moderate assist (50% patient effort);maximum assist (25% patient effort)   Assistive Device (Sit-Stand Transfers) walker, front-wheeled   Gait/Stairs (Locomotion)   Comment, (Gait/Stairs) n/a- pt unable to ambulate.   Clinical Impression   Criteria for Skilled Therapeutic Intervention Yes, treatment indicated   PT Diagnosis (PT) Impaired functional mobility   Influenced by  the following impairments Weakness, fatigue   Functional limitations due to impairments Impaired strength, transfers, gait   Clinical Presentation (PT Evaluation Complexity) Stable/Uncomplicated   Clinical Presentation Rationale Presents as diagnosed   Clinical Decision Making (Complexity) moderate complexity   Planned Therapy Interventions (PT) balance training;bed mobility training;gait training;home exercise program;strengthening;transfer training   Anticipated Equipment Needs at Discharge (PT) walker, rolling   Risk & Benefits of therapy have been explained patient;daughter   PT Discharge Planning   PT Discharge Recommendation (DC Rec) Transitional Care Facility;home with assist;home with home care physical therapy   PT Rationale for DC Rec Pt dem poor activity tolerance and safety due to weakness. Recommend TCU to improve mobility prior to d/c back to [facility]. If pt were to return home, would need 24/7 assist with all mobility and possible increased use of her WC.   Therapy Certification   Start of care date 09/15/22   Certification date from 09/15/22   Certification date to 09/22/22   Medical Diagnosis UTI   Total Evaluation Time   Total Evaluation Time (Minutes) 10   Physical Therapy Goals   PT Frequency Daily   PT Predicted Duration/Target Date for Goal Attainment 09/22/22   PT Goals Bed Mobility;Transfers;Gait   PT: Bed Mobility Minimal assist;Supine to/from sit   PT: Transfers Minimal assist;Sit to/from stand;Bed to/from chair;Assistive device   PT: Gait Minimal assist;Rolling walker;50 feet       Elizabeth Ricardo, PT, DPT  9/15/2022

## 2022-09-15 NOTE — PHARMACY-ADMISSION MEDICATION HISTORY
Pharmacy Note - Admission Medication History     ______________________________________________________________________    Prior To Admission (PTA) med list completed and updated in EMR.       Current Facility-Administered Medications for the 9/15/22 encounter (Hospital Encounter)   Medication     denosumab (PROLIA) injection 60 mg     PTA Med List   Medication Sig Last Dose     aspirin 81 MG EC tablet [ASPIRIN 81 MG EC TABLET] Take 1 tablet (81 mg total) by mouth at bedtime. For CAD 9/14/2022 at hs     atorvastatin (LIPITOR) 40 MG tablet TAKE 1 TABLET BY MOUTH EVERY DAY. 9/14/2022 at hs     calcium, as carbonate, (TUMS) 200 mg calcium (500 mg) chewable tablet [CALCIUM, AS CARBONATE, (TUMS) 200 MG CALCIUM (500 MG) CHEWABLE TABLET] Chew 1 tablet as needed for heartburn.      calcium-vitamin D 500 mg(1,250mg) -200 unit per tablet [CALCIUM-VITAMIN D 500 MG(1,250MG) -200 UNIT PER TABLET] Take 1 tablet by mouth 2 (two) times a day with meals. 9/14/2022 at pm     isosorbide mononitrate (IMDUR) 30 MG 24 hr tablet TAKE 1 TABLET EVERY MORNING 9/14/2022 at am     metoprolol succinate ER (TOPROL-XL) 25 MG 24 hr tablet Take 1 tablet (25 mg) by mouth daily 9/14/2022 at am     nitroglycerin (NITROSTAT) 0.4 MG SL tablet [NITROGLYCERIN (NITROSTAT) 0.4 MG SL TABLET] Place 1 tablet (0.4 mg total) under the tongue every 5 (five) minutes as needed for chest pain.      omeprazole (PRILOSEC) 20 MG DR capsule TAKE 1 CAPSULE(20 MG) BY MOUTH DAILY BEFORE BREAKFAST 9/14/2022 at am     triamcinolone (KENALOG) 0.1 % cream [TRIAMCINOLONE (KENALOG) 0.1 % CREAM] Apply 1 application topically as needed.        Information source(s): Patient, Clinic records and CareEverywhere/[facility]  Method of interview communication: in-person    Summary of Changes to PTA Med List  New: nothing   Discontinued: nothing   Changed: nothing     Patient was asked about OTC/herbal products specifically.  PTA med list reflects this.    In the past week, patient  estimated taking medication this percent of the time:  greater than 90%.    Allergies were reviewed, assessed, and updated with the patient.      Patient does not use any multi-dose medications prior to admission.    The information provided in this note is only as accurate as the sources available at the time of the update(s).    Thank you for the opportunity to participate in the care of this patient.    Francisco Tavarez [facility]  9/15/2022 9:18 AM

## 2022-09-15 NOTE — ED PROVIDER NOTES
EMERGENCY DEPARTMENT ENCOUnter      NAME: Yamilex Keller  AGE: 89 year old female  YOB: 1929  MRN: [id number]  EVALUATION DATE & TIME: 9/15/2022  4:57 AM    PCP: Michael Arzate    ED PROVIDER: Surendra Zhang DO      Chief Complaint   Patient presents with     Generalized Weakness         FINAL IMPRESSION:  1. Acute cystitis without hematuria          ED COURSE & MEDICAL DECISION MAKIN:04 AM Initial encounter and examination of the patient.    The patient presented to the emergency department tonight due to generalized fatigue and inability to ambulate.  She denies any pain but does admit to some dysuria.  She clinically appears well.  Laboratory testing is unremarkable aside from a urinalysis that does show signs of a urinary tract infection.  Given the significance of her fatigue we will plan to start IV antibiotics and keep her in the hospital.  She is comfortable with this plan.    At the conclusion of the encounter I discussed the results of all of the tests and the disposition. The questions were answered. The patient or family acknowledged understanding and was agreeable with the care plan.         MEDICATIONS GIVEN IN THE EMERGENCY:  Medications   cefTRIAXone (ROCEPHIN) 1 g vial to attach to  mL bag for ADULTS or NS 50 mL bag for PEDS (has no administration in time range)         =================================================================    HPI        Yamilex Keller is a 89 year old female with a pertinent history of full code status, CAD, osteoporosis, hypertension, hyperlipidemia, Parkinson's, and GERD who presents to this ED via EMS for evaluation of generalized weakness.    Per EMS, the patient lives in an unassisted living facility, and called the staff stating she was having a heart attack. Upon staff arrival to her room, the patient stared that she was not having chest pain or discomfort, but rather was having difficulty getting to the bathroom. The patient denies  "falling. Yesterday (9/14), the patient was evaluated on whether she should remain at the facility as she currently is. EMS reports that patient has increased anxiety going to the bathroom. EKG normal.    Per patient, she feels \"fine,\" and denies pain. She reports being unsteady and weak, but states that she has felt that way for a while, and nothing is new. The patient reports ankle swelling recently. She reports that when she urinates, it feels \"hot,\" but she denies pain with urination. No other complaints at this time.    REVIEW OF SYSTEMS     Constitutional:  Denies fever or chills  HENT:  Denies sore throat   Respiratory:  Denies cough or shortness of breath   Cardiovascular:  Denies chest pain or palpitations  GI:  Denies abdominal pain, nausea, or vomiting  Musculoskeletal:  Denies any new extremity pain   Skin:  Denies rash   : Positive for feeling \"hot\" when she urinates. Denies pain with urination.  Neurologic:  Denies headache, focal weakness or sensory changes. Positive for feeling unsteady and weakness.  All other systems reviewed and are negative      PAST MEDICAL HISTORY:  Past Medical History:   Diagnosis Date     Acute ST elevation myocardial infarction (STEMI) involving left anterior descending coronary artery (H) 7/18/2017     CAD (coronary artery disease)      Closed wedge compression fracture of seventh thoracic vertebra, initial encounter 7/8/2018     Full code status      GERD (gastroesophageal reflux disease) 1/10/2020     Hearing loss      HLD (hyperlipidemia)      HTN (hypertension) 4/1/2019     Osteoporosis      Temporal arteritis (H)      Tubular adenoma of colon     Last colonoscopy 2011     Uses walker        PAST SURGICAL HISTORY:  Past Surgical History:   Procedure Laterality Date     ANGIOPLASTY  //     BLEPHAROPLASTY Bilateral      CV CORONARY ANGIOGRAM N/A 8/25/2020    Procedure: Coronary Angiogram;  Surgeon: Jennifer Peck MD;  Location: [facility] Cath Lab;  Service: " Cardiology     CV CORONARY ANGIOGRAM N/A 8/21/2020    Procedure: Coronary Angiogram;  Surgeon: Howard Navarro MD;  Location: [facility] Cath Lab;  Service: Cardiology     CV LEFT HEART CATHETERIZATION WITHOUT LEFT VENTRICULOGRAM Left 8/25/2020    Procedure: Left Heart Catheterization Without Left Ventriculogram;  Surgeon: Jennifer Peck MD;  Location: [facility] Cath Lab;  Service: Cardiology     CV LEFT HEART CATHETERIZATION WITHOUT LEFT VENTRICULOGRAM Left 8/21/2020    Procedure: Left Heart Catheterization Without Left Ventriculogram;  Surgeon: Howard Navarro MD;  Location: [facility] Cath Lab;  Service: Cardiology     ESOPHAGOSCOPY, GASTROSCOPY, DUODENOSCOPY (EGD), COMBINED N/A 7/9/2018    Procedure: ESOPHAGOGASTRODUODENOSCOPY (EGD);  Surgeon: Prabhu Lin MD;  Location: [facility];  Service:      IR THORACIC VERTEBROPLASTY  8/17/2018     TN CATH PLACEMENT & NJX CORONARY ART ANGIO IMG S&I N/A 7/18/2017    Procedure: Coronary Angiogram;  Surgeon: Shelia Garcia MD;  Location: [facility] Cath Lab;  Service: Cardiology     TN CATH PLMT L HRT & ARTS W/NJX & ANGIO IMG S&I Left 7/18/2017    Procedure: Left Heart Catheterization Without Left Ventriculogram;  Surgeon: Shelia Garcia MD;  Location: [facility] Cath Lab;  Service: Cardiology     TN TEMPORAL ARTERY LIGATN OR BX Bilateral 10/7/2015    Procedure: BILATERAL TEMPORAL ARTERY BIOPSY;  Surgeon: Alfredito Jason MD;  Location: [facility] OR;  Service: General     TONSILLECTOMY             CURRENT MEDICATIONS:    aspirin 81 MG EC tablet  atorvastatin (LIPITOR) 40 MG tablet  calcium, as carbonate, (TUMS) 200 mg calcium (500 mg) chewable tablet  calcium-vitamin D 500 mg(1,250mg) -200 unit per tablet  isosorbide mononitrate (IMDUR) 30 MG 24 hr tablet  metoprolol succinate ER (TOPROL-XL) 25 MG 24 hr tablet  nitroglycerin (NITROSTAT) 0.4 MG SL tablet  omeprazole (PRILOSEC) 20 MG DR capsule  triamcinolone (KENALOG) 0.1 %  "cream        ALLERGIES:  Allergies   Allergen Reactions     Lisinopril Swelling     Swelling of lips/mouth      Losartan Angioedema       FAMILY HISTORY:  Family History   Problem Relation Age of Onset     Liver Cancer Mother      Prostate Cancer Father      Pacemaker Brother      Lung Cancer Brother      Lung Cancer Brother      Uterine Cancer Sister        SOCIAL HISTORY:   Social History     Socioeconomic History     Marital status:      Spouse name: None     Number of children: 1     Years of education: None     Highest education level: None   Occupational History     Occupation: RETIRED   Tobacco Use     Smoking status: Former Smoker     Packs/day: 1.00     Years: 4.00     Pack years: 4.00     Types: Cigarettes, Cigarettes     Quit date: 1953     Years since quittin.7     Smokeless tobacco: Never Used   Substance and Sexual Activity     Alcohol use: Yes     Comment: Alcoholic Drinks/day: Occassionally     Drug use: No     Sexual activity: Not Currently     Partners: Male   Social History Narrative     to  Aris.  1 daughter.        Patient of Dr. Arzate since 2019.        VITALS:  Patient Vitals for the past 24 hrs:   BP Temp Temp src Pulse Resp SpO2 Height Weight   09/15/22 0503 (!) 143/63 97.9  F (36.6  C) Oral 61 14 98 % 1.651 m (5' 5\") 50 kg (110 lb 3.7 oz)       PHYSICAL EXAM    Constitutional:  Well developed, Well nourished,  HENT:  Normocephalic, Atraumatic, Bilateral external ears normal, Oropharynx moist, Nose normal.   Neck:  Normal range of motion, No meningismus, No stridor.   Eyes:  EOMI, Conjunctiva normal, No discharge.   Respiratory:  Normal breath sounds, No respiratory distress, No wheezing, No chest tenderness.   Cardiovascular:  Normal heart rate, Normal rhythm, No murmurs  GI:  Soft, No tenderness, No guarding  Musculoskeletal:   No tenderness to palpation or major deformities noted.   Integument:  Warm, Dry, No erythema, No rash.   Neurologic:  Alert & " oriented x 3, Normal motor function, Normal sensory function, No focal deficits noted.   Psychiatric:  Affect normal, Judgment normal, Mood normal.      LAB:  All pertinent labs reviewed and interpreted.  Results for orders placed or performed during the hospital encounter of 09/15/22   CBC with platelets   Result Value Ref Range    WBC Count 7.4 4.0 - 11.0 10e3/uL    RBC Count 4.58 3.80 - 5.20 10e6/uL    Hemoglobin 14.6 11.7 - 15.7 g/dL    Hematocrit 43.6 35.0 - 47.0 %    MCV 95 78 - 100 fL    MCH 31.9 26.5 - 33.0 pg    MCHC 33.5 31.5 - 36.5 g/dL    RDW 13.2 10.0 - 15.0 %    Platelet Count 177 150 - 450 10e3/uL   Basic metabolic panel   Result Value Ref Range    Sodium 143 136 - 145 mmol/L    Potassium 3.7 3.5 - 5.0 mmol/L    Chloride 107 98 - 107 mmol/L    Carbon Dioxide (CO2) 29 22 - 31 mmol/L    Anion Gap 7 5 - 18 mmol/L    Urea Nitrogen 21 8 - 28 mg/dL    Creatinine 0.73 0.60 - 1.10 mg/dL    Calcium 10.3 8.5 - 10.5 mg/dL    Glucose 90 70 - 125 mg/dL    GFR Estimate 76 >60 mL/min/1.73m2   UA with Microscopic reflex to Culture    Specimen: Urine, Fontanez Catheter   Result Value Ref Range    Color Urine Light Yellow Colorless, Straw, Light Yellow, Yellow    Appearance Urine Turbid (A) Clear    Glucose Urine Negative Negative mg/dL    Bilirubin Urine Negative Negative    Ketones Urine Negative Negative mg/dL    Specific Gravity Urine 1.019 1.001 - 1.030    Blood Urine 0.03 mg/dL (A) Negative    pH Urine 6.0 5.0 - 7.0    Protein Albumin Urine Negative Negative mg/dL    Urobilinogen Urine <2.0 <2.0 mg/dL    Nitrite Urine Positive (A) Negative    Leukocyte Esterase Urine 25 Judith/uL (A) Negative    Bacteria Urine Many (A) None Seen /HPF    RBC Urine 1 <=2 /HPF    WBC Urine 3 <=5 /HPF    Squamous Epithelials Urine <1 <=1 /HPF           Carrie LAZARO, am serving as a scribe to document services personally performed by Dr. Zhang based on my observation and the provider's statements to me. ISurendra, DO attest  that Carrie Lim is acting in a scribe capacity, has observed my performance of the services and has documented them in accordance with my direction.    Surendra Zhang DO  Emergency Medicine  [facility] EMERGENCY DEPARTMENT  [address] [place] [id number]  [phone]  Dept: [phone]       Surendra Zhang MD  09/15/22 0727

## 2022-09-15 NOTE — PROGRESS NOTES
[facility]      OUTPATIENT PHYSICAL THERAPY EVALUATION  PLAN OF TREATMENT FOR OUTPATIENT REHABILITATION  (COMPLETE FOR INITIAL CLAIMS ONLY)  Patient's Last Name, First Name, M.I.  YOB: 1929  Yamilex Keller                        Provider's Name  [facility] Medical Record No.  [id number]                               Onset Date:  09/15/22   Start of Care Date:  09/15/22      Type:     _X_PT   ___OT   ___SLP Medical Diagnosis:  UTI                        PT Diagnosis:  Impaired functional mobility   Visits from SOC:  1   _________________________________________________________________________________  Plan of Treatment/Functional Goals    Planned Interventions: balance training, bed mobility training, gait training, home exercise program, strengthening, transfer training     Goals: See Physical Therapy Goals on Care Plan in [employer] electronic health record.    Therapy Frequency: Daily  Predicted Duration of Therapy Intervention: 09/22/22  _________________________________________________________________________________    I CERTIFY THE NEED FOR THESE SERVICES FURNISHED UNDER        THIS PLAN OF TREATMENT AND WHILE UNDER MY CARE     (Physician co-signature of this document indicates review and certification of the therapy plan).              Certification date from: 09/15/22, Certification date to: 09/22/22    Referring Physician: Ko Vasquez MD            Initial Assessment        See Physical Therapy evaluation dated 09/15/22 in Epic electronic health record.

## 2022-09-15 NOTE — ED TRIAGE NOTES
per EMS patient initially called staff at assisted living for possible chest pain. However when they arrived she denied any pain and reports generalized weakness. Pt was unable to get to bathroom.  Had evaluation yesterday to see if she should be living on her own at this time.  Reports unsteady gait.

## 2022-09-15 NOTE — PROVIDER NOTIFICATION
"[facility] ED Handoff Report    ED Chief Complaint: Weakness    ED Diagnosis:  (N30.00) Acute cystitis without hematuria  Plan: See provider note       PMH:    Past Medical History:   Diagnosis Date     Acute ST elevation myocardial infarction (STEMI) involving left anterior descending coronary artery (H) 7/18/2017     CAD (coronary artery disease)      Closed wedge compression fracture of seventh thoracic vertebra, initial encounter 7/8/2018     Full code status      GERD (gastroesophageal reflux disease) 1/10/2020     Hearing loss      HLD (hyperlipidemia)      HTN (hypertension) 4/1/2019     Osteoporosis      Temporal arteritis (H)      Tubular adenoma of colon     Last colonoscopy 2011     Uses walker         Code Status:  No CPR- Do NOT Intubate     Falls Risk: Yes Band: Applied    Current Living Situation/Residence: lives in an assisted living facility     Elimination Status: Continent: Purewick     Activity Level: 2 assist    Patients Preferred Language:  English     Needed: No    Vital Signs:  BP (P) 118/58   Pulse (!) 49   Temp 98.2  F (36.8  C) (Oral)   Resp 14   Ht 1.651 m (5' 5\")   Wt 50 kg (110 lb 3.7 oz)   SpO2 97%   BMI 18.34 kg/m       Cardiac Rhythm: Sinus sandee (40s, 50s)    Pain Score: 3/10    Is the Patient Confused:  No    Last Food or Drink: 09/15/22 at Dinner    Focused Assessment:  A&Ox4.  Tolerating room air.  Severe weakness.  Good appetite.  Hard of hearing.    Tests Performed: Done: Labs and Imaging    Treatments Provided:  IV antibiotics, switching to oral.    Family Dynamics/Concerns: No    Family Updated On Visitor Policy: Yes    Plan of Care Communicated to Family: Yes    Who Was Updated about Plan of Care: Daughter Dustin with patient     Covid: asymptomatic , negative    RN: Mckenna Arce RN 9/15/2022 6:30 PM       "

## 2022-09-15 NOTE — PROVIDER NOTIFICATION
Patient sandee with pulse down to 45, asymptomatic.  Dr. Vasquez notified.  No new orders, continue to monitor.

## 2022-09-15 NOTE — CONSULTS
"Care Management Initial Consult    General Information  Assessment completed with: Patient, Children, Yamilex and daughter Pito  Type of CM/SW Visit: Initial Assessment    Primary Care Provider verified and updated as needed: Yes   Readmission within the last 30 days: no previous admission in last 30 days      Reason for Consult: discharge planning  Advance Care Planning: Advance Care Planning Reviewed: present on chart          Communication Assessment  Patient's communication style: spoken language (English or Bilingual)                    Living Environment:   People in home: facility resident     Current living Arrangements: assisted living  Name of Facility: [facility]   Able to return to prior arrangements: no (likely needs TCU)       Family/Social Support:  Care provided by: self, other (see comments) (\"Assisted Living staff\")  Provides care for: no one, unable/limited ability to care for self  Marital Status:   Children, Facility resident(s)/Staff          Description of Support System: Supportive, Involved    Support Assessment: Adequate family and caregiver support, Adequate social supports, Patient communicates needs well met    Current Resources:   Patient receiving home care services: No     Community Resources: Skilled Nursing Facility ([facility]  Equipment currently used at home: walker, rolling, wheelchair, manual (\"4ww used in her apartment; she is wheeled down to the dining room in the wheelchair\".)  Supplies currently used at home: Other (\"glasses, she is hard of hearing but no hearing aids\".)    Employment/Financial:  Employment Status: retired     Employment/ Comments: \"I use my husbands  benefits as my secondary insurance. I don't see a VA MD\".  Financial Concerns:     Referral to Financial Worker: No       Lifestyle & Psychosocial Needs:  Social Determinants of Health     Tobacco Use: Medium Risk " "    Smoking Tobacco Use: Former Smoker     Smokeless Tobacco Use: Never Used   Alcohol Use: Not on file   Financial Resource Strain: Not on file   Food Insecurity: Not on file   Transportation Needs: Not on file   Physical Activity: Not on file   Stress: Not on file   Social Connections: Not on file   Intimate Partner Violence: Not on file   Depression: Not at risk     PHQ-2 Score: 1   Housing Stability: Not on file       Functional Status:  Prior to admission patient needed assistance:   Dependent ADLs:: Ambulation-walker, Wheelchair-with assist, Bathing, Dressing, Grooming, Transfers, Toileting  Dependent IADLs:: Cleaning, Cooking, Laundry, Shopping, Meal Preparation, Medication Management, Transportation (\"daughter helps with medications\".)  Assesssment of Functional Status: Not at baseline with ADL Functioning, Not at baseline with mobility, Not at  functional baseline, Needs placement in a SNF/TCF for rehabilitation    Mental Health Status:          Chemical Dependency Status:                Values/Beliefs:  Spiritual, Cultural Beliefs, Mandaen Practices, Values that affect care:                 Additional Information:  Yamilex [facility]. She has \"been increasingly weak for the past few weeks so she has increased her services at the [facility]. She is now getting help with: bathing, dressing and undressing, grooming, toileting, escorts to meals by wheelchair, housekeeping, laundry, and meals. Her daughter sets up her medications and helps with transportation.\"    Likely needs TCU and wants [facility] or [facility] - referrals sent.    [facility] transport at discharge.    Pito Hatch daughter [phone].    Erika Edwards RN      "

## 2022-09-16 ENCOUNTER — APPOINTMENT (OUTPATIENT)
Dept: PHYSICAL THERAPY | Facility: HOSPITAL | Age: 87
End: 2022-09-16
Payer: MEDICARE

## 2022-09-16 PROCEDURE — 250N000013 HC RX MED GY IP 250 OP 250 PS 637: Performed by: INTERNAL MEDICINE

## 2022-09-16 PROCEDURE — 97530 THERAPEUTIC ACTIVITIES: CPT | Mod: GP

## 2022-09-16 PROCEDURE — 99207 PR CDG-CUT & PASTE-POTENTIAL IMPACT ON LEVEL: CPT | Performed by: INTERNAL MEDICINE

## 2022-09-16 PROCEDURE — 99225 PR SUBSEQUENT OBSERVATION CARE,LEVEL II: CPT | Performed by: INTERNAL MEDICINE

## 2022-09-16 PROCEDURE — G0378 HOSPITAL OBSERVATION PER HR: HCPCS

## 2022-09-16 RX ORDER — POLYETHYLENE GLYCOL 3350 17 G/17G
17 POWDER, FOR SOLUTION ORAL 2 TIMES DAILY
Status: DISCONTINUED | OUTPATIENT
Start: 2022-09-16 | End: 2022-09-20 | Stop reason: HOSPADM

## 2022-09-16 RX ORDER — BISACODYL 10 MG
10 SUPPOSITORY, RECTAL RECTAL DAILY PRN
Status: DISCONTINUED | OUTPATIENT
Start: 2022-09-16 | End: 2022-09-20 | Stop reason: HOSPADM

## 2022-09-16 RX ORDER — SENNOSIDES 8.6 MG
8.6 TABLET ORAL 2 TIMES DAILY
Status: DISCONTINUED | OUTPATIENT
Start: 2022-09-16 | End: 2022-09-20 | Stop reason: HOSPADM

## 2022-09-16 RX ADMIN — POLYETHYLENE GLYCOL 3350 17 G: 17 POWDER, FOR SOLUTION ORAL at 22:00

## 2022-09-16 RX ADMIN — METOPROLOL SUCCINATE 25 MG: 25 TABLET, EXTENDED RELEASE ORAL at 11:09

## 2022-09-16 RX ADMIN — SENNOSIDES 8.6 MG: 8.6 TABLET, FILM COATED ORAL at 22:00

## 2022-09-16 RX ADMIN — CEPHALEXIN 500 MG: 500 CAPSULE ORAL at 07:34

## 2022-09-16 RX ADMIN — CEPHALEXIN 500 MG: 500 CAPSULE ORAL at 14:00

## 2022-09-16 RX ADMIN — Medication 81 MG: at 22:00

## 2022-09-16 RX ADMIN — CEPHALEXIN 500 MG: 500 CAPSULE ORAL at 22:00

## 2022-09-16 RX ADMIN — PANTOPRAZOLE SODIUM 40 MG: 40 TABLET, DELAYED RELEASE ORAL at 09:38

## 2022-09-16 RX ADMIN — ATORVASTATIN CALCIUM 40 MG: 40 TABLET, FILM COATED ORAL at 22:00

## 2022-09-16 RX ADMIN — ISOSORBIDE MONONITRATE 30 MG: 30 TABLET, EXTENDED RELEASE ORAL at 09:47

## 2022-09-16 ASSESSMENT — ACTIVITIES OF DAILY LIVING (ADL)
ADLS_ACUITY_SCORE: 41
ADLS_ACUITY_SCORE: 39
ADLS_ACUITY_SCORE: 41
ADLS_ACUITY_SCORE: 45
ADLS_ACUITY_SCORE: 39
ADLS_ACUITY_SCORE: 41
ADLS_ACUITY_SCORE: 41
ADLS_ACUITY_SCORE: 39
ADLS_ACUITY_SCORE: 41

## 2022-09-16 NOTE — PLAN OF CARE
Problem: UTI (Urinary Tract Infection)  Goal: Improved Infection Symptoms  Outcome: Ongoing, Progressing   PRIMARY DIAGNOSIS: GENERALIZED WEAKNESS    OUTPATIENT/OBSERVATION GOALS TO BE MET BEFORE DISCHARGE  1. Orthostatic performed: N/A    2. Tolerating PO medications: Yes    3. Return to near baseline physical activity: Yes    4. Cleared for discharge by consultants (if involved): Yes    Discharge Planner Nurse   Safe discharge environment identified: Waiting for placement.  Barriers to discharge: yes. Pending placement       Entered by: Kat Swanson RN 09/16/2022 3:52 PM     Please review provider order for any additional goals.   Nurse to notify provider when observation goals have been met and patient is ready for discharge.Goal Outcome Evaluation:

## 2022-09-16 NOTE — PROGRESS NOTES
PRIMARY DIAGNOSIS: UTI  OUTPATIENT/OBSERVATION GOALS TO BE MET BEFORE DISCHARGE:  1. ADLs back to baseline: No. Working with PT    2. Activity and level of assistance: Up with maximum assistance. PT following.    3. Pain status: Pain free.    4. Return to near baseline physical activity: No     Discharge Planner Nurse   Safe discharge environment identified: No  Barriers to discharge: Yes,  Pending placement. Referrals sent.       Entered by: Kat Swanson RN 09/16/2022 6:39 PM     Please review provider order for any additional goals.   Nurse to notify provider when observation goals have been met and patient is ready for discharge.

## 2022-09-16 NOTE — PROGRESS NOTES
PEARL made follow up calls to TCU :   -[facility]- no current beds  -[facility]- Left      Tawanna Tierney, LGSW

## 2022-09-16 NOTE — PLAN OF CARE
"edPRIMARY DIAGNOSIS: \"GENERIC\" NURSING  OUTPATIENT/OBSERVATION GOALS TO BE MET BEFORE DISCHARGE:  ADLs back to baseline: No    Activity and level of assistance: Up with maximum assistance. Consider SW and/or PT evaluation.     Pain status: Pain free.    Return to near baseline physical activity: No     Discharge Planner Nurse   Safe discharge environment identified: No  Barriers to discharge: Yes, Pending placement       Entered by: Camille Ramirez RN 09/16/2022 7:59 AM     Please review provider order for any additional goals.   Nurse to notify provider when observation goals have been met and patient is ready for discharge.Goal Outcome Evaluation:        Repositioned q2 hrs, incontinent or bypass of purewick. Calls appropriately.               "

## 2022-09-16 NOTE — PROGRESS NOTES
PRIMARY DIAGNOSIS: UTI  OUTPATIENT/OBSERVATION GOALS TO BE MET BEFORE DISCHARGE:  1. ADLs back to baseline: No    2. Activity and level of assistance: PT following, recommending TCU    3. Pain status: Pain free.    4. Return to near baseline physical activity: No. Needs TCU     Discharge Planner Nurse   Safe discharge environment identified: No  Barriers to discharge: Yes, pending placement       Entered by: Kat Swanson RN 09/16/2022 3:54 PM     Please review provider order for any additional goals.   Nurse to notify provider when observation goals have been met and patient is ready for discharge.

## 2022-09-16 NOTE — PLAN OF CARE
"PRIMARY DIAGNOSIS: \"GENERIC\" NURSING  OUTPATIENT/OBSERVATION GOALS TO BE MET BEFORE DISCHARGE:  1. ADLs back to baseline: No    2. Activity and level of assistance: Up with maximum assistance. Consider SW and/or PT evaluation.     3. Pain status: Pain free.    4. Return to near baseline physical activity: No     Discharge Planner Nurse   Safe discharge environment identified: No - PT/OT recommending TCU  Barriers to discharge: Yes - placement.        Entered by: Rocío Méndez RN 09/15/2022 10:26 PM     Please review provider order for any additional goals.   Nurse to notify provider when observation goals have been met and patient is ready for discharge.    Goal Outcome Evaluation:      Pt arrived from ED around 1900. Pt denied pain. Vital signs stable except pt's HR can be slightly bradycardic (40-60 bpm). Asymptomatic. Purewick in place.   PT/OT evals have been completed and recommending TCU or 24 hour home care if going home.               "

## 2022-09-16 NOTE — PROGRESS NOTES
"[facility]    Medicine Progress Note - Hospitalist Service    Date of Admission:  9/15/2022  HPI:  Yamilex Keller is a 89 yo F with h/o CAD, osteoporosis, [place], HTN, HLD, dementia, giant cell arteritis, GERD, and parkinsonism who presents with generalized weakness and is found to have a possible UTI. Will place on observation and start antibiotics.  Hydrate gently. Hopefully can return to [facility] later today or tomorrow.       Assessment & Plan        Acute cystitis without hematuria - rocephin in ED, switch oral keflex tomorrow am.    Generalized muscle weakness - this is causing mobility issues at her facility, she is needing more help there.  I think this is causing some anxiety that contributed to her calling for help this morning.  Will get PT/OT eval and have CM/SW eval.    Grief- her  passed away in the past year and she states \"I'm ready to go to [facility] - I'm not sure why I'm still here\".     Active Problems:    CAD (coronary artery disease) - continue lipitor, ASA, toprol, and Imdur as at home, no signs of ischemia, she initially told staff at her facility when she initially called for help but she thought she was having a heart attack apparently but when they arrived to her room she denied any chest pain or shortness of breath at seem to be more anxiety about getting to the bathroom.  EKG is completely normal.  I doubt any current ischemia.    Osteoporosis - calcium at home    Hearing loss    HTN (hypertension) - continue toprol and Imdur    HLD (hyperlipidemia) - continue lipitor as at home    Dementia, mild - this is very mild    h/o GCA (giant cell arteritis) - no issues currently    Parkinsonism, unspecified Parkinsonism type - no medications for this, PT/OT eval    GERD (gastroesophageal reflux disease) - continue PPI and follow         plan: continue oral keflex pending urine culture /s    Pt ot to decide if she can go back to to senior living or need snif         D/w " daughter chavo who is night RN here in detail        Diet: Regular Diet Adult    DVT Prophylaxis: Pneumatic Compression Devices  Fontanez Catheter: Not present  Central Lines: None  Cardiac Monitoring: None  Code Status: No CPR- Do NOT Intubate      Disposition Plan      Expected Discharge Date: 09/16/2022        Discharge Comments: needs TCU        The patient's care was discussed with the Patient's Family.    Richard Long MD  Hospitalist Service  [facility]  Securely message with the Vocera Web Console (learn more here)  Text page via [employer] Paging/Directory         Clinically Significant Risk Factors Present on Admission          # Hypercalcemia: Ca = 10.3 mg/dL (Ref range: 8.5 - 10.5 mg/dL) and/or iCa = N/A on admission, will monitor as appropriate             likely secondary to dehydration  Will order some iv fluids gently     ______________________________________________________________________    Interval History   {Include any events overnight / new information. 4 point ROS is needed for billing a level III     Data reviewed today: I reviewed all medications, new labs and imaging results over the last 24 hours. I personally reviewed no images or EKG's today.    Physical Exam   Vital Signs: Temp: 98  F (36.7  C) Temp src: Oral BP: 113/58 Pulse: 57   Resp: 18 SpO2: 93 % O2 Device: None (Room air)    Weight: 111 lbs 12.8 oz  General Appearance:alert, pleasant ,feels weak   Respiratory: clear to auscultation bilaterally  Cardiovascular: RRR<no gallop ,murmur ,rub  GI: soft ,no masses,no organomegaly, + BS  Skin: worm ,dry ,no skin rash  Other: No cv or back tenderness    Data   Recent Labs   Lab 09/15/22  0514   WBC 7.4   HGB 14.6   MCV 95         POTASSIUM 3.7   CHLORIDE 107   CO2 29   BUN 21   CR 0.73   ANIONGAP 7   MARLYN 10.3   GLC 90

## 2022-09-17 ENCOUNTER — APPOINTMENT (OUTPATIENT)
Dept: OCCUPATIONAL THERAPY | Facility: HOSPITAL | Age: 87
End: 2022-09-17
Payer: MEDICARE

## 2022-09-17 LAB — BACTERIA UR CULT: ABNORMAL

## 2022-09-17 PROCEDURE — 99207 PR CDG-CUT & PASTE-POTENTIAL IMPACT ON LEVEL: CPT | Performed by: INTERNAL MEDICINE

## 2022-09-17 PROCEDURE — G0378 HOSPITAL OBSERVATION PER HR: HCPCS

## 2022-09-17 PROCEDURE — 250N000013 HC RX MED GY IP 250 OP 250 PS 637: Performed by: INTERNAL MEDICINE

## 2022-09-17 PROCEDURE — 99225 PR SUBSEQUENT OBSERVATION CARE,LEVEL II: CPT | Performed by: INTERNAL MEDICINE

## 2022-09-17 PROCEDURE — 97535 SELF CARE MNGMENT TRAINING: CPT | Mod: GO

## 2022-09-17 RX ADMIN — CEPHALEXIN 500 MG: 500 CAPSULE ORAL at 21:23

## 2022-09-17 RX ADMIN — CEPHALEXIN 500 MG: 500 CAPSULE ORAL at 14:29

## 2022-09-17 RX ADMIN — ATORVASTATIN CALCIUM 40 MG: 40 TABLET, FILM COATED ORAL at 21:24

## 2022-09-17 RX ADMIN — CEPHALEXIN 500 MG: 500 CAPSULE ORAL at 06:33

## 2022-09-17 RX ADMIN — METOPROLOL SUCCINATE 25 MG: 25 TABLET, EXTENDED RELEASE ORAL at 09:41

## 2022-09-17 RX ADMIN — ISOSORBIDE MONONITRATE 30 MG: 30 TABLET, EXTENDED RELEASE ORAL at 09:41

## 2022-09-17 RX ADMIN — PANTOPRAZOLE SODIUM 40 MG: 40 TABLET, DELAYED RELEASE ORAL at 06:33

## 2022-09-17 RX ADMIN — SENNOSIDES 8.6 MG: 8.6 TABLET, FILM COATED ORAL at 09:43

## 2022-09-17 RX ADMIN — POLYETHYLENE GLYCOL 3350 17 G: 17 POWDER, FOR SOLUTION ORAL at 21:24

## 2022-09-17 RX ADMIN — Medication 81 MG: at 21:23

## 2022-09-17 RX ADMIN — SENNOSIDES 8.6 MG: 8.6 TABLET, FILM COATED ORAL at 21:23

## 2022-09-17 RX ADMIN — POLYETHYLENE GLYCOL 3350 17 G: 17 POWDER, FOR SOLUTION ORAL at 09:44

## 2022-09-17 ASSESSMENT — ACTIVITIES OF DAILY LIVING (ADL)
ADLS_ACUITY_SCORE: 41
ADLS_ACUITY_SCORE: 45
ADLS_ACUITY_SCORE: 45
ADLS_ACUITY_SCORE: 41
ADLS_ACUITY_SCORE: 45
ADLS_ACUITY_SCORE: 45

## 2022-09-17 NOTE — PROGRESS NOTES
PRIMARY DIAGNOSIS: UTI  OUTPATIENT/OBSERVATION GOALS TO BE MET BEFORE DISCHARGE:  1. ADLs back to baseline: No    2. Activity and level of assistance: Up with maximum assistance. Consider SW and/or PT evaluation.     3. Pain status: Pain free.    4. Return to near baseline physical activity: No     Discharge Planner Nurse   Safe discharge environment identified: No  Barriers to discharge: Yes       Entered by: Omid Aguirre RN 09/17/2022 6:59 AM     Please review provider order for any additional goals.   Nurse to notify provider when observation goals have been met and patient is ready for discharge.

## 2022-09-17 NOTE — PROGRESS NOTES
PRIMARY DIAGNOSIS: UTI  OUTPATIENT/OBSERVATION GOALS TO BE MET BEFORE DISCHARGE:  1. ADLs back to baseline: No    2. Activity and level of assistance: Assist x 1 with walker and gait belt. Unsteady.    3. Pain status: Pain free.    4. Return to near baseline physical activity: No, needs TCU     Discharge Planner Nurse   Safe discharge environment identified: No  Barriers to discharge: Yes. Pending TCU placement.        Entered by: Kat Swanson RN 09/17/2022 3:21 PM     Please review provider order for any additional goals.   Nurse to notify provider when observation goals have been met and patient is ready for discharge.

## 2022-09-17 NOTE — PROGRESS NOTES
"[facility]    Medicine Progress Note - Hospitalist Service    Date of Admission:  9/15/2022  HPI:  Yamilex Keller is a 89 yo F with h/o CAD, osteoporosis, [place], HTN, HLD, dementia, giant cell arteritis, GERD, and parkinsonism who presents with generalized weakness and is found to have a possible UTI. Will place on observation and start antibiotics.  Hydrate gently. Hopefully can return to [facility] later today or tomorrow.       Assessment & Plan        Acute cystitis without hematuria - rocephin in ED, switch oral keflex tomorrow am.    Generalized muscle weakness - this is causing mobility issues at her facility, she is needing more help there.  I think this is causing some anxiety that contributed to her calling for help this morning.  Will get PT/OT eval and have CM/SW eval.    Grief- her  passed away in the past year and she states \"I'm ready to go to [facility] - I'm not sure why I'm still here\".     Active Problems:    CAD (coronary artery disease) - continue lipitor, ASA, toprol, and Imdur as at home, no signs of ischemia, she initially told staff at her facility when she initially called for help but she thought she was having a heart attack apparently but when they arrived to her room she denied any chest pain or shortness of breath at seem to be more anxiety about getting to the bathroom.  EKG is completely normal.  I doubt any current ischemia.    Osteoporosis - calcium at home    Hearing loss    HTN (hypertension) - continue toprol and Imdur    HLD (hyperlipidemia) - continue lipitor as at home    Dementia, mild - this is very mild    h/o GCA (giant cell arteritis) - no issues currently    Parkinsonism, unspecified Parkinsonism type - no medications for this, PT/OT eval    GERD (gastroesophageal reflux disease) - continue PPI and follow         plan: continue oral keflex pending urine culture /s    Pt ot to decide if she can go back to to senior living or need snif         D/w " daughter chavo who is night RN here in detail        Diet: Regular Diet Adult    DVT Prophylaxis: Pneumatic Compression Devices  Fontanez Catheter: Not present  Central Lines: None  Cardiac Monitoring: None  Code Status: No CPR- Do NOT Intubate      Disposition Plan     Expected Discharge Date: 09/17/2022        Discharge Comments: needs TCU        The patient's care was discussed with the Patient's Family.    Richard Long MD  Hospitalist Service  [facility]  Securely message with the Vocera Web Console (learn more here)  Text page via [employer] Paging/Directory         Clinically Significant Risk Factors Present on Admission                      likely secondary to dehydration  Will order some iv fluids gently     ______________________________________________________________________    Interval History   {Include any events overnight / new information. 4 point ROS is needed for billing a level III     Data reviewed today: I reviewed all medications, new labs and imaging results over the last 24 hours. I personally reviewed no images or EKG's today.    Physical Exam   Vital Signs: Temp: 97.4  F (36.3  C) Temp src: Oral BP: (!) 148/69 Pulse: 55   Resp: 16 SpO2: 93 % O2 Device: None (Room air)    Weight: 111 lbs 12.8 oz  General Appearance:alert, pleasant ,feels weak   Respiratory: clear to auscultation bilaterally  Cardiovascular: RRR<no gallop ,murmur ,rub  GI: soft ,no masses,no organomegaly, + BS  Skin: worm ,dry ,no skin rash  Other: No cv or back tenderness    Data   Recent Labs   Lab 09/15/22  0514   WBC 7.4   HGB 14.6   MCV 95         POTASSIUM 3.7   CHLORIDE 107   CO2 29   BUN 21   CR 0.73   ANIONGAP 7   MARLYN 10.3   GLC 90   PLAN : AWAITING BED AT TCU BEFORE SHE CAN RETURN TO [facility]

## 2022-09-17 NOTE — UTILIZATION REVIEW
Concurrent stay review; Secondary Review Determination     Under the authority of the Utilization Management Committee, the utilization review process indicated a secondary review on Yamilex Keller.  The review outcome is based on review of the medical records, discussions with staff, and applying clinical experience noted on the date of the review.        (x) Observation Status Appropriate - Concurrent stay review    RATIONALE FOR DETERMINATION   89 yo F with PMH of CAD, osteoporosis, [place], HTN, HLD, dementia, giant cell arteritis, GERD, and parkinsonism who presents to ER with generalized weakness and is found to have a possible UTI. On oral antibiotics , stable vital signs , pending TCU    Patient is clinically improving and there is no clear indication to change patient's status to inpatient. The severity of illness, intensity of service provided, expected LOS and risk for adverse outcome make the care appropriate for observation.    The information on this document is developed by the utilization review team in order for the business office to ensure compliance.  This only denotes the appropriateness of proper admission status and does not reflect the quality of care rendered.         The definitions of Inpatient Status and Observation Status used in making the determination above are those provided in the CMS Coverage Manual, Chapter 1 and Chapter 6, section 70.4.      Sincerely,   Tyrell May MD  Utilization Review  Physician Advisor  [facility]

## 2022-09-17 NOTE — PROGRESS NOTES
Care Management Follow Up    Length of Stay (days): 0    Expected Discharge Date: 09/18/2022     Concerns to be Addressed:   placement    Patient plan of care discussed at interdisciplinary rounds: Yes    Anticipated Discharge Disposition:  TCU vs  AFL     Anticipated Discharge Services:    Anticipated Discharge DME:      Patient/family educated on Medicare website which has current facility and service quality ratings:    Education Provided on the Discharge Plan:    Patient/Family in Agreement with the Plan:      Referrals Placed by CM/SW:    Private pay costs discussed: Not applicable    Additional Information:  CM reached out to pt [facility] ([phone]) to see their availability to take the patient back and they told writer that anytime there is a change in mobility they need to do an assessment and there is no nursing to do an assessment over the weekend. Pt is not at her baseline with mobility and will likely need TCU. Several referrals sent to TCU for placement will just need to wait to hear back from TCU as far as availability or if pt gets back to her baseline with mobility by Monday and the [facility] can do an assessment she could potentially discharge back to [facility]. CM to continue to work on TCU placement for now.    Mulu Arteaga, RN

## 2022-09-17 NOTE — PLAN OF CARE
Problem: UTI (Urinary Tract Infection)  Goal: Improved Infection Symptoms  Outcome: Ongoing, Progressing     PRIMARY DIAGNOSIS: UTI  OUTPATIENT/OBSERVATION GOALS TO BE MET BEFORE DISCHARGE:  1. ADLs back to baseline: No. Working with PT/OT.    2. Activity and level of assistance: Up with assist x 1, gait belt and walker. Unsteady. Needs TCU.     3. Pain status: Pain free.    4. Return to near baseline physical activity:  Needs TCU     Discharge Planner Nurse   Safe discharge environment identified:  Pending TCU placement, referrals sent.  Barriers to discharge: Yes. Pending TCU placement.        Entered by: Kat Swanson RN 09/17/2022 3:18 PM     Please review provider order for any additional goals.   Nurse to notify provider when observation goals have been met and patient is ready for discharge.Goal Outcome Evaluation:

## 2022-09-18 ENCOUNTER — APPOINTMENT (OUTPATIENT)
Dept: OCCUPATIONAL THERAPY | Facility: HOSPITAL | Age: 87
End: 2022-09-18
Payer: MEDICARE

## 2022-09-18 LAB
GLUCOSE BLDC GLUCOMTR-MCNC: 108 MG/DL (ref 70–99)
GLUCOSE BLDC GLUCOMTR-MCNC: 85 MG/DL (ref 70–99)
GLUCOSE BLDC GLUCOMTR-MCNC: 93 MG/DL (ref 70–99)

## 2022-09-18 PROCEDURE — 250N000013 HC RX MED GY IP 250 OP 250 PS 637: Performed by: INTERNAL MEDICINE

## 2022-09-18 PROCEDURE — 82962 GLUCOSE BLOOD TEST: CPT

## 2022-09-18 PROCEDURE — 99225 PR SUBSEQUENT OBSERVATION CARE,LEVEL II: CPT | Performed by: INTERNAL MEDICINE

## 2022-09-18 PROCEDURE — 97110 THERAPEUTIC EXERCISES: CPT | Mod: GO

## 2022-09-18 PROCEDURE — 97535 SELF CARE MNGMENT TRAINING: CPT | Mod: GO

## 2022-09-18 PROCEDURE — G0378 HOSPITAL OBSERVATION PER HR: HCPCS

## 2022-09-18 PROCEDURE — 99207 PR CDG-CUT & PASTE-POTENTIAL IMPACT ON LEVEL: CPT | Performed by: INTERNAL MEDICINE

## 2022-09-18 RX ADMIN — ISOSORBIDE MONONITRATE 30 MG: 30 TABLET, EXTENDED RELEASE ORAL at 09:55

## 2022-09-18 RX ADMIN — SENNOSIDES 8.6 MG: 8.6 TABLET, FILM COATED ORAL at 09:55

## 2022-09-18 RX ADMIN — PANTOPRAZOLE SODIUM 40 MG: 40 TABLET, DELAYED RELEASE ORAL at 06:50

## 2022-09-18 RX ADMIN — Medication 81 MG: at 22:24

## 2022-09-18 RX ADMIN — ATORVASTATIN CALCIUM 40 MG: 40 TABLET, FILM COATED ORAL at 22:24

## 2022-09-18 RX ADMIN — CEPHALEXIN 500 MG: 500 CAPSULE ORAL at 06:50

## 2022-09-18 RX ADMIN — CEPHALEXIN 500 MG: 500 CAPSULE ORAL at 22:24

## 2022-09-18 RX ADMIN — SENNOSIDES 8.6 MG: 8.6 TABLET, FILM COATED ORAL at 22:24

## 2022-09-18 RX ADMIN — CEPHALEXIN 500 MG: 500 CAPSULE ORAL at 13:36

## 2022-09-18 ASSESSMENT — ACTIVITIES OF DAILY LIVING (ADL)
ADLS_ACUITY_SCORE: 45

## 2022-09-18 NOTE — PROGRESS NOTES
PRIMARY DIAGNOSIS: UTI  OUTPATIENT/OBSERVATION GOALS TO BE MET BEFORE DISCHARGE:  1. ADLs back to baseline: No    2. Activity and level of assistance: Working with PT/Ot. Assist x1 -2 with gait belt and walker. Unsteady. Pending TCU vs prison placement.    3. Pain status: Pain free.    4. Return to near baseline physical activity: No     Discharge Planner Nurse   Safe discharge environment identified: No  Barriers to discharge: Yes, Pending TCU vs [facility] placement.        Entered by: Kat Swanson RN 09/17/2022 7:42 PM        Please review provider order for any additional goals.   Nurse to notify provider when observation goals have been met and patient is ready for discharge.

## 2022-09-18 NOTE — PROGRESS NOTES
"[facility]    Medicine Progress Note - Hospitalist Service    Date of Admission:  9/15/2022  HPI:  Yamilex Keller is a 89 yo F with h/o CAD, osteoporosis, [place], HTN, HLD, dementia, giant cell arteritis, GERD, and parkinsonism who presents with generalized weakness and is found to have a possible UTI. Will place on observation and start antibiotics.  Hydrate gently. Hopefully can return to [facility] later today or tomorrow.       Assessment & Plan        Acute cystitis without hematuria - rocephin in ED, switch oral keflex tomorrow am.    Generalized muscle weakness - this is causing mobility issues at her facility, she is needing more help there.  I think this is causing some anxiety that contributed to her calling for help this morning.  Will get PT/OT eval and have CM/SW eval.    Grief- her  passed away in the past year and she states \"I'm ready to go to [facility] - I'm not sure why I'm still here\".     Active Problems:    CAD (coronary artery disease) - continue lipitor, ASA, toprol, and Imdur as at home, no signs of ischemia, she initially told staff at her facility when she initially called for help but she thought she was having a heart attack apparently but when they arrived to her room she denied any chest pain or shortness of breath at seem to be more anxiety about getting to the bathroom.  EKG is completely normal.  I doubt any current ischemia.    Osteoporosis - calcium at home    Hearing loss    HTN (hypertension) - continue toprol and Imdur    HLD (hyperlipidemia) - continue lipitor as at home    Dementia, mild - this is very mild    h/o GCA (giant cell arteritis) - no issues currently    Parkinsonism, unspecified Parkinsonism type - no medications for this, PT/OT eval    GERD (gastroesophageal reflux disease) - continue PPI and follow         plan: continue oral keflex pending urine culture     Pt ot to decide if she can go back to to senior living or need snif     Still pending " tcu ? in am     D/w daughter chavo who is night RN here in detail        Diet: Regular Diet Adult    DVT Prophylaxis: Pneumatic Compression Devices  Fontanez Catheter: Not present  Central Lines: None  Cardiac Monitoring: None  Code Status: No CPR- Do NOT Intubate      Disposition Plan      Expected Discharge Date: 09/19/2022        Discharge Comments: needs TCU        The patient's care was discussed with the Patient's Family.    Richard Long MD  Hospitalist Service  [facility]  Securely message with the Vocera Web Console (learn more here)  Text page via [employer] Paging/Directory         Clinically Significant Risk Factors Present on Admission                      likely secondary to dehydration  Will order some iv fluids gently     ______________________________________________________________________    Interval History   {Include any events overnight / new information. 4 point ROS is needed for billing a level III     Data reviewed today: I reviewed all medications, new labs and imaging results over the last 24 hours. I personally reviewed no images or EKG's today.    Physical Exam   Vital Signs: Temp: 97.6  F (36.4  C) Temp src: Oral BP: 110/53 Pulse: 51   Resp: 16 SpO2: 94 % O2 Device: None (Room air)    Weight: 111 lbs 12.8 oz  General Appearance:alert, pleasant ,feels weak   Respiratory: clear to auscultation bilaterally  Cardiovascular: RRR<no gallop ,murmur ,rub  GI: soft ,no masses,no organomegaly, + BS  Skin: worm ,dry ,no skin rash  Other: No cv or back tenderness    Data   Recent Labs   Lab 09/18/22  1131 09/18/22  0814 09/15/22  0514   WBC  --   --  7.4   HGB  --   --  14.6   MCV  --   --  95   PLT  --   --  177   NA  --   --  143   POTASSIUM  --   --  3.7   CHLORIDE  --   --  107   CO2  --   --  29   BUN  --   --  21   CR  --   --  0.73   ANIONGAP  --   --  7   MARLYN  --   --  10.3   GLC 85 93 90   PLAN : AWAITING BED AT U BEFORE SHE CAN RETURN TO [facility]

## 2022-09-18 NOTE — PLAN OF CARE
PRIMARY DIAGNOSIS: UTI  OUTPATIENT/OBSERVATION GOALS TO BE MET BEFORE DISCHARGE:  1. ADLs back to baseline: No    2. Activity and level of assistance: Up with maximum assistance. Working with PT.     3. Pain status: Pain free.    4. Return to near baseline physical activity: No     Discharge Planner Nurse   Safe discharge environment identified: No  Barriers to discharge: Yes       Entered by: Omid Aguirre RN 09/18/2022 6:58 AM     Please review provider order for any additional goals.   Nurse to notify provider when observation goals have been met and patient is ready for discharge.

## 2022-09-19 ENCOUNTER — APPOINTMENT (OUTPATIENT)
Dept: OCCUPATIONAL THERAPY | Facility: HOSPITAL | Age: 87
End: 2022-09-19
Payer: MEDICARE

## 2022-09-19 ENCOUNTER — APPOINTMENT (OUTPATIENT)
Dept: PHYSICAL THERAPY | Facility: HOSPITAL | Age: 87
End: 2022-09-19
Payer: MEDICARE

## 2022-09-19 PROCEDURE — G0378 HOSPITAL OBSERVATION PER HR: HCPCS

## 2022-09-19 PROCEDURE — 97535 SELF CARE MNGMENT TRAINING: CPT | Mod: GO

## 2022-09-19 PROCEDURE — 99225 PR SUBSEQUENT OBSERVATION CARE,LEVEL II: CPT | Performed by: INTERNAL MEDICINE

## 2022-09-19 PROCEDURE — 250N000013 HC RX MED GY IP 250 OP 250 PS 637: Performed by: INTERNAL MEDICINE

## 2022-09-19 PROCEDURE — 97116 GAIT TRAINING THERAPY: CPT | Mod: GP,CQ

## 2022-09-19 PROCEDURE — 97110 THERAPEUTIC EXERCISES: CPT | Mod: GP

## 2022-09-19 RX ADMIN — ISOSORBIDE MONONITRATE 30 MG: 30 TABLET, EXTENDED RELEASE ORAL at 08:34

## 2022-09-19 RX ADMIN — POLYETHYLENE GLYCOL 3350 17 G: 17 POWDER, FOR SOLUTION ORAL at 21:05

## 2022-09-19 RX ADMIN — SENNOSIDES 8.6 MG: 8.6 TABLET, FILM COATED ORAL at 08:34

## 2022-09-19 RX ADMIN — POLYETHYLENE GLYCOL 3350 17 G: 17 POWDER, FOR SOLUTION ORAL at 08:33

## 2022-09-19 RX ADMIN — Medication 81 MG: at 21:05

## 2022-09-19 RX ADMIN — ATORVASTATIN CALCIUM 40 MG: 40 TABLET, FILM COATED ORAL at 21:05

## 2022-09-19 RX ADMIN — ACETAMINOPHEN 650 MG: 325 TABLET, FILM COATED ORAL at 09:20

## 2022-09-19 RX ADMIN — PANTOPRAZOLE SODIUM 40 MG: 40 TABLET, DELAYED RELEASE ORAL at 06:33

## 2022-09-19 RX ADMIN — SENNOSIDES 8.6 MG: 8.6 TABLET, FILM COATED ORAL at 21:05

## 2022-09-19 RX ADMIN — METOPROLOL SUCCINATE 25 MG: 25 TABLET, EXTENDED RELEASE ORAL at 08:34

## 2022-09-19 ASSESSMENT — ACTIVITIES OF DAILY LIVING (ADL)
ADLS_ACUITY_SCORE: 45

## 2022-09-19 NOTE — PLAN OF CARE
PRIMARY DIAGNOSIS: GENERALIZED WEAKNESS    OUTPATIENT/OBSERVATION GOALS TO BE MET BEFORE DISCHARGE  1. Orthostatic performed: Yes:                                    2. Tolerating PO medications: Yes    3. Return to near baseline physical activity: Yes    4. Cleared for discharge by consultants (if involved): No    Discharge Planner Nurse   Safe discharge environment identified: No  Barriers to discharge: Yes       Entered by: Tierney Car RN 09/19/2022 5:30 PM     Please review provider order for any additional goals.   Nurse to notify provider when observation goals have been met and patient is ready for discharge.

## 2022-09-19 NOTE — PROGRESS NOTES
PRIMARY DIAGNOSIS: UTI  OUTPATIENT/OBSERVATION GOALS TO BE MET BEFORE DISCHARGE:  1. ADLs back to baseline: No    2. Activity and level of assistance: Up with Ax1 with walker and gait belt    3. Pain status: Pain free.    4. Return to near baseline physical activity: No     Discharge Planner Nurse   Safe discharge environment identified: No  Barriers to discharge: Yes       Entered by: Shruthi Block RN 09/19/2022 10:30 AM     .

## 2022-09-19 NOTE — PROGRESS NOTES
PRIMARY DIAGNOSIS: UTI  OUTPATIENT/OBSERVATION GOALS TO BE MET BEFORE DISCHARGE:  1. ADLs back to baseline: No    2. Activity and level of assistance: assist x 1 with walker and gait belt.    3. Pain status: Pain free.    4. Return to near baseline physical activity: No     Discharge Planner Nurse   Safe discharge environment identified: No  Barriers to discharge: Yes, pending placement       Entered by: Kat Swanson RN 09/18/2022 7:09 PM     Please review provider order for any additional goals.   Nurse to notify provider when observation goals have been met and patient is ready for discharge.

## 2022-09-19 NOTE — PLAN OF CARE
PRIMARY DIAGNOSIS: UTI  OUTPATIENT/OBSERVATION GOALS TO BE MET BEFORE DISCHARGE:  1. ADLs back to baseline: No    2. Activity and level of assistance: Up with assist of 1 with walker and gait belt.    3. Pain status: Pain free.    4. Return to near baseline physical activity: No     Discharge Planner Nurse   Safe discharge environment identified: No  Barriers to discharge: Yes       Entered by: Jorje Bueno RN 09/19/2022 4:55 AM  Pt denies pain or nausea overnight. Pt slept overnight. VS unchanged.    Jorje Bueno RN    Please review provider order for any additional goals.   Nurse to notify provider when observation goals have been met and patient is ready for discharge.

## 2022-09-19 NOTE — PROGRESS NOTES
"[facility]    Medicine Progress Note - Hospitalist Service    Date of Admission:  9/15/2022  HPI:  Yamilex Keller is a 89 yo F with h/o CAD, osteoporosis, [place], HTN, HLD, dementia, giant cell arteritis, GERD, and parkinsonism who presents with generalized weakness and is found to have a UTI.  Has been treated with Antibioics Hydrated gently. Awaiting placement back to intermediate vs TCU.       Assessment & Plan        Acute cystitis without hematuria - rocephin in ED, has been treated with keflex here.    Generalized muscle weakness - this is causing mobility issues at her facility, she is needing more help there.  I think this is causing some anxiety that contributed to her calling for help this morning.  PT/OT feel she can go back to her intermediate.    Grief- her  passed away in the past year and she states \"I'm ready to go to [facility] - I'm not sure why I'm still here\".     Active Problems:    CAD (coronary artery disease) - continue lipitor, ASA, toprol, and Imdur as at home, no signs of ischemia, she initially told staff at her facility when she initially called for help but she thought she was having a heart attack apparently but when they arrived to her room she denied any chest pain or shortness of breath at seem to be more anxiety about getting to the bathroom.  EKG is completely normal.  I doubt any current ischemia.    Osteoporosis - calcium at home    Hearing loss    HTN (hypertension) - continue toprol and Imdur    HLD (hyperlipidemia) - continue lipitor as at home    Dementia, mild - this is very mild    h/o GCA (giant cell arteritis) - no issues currently    Parkinsonism, unspecified Parkinsonism type - no medications for this, PT/OT eval    GERD (gastroesophageal reflux disease) - continue PPI and follow         Diet: Regular Diet Adult  Diet    DVT Prophylaxis: Pneumatic Compression Devices  Fontanez Catheter: Not present  Central Lines: None  Cardiac Monitoring: None  Code Status: No " CPR- Do NOT Intubate      Disposition Plan      Expected Discharge Date: 09/20/2022        Discharge Comments: needs TCU   vs back to [facility]     The patient's care was discussed with the Patient's Family.    Ko Vasquez MD  Hospitalist Service  [facility]  Securely message with the Vocera Web Console (learn more here)  Text page via [employer] Paging/Directory       _________________________________________________________________    Interval History   Nothing new overnight. Discussed with patient, daughter, and SW about discharge plans.    Data reviewed today: I reviewed all medications, new labs and imaging results over the last 24 hours. I personally reviewed no images or EKG's today.    Physical Exam   Vital Signs: Temp: 98.1  F (36.7  C) Temp src: Oral BP: (!) 154/70 Pulse: 65   Resp: 16 SpO2: 94 % O2 Device: None (Room air)    Weight: 111 lbs 12.8 oz  General Appearance:alert, pleasant ,feels weak   Respiratory: clear to auscultation bilaterally  Cardiovascular: RRR S1S2  GI: +BS, soft, NT/ND  Skin: warm ,dry ,no skin rash  Neuro: Alert, generally nonfocal on motor and sensory testing      Data   Recent Labs   Lab 09/18/22  1632 09/18/22  1131 09/18/22  0814 09/15/22  0514   WBC  --   --   --  7.4   HGB  --   --   --  14.6   MCV  --   --   --  95   PLT  --   --   --  177   NA  --   --   --  143   POTASSIUM  --   --   --  3.7   CHLORIDE  --   --   --  107   CO2  --   --   --  29   BUN  --   --   --  21   CR  --   --   --  0.73   ANIONGAP  --   --   --  7   MARLYN  --   --   --  10.3   * [phone]

## 2022-09-19 NOTE — PLAN OF CARE
PRIMARY DIAGNOSIS: UTI  OUTPATIENT/OBSERVATION GOALS TO BE MET BEFORE DISCHARGE:  1. ADLs back to baseline: No    2. Activity and level of assistance: Up with assist of 1 with walker and gait belt.    3. Pain status: Pain free.    4. Return to near baseline physical activity: No     Discharge Planner Nurse   Safe discharge environment identified: No  Barriers to discharge: Yes       Entered by: Jorje Bueno RN 09/19/2022 4:44 AM    Please review provider order for any additional goals.   Nurse to notify provider when observation goals have been met and patient is ready for discharge.

## 2022-09-19 NOTE — PROGRESS NOTES
PRIMARY DIAGNOSIS: UTI  OUTPATIENT/OBSERVATION GOALS TO BE MET BEFORE DISCHARGE:  1. ADLs back to baseline: No    2. Activity and level of assistance: Up with Ax1 with walker and gait belt    3. Pain status: Pain free.    4. Return to near baseline physical activity: No     Discharge Planner Nurse   Safe discharge environment identified: No  Barriers to discharge: Yes       Entered by: Shruthi Block RN 09/19/2022 11:47 AM     Patient C/O slight pain in her knee after therapy which was relieved with PRN Tylenol. Continues on regular diet. Daughter was in visiting this shift wondering about progress of finding a place for patient to go, updated . Ax1 with walker and gait belt.

## 2022-09-19 NOTE — PLAN OF CARE
PRIMARY DIAGNOSIS: UTI  OUTPATIENT/OBSERVATION GOALS TO BE MET BEFORE DISCHARGE:  1. ADLs back to baseline: No    2. Activity and level of assistance: Up with assist of 1 with walker and gait belt.    3. Pain status: Pain free.    4. Return to near baseline physical activity: No     Discharge Planner Nurse   Safe discharge environment identified: No  Barriers to discharge: Yes       Entered by: Jorje Bueno RN 09/19/2022 4:45 AM    Please review provider order for any additional goals.   Nurse to notify provider when observation goals have been met and patient is ready for discharge.

## 2022-09-19 NOTE — PROGRESS NOTES
Care Management Follow Up    Length of Stay (days): 0    Expected Discharge Date: 09/19/2022     Concerns to be Addressed:   discharge planning    Patient plan of care discussed at interdisciplinary rounds: Yes    Anticipated Discharge Disposition:  TCU vs. Home PT/OT     Anticipated Discharge Services:  TCU vs Home PT/OT  Anticipated Discharge DME:  Per treatment team    Patient/family educated on Medicare website which has current facility and service quality ratings:    Education Provided on the Discharge Plan:    Patient/Family in Agreement with the Plan:      Referrals Placed by CM/SW:    Private pay costs discussed: Not applicable    Additional Information:  CHATO PENALOZA spoke with bedside nurse regarding update for Pt.  CHATO stated that per chart notes, priority of discharge recs (TCU and home care) were different from PT and OT.  Pt and family wanted to know whether Pt can go home ([facility]) or needs to go to TCU.  CHATO let bedside nurse know (for Pt/family) that SW cannot make that determination, that PT/OT provide the recommendations and that SW follows disciplines recs for discharge planning.    CHATO stated that the two TCU's referrals were sent to previously declined due to lack of bed availability and SW will follow up with PT for clarification on whether SW/CM should be focusing on TCU or home care.  Once known, CHATO asked to follow up with Pt's daughter.    CHATO sent [employer] message to Charu in PT requesting update on discharge rec for Pt so CM can locate appropriate post discharge care.    Care management following for progression and discharge planning.    Addendum:  11:54 AM  PT update: Discharge rec changed to home if facility can provide support/assistance and home care arranged.      CHATO placed call to [facility] ([phone]) and asked to speak with admissions; CHATO told that Susie would be the contact and was transferred to her voice mail.  Left VM with update that discharge rec for Pt changed to home w/home care.   Asked for call back to discuss facility's ability to meet Pt's needs as Pt able to discharge and SW will be working on getting home care set up.  Provided call back number of [phone].      PEACE EstrellaSW

## 2022-09-19 NOTE — PLAN OF CARE
PRIMARY DIAGNOSIS: UTI  OUTPATIENT/OBSERVATION GOALS TO BE MET BEFORE DISCHARGE:  ADLs back to baseline: No    Activity and level of assistance: Assist x 1 with gait belt and walker. Unsteady but able to ambulate in room today with OT.     Pain status: Pain free.    Return to near baseline physical activity: No. Working with PT/OT to gain strength in order to discharge to TCU vs [facility]     Discharge Planner Nurse   Safe discharge environment identified: No  Barriers to discharge: Yes, waiting for placement.        Entered by: Kat Swanson RN 09/18/2022 7:05 PM     Please review provider order for any additional goals.   Nurse to notify provider when observation goals have been met and patient is ready for discharge.Goal Outcome Evaluation:

## 2022-09-20 ENCOUNTER — APPOINTMENT (OUTPATIENT)
Dept: PHYSICAL THERAPY | Facility: HOSPITAL | Age: 87
End: 2022-09-20
Payer: MEDICARE

## 2022-09-20 ENCOUNTER — APPOINTMENT (OUTPATIENT)
Dept: OCCUPATIONAL THERAPY | Facility: HOSPITAL | Age: 87
End: 2022-09-20
Payer: MEDICARE

## 2022-09-20 VITALS
RESPIRATION RATE: 16 BRPM | HEART RATE: 63 BPM | DIASTOLIC BLOOD PRESSURE: 49 MMHG | TEMPERATURE: 98.4 F | HEIGHT: 65 IN | SYSTOLIC BLOOD PRESSURE: 99 MMHG | BODY MASS INDEX: 18.63 KG/M2 | OXYGEN SATURATION: 92 % | WEIGHT: 111.8 LBS

## 2022-09-20 LAB
ATRIAL RATE - MUSE: 61 BPM
DIASTOLIC BLOOD PRESSURE - MUSE: 63 MMHG
INTERPRETATION ECG - MUSE: NORMAL
P AXIS - MUSE: 74 DEGREES
PR INTERVAL - MUSE: 188 MS
QRS DURATION - MUSE: 88 MS
QT - MUSE: 394 MS
QTC - MUSE: 396 MS
R AXIS - MUSE: -12 DEGREES
SARS-COV-2 RNA RESP QL NAA+PROBE: NEGATIVE
SYSTOLIC BLOOD PRESSURE - MUSE: 143 MMHG
T AXIS - MUSE: 39 DEGREES
VENTRICULAR RATE- MUSE: 61 BPM

## 2022-09-20 PROCEDURE — 250N000013 HC RX MED GY IP 250 OP 250 PS 637: Performed by: INTERNAL MEDICINE

## 2022-09-20 PROCEDURE — 97110 THERAPEUTIC EXERCISES: CPT | Mod: GP

## 2022-09-20 PROCEDURE — G0378 HOSPITAL OBSERVATION PER HR: HCPCS

## 2022-09-20 PROCEDURE — 97535 SELF CARE MNGMENT TRAINING: CPT | Mod: GO

## 2022-09-20 PROCEDURE — 97116 GAIT TRAINING THERAPY: CPT | Mod: GP

## 2022-09-20 PROCEDURE — U0003 INFECTIOUS AGENT DETECTION BY NUCLEIC ACID (DNA OR RNA); SEVERE ACUTE RESPIRATORY SYNDROME CORONAVIRUS 2 (SARS-COV-2) (CORONAVIRUS DISEASE [COVID-19]), AMPLIFIED PROBE TECHNIQUE, MAKING USE OF HIGH THROUGHPUT TECHNOLOGIES AS DESCRIBED BY CMS-2020-01-R: HCPCS | Performed by: INTERNAL MEDICINE

## 2022-09-20 RX ADMIN — PANTOPRAZOLE SODIUM 40 MG: 40 TABLET, DELAYED RELEASE ORAL at 10:46

## 2022-09-20 RX ADMIN — POLYETHYLENE GLYCOL 3350 17 G: 17 POWDER, FOR SOLUTION ORAL at 10:49

## 2022-09-20 RX ADMIN — SENNOSIDES 8.6 MG: 8.6 TABLET, FILM COATED ORAL at 10:47

## 2022-09-20 RX ADMIN — METOPROLOL SUCCINATE 25 MG: 25 TABLET, EXTENDED RELEASE ORAL at 10:45

## 2022-09-20 RX ADMIN — ISOSORBIDE MONONITRATE 30 MG: 30 TABLET, EXTENDED RELEASE ORAL at 10:47

## 2022-09-20 ASSESSMENT — ACTIVITIES OF DAILY LIVING (ADL)
ADLS_ACUITY_SCORE: 42
ADLS_ACUITY_SCORE: 42
ADLS_ACUITY_SCORE: 45
ADLS_ACUITY_SCORE: 42
ADLS_ACUITY_SCORE: 45

## 2022-09-20 NOTE — PROGRESS NOTES
Care Management Follow Up    Length of Stay (days): 0    Expected Discharge Date: 09/20/2022     Concerns to be Addressed:  Placement; discharge planning     Patient plan of care discussed at interdisciplinary rounds: Yes    Anticipated Discharge Disposition:  TCU     Anticipated Discharge Services:  TCU  Anticipated Discharge DME:  Per treatment team    Patient/family educated on Medicare website which has current facility and service quality ratings:    Education Provided on the Discharge Plan:  yes  Patient/Family in Agreement with the Plan:  yes    Referrals Placed by CM/SW:  Post acute facilities  Private pay costs discussed: private room/amenity fees and transportation costs    Additional Information:  CHATO PENALOZA spoke with Susie from Pt's [facility] this AM after Susie assessed Pt for return here at hospital.  Pt's daughter Batsheva also present.  Per Susie, Pt not safe to return and needs TCU.  Pt too weak and would benefit from additional rehabbing before returning to [facility].      Spoke with Batsheva about additional TCU preferences.  Batsheva requested referrals be sent to [facility], [facility], [facility], [facility], and try again at [facility] and [facility].  CHATO sent referrals to to the facilities.    CHATO updated MD and PT of change in discharge rec from [facility].    10:07 AM  Eli at [facility] called and declined Pt stating Pt able to return to [facility] or needs LTC.  Discussed [facility]'s recommendation of TCU; Eli stated that her concern is Pt coming to TCU and [facility] not accepting back and declined Pt.    CHATO will follow up on additional referrals.    Care management following for progression and discharge planning.    Addendum:  11:07 AM  CHATO received call from admissions at [facility]; they can accept Pt today.  Pt will need new covid test.  Asked for update on ride time and discharge orders.    CHATO paged MD via [employer] text that we have a TCU and asked for covid test to be ordered.    CHATO  updated charge RN of discharge plan.    SW called transport and scheduled WC transport for 4:30 p.m. today.      Placed call to Susie at [facility] and updated her that we have an accepting TCU for Pt.  Susie will follow Pt at [facility].    Placed additional call to Pt's daughter Batsheva ([phone]) with update on time of transport (4:30 p.m.)        KALEB Estrella

## 2022-09-20 NOTE — PLAN OF CARE
Occupational Therapy Discharge Summary    Reason for therapy discharge:    Discharging to TCU.    Progress towards therapy goal(s). See goals on Care Plan in [facility] electronic health record for goal details.  Progressing towards goals.    Therapy recommendation(s):    Recommend continued OT @ TCU.

## 2022-09-20 NOTE — PLAN OF CARE
PRIMARY DIAGNOSIS: UTI  OUTPATIENT/OBSERVATION GOALS TO BE MET BEFORE DISCHARGE:  ADLs back to baseline:  discharging to TCU    Activity and level of assistance: Assist x 1 w walker. Discharge to TCU     Pain status: Pain free.    Return to near baseline physical activity: No     Discharge Planner Nurse   Safe discharge environment identified: Yes  Barriers to discharge: No. Discharging today to TCU       Entered by: Kat Swanson RN 09/20/2022 4:35 PM     Please review provider order for any additional goals.   Nurse to notify provider when observation goals have been met and patient is ready for discharge.Goal Outcome Evaluation:

## 2022-09-20 NOTE — PLAN OF CARE
"Goal Outcome Evaluation:        PRIMARY DIAGNOSIS: \"GENERIC\" NURSING  OUTPATIENT/OBSERVATION GOALS TO BE MET BEFORE DISCHARGE:  ADLs back to baseline: No    Activity and level of assistance: Up with standby assistance.    Pain status: Improved-controlled with oral pain medications.    Return to near baseline physical activity: No     Discharge Planner Nurse   Safe discharge environment identified: Yes  Barriers to discharge: Yes       Entered by: Elizabeth Cervantes RN 09/20/2022 5:14 AM     Please review provider order for any additional goals.   Nurse to notify provider when observation goals have been met and patient is ready for discharge.    Patient alert. Oriented x 3 with forgetfulness at times. Pleasant and co-operative. Incontinent of bladder. Denied pain/nausea/vomiting. Denied discomfort when voiding. Will continue to monitor the patient.  "

## 2022-09-20 NOTE — PLAN OF CARE
"PRIMARY DIAGNOSIS: UTI  OUTPATIENT/OBSERVATION GOALS TO BE MET BEFORE DISCHARGE:  1. ADLs back to baseline: No    2. Activity and level of assistance: Up with maximum assistance. Consider SW and/or PT evaluation.     3. Pain status: Pain free.    4. Return to near baseline physical activity: No     Discharge Planner Nurse   Safe discharge environment identified: Yes  Barriers to discharge: No       Entered by: Tierney Car RN 09/19/2022 8:57 PM     Please review provider order for any additional goals.   Nurse to notify provider when observation goals have been met and patient is ready for discharge.Goal Outcome Evaluation:     Patient denied knee pain and declined Tylenol.  States she has no pain except with ambulation.  Stated \"I didn't notice today so much..\"    Up to bedside commode.  Voiding in good amounts.  VSS.                        "

## 2022-09-21 ENCOUNTER — PATIENT OUTREACH (OUTPATIENT)
Dept: CARE COORDINATION | Facility: CLINIC | Age: 87
End: 2022-09-21

## 2022-09-21 NOTE — PLAN OF CARE
Physical Therapy Discharge Summary    Reason for therapy discharge:    Discharged to transitional care facility.    Progress towards therapy goal(s). See goals on Care Plan in [facility] electronic health record for goal details.  Goals not met.  Barriers to achieving goals:   Pt was making progress towards the goals.  .    Therapy recommendation(s):    Continued therapy is recommended.  Rationale/Recommendations:  to improve mobility and strength. .

## 2022-09-21 NOTE — PROGRESS NOTES
[facility]    Background: Transitional Care Management program auto-identified and prompting a chart review by [facility] team.    Assessment: Upon chart review, CCR Team member will cancel/close this episode of Transitional Care Management program due to reason below:    Patient discharged to TCU/ARU/LTACH and is established within [facility] Primary Care. Referral created for Primary Care-Care Coordination program.    Plan: Transitional Care Management episode closed per reason above.      Mulu Srinivasan MA  Connected Care Resource [place], [facility]    *Connected Care Resource Team does NOT follow patient ongoing. Referrals are identified based on internal discharge reports and the outreach is to ensure patient has an understanding of their discharge instructions.

## 2022-09-21 NOTE — PROGRESS NOTES
Contact   Chart Review     Situation: Patient chart reviewed by .    Background: Sent to care coordination for TCU discharge assistance.     Assessment: patient lives in assisted living and has nursing support.      Plan/Recommendations: no further outreach scheduled.    Amanda Rizvi,   [facility]  [phone]

## 2022-09-24 ENCOUNTER — HEALTH MAINTENANCE LETTER (OUTPATIENT)
Age: 87
End: 2022-09-24

## 2022-09-26 ENCOUNTER — TRANSITIONAL CARE UNIT VISIT (OUTPATIENT)
Dept: GERIATRICS | Facility: CLINIC | Age: 87
End: 2022-09-26
Payer: MEDICARE

## 2022-09-26 DIAGNOSIS — G20.C PARKINSONISM, UNSPECIFIED PARKINSONISM TYPE (H): ICD-10-CM

## 2022-09-26 DIAGNOSIS — I10 PRIMARY HYPERTENSION: ICD-10-CM

## 2022-09-26 DIAGNOSIS — F03.90 DEMENTIA WITHOUT BEHAVIORAL DISTURBANCE, UNSPECIFIED DEMENTIA TYPE: Primary | ICD-10-CM

## 2022-09-26 DIAGNOSIS — N30.00 ACUTE CYSTITIS WITHOUT HEMATURIA: ICD-10-CM

## 2022-09-26 PROCEDURE — 99310 SBSQ NF CARE HIGH MDM 45: CPT | Performed by: NURSE PRACTITIONER

## 2022-09-26 NOTE — LETTER
"    9/26/2022        RE: Yamilex Keller  [address]  [place] [address]        [facility] GERIATRICS    PRIMARY CARE PROVIDER AND CLINIC:  Michael Arzate MD, [address] [address] / [facility] [id number]  Chief Complaint   Patient presents with     Hospital F/U      [facility] Medical Record Number:  [id number]  Place of Service where encounter took place:  [facility] [[id number]]    Yamilex Keller  is a 89 year old  (3/25/1929), admitted to the above facility from  [facility]. Hospital stay 9/15 through 9/22..   HPI:    .  Has a history of CAD, hyperlipidemia, dementia, parkinsonian some who was experiencing weakness at her [facility] when she called for help and was brought to the ER, found to have UTI.  She did end up spending 7 days in the hospital awaiting TCU placement.  Now here for PT and OT medical management.    She seen sitting up in her wheelchair in her room, she is able to tell me that she had a UTI and that he lives at an [facility] and \"probably has Parkinson's disease \".  She also tells me her  passed away a year ago.  She is pleasant and appropriate.  Denies any acute concerns.      CODE STATUS/ADVANCE DIRECTIVES DISCUSSION:  Prior  DNR.   ALLERGIES:   Allergies   Allergen Reactions     Lisinopril Swelling     Swelling of lips/mouth      Losartan Angioedema      PAST MEDICAL HISTORY:   Past Medical History:   Diagnosis Date     Acute ST elevation myocardial infarction (STEMI) involving left anterior descending coronary artery (H) 7/18/2017     CAD (coronary artery disease)      Closed wedge compression fracture of seventh thoracic vertebra, initial encounter 7/8/2018     Full code status      GERD (gastroesophageal reflux disease) 1/10/2020     Hearing loss      HLD (hyperlipidemia)      HTN (hypertension) 4/1/2019     Osteoporosis      Temporal arteritis (H)      Tubular adenoma of colon     Last colonoscopy 2011     Uses " walker       PAST SURGICAL HISTORY:   has a past surgical history that includes IR Thoracic Vertebroplasty (8/17/2018); Tonsillectomy; Pr Temporal Artery Ligatn Or Bx (Bilateral, 10/7/2015); Angioplasty (//); Pr Cath Placement & Njx Coronary Art Angio Img S&I (N/A, 7/18/2017); Pr Cath Plmt L Hrt & Arts W/Njx & Angio Img S&I (Left, 7/18/2017); Esophagoscopy, gastroscopy, duodenoscopy (EGD), combined (N/A, 7/9/2018); Blepharoplasty (Bilateral); Cv Coronary Angiogram (N/A, 8/25/2020); Cv Left Heart Catheterization Without Left Ventriculogram (Left, 8/25/2020); Cv Coronary Angiogram (N/A, 8/21/2020); and Cv Left Heart Catheterization Without Left Ventriculogram (Left, 8/21/2020).  FAMILY HISTORY: family history includes Liver Cancer in her mother; Lung Cancer in her brother and brother; Pacemaker in her brother; Prostate Cancer in her father; Uterine Cancer in her sister.  SOCIAL HISTORY:   reports that she quit smoking about 69 years ago. Her smoking use included cigarettes and cigarettes. She has a 4.00 pack-year smoking history. She has never used smokeless tobacco. She reports current alcohol use. She reports that she does not use drugs.  Patient's living condition: lives alone    Post Discharge Medication Reconciliation Status:     Post Medication Reconciliation Status:           Current Outpatient Medications   Medication Sig     aspirin 81 MG EC tablet [ASPIRIN 81 MG EC TABLET] Take 1 tablet (81 mg total) by mouth at bedtime. For CAD     atorvastatin (LIPITOR) 40 MG tablet TAKE 1 TABLET BY MOUTH EVERY DAY.     calcium, as carbonate, (TUMS) 200 mg calcium (500 mg) chewable tablet [CALCIUM, AS CARBONATE, (TUMS) 200 MG CALCIUM (500 MG) CHEWABLE TABLET] Chew 1 tablet as needed for heartburn.     calcium-vitamin D 500 mg(1,250mg) -200 unit per tablet [CALCIUM-VITAMIN D 500 MG(1,250MG) -200 UNIT PER TABLET] Take 1 tablet by mouth 2 (two) times a day with meals.     isosorbide mononitrate (IMDUR) 30 MG 24 hr tablet TAKE  "1 TABLET EVERY MORNING     metoprolol succinate ER (TOPROL-XL) 25 MG 24 hr tablet Take 1 tablet (25 mg) by mouth daily     nitroglycerin (NITROSTAT) 0.4 MG SL tablet [NITROGLYCERIN (NITROSTAT) 0.4 MG SL TABLET] Place 1 tablet (0.4 mg total) under the tongue every 5 (five) minutes as needed for chest pain.     omeprazole (PRILOSEC) 20 MG DR capsule TAKE 1 CAPSULE(20 MG) BY MOUTH DAILY BEFORE BREAKFAST     triamcinolone (KENALOG) 0.1 % cream [TRIAMCINOLONE (KENALOG) 0.1 % CREAM] Apply 1 application topically as needed.     Current Facility-Administered Medications   Medication     denosumab (PROLIA) injection 60 mg       ROS:  4 point ROS including Respiratory, CV, GI and , other than that noted in the HPI,  is negative    Vitals:  BP (!) 143/65   Pulse (!) 46   Temp 98.3  F (36.8  C)   Resp 16   Ht 1.651 m (5' 5\")   Wt 51 kg (112 lb 6.4 oz)   SpO2 100%   BMI 18.70 kg/m    Exam:  Physical Exam   General appearance: alert, appears stated age and cooperative.   Head: Normocephalic, without obvious abnormality, atraumatic, Eyes: sclera anicteric.  Lungs: respirations without effort, LSCTA; no wheezing or rales.   Cardiovascular: S1, S2. Regular rate and rhythm.   ABDOMEN: Globular and soft, non tender.    Extremities: extremities normal, atraumatic, no cyanosis or edema  Skin: Skin color, texture, turgor normal. No rashes or lesions  Neurologic:oriented. No focal deficits.   Psych: interacts well with caregivers, exhibits logical thought processes and connections, pleasant    Lab/Diagnostic data:    Most Recent 3 CBC's:Recent Labs   Lab Test 09/15/22  0514 02/24/22  1248 09/23/21  1915   WBC 7.4 9.8 7.7   HGB 14.6 15.1 14.9   MCV 95 101* 99    202 197     Most Recent 3 BMP's:Recent Labs   Lab Test 09/18/22  1632 09/18/22  1131 09/18/22  0814 09/15/22  0514 02/24/22  1248 09/23/21  1915   NA  --   --   --  143 142 142   POTASSIUM  --   --   --  3.7 4.5 4.3   CHLORIDE  --   --   --  107 103 105   CO2  --   " --   --  29 29 28   BUN  --   --   --  21 17 18   CR  --   --   --  0.73 0.75 0.77   ANIONGAP  --   --   --  7 10 9   MARLYN  --   --   --  10.3 9.8 9.8   * 85 93 90 81 95       ASSESSMENT/PLAN:    (F03.90) Dementia without behavioral disturbance, unspecified dementia type  (primary encounter diagnosis)  Comment: Mild.  Plan: Occupational Therapy, senior living at discharge.      (G20) Parkinsonism, unspecified Parkinsonism type (H)  Plan: Physical therapy.    (N30.00) Acute cystitis without hematuria  Comment: Resolved on Keflex.  Plan: Monitor for symptom recurrence.    (I10) Primary hypertension  Comment: Monitor in TCU.  Plan: Continue Toprol and Imdur.    CAD  Plan: Continue Lipitor.    Electronically signed by:  Asa Jones CNP                       Sincerely,        Asa Jones, CNP

## 2022-09-27 ENCOUNTER — TRANSITIONAL CARE UNIT VISIT (OUTPATIENT)
Dept: GERIATRICS | Facility: CLINIC | Age: 87
End: 2022-09-27
Payer: MEDICARE

## 2022-09-27 VITALS
BODY MASS INDEX: 18.73 KG/M2 | RESPIRATION RATE: 16 BRPM | HEART RATE: 46 BPM | SYSTOLIC BLOOD PRESSURE: 143 MMHG | WEIGHT: 112.4 LBS | DIASTOLIC BLOOD PRESSURE: 65 MMHG | OXYGEN SATURATION: 100 % | TEMPERATURE: 98.3 F | HEIGHT: 65 IN

## 2022-09-27 VITALS
BODY MASS INDEX: 18.73 KG/M2 | HEIGHT: 65 IN | HEART RATE: 46 BPM | WEIGHT: 112.4 LBS | OXYGEN SATURATION: 100 % | TEMPERATURE: 98.3 F | SYSTOLIC BLOOD PRESSURE: 143 MMHG | RESPIRATION RATE: 16 BRPM | DIASTOLIC BLOOD PRESSURE: 65 MMHG

## 2022-09-27 DIAGNOSIS — E78.5 HYPERLIPIDEMIA, UNSPECIFIED HYPERLIPIDEMIA TYPE: ICD-10-CM

## 2022-09-27 DIAGNOSIS — I25.10 CORONARY ARTERY DISEASE DUE TO LIPID RICH PLAQUE: ICD-10-CM

## 2022-09-27 DIAGNOSIS — I10 ESSENTIAL HYPERTENSION: ICD-10-CM

## 2022-09-27 DIAGNOSIS — R53.1 WEAKNESS: Primary | ICD-10-CM

## 2022-09-27 DIAGNOSIS — I25.83 CORONARY ARTERY DISEASE DUE TO LIPID RICH PLAQUE: ICD-10-CM

## 2022-09-27 DIAGNOSIS — N30.00 ACUTE CYSTITIS WITHOUT HEMATURIA: ICD-10-CM

## 2022-09-27 PROCEDURE — 99305 1ST NF CARE MODERATE MDM 35: CPT | Performed by: FAMILY MEDICINE

## 2022-09-27 NOTE — PROGRESS NOTES
"[facility] GERIATRICS    PRIMARY CARE PROVIDER AND CLINIC:  Michael Arzate MD, [address] [address] / [facility] [address]  Chief Complaint   Patient presents with     Hospital F/U      [place] Medical Record Number:  [id number]  Place of Service where encounter took place:  [facility] ([facility]) [[id number]]    Yamilex Keller  is a 89 year old  (3/25/1929), admitted to the above facility from  [facility]. Hospital stay 9/15 through 9/22..   HPI:    .  Has a history of CAD, hyperlipidemia, dementia, parkinsonian some who was experiencing weakness at her [facility] when she called for help and was brought to the ER, found to have UTI.  She did end up spending 7 days in the hospital awaiting TCU placement.  Now here for PT and OT medical management.    She seen sitting up in her wheelchair in her room, she is able to tell me that she had a UTI and that he lives at an [facility] and \"probably has Parkinson's disease \".  She also tells me her  passed away a year ago.  She is pleasant and appropriate.  Denies any acute concerns.      CODE STATUS/ADVANCE DIRECTIVES DISCUSSION:  Prior  DNR.   ALLERGIES:   Allergies   Allergen Reactions     Lisinopril Swelling     Swelling of lips/mouth      Losartan Angioedema      PAST MEDICAL HISTORY:   Past Medical History:   Diagnosis Date     Acute ST elevation myocardial infarction (STEMI) involving left anterior descending coronary artery (H) 7/18/2017     CAD (coronary artery disease)      Closed wedge compression fracture of seventh thoracic vertebra, initial encounter 7/8/2018     Full code status      GERD (gastroesophageal reflux disease) 1/10/2020     Hearing loss      HLD (hyperlipidemia)      HTN (hypertension) 4/1/2019     Osteoporosis      Temporal arteritis (H)      Tubular adenoma of colon     Last colonoscopy 2011     Uses walker       PAST SURGICAL HISTORY:   has a past surgical history that includes IR Thoracic " Vertebroplasty (8/17/2018); Tonsillectomy; Pr Temporal Artery Ligatn Or Bx (Bilateral, 10/7/2015); Angioplasty (//); Pr Cath Placement & Njx Coronary Art Angio Img S&I (N/A, 7/18/2017); Pr Cath Plmt L Hrt & Arts W/Njx & Angio Img S&I (Left, 7/18/2017); Esophagoscopy, gastroscopy, duodenoscopy (EGD), combined (N/A, 7/9/2018); Blepharoplasty (Bilateral); Cv Coronary Angiogram (N/A, 8/25/2020); Cv Left Heart Catheterization Without Left Ventriculogram (Left, 8/25/2020); Cv Coronary Angiogram (N/A, 8/21/2020); and Cv Left Heart Catheterization Without Left Ventriculogram (Left, 8/21/2020).  FAMILY HISTORY: family history includes Liver Cancer in her mother; Lung Cancer in her brother and brother; Pacemaker in her brother; Prostate Cancer in her father; Uterine Cancer in her sister.  SOCIAL HISTORY:   reports that she quit smoking about 69 years ago. Her smoking use included cigarettes and cigarettes. She has a 4.00 pack-year smoking history. She has never used smokeless tobacco. She reports current alcohol use. She reports that she does not use drugs.  Patient's living condition: lives alone    Post Discharge Medication Reconciliation Status:     Post Medication Reconciliation Status:           Current Outpatient Medications   Medication Sig     aspirin 81 MG EC tablet [ASPIRIN 81 MG EC TABLET] Take 1 tablet (81 mg total) by mouth at bedtime. For CAD     atorvastatin (LIPITOR) 40 MG tablet TAKE 1 TABLET BY MOUTH EVERY DAY.     calcium, as carbonate, (TUMS) 200 mg calcium (500 mg) chewable tablet [CALCIUM, AS CARBONATE, (TUMS) 200 MG CALCIUM (500 MG) CHEWABLE TABLET] Chew 1 tablet as needed for heartburn.     calcium-vitamin D 500 mg(1,250mg) -200 unit per tablet [CALCIUM-VITAMIN D 500 MG(1,250MG) -200 UNIT PER TABLET] Take 1 tablet by mouth 2 (two) times a day with meals.     isosorbide mononitrate (IMDUR) 30 MG 24 hr tablet TAKE 1 TABLET EVERY MORNING     metoprolol succinate ER (TOPROL-XL) 25 MG 24 hr tablet Take 1  "tablet (25 mg) by mouth daily     nitroglycerin (NITROSTAT) 0.4 MG SL tablet [NITROGLYCERIN (NITROSTAT) 0.4 MG SL TABLET] Place 1 tablet (0.4 mg total) under the tongue every 5 (five) minutes as needed for chest pain.     omeprazole (PRILOSEC) 20 MG DR capsule TAKE 1 CAPSULE(20 MG) BY MOUTH DAILY BEFORE BREAKFAST     triamcinolone (KENALOG) 0.1 % cream [TRIAMCINOLONE (KENALOG) 0.1 % CREAM] Apply 1 application topically as needed.     Current Facility-Administered Medications   Medication     denosumab (PROLIA) injection 60 mg       ROS:  4 point ROS including Respiratory, CV, GI and , other than that noted in the HPI,  is negative    Vitals:  BP (!) 143/65   Pulse (!) 46   Temp 98.3  F (36.8  C)   Resp 16   Ht 1.651 m (5' 5\")   Wt 51 kg (112 lb 6.4 oz)   SpO2 100%   BMI 18.70 kg/m    Exam:  Physical Exam   General appearance: alert, appears stated age and cooperative.   Head: Normocephalic, without obvious abnormality, atraumatic, Eyes: sclera anicteric.  Lungs: respirations without effort, LSCTA; no wheezing or rales.   Cardiovascular: S1, S2. Regular rate and rhythm.   ABDOMEN: Globular and soft, non tender.    Extremities: extremities normal, atraumatic, no cyanosis or edema  Skin: Skin color, texture, turgor normal. No rashes or lesions  Neurologic:oriented. No focal deficits.   Psych: interacts well with caregivers, exhibits logical thought processes and connections, pleasant    Lab/Diagnostic data:    Most Recent 3 CBC's:Recent Labs   Lab Test 09/15/22  0514 02/24/22  1248 09/23/21 1915   WBC 7.4 9.8 7.7   HGB 14.6 15.1 14.9   MCV 95 101* 99    202 197     Most Recent 3 BMP's:Recent Labs   Lab Test 09/18/22  1632 09/18/22  1131 09/18/22  0814 09/15/22  0514 02/24/22  1248 09/23/21  1915   NA  --   --   --  143 142 142   POTASSIUM  --   --   --  3.7 4.5 4.3   CHLORIDE  --   --   --  107 103 105   CO2  --   --   --  29 29 28   BUN  --   --   --  21 17 18   CR  --   --   --  0.73 0.75 0.77 "   ANIONGAP  --   --   --  7 10 9   MARLYN  --   --   --  10.3 9.8 9.8   * 85 93 90 81 95       ASSESSMENT/PLAN:    (F03.90) Dementia without behavioral disturbance, unspecified dementia type  (primary encounter diagnosis)  Comment: Mild.  Plan: Occupational Therapy, assisted at discharge.      (G20) Parkinsonism, unspecified Parkinsonism type (H)  Plan: Physical therapy.    (N30.00) Acute cystitis without hematuria  Comment: Resolved on Keflex.  Plan: Monitor for symptom recurrence.    (I10) Primary hypertension  Comment: Monitor in TCU.  Plan: Continue Toprol and Imdur.    CAD  Plan: Continue Lipitor.    Electronically signed by:  Asa Jones, CNP

## 2022-09-27 NOTE — LETTER
9/27/2022        RE: Yamilex Keller  [address]  [place] [address]          [facility] GERIATRICS    [place] Medical Record Number:  [id number]  [facility] [[id number]]   CODE STATUS:   DNR / DNI    Chief Complaint/Reason for Visit:  Chief Complaint   Patient presents with     Hospital F/U     Admit note to TCU for weakness, UTI.        HPI:    Yamilex Keller is a 89 year old female with hx of HTN and HLD, osteoporosis, Parkinsonism, resides in [facility], admitted to the hospital on 9/15/2022 with progressive generalized weakness. Work up revealed UTI. She was treated in the hospital with IV Rocephin then changed to PO Keflex, completed course for UC positive for E coli. There is no discharge summary available to provide further details of her hospitalization. Pt reports no problems during her hospital stay. She was felt to need TCU and discharged to [facility] TCU on 9/20/2022 for PT, OT, nursing cares, medical management and monitoring.       Today:  She is somewhat tired but feeling okay. She has been working with therapy. She denies any pain. Has no urinary sx. Had finished course of abx in the hospital. No abdominal pain, nausea, vomiting or diarrhea. No fever, cough or congestion. She has fair appetite. Needs assist from staff due to gait instability and overall weakness. No complaints of chest pain, shortness of breath or dizziness. No new bowel concerns. She is a , lives at [facility]. No new vision or hearing concerns.       REVIEW OF SYSTEMS:  All others negative other than those noted in HPI.      PAST MEDICAL HISTORY:  Past Medical History:   Diagnosis Date     Acute ST elevation myocardial infarction (STEMI) involving left anterior descending coronary artery (H) 7/18/2017     CAD (coronary artery disease)      Closed wedge compression fracture of seventh thoracic vertebra,  initial encounter 7/8/2018     Full code status      GERD (gastroesophageal reflux disease) 1/10/2020     Hearing loss      HLD (hyperlipidemia)      HTN (hypertension) 4/1/2019     Osteoporosis      Temporal arteritis (H)      Tubular adenoma of colon     Last colonoscopy 2011     Uses walker        PAST SURGICAL HISTORY:  Past Surgical History:   Procedure Laterality Date     ANGIOPLASTY  //     BLEPHAROPLASTY Bilateral      CV CORONARY ANGIOGRAM N/A 8/25/2020    Procedure: Coronary Angiogram;  Surgeon: Jennifer Peck MD;  Location: [facility];  Service: Cardiology     CV CORONARY ANGIOGRAM N/A 8/21/2020    Procedure: Coronary Angiogram;  Surgeon: Howard Navarro MD;  Location: [facility];  Service: Cardiology     CV LEFT HEART CATHETERIZATION WITHOUT LEFT VENTRICULOGRAM Left 8/25/2020    Procedure: Left Heart Catheterization Without Left Ventriculogram;  Surgeon: Jennifer Peck MD;  Location: [facility];  Service: Cardiology     CV LEFT HEART CATHETERIZATION WITHOUT LEFT VENTRICULOGRAM Left 8/21/2020    Procedure: Left Heart Catheterization Without Left Ventriculogram;  Surgeon: Howard Navarro MD;  Location: [facility];  Service: Cardiology     ESOPHAGOSCOPY, GASTROSCOPY, DUODENOSCOPY (EGD), COMBINED N/A 7/9/2018    Procedure: ESOPHAGOGASTRODUODENOSCOPY (EGD);  Surgeon: Prabhu Lin MD;  Location: [facility];  Service:      IR THORACIC VERTEBROPLASTY  8/17/2018     GA CATH PLACEMENT & NJX CORONARY ART ANGIO IMG S&I N/A 7/18/2017    Procedure: Coronary Angiogram;  Surgeon: Shelia Garcia MD;  Location: [facility];  Service: Cardiology     GA CATH PLMT L HRT & ARTS W/NJX & ANGIO IMG S&I Left 7/18/2017    Procedure: Left Heart Catheterization Without Left Ventriculogram;  Surgeon: Shelia Garcia MD;  Location: [facility];  Service: Cardiology     GA TEMPORAL ARTERY LIGATN OR BX Bilateral 10/7/2015    Procedure: BILATERAL TEMPORAL  ARTERY BIOPSY;  Surgeon: Alfredito Jason MD;  Location: [facility];  Service: General     TONSILLECTOMY         FAMILY HISTORY:  Family History   Problem Relation Age of Onset     Liver Cancer Mother      Prostate Cancer Father      Pacemaker Brother      Lung Cancer Brother      Lung Cancer Brother      Uterine Cancer Sister        SOCIAL HISTORY:  Social History     Socioeconomic History     Marital status:      Spouse name: Not on file     Number of children: 1     Years of education: Not on file     Highest education level: Not on file   Occupational History     Occupation: RETIRED   Tobacco Use     Smoking status: Former Smoker     Packs/day: 1.00     Years: 4.00     Pack years: 4.00     Types: Cigarettes, Cigarettes     Quit date: 1953     Years since quittin.8     Smokeless tobacco: Never Used   Substance and Sexual Activity     Alcohol use: Yes     Comment: Alcoholic Drinks/day: Occassionally     Drug use: No     Sexual activity: Not Currently     Partners: Male   Other Topics Concern     Not on file   Social History Narrative     to  Aris.  1 daughter.        Patient of Dr. Arzate since 2019.      Social Determinants of Health     Financial Resource Strain: Not on file   Food Insecurity: Not on file   Transportation Needs: Not on file   Physical Activity: Not on file   Stress: Not on file   Social Connections: Not on file   Intimate Partner Violence: Not on file   Housing Stability: Not on file       MEDICATIONS:  Current Outpatient Medications   Medication Sig Dispense Refill     aspirin 81 MG EC tablet [ASPIRIN 81 MG EC TABLET] Take 1 tablet (81 mg total) by mouth at bedtime. For CAD 30 tablet 0     atorvastatin (LIPITOR) 40 MG tablet TAKE 1 TABLET BY MOUTH EVERY DAY. 90 tablet 3     calcium, as carbonate, (TUMS) 200 mg calcium (500 mg) chewable tablet [CALCIUM, AS CARBONATE, (TUMS) 200 MG CALCIUM (500 MG) CHEWABLE TABLET] Chew 1 tablet as needed for heartburn.    "    calcium-vitamin D 500 mg(1,250mg) -200 unit per tablet [CALCIUM-VITAMIN D 500 MG(1,250MG) -200 UNIT PER TABLET] Take 1 tablet by mouth 2 (two) times a day with meals.       isosorbide mononitrate (IMDUR) 30 MG 24 hr tablet TAKE 1 TABLET EVERY MORNING 90 tablet 2     metoprolol succinate ER (TOPROL-XL) 25 MG 24 hr tablet Take 1 tablet (25 mg) by mouth daily 90 tablet 2     nitroglycerin (NITROSTAT) 0.4 MG SL tablet [NITROGLYCERIN (NITROSTAT) 0.4 MG SL TABLET] Place 1 tablet (0.4 mg total) under the tongue every 5 (five) minutes as needed for chest pain. 25 tablet 2     omeprazole (PRILOSEC) 20 MG DR capsule TAKE 1 CAPSULE(20 MG) BY MOUTH DAILY BEFORE BREAKFAST 90 capsule 3     triamcinolone (KENALOG) 0.1 % cream [TRIAMCINOLONE (KENALOG) 0.1 % CREAM] Apply 1 application topically as needed.          ALLERGIES:  Allergies   Allergen Reactions     Lisinopril Swelling     Swelling of lips/mouth      Losartan Angioedema       PHYSICAL EXAM:  General: Patient is alert female, no distress.   Vitals:BP (!) 143/65   Pulse (!) 46   Temp 98.3  F (36.8  C)   Resp 16   Ht 1.651 m (5' 5\")   Wt 51 kg (112 lb 6.4 oz)   SpO2 100%   BMI 18.70 kg/m     HEENT: Head is NCAT. Eyes show no injection or icterus. Nares negative. Oropharynx well hydrated.  Neck: Supple. No tenderness or adenopathy. No JVD.  Lungs: Clear bilaterally. No wheezes.  Cardiovascular: Regular rate and rhythm, normal S1, S2.  Back: No spinal or CVA tenderness.  Abdomen: Soft, no tenderness on exam. Bowel sounds present. No guarding rebound or rigidity.  : Deferred.  Extremities: No signif distal edema is noted.  Musculoskeletal: Age related degen changes.  Skin: No open areas.   Psych: Mood appears good.      LABS/DIAGNOSTIC DATA:  Component      Latest Ref Rng & Units 9/15/2022             WBC      4.0 - 11.0 10e3/uL 7.4   RBC Count      3.80 - 5.20 10e6/uL 4.58   Hemoglobin      11.7 - 15.7 g/dL 14.6   Hematocrit      35.0 - 47.0 % 43.6   MCV      78 - " 100 fL 95   MCH      26.5 - 33.0 pg 31.9   MCHC      31.5 - 36.5 g/dL 33.5   RDW      10.0 - 15.0 % 13.2   Platelet Count      150 - 450 10e3/uL 177     Component      Latest Ref Rng & Units 9/15/2022             Sodium      136 - 145 mmol/L 143   Potassium      3.5 - 5.0 mmol/L 3.7   Chloride      98 - 107 mmol/L 107   Carbon Dioxide      22 - 31 mmol/L 29   Anion Gap      5 - 18 mmol/L 7   Glucose      70 - 125 mg/dL 90   Calcium      8.5 - 10.5 mg/dL 10.3   Urea Nitrogen      8 - 28 mg/dL 21   Creatinine      0.60 - 1.10 mg/dL 0.73   GFR Estimate      >60 mL/min/1.73m2 76         ASSESSMENT/PLAN:  1. Weakness. Generalized. Here in TCU for therapies. She had progressively worsening weakness at her half-way, found to have UTI and treated. Unclear if she will return to prior baseline, may need more help in the future.  2. UTI. E coli on UC, treated with abx. No sx of concern.   3. HTN. On metoprolol and isosorbide. Bps monitored in TCU for any need for changes. Pulse low side sec to beta blocker, asymptomatic.   4. HLD. Continue PTA Lipitor.   5. CAD. Hx angioplasty. On isosorbide and aspirin and statin. No complaints of chest pain.   6. Osteoporosis. Continue calcium and vitamin D supplementation.   7. Parkinsonism. Noted per chart and pt hx. Not on meds.   8. Hx GCA. Not active.  9. Code status is DNR/DNI.          Electronically signed by: Rupinder White MD          Sincerely,        Rupinder White MD

## 2022-09-29 ENCOUNTER — TRANSITIONAL CARE UNIT VISIT (OUTPATIENT)
Dept: GERIATRICS | Facility: CLINIC | Age: 87
End: 2022-09-29
Payer: MEDICARE

## 2022-09-29 VITALS
OXYGEN SATURATION: 97 % | BODY MASS INDEX: 18.59 KG/M2 | HEIGHT: 65 IN | DIASTOLIC BLOOD PRESSURE: 76 MMHG | RESPIRATION RATE: 16 BRPM | WEIGHT: 111.6 LBS | SYSTOLIC BLOOD PRESSURE: 148 MMHG | HEART RATE: 58 BPM | TEMPERATURE: 98.7 F

## 2022-09-29 DIAGNOSIS — E78.5 HYPERLIPIDEMIA, UNSPECIFIED HYPERLIPIDEMIA TYPE: ICD-10-CM

## 2022-09-29 DIAGNOSIS — R53.1 WEAKNESS: Primary | ICD-10-CM

## 2022-09-29 DIAGNOSIS — I10 ESSENTIAL HYPERTENSION: ICD-10-CM

## 2022-09-29 DIAGNOSIS — N30.00 ACUTE CYSTITIS WITHOUT HEMATURIA: ICD-10-CM

## 2022-09-29 PROCEDURE — 99309 SBSQ NF CARE MODERATE MDM 30: CPT | Performed by: FAMILY MEDICINE

## 2022-09-29 NOTE — LETTER
9/29/2022        RE: Yamilex Keller  [address]  [place] [address]          [facility] GERIATRICS    [place] Medical Record Number:  [id number]  Place of Service where encounter took place: [facility] [[id number]]   CODE STATUS:   DNR / DNI    Chief Complaint/Reason for Visit:  Chief Complaint   Patient presents with     RECHECK     TCU 9/29/2022. Weakness, UTI.        TCU HPI:    Yamilex Keller is a 89 year old female with hx of HTN and HLD, osteoporosis, Parkinsonism, resides in [facility], admitted to the hospital on 9/15/2022 with progressive generalized weakness. Work up revealed UTI. She was treated in the hospital with IV Rocephin then changed to PO Keflex, completed course for UC positive for E coli. There is no discharge summary available to provide further details of her hospitalization. Pt reports no problems during her hospital stay. She was felt to need TCU and discharged to [facility] TCU on 9/20/2022 for PT, OT, nursing cares, medical management and monitoring.       Today:  She feels a little stronger but still overall tired and weak. Working with therapy as able. No pain. No urinary sx, had completed abx for UTI. Appetite is fair. Dietician has seen for nutritional supplement recommendation. No abdominal pain, nausea, vomiting or diarrhea. No fever, cough or congestion. No complaints of chest pain, shortness of breath or dizziness. No new bowel concerns.       PAST MEDICAL HISTORY:  Past Medical History:   Diagnosis Date     Acute ST elevation myocardial infarction (STEMI) involving left anterior descending coronary artery (H) 7/18/2017     CAD (coronary artery disease)      Closed wedge compression fracture of seventh thoracic vertebra, initial encounter 7/8/2018     Full code status      GERD (gastroesophageal reflux disease) 1/10/2020     Hearing loss      HLD (hyperlipidemia)      HTN (hypertension) 4/1/2019     Osteoporosis       "Temporal arteritis (H)      Tubular adenoma of colon     Last colonoscopy 2011     Uses walker        MEDICATIONS:  Current Outpatient Medications   Medication Sig Dispense Refill     aspirin 81 MG EC tablet [ASPIRIN 81 MG EC TABLET] Take 1 tablet (81 mg total) by mouth at bedtime. For CAD 30 tablet 0     atorvastatin (LIPITOR) 40 MG tablet TAKE 1 TABLET BY MOUTH EVERY DAY. 90 tablet 3     calcium, as carbonate, (TUMS) 200 mg calcium (500 mg) chewable tablet [CALCIUM, AS CARBONATE, (TUMS) 200 MG CALCIUM (500 MG) CHEWABLE TABLET] Chew 1 tablet as needed for heartburn.       calcium-vitamin D 500 mg(1,250mg) -200 unit per tablet [CALCIUM-VITAMIN D 500 MG(1,250MG) -200 UNIT PER TABLET] Take 1 tablet by mouth 2 (two) times a day with meals.       isosorbide mononitrate (IMDUR) 30 MG 24 hr tablet TAKE 1 TABLET EVERY MORNING 90 tablet 2     metoprolol succinate ER (TOPROL-XL) 25 MG 24 hr tablet Take 1 tablet (25 mg) by mouth daily 90 tablet 2     nitroglycerin (NITROSTAT) 0.4 MG SL tablet [NITROGLYCERIN (NITROSTAT) 0.4 MG SL TABLET] Place 1 tablet (0.4 mg total) under the tongue every 5 (five) minutes as needed for chest pain. 25 tablet 2     omeprazole (PRILOSEC) 20 MG DR capsule TAKE 1 CAPSULE(20 MG) BY MOUTH DAILY BEFORE BREAKFAST 90 capsule 3     triamcinolone (KENALOG) 0.1 % cream [TRIAMCINOLONE (KENALOG) 0.1 % CREAM] Apply 1 application topically as needed.          PHYSICAL EXAM:  General: Patient is alert female, no distress.   Vitals:BP (!) 148/76   Pulse 58   Temp 98.7  F (37.1  C)   Resp 16   Ht 1.651 m (5' 5\")   Wt 50.6 kg (111 lb 9.6 oz)   SpO2 97%   BMI 18.57 kg/m     HEENT: Head is NCAT. Eyes show no injection or icterus. Nares negative. Oropharynx moist.  Neck:  No JVD.  Lungs: Non labored respirations.   Abdomen: Soft.  : Deferred.  Extremities: No significant edema.  Musculoskeletal: Age related degen changes.  Skin: Warm and dry.   Psych: Mood appears good.      LABS/DIAGNOSTIC " DATA:  Component      Latest Ref Rng & Units 9/15/2022             WBC      4.0 - 11.0 10e3/uL 7.4   RBC Count      3.80 - 5.20 10e6/uL 4.58   Hemoglobin      11.7 - 15.7 g/dL 14.6   Hematocrit      35.0 - 47.0 % 43.6   MCV      78 - 100 fL 95   MCH      26.5 - 33.0 pg 31.9   MCHC      31.5 - 36.5 g/dL 33.5   RDW      10.0 - 15.0 % 13.2   Platelet Count      150 - 450 10e3/uL 177     Component      Latest Ref Rng & Units 9/15/2022             Sodium      136 - 145 mmol/L 143   Potassium      3.5 - 5.0 mmol/L 3.7   Chloride      98 - 107 mmol/L 107   Carbon Dioxide      22 - 31 mmol/L 29   Anion Gap      5 - 18 mmol/L 7   Glucose      70 - 125 mg/dL 90   Calcium      8.5 - 10.5 mg/dL 10.3   Urea Nitrogen      8 - 28 mg/dL 21   Creatinine      0.60 - 1.10 mg/dL 0.73   GFR Estimate      >60 mL/min/1.73m2 76         ASSESSMENT/PLAN:  1. Weakness. Deconditioning. Advanced age, recent UTI. Continuing in therapies as able.   2. UTI. UC with E coli, completed course of abx. No urinary sx.  3. HTN. On metoprolol and isosorbide. Bps satisfactory.  4. HLD. Continue PTA Lipitor.   5. CAD. Hx angioplasty. On isosorbide and aspirin and statin. No chest pain, palpitations, dyspnea or dizziness.   6. Parkinsonism. Noted per chart and pt hx. Not on meds.         Electronically signed by: Rupinder White MD          Sincerely,        Rupinder White MD

## 2022-10-04 ENCOUNTER — TRANSITIONAL CARE UNIT VISIT (OUTPATIENT)
Dept: GERIATRICS | Facility: CLINIC | Age: 87
End: 2022-10-04
Payer: MEDICARE

## 2022-10-04 VITALS
RESPIRATION RATE: 18 BRPM | OXYGEN SATURATION: 96 % | SYSTOLIC BLOOD PRESSURE: 130 MMHG | HEIGHT: 65 IN | DIASTOLIC BLOOD PRESSURE: 58 MMHG | HEART RATE: 53 BPM | BODY MASS INDEX: 18.49 KG/M2 | TEMPERATURE: 98.5 F | WEIGHT: 111 LBS

## 2022-10-04 DIAGNOSIS — R53.81 PHYSICAL DEBILITY: Primary | ICD-10-CM

## 2022-10-04 DIAGNOSIS — N30.00 ACUTE CYSTITIS WITHOUT HEMATURIA: ICD-10-CM

## 2022-10-04 DIAGNOSIS — I10 ESSENTIAL HYPERTENSION: ICD-10-CM

## 2022-10-04 DIAGNOSIS — I25.10 CORONARY ARTERY DISEASE DUE TO LIPID RICH PLAQUE: ICD-10-CM

## 2022-10-04 DIAGNOSIS — I25.83 CORONARY ARTERY DISEASE DUE TO LIPID RICH PLAQUE: ICD-10-CM

## 2022-10-04 PROCEDURE — 99309 SBSQ NF CARE MODERATE MDM 30: CPT | Performed by: FAMILY MEDICINE

## 2022-10-04 NOTE — LETTER
10/4/2022        RE: Yamilex Keller  [address]  [place] [address]          [facility] GERIATRICS    [place] Medical Record Number:  [id number]  Place of [place] where [place] took place[place] [facility] [place] [place]) [[id number]]   CODE STATUS:   DNR / DNI    Chief Complaint/Reason for Visit:  Chief Complaint   Patient presents with     Follow Up     TCU 10/4/2022. Weakness, UTI.       TCU HPI:    Yamilex Keller is a 89 year old female with hx of HTN and HLD, osteoporosis, Parkinsonism, resides in [facility], admitted to the hospital on 9/15/2022 with progressive generalized weakness. Work up revealed UTI. She was treated in the hospital with IV Rocephin then changed to PO Keflex, completed course for UC positive for E coli. There is no discharge summary available to provide further details of her hospitalization. Pt reports no problems during her hospital stay. She was felt to need TCU and discharged to [facility] TCU on 9/20/2022 for PT, OT, nursing cares, medical management and monitoring.       Today:  No new concerns. She is working with therapy, gaining strength slowly. Unclear if she will be able to return to prior living situation. Care conference in TCU needed. Denies any pain. Treated for UTI, no current sx. Appetite is fair. No abdominal pain, nausea, vomiting or diarrhea. No fever, cough or congestion. No complaints of chest pain, shortness of breath or dizziness. No bowel concerns.       PAST MEDICAL HISTORY:  Past Medical History:   Diagnosis Date     Acute ST elevation myocardial infarction (STEMI) involving left anterior descending coronary artery (H) 7/18/2017     CAD (coronary artery disease)      Closed wedge compression fracture of seventh thoracic vertebra, initial encounter 7/8/2018     Full code status      GERD (gastroesophageal reflux disease) 1/10/2020     Hearing loss      HLD (hyperlipidemia)      HTN (hypertension) 4/1/2019      "Osteoporosis      Temporal arteritis (H)      Tubular adenoma of colon     Last colonoscopy 2011     Uses walker        MEDICATIONS:  Current Outpatient Medications   Medication Sig Dispense Refill     aspirin 81 MG EC tablet [ASPIRIN 81 MG EC TABLET] Take 1 tablet (81 mg total) by mouth at bedtime. For CAD 30 tablet 0     atorvastatin (LIPITOR) 40 MG tablet TAKE 1 TABLET BY MOUTH EVERY DAY. 90 tablet 3     calcium, as carbonate, (TUMS) 200 mg calcium (500 mg) chewable tablet [CALCIUM, AS CARBONATE, (TUMS) 200 MG CALCIUM (500 MG) CHEWABLE TABLET] Chew 1 tablet as needed for heartburn.       calcium-vitamin D 500 mg(1,250mg) -200 unit per tablet [CALCIUM-VITAMIN D 500 MG(1,250MG) -200 UNIT PER TABLET] Take 1 tablet by mouth 2 (two) times a day with meals.       isosorbide mononitrate (IMDUR) 30 MG 24 hr tablet TAKE 1 TABLET EVERY MORNING 90 tablet 2     metoprolol succinate ER (TOPROL-XL) 25 MG 24 hr tablet Take 1 tablet (25 mg) by mouth daily 90 tablet 2     nitroglycerin (NITROSTAT) 0.4 MG SL tablet [NITROGLYCERIN (NITROSTAT) 0.4 MG SL TABLET] Place 1 tablet (0.4 mg total) under the tongue every 5 (five) minutes as needed for chest pain. 25 tablet 2     omeprazole (PRILOSEC) 20 MG DR capsule TAKE 1 CAPSULE(20 MG) BY MOUTH DAILY BEFORE BREAKFAST 90 capsule 3     triamcinolone (KENALOG) 0.1 % cream [TRIAMCINOLONE (KENALOG) 0.1 % CREAM] Apply 1 application topically as needed.          PHYSICAL EXAM:  General: Patient is alert female, no distress.   Vitals:/58   Pulse 53   Temp 98.5  F (36.9  C)   Resp 18   Ht 1.651 m (5' 5\")   Wt 50.3 kg (111 lb)   SpO2 96%   BMI 18.47 kg/m     HEENT: Head is NCAT. Eyes show no injection or icterus. Nares negative. Oropharynx hydrated.  Neck:  No JVD.  Lungs: Non labored respirations.    Abdomen: Soft.  : Deferred.  Extremities: Trace edema.   Musculoskeletal: Age related degen changes.  Skin: Warm and dry.   Psych: Mood is good.       LABS/DIAGNOSTIC " DATA:  Component      Latest Ref Rng & Units 9/15/2022             WBC      4.0 - 11.0 10e3/uL 7.4   RBC Count      3.80 - 5.20 10e6/uL 4.58   Hemoglobin      11.7 - 15.7 g/dL 14.6   Hematocrit      35.0 - 47.0 % 43.6   MCV      78 - 100 fL 95   MCH      26.5 - 33.0 pg 31.9   MCHC      31.5 - 36.5 g/dL 33.5   RDW      10.0 - 15.0 % 13.2   Platelet Count      150 - 450 10e3/uL 177     Component      Latest Ref Rng & Units 9/15/2022             Sodium      136 - 145 mmol/L 143   Potassium      3.5 - 5.0 mmol/L 3.7   Chloride      98 - 107 mmol/L 107   Carbon Dioxide      22 - 31 mmol/L 29   Anion Gap      5 - 18 mmol/L 7   Glucose      70 - 125 mg/dL 90   Calcium      8.5 - 10.5 mg/dL 10.3   Urea Nitrogen      8 - 28 mg/dL 21   Creatinine      0.60 - 1.10 mg/dL 0.73   GFR Estimate      >60 mL/min/1.73m2 76         ASSESSMENT/PLAN:  1. Debilitation. Weakness and decondiitoning. Working with therapies as able. CC in TCU needed.   2. UTI. UC with E coli, completed course of abx. No urinary sx.  3. HTN. On metoprolol and isosorbide. Cont to monitor Bps in TCU.   4. CAD. Hx angioplasty. On isosorbide and aspirin and statin. No chest pain, palpitations, dyspnea or dizziness.   5. Parkinsonism. Not on meds.         Electronically signed by: Rupinder White MD          Sincerely,        Rupinder White MD

## 2022-10-06 ENCOUNTER — TRANSITIONAL CARE UNIT VISIT (OUTPATIENT)
Dept: GERIATRICS | Facility: CLINIC | Age: 87
End: 2022-10-06
Payer: MEDICARE

## 2022-10-06 VITALS
SYSTOLIC BLOOD PRESSURE: 150 MMHG | OXYGEN SATURATION: 98 % | TEMPERATURE: 98.5 F | HEART RATE: 60 BPM | RESPIRATION RATE: 16 BRPM | BODY MASS INDEX: 18.59 KG/M2 | HEIGHT: 65 IN | WEIGHT: 111.6 LBS | DIASTOLIC BLOOD PRESSURE: 67 MMHG

## 2022-10-06 DIAGNOSIS — I25.10 CORONARY ARTERY DISEASE DUE TO LIPID RICH PLAQUE: ICD-10-CM

## 2022-10-06 DIAGNOSIS — R53.1 WEAKNESS: Primary | ICD-10-CM

## 2022-10-06 DIAGNOSIS — I10 ESSENTIAL HYPERTENSION: ICD-10-CM

## 2022-10-06 DIAGNOSIS — I25.83 CORONARY ARTERY DISEASE DUE TO LIPID RICH PLAQUE: ICD-10-CM

## 2022-10-06 DIAGNOSIS — N30.00 ACUTE CYSTITIS WITHOUT HEMATURIA: ICD-10-CM

## 2022-10-06 PROCEDURE — 99309 SBSQ NF CARE MODERATE MDM 30: CPT | Performed by: FAMILY MEDICINE

## 2022-10-06 NOTE — LETTER
10/6/2022        RE: Yamilex Keller  [address]  [place] [address]          [facility] GERIATRICS    [place] Medical Record Number:  [id number]  Place of Service where encounter took place: [facility] ([facility]) [[id number]]   CODE STATUS:   DNR / DNI    Chief Complaint/Reason for Visit:  Chief Complaint   Patient presents with     RECHECK     TCU 10/6/2022. Weakness, UTI       TCU HPI:    Ymailex Keller is a 89 year old female with hx of HTN and HLD, osteoporosis, Parkinsonism, resides in [facility], admitted to the hospital on 9/15/2022 with progressive generalized weakness. Work up revealed UTI. She was treated in the hospital with IV Rocephin then changed to PO Keflex, completed course for UC positive for E coli. There is no discharge summary available to provide further details of her hospitalization. Pt reports no problems during her hospital stay. She was felt to need TCU and discharged to [facility] TCU on 9/20/2022 for PT, OT, nursing cares, medical management and monitoring.       Today:  She is continuing in therapy in TCU. Will likely need LTC placement due to increased care needs. She denies any pain. No urinary sx, had completed treatment for UTI. No abdominal pain. No fever. Appetite is fair. Sleeping ok. No new concerns per nursing.  No nausea, vomiting or diarrhea. No cough or congestion. No complaints of chest pain, shortness of breath or dizziness. No bowel concerns.       PAST MEDICAL HISTORY:  Past Medical History:   Diagnosis Date     Acute ST elevation myocardial infarction (STEMI) involving left anterior descending coronary artery (H) 7/18/2017     CAD (coronary artery disease)      Closed wedge compression fracture of seventh thoracic vertebra, initial encounter 7/8/2018     Full code status      GERD (gastroesophageal reflux disease) 1/10/2020     Hearing loss      HLD (hyperlipidemia)      HTN (hypertension) 4/1/2019     Osteoporosis   "    Temporal arteritis (H)      Tubular adenoma of colon     Last colonoscopy 2011     Uses walker        MEDICATIONS:  Current Outpatient Medications   Medication Sig Dispense Refill     aspirin 81 MG EC tablet [ASPIRIN 81 MG EC TABLET] Take 1 tablet (81 mg total) by mouth at bedtime. For CAD 30 tablet 0     atorvastatin (LIPITOR) 40 MG tablet TAKE 1 TABLET BY MOUTH EVERY DAY. 90 tablet 3     calcium, as carbonate, (TUMS) 200 mg calcium (500 mg) chewable tablet [CALCIUM, AS CARBONATE, (TUMS) 200 MG CALCIUM (500 MG) CHEWABLE TABLET] Chew 1 tablet as needed for heartburn.       calcium-vitamin D 500 mg(1,250mg) -200 unit per tablet [CALCIUM-VITAMIN D 500 MG(1,250MG) -200 UNIT PER TABLET] Take 1 tablet by mouth 2 (two) times a day with meals.       isosorbide mononitrate (IMDUR) 30 MG 24 hr tablet TAKE 1 TABLET EVERY MORNING 90 tablet 2     metoprolol succinate ER (TOPROL-XL) 25 MG 24 hr tablet Take 1 tablet (25 mg) by mouth daily 90 tablet 2     nitroglycerin (NITROSTAT) 0.4 MG SL tablet [NITROGLYCERIN (NITROSTAT) 0.4 MG SL TABLET] Place 1 tablet (0.4 mg total) under the tongue every 5 (five) minutes as needed for chest pain. 25 tablet 2     omeprazole (PRILOSEC) 20 MG DR capsule TAKE 1 CAPSULE(20 MG) BY MOUTH DAILY BEFORE BREAKFAST 90 capsule 3     triamcinolone (KENALOG) 0.1 % cream [TRIAMCINOLONE (KENALOG) 0.1 % CREAM] Apply 1 application topically as needed.          PHYSICAL EXAM:  General: Patient is alert female, no distress.   Vitals:BP (!) 150/67   Pulse 60   Temp 98.5  F (36.9  C)   Resp 16   Ht 1.651 m (5' 5\")   Wt 50.6 kg (111 lb 9.6 oz)   SpO2 98%   BMI 18.57 kg/m     HEENT: Head is NCAT. Eyes show no injection or icterus. Nares negative. Oropharynx moist.  Neck:  No JVD.  Lungs: Non labored respirations.     Abdomen: Soft.  : Deferred.  Extremities: No edema.   Musculoskeletal: Age related degen changes.  Skin: No rashes.    Psych: Mood is good.       LABS/DIAGNOSTIC DATA:  Component      " Latest Ref Rng & Units 9/15/2022             WBC      4.0 - 11.0 10e3/uL 7.4   RBC Count      3.80 - 5.20 10e6/uL 4.58   Hemoglobin      11.7 - 15.7 g/dL 14.6   Hematocrit      35.0 - 47.0 % 43.6   MCV      78 - 100 fL 95   MCH      26.5 - 33.0 pg 31.9   MCHC      31.5 - 36.5 g/dL 33.5   RDW      10.0 - 15.0 % 13.2   Platelet Count      150 - 450 10e3/uL 177     Component      Latest Ref Rng & Units 9/15/2022             Sodium      136 - 145 mmol/L 143   Potassium      3.5 - 5.0 mmol/L 3.7   Chloride      98 - 107 mmol/L 107   Carbon Dioxide      22 - 31 mmol/L 29   Anion Gap      5 - 18 mmol/L 7   Glucose      70 - 125 mg/dL 90   Calcium      8.5 - 10.5 mg/dL 10.3   Urea Nitrogen      8 - 28 mg/dL 21   Creatinine      0.60 - 1.10 mg/dL 0.73   GFR Estimate      >60 mL/min/1.73m2 76         ASSESSMENT/PLAN:  1. Weakness and deconditioning. Overall frailty. Continuing in therapies in TCU, likely will need LTC.  2. HTN. On metoprolol and isosorbide. Bps satisfactory, continue to monitor.   3. UTI. Completed treatment. No sx of concern.   4. CAD. Hx angioplasty. On isosorbide, aspirin and statin. No chest pain, palpitations, dyspnea or dizziness.   5. Parkinsonism. Not on meds.          Electronically signed by: Rupinder White MD          Sincerely,        Rupinder White MD

## 2022-10-07 ENCOUNTER — TRANSITIONAL CARE UNIT VISIT (OUTPATIENT)
Dept: GERIATRICS | Facility: CLINIC | Age: 87
End: 2022-10-07
Payer: MEDICARE

## 2022-10-07 VITALS
SYSTOLIC BLOOD PRESSURE: 150 MMHG | TEMPERATURE: 98.5 F | HEART RATE: 60 BPM | HEIGHT: 65 IN | WEIGHT: 111.6 LBS | DIASTOLIC BLOOD PRESSURE: 67 MMHG | BODY MASS INDEX: 18.59 KG/M2 | OXYGEN SATURATION: 98 % | RESPIRATION RATE: 16 BRPM

## 2022-10-07 DIAGNOSIS — M62.81 GENERALIZED MUSCLE WEAKNESS: ICD-10-CM

## 2022-10-07 DIAGNOSIS — G20.C PARKINSONISM, UNSPECIFIED PARKINSONISM TYPE (H): ICD-10-CM

## 2022-10-07 DIAGNOSIS — I10 PRIMARY HYPERTENSION: ICD-10-CM

## 2022-10-07 DIAGNOSIS — F03.90 DEMENTIA WITHOUT BEHAVIORAL DISTURBANCE, PSYCHOTIC DISTURBANCE, MOOD DISTURBANCE, OR ANXIETY, UNSPECIFIED DEMENTIA SEVERITY, UNSPECIFIED DEMENTIA TYPE (H): ICD-10-CM

## 2022-10-07 DIAGNOSIS — K59.00 CONSTIPATION, UNSPECIFIED CONSTIPATION TYPE: Primary | ICD-10-CM

## 2022-10-07 PROCEDURE — 99309 SBSQ NF CARE MODERATE MDM 30: CPT | Performed by: NURSE PRACTITIONER

## 2022-10-07 NOTE — PROGRESS NOTES
[facility] GERIATRICS    [place] Medical Record Number:  [id number]  Place of Service where encounter took place: [facility] ([facility]) [[id number]]   CODE STATUS:   DNR / DNI    Chief Complaint/Reason for Visit:  Chief Complaint   Patient presents with     Hospital F/U     Admit note to TCU for weakness, UTI.        HPI:    Yamilex Keller is a 89 year old female with hx of HTN and HLD, osteoporosis, Parkinsonism, resides in [facility], admitted to the hospital on 9/15/2022 with progressive generalized weakness. Work up revealed UTI. She was treated in the hospital with IV Rocephin then changed to PO Keflex, completed course for UC positive for E coli. There is no discharge summary available to provide further details of her hospitalization. Pt reports no problems during her hospital stay. She was felt to need TCU and discharged to [facility] TCU on 9/20/2022 for PT, OT, nursing cares, medical management and monitoring.       Today:  She is somewhat tired but feeling okay. She has been working with therapy. She denies any pain. Has no urinary sx. Had finished course of abx in the hospital. No abdominal pain, nausea, vomiting or diarrhea. No fever, cough or congestion. She has fair appetite. Needs assist from staff due to gait instability and overall weakness. No complaints of chest pain, shortness of breath or dizziness. No new bowel concerns. She is a , [facility]. No new vision or hearing concerns.       REVIEW OF SYSTEMS:  All others negative other than those noted in HPI.      PAST MEDICAL HISTORY:  Past Medical History:   Diagnosis Date     Acute ST elevation myocardial infarction (STEMI) involving left anterior descending coronary artery (H) 7/18/2017     CAD (coronary artery disease)      Closed wedge compression fracture of seventh thoracic vertebra, initial encounter 7/8/2018     Full code status      GERD (gastroesophageal reflux disease) 1/10/2020      Hearing loss      HLD (hyperlipidemia)      HTN (hypertension) 4/1/2019     Osteoporosis      Temporal arteritis (H)      Tubular adenoma of colon     Last colonoscopy 2011     Uses walker        PAST SURGICAL HISTORY:  Past Surgical History:   Procedure Laterality Date     ANGIOPLASTY  //     BLEPHAROPLASTY Bilateral      CV CORONARY ANGIOGRAM N/A 8/25/2020    Procedure: Coronary Angiogram;  Surgeon: Jennifer Peck MD;  Location: [facility];  Service: Cardiology     CV CORONARY ANGIOGRAM N/A 8/21/2020    Procedure: Coronary Angiogram;  Surgeon: Howard Navarro MD;  Location: [facility];  Service: Cardiology     CV LEFT HEART CATHETERIZATION WITHOUT LEFT VENTRICULOGRAM Left 8/25/2020    Procedure: Left Heart Catheterization Without Left Ventriculogram;  Surgeon: Jennifer Peck MD;  Location: [facility];  Service: Cardiology     CV LEFT HEART CATHETERIZATION WITHOUT LEFT VENTRICULOGRAM Left 8/21/2020    Procedure: Left Heart Catheterization Without Left Ventriculogram;  Surgeon: Howard Navarro MD;  Location: [facility];  Service: Cardiology     ESOPHAGOSCOPY, GASTROSCOPY, DUODENOSCOPY (EGD), COMBINED N/A 7/9/2018    Procedure: ESOPHAGOGASTRODUODENOSCOPY (EGD);  Surgeon: Prabhu Lin MD;  Location: [facility];  Service:      IR THORACIC VERTEBROPLASTY  8/17/2018     NH CATH PLACEMENT & NJX CORONARY ART ANGIO IMG S&I N/A 7/18/2017    Procedure: Coronary Angiogram;  Surgeon: Shelia Garcia MD;  Location: [facility];  Service: Cardiology     NH CATH PLMT L HRT & ARTS W/NJX & ANGIO IMG S&I Left 7/18/2017    Procedure: Left Heart Catheterization Without Left Ventriculogram;  Surgeon: Shelia Garcia MD;  Location: [facility];  Service: Cardiology     NH TEMPORAL ARTERY LIGATN OR BX Bilateral 10/7/2015    Procedure: BILATERAL TEMPORAL ARTERY BIOPSY;  Surgeon: Alfredito Jason MD;  Location: [facility] OR;  Service: General      TONSILLECTOMY         FAMILY HISTORY:  Family History   Problem Relation Age of Onset     Liver Cancer Mother      Prostate Cancer Father      Pacemaker Brother      Lung Cancer Brother      Lung Cancer Brother      Uterine Cancer Sister        SOCIAL HISTORY:  Social History     Socioeconomic History     Marital status:      Spouse name: Not on file     Number of children: 1     Years of education: Not on file     Highest education level: Not on file   Occupational History     Occupation: RETIRED   Tobacco Use     Smoking status: Former Smoker     Packs/day: 1.00     Years: 4.00     Pack years: 4.00     Types: Cigarettes, Cigarettes     Quit date: 1953     Years since quittin.8     Smokeless tobacco: Never Used   Substance and Sexual Activity     Alcohol use: Yes     Comment: Alcoholic Drinks/day: Occassionally     Drug use: No     Sexual activity: Not Currently     Partners: Male   Other Topics Concern     Not on file   Social History Narrative     to  Aris.  1 daughter.        Patient of Dr. Arzate since 2019.      Social Determinants of Health     Financial Resource Strain: Not on file   Food Insecurity: Not on file   Transportation Needs: Not on file   Physical Activity: Not on file   Stress: Not on file   Social Connections: Not on file   Intimate Partner Violence: Not on file   Housing Stability: Not on file       MEDICATIONS:  Current Outpatient Medications   Medication Sig Dispense Refill     aspirin 81 MG EC tablet [ASPIRIN 81 MG EC TABLET] Take 1 tablet (81 mg total) by mouth at bedtime. For CAD 30 tablet 0     atorvastatin (LIPITOR) 40 MG tablet TAKE 1 TABLET BY MOUTH EVERY DAY. 90 tablet 3     calcium, as carbonate, (TUMS) 200 mg calcium (500 mg) chewable tablet [CALCIUM, AS CARBONATE, (TUMS) 200 MG CALCIUM (500 MG) CHEWABLE TABLET] Chew 1 tablet as needed for heartburn.       calcium-vitamin D 500 mg(1,250mg) -200 unit per tablet [CALCIUM-VITAMIN D 500 MG(1,250MG) -200  "UNIT PER TABLET] Take 1 tablet by mouth 2 (two) times a day with meals.       isosorbide mononitrate (IMDUR) 30 MG 24 hr tablet TAKE 1 TABLET EVERY MORNING 90 tablet 2     metoprolol succinate ER (TOPROL-XL) 25 MG 24 hr tablet Take 1 tablet (25 mg) by mouth daily 90 tablet 2     nitroglycerin (NITROSTAT) 0.4 MG SL tablet [NITROGLYCERIN (NITROSTAT) 0.4 MG SL TABLET] Place 1 tablet (0.4 mg total) under the tongue every 5 (five) minutes as needed for chest pain. 25 tablet 2     omeprazole (PRILOSEC) 20 MG DR capsule TAKE 1 CAPSULE(20 MG) BY MOUTH DAILY BEFORE BREAKFAST 90 capsule 3     triamcinolone (KENALOG) 0.1 % cream [TRIAMCINOLONE (KENALOG) 0.1 % CREAM] Apply 1 application topically as needed.          ALLERGIES:  Allergies   Allergen Reactions     Lisinopril Swelling     Swelling of lips/mouth      Losartan Angioedema       PHYSICAL EXAM:  General: Patient is alert female, no distress.   Vitals:BP (!) 143/65   Pulse (!) 46   Temp 98.3  F (36.8  C)   Resp 16   Ht 1.651 m (5' 5\")   Wt 51 kg (112 lb 6.4 oz)   SpO2 100%   BMI 18.70 kg/m     HEENT: Head is NCAT. Eyes show no injection or icterus. Nares negative. Oropharynx well hydrated.  Neck: Supple. No tenderness or adenopathy. No JVD.  Lungs: Clear bilaterally. No wheezes.  Cardiovascular: Regular rate and rhythm, normal S1, S2.  Back: No spinal or CVA tenderness.  Abdomen: Soft, no tenderness on exam. Bowel sounds present. No guarding rebound or rigidity.  : Deferred.  Extremities: No signif distal edema is noted.  Musculoskeletal: Age related degen changes.  Skin: No open areas.   Psych: Mood appears good.      LABS/DIAGNOSTIC DATA:  Component      Latest Ref Rng & Units 9/15/2022             WBC      4.0 - 11.0 10e3/uL 7.4   RBC Count      3.80 - 5.20 10e6/uL 4.58   Hemoglobin      11.7 - 15.7 g/dL 14.6   Hematocrit      35.0 - 47.0 % 43.6   MCV      78 - 100 fL 95   MCH      26.5 - 33.0 pg 31.9   MCHC      31.5 - 36.5 g/dL 33.5   RDW      10.0 - 15.0 " % 13.2   Platelet Count      150 - 450 10e3/uL 177     Component      Latest Ref Rng & Units 9/15/2022             Sodium      136 - 145 mmol/L 143   Potassium      3.5 - 5.0 mmol/L 3.7   Chloride      98 - 107 mmol/L 107   Carbon Dioxide      22 - 31 mmol/L 29   Anion Gap      5 - 18 mmol/L 7   Glucose      70 - 125 mg/dL 90   Calcium      8.5 - 10.5 mg/dL 10.3   Urea Nitrogen      8 - 28 mg/dL 21   Creatinine      0.60 - 1.10 mg/dL 0.73   GFR Estimate      >60 mL/min/1.73m2 76         ASSESSMENT/PLAN:  1. Weakness. Generalized. Here in TCU for therapies. She had progressively worsening weakness at her detention, found to have UTI and treated. Unclear if she will return to prior baseline, may need more help in the future.  2. UTI. E coli on UC, treated with abx. No sx of concern.   3. HTN. On metoprolol and isosorbide. Bps monitored in TCU for any need for changes. Pulse low side sec to beta blocker, asymptomatic.   4. HLD. Continue PTA Lipitor.   5. CAD. Hx angioplasty. On isosorbide and aspirin and statin. No complaints of chest pain.   6. Osteoporosis. Continue calcium and vitamin D supplementation.   7. Parkinsonism. Noted per chart and pt hx. Not on meds.   8. Hx GCA. Not active.  9. Code status is DNR/DNI.          Electronically signed by: Rupinder White MD

## 2022-10-07 NOTE — PROGRESS NOTES
"[facility] GERIATRICS    Chief Complaint   Patient presents with     RECHECK     HPI:  Yamilex Keller is a 89 year old  (3/25/1929), who is being seen today for an episodic care visit at: [facility] ([facility]) [[id number]]. Today's concern is: constipation.    Background: Has a history of CAD, hyperlipidemia, dementia, parkinsonian some who was experiencing weakness at her MEL when she called for help and was brought to the ER, found to have UTI.  She did end up spending 7 days in the hospital awaiting TCU placement.  Now here for PT and OT medical management.     Has been doing well in the TCU, complying with therapy.  She does have intermittent confusion on and off, however nursing status is baseline for her.  Today she is having more difficulty telling me history and following conversation.  Nursing is concerned for increased constipation, she is on senna so we will schedule today.       Allergies, and PMH/PSH reviewed in EPIC today.  REVIEW OF SYSTEMS:  4 point ROS including Respiratory, CV, GI and , other than that noted in the HPI,  is negative    Objective:   BP (!) 150/67   Pulse 60   Temp 98.5  F (36.9  C)   Resp 16   Ht 1.651 m (5' 5\")   Wt 50.6 kg (111 lb 9.6 oz)   SpO2 98%   BMI 18.57 kg/m      Physical Exam   General appearance: alert, appears stated age and cooperative.   Head: Normocephalic, without obvious abnormality, atraumatic, Eyes: sclera anicteric.  Lungs: respirations without effort, LSCTA; no wheezing or rales.   Cardiovascular: S1, S2. Regular rate and rhythm.   ABDOMEN: Globular and soft, non tender.    Extremities: extremities normal, atraumatic, no cyanosis or edema  Skin: Skin color, texture, turgor normal. No rashes or lesions  Neurologic:oriented. No focal deficits.   Psych: interacts well with caregivers, exhibits logical thought processes and connections, pleasant      Most Recent 3 CBC's:  Recent Labs   Lab Test 09/15/22  0514 02/24/22  1248 " 09/23/21 1915   WBC 7.4 9.8 7.7   HGB 14.6 15.1 14.9   MCV 95 101* 99    202 197     Most Recent 3 BMP's:  Recent Labs   Lab Test 09/18/22  1632 09/18/22  1131 09/18/22  0814 09/15/22  0514 02/24/22  1248 09/23/21 1915   NA  --   --   --  143 142 142   POTASSIUM  --   --   --  3.7 4.5 4.3   CHLORIDE  --   --   --  107 103 105   CO2  --   --   --  29 29 28   BUN  --   --   --  21 17 18   CR  --   --   --  0.73 0.75 0.77   ANIONGAP  --   --   --  7 10 9   MARLYN  --   --   --  10.3 9.8 9.8   * 85 93 90 81 95       Assessment/Plan:    (K59.00) Constipation, unspecified constipation type  (primary encounter diagnosis)  Plan: senna s 1 tab po bid and bid prn.    (F03.90) Dementia without behavioral disturbance, unspecified dementia type  (primary encounter diagnosis)  Comment: Mild.  Plan: Occupational Therapy, care home at discharge.       (G20) Parkinsonism, unspecified Parkinsonism type (H)  Plan: Physical therapy.     (N30.00) Acute cystitis without hematuria  Comment: Resolved on Keflex.  Plan: Monitor for symptom recurrence.     (I10) Primary hypertension  Comment: Monitor in TCU.  Plan: Continue Toprol and Imdur.     CAD  Plan: Continue Lipitor.    Electronically signed by: Asa Jones, CNP        Erythromycin Pregnancy And Lactation Text: This medication is Pregnancy Category B and is considered safe during pregnancy. It is also excreted in breast milk.

## 2022-10-07 NOTE — LETTER
10/7/2022        RE: Yamilex Keller  [address]  [place] [address]        [facility] GERIATRICS    Chief Complaint   Patient presents with     RECHECK     HPI:  Yamilex Keller is a 89 year old  (3/25/1929), who is being seen today for an episodic care visit at: [facility] ([facility]) [[id number]]. Today's concern is: ***    Allergies, and PMH/PSH reviewed in [facility] today.  REVIEW OF SYSTEMS:  {zgllpo21:938922}    Objective:   There were no vitals taken for this visit.  {[facility] physical exam :845517}    {fgslab:326846}    Assessment/Plan:  {FGS DX2:536136}    {TIP  Click the link below to document, then refresh to pull in response :180844}  Post Medication Reconciliation Status:          Orders:  {fgsorders:013589}  ***    Electronically signed by: Nikole Alas ***            Sincerely,        Asa Jones, CNP

## 2022-10-07 NOTE — LETTER
"    10/7/2022        RE: Yamilex Keller  [address]  [place] [address]        [facility] GERIATRICS    Chief Complaint   Patient presents with     RECHECK     HPI:  Yamilex Keller is a 89 year old  (3/25/1929), who is being seen today for an episodic care visit at: [facility] ([facility]) [[id number]]. Today's concern is: constipation.    Background: Has a history of CAD, hyperlipidemia, dementia, parkinsonian some who was experiencing weakness at her [facility] when she called for help and was brought to the ER, found to have UTI.  She did end up spending 7 days in the hospital awaiting TCU placement.  Now here for PT and OT medical management.     Has been doing well in the TCU, complying with therapy.  She does have intermittent confusion on and off, however nursing status is baseline for her.  Today she is having more difficulty telling me history and following conversation.  Nursing is concerned for increased constipation, she is on senna so we will schedule today.       Allergies, and PMH/PSH reviewed in EPIC today.  REVIEW OF SYSTEMS:  4 point ROS including Respiratory, CV, GI and , other than that noted in the HPI,  is negative    Objective:   BP (!) 150/67   Pulse 60   Temp 98.5  F (36.9  C)   Resp 16   Ht 1.651 m (5' 5\")   Wt 50.6 kg (111 lb 9.6 oz)   SpO2 98%   BMI 18.57 kg/m      Physical Exam   General appearance: alert, appears stated age and cooperative.   Head: Normocephalic, without obvious abnormality, atraumatic, Eyes: sclera anicteric.  Lungs: respirations without effort, LSCTA; no wheezing or rales.   Cardiovascular: S1, S2. Regular rate and rhythm.   ABDOMEN: Globular and soft, non tender.    Extremities: extremities normal, atraumatic, no cyanosis or edema  Skin: Skin color, texture, turgor normal. No rashes or lesions  Neurologic:oriented. No focal deficits.   Psych: interacts well with caregivers, exhibits logical thought processes and connections, " pleasant      Most Recent 3 CBC's:  Recent Labs   Lab Test 09/15/22  0514 02/24/22  1248 09/23/21  1915   WBC 7.4 9.8 7.7   HGB 14.6 15.1 14.9   MCV 95 101* 99    202 197     Most Recent 3 BMP's:  Recent Labs   Lab Test 09/18/22  1632 09/18/22  1131 09/18/22  0814 09/15/22  0514 02/24/22  1248 09/23/21  1915   NA  --   --   --  143 142 142   POTASSIUM  --   --   --  3.7 4.5 4.3   CHLORIDE  --   --   --  107 103 105   CO2  --   --   --  29 29 28   BUN  --   --   --  21 17 18   CR  --   --   --  0.73 0.75 0.77   ANIONGAP  --   --   --  7 10 9   MARLYN  --   --   --  10.3 9.8 9.8   * 85 93 90 81 95       Assessment/Plan:    (K59.00) Constipation, unspecified constipation type  (primary encounter diagnosis)  Plan: senna s 1 tab po bid and bid prn.    (F03.90) Dementia without behavioral disturbance, unspecified dementia type  (primary encounter diagnosis)  Comment: Mild.  Plan: Occupational Therapy, half-way at discharge.       (G20) Parkinsonism, unspecified Parkinsonism type (H)  Plan: Physical therapy.     (N30.00) Acute cystitis without hematuria  Comment: Resolved on Keflex.  Plan: Monitor for symptom recurrence.     (I10) Primary hypertension  Comment: Monitor in TCU.  Plan: Continue Toprol and Imdur.     CAD  Plan: Continue Lipitor.    Electronically signed by: Asa Jones, JAKE             Sincerely,        Asa Jones, CNP

## 2022-10-08 NOTE — NURSING NOTE
"[facility] GERIATRICS         Chief Complaint   Patient presents with     RECHECK      HPI:  Yamilex Keller is a 89 year old  (3/25/1929), who is being seen today for an episodic care visit at: [facility] ([facility]) [[id number]]. Today's concern is:     Has a history of CAD, hyperlipidemia, dementia, parkinson, admitted to the TCU for general weakness.    Patient is seen today sitting on the edge of her bed. She c/o constipation in the past week. States that it also takes her longer to have a BM. Denies pain. Denies painful urination, changes in urination, fatigue, chest pain, palpitation, SOB, dyspnea, and other GI concerns. She sleeps well at night, but she has difficulty falling back to sleep after nocturia. Her Appetite is okay. She makes progress with therapy.     Allergies, and PMH/PSH reviewed in EPIC today.  REVIEW OF SYSTEMS:  4 point ROS including Respiratory, CV, GI and , other than that noted in the HPI,  is negative     Objective:   BP (!) 150/67   Pulse 60   Temp 98.5  F (36.9  C)   Resp 16   Ht 1.651 m (5' 5\")   Wt 50.6 kg (111 lb 9.6 oz)   SpO2 98%   BMI 18.57 kg/m    GENERAL APPEARANCE:  Alert, in no distress, cooperative  ENT:  Mouth and posterior oropharynx normal, moist mucous membranes, mild hearing loss  RESP:  respiratory effort and palpation of chest normal, lungs clear to auscultation , no respiratory distress  CV:  Palpation and auscultation of heart done , regular rate and rhythm, no murmur, rub, or gallop, no edema  ABDOMEN:  normal bowel sounds, soft, nontender, no hepatosplenomegaly or other masses  M/S:   Gait and station abnormal use of wheelchair for transportation, assist x1 for transfer  Digits and nails normal  SKIN:  Inspection of skin and subcutaneous tissue baseline, Palpation of skin and subcutaneous tissue baseline  NEURO:   Cranial nerves 2-12 are normal tested and grossly at patient's baseline  PSYCH:  oriented to x2, insight and judgement impaired, affect " and mood normal     Recent labs in [facility] reviewed by me today.      Assessment/Plan:  (K59.00) Constipation, unspecified constipation type  (primary encounter diagnosis)  Comment: New symptom in the past week, currently on Senna 7.5mg PRN  Plan: - Discontinue current senna orders  - Start senna 7.5 mg PO daily  - Encourage fluid intake    (F03.90) Dementia without behavioral disturbance, psychotic disturbance, mood disturbance, or anxiety, unspecified dementia severity, unspecified dementia type (H)  Comment: Mild dementia. Has mild difficulty hearing which may mask dementia sometimes.  Plan: OT, may consider memory care in [facility] at discharge.    (G20) Parkinsonism, unspecified Parkinsonism type (H)  (M62.81) Generalized muscle weakness  Comment: Chronic, stable.  Plan: Continue with PT     (I10) Primary hypertension  Comment: Monitor BP per facility protocol.  Plan: Continue Toprol and Imdur.      Post Medication Reconciliation Status:             Orders:  - Discontinue current senna orders  - Start senna 7.5 mg PO daily     Electronically signed by: Mikey Jose RN, NP student

## 2022-10-10 NOTE — PROGRESS NOTES
[facility] GERIATRICS    [place] Medical Record Number:  [id number]  Place of Service where encounter took place: [facility] ([facility]) [[id number]]   CODE STATUS:   DNR / DNI    Chief Complaint/Reason for Visit:  Chief Complaint   Patient presents with     RECHECK     TCU 9/29/2022. Weakness, UTI.        TCU HPI:    Yamilex Keller is a 89 year old female with hx of HTN and HLD, osteoporosis, Parkinsonism, resides in [facility], admitted to the hospital on 9/15/2022 with progressive generalized weakness. Work up revealed UTI. She was treated in the hospital with IV Rocephin then changed to PO Keflex, completed course for UC positive for E coli. There is no discharge summary available to provide further details of her hospitalization. Pt reports no problems during her hospital stay. She was felt to need TCU and discharged to [facility]CU on 9/20/2022 for PT, OT, nursing cares, medical management and monitoring.       Today:  She feels a little stronger but still overall tired and weak. Working with therapy as able. No pain. No urinary sx, had completed abx for UTI. Appetite is fair. Dietician has seen for nutritional supplement recommendation. No abdominal pain, nausea, vomiting or diarrhea. No fever, cough or congestion. No complaints of chest pain, shortness of breath or dizziness. No new bowel concerns.       PAST MEDICAL HISTORY:  Past Medical History:   Diagnosis Date     Acute ST elevation myocardial infarction (STEMI) involving left anterior descending coronary artery (H) 7/18/2017     CAD (coronary artery disease)      Closed wedge compression fracture of seventh thoracic vertebra, initial encounter 7/8/2018     Full code status      GERD (gastroesophageal reflux disease) 1/10/2020     Hearing loss      HLD (hyperlipidemia)      HTN (hypertension) 4/1/2019     Osteoporosis      Temporal arteritis (H)      Tubular adenoma of colon     Last colonoscopy 2011     Uses walker   "      MEDICATIONS:  Current Outpatient Medications   Medication Sig Dispense Refill     aspirin 81 MG EC tablet [ASPIRIN 81 MG EC TABLET] Take 1 tablet (81 mg total) by mouth at bedtime. For CAD 30 tablet 0     atorvastatin (LIPITOR) 40 MG tablet TAKE 1 TABLET BY MOUTH EVERY DAY. 90 tablet 3     calcium, as carbonate, (TUMS) 200 mg calcium (500 mg) chewable tablet [CALCIUM, AS CARBONATE, (TUMS) 200 MG CALCIUM (500 MG) CHEWABLE TABLET] Chew 1 tablet as needed for heartburn.       calcium-vitamin D 500 mg(1,250mg) -200 unit per tablet [CALCIUM-VITAMIN D 500 MG(1,250MG) -200 UNIT PER TABLET] Take 1 tablet by mouth 2 (two) times a day with meals.       isosorbide mononitrate (IMDUR) 30 MG 24 hr tablet TAKE 1 TABLET EVERY MORNING 90 tablet 2     metoprolol succinate ER (TOPROL-XL) 25 MG 24 hr tablet Take 1 tablet (25 mg) by mouth daily 90 tablet 2     nitroglycerin (NITROSTAT) 0.4 MG SL tablet [NITROGLYCERIN (NITROSTAT) 0.4 MG SL TABLET] Place 1 tablet (0.4 mg total) under the tongue every 5 (five) minutes as needed for chest pain. 25 tablet 2     omeprazole (PRILOSEC) 20 MG DR capsule TAKE 1 CAPSULE(20 MG) BY MOUTH DAILY BEFORE BREAKFAST 90 capsule 3     triamcinolone (KENALOG) 0.1 % cream [TRIAMCINOLONE (KENALOG) 0.1 % CREAM] Apply 1 application topically as needed.          PHYSICAL EXAM:  General: Patient is alert female, no distress.   Vitals:BP (!) 148/76   Pulse 58   Temp 98.7  F (37.1  C)   Resp 16   Ht 1.651 m (5' 5\")   Wt 50.6 kg (111 lb 9.6 oz)   SpO2 97%   BMI 18.57 kg/m     HEENT: Head is NCAT. Eyes show no injection or icterus. Nares negative. Oropharynx moist.  Neck:  No JVD.  Lungs: Non labored respirations.   Abdomen: Soft.  : Deferred.  Extremities: No significant edema.  Musculoskeletal: Age related degen changes.  Skin: Warm and dry.   Psych: Mood appears good.      LABS/DIAGNOSTIC DATA:  Component      Latest Ref Rng & Units 9/15/2022             WBC      4.0 - 11.0 10e3/uL 7.4   RBC Count   "    3.80 - 5.20 10e6/uL 4.58   Hemoglobin      11.7 - 15.7 g/dL 14.6   Hematocrit      35.0 - 47.0 % 43.6   MCV      78 - 100 fL 95   MCH      26.5 - 33.0 pg 31.9   MCHC      31.5 - 36.5 g/dL 33.5   RDW      10.0 - 15.0 % 13.2   Platelet Count      150 - 450 10e3/uL 177     Component      Latest Ref Rng & Units 9/15/2022             Sodium      136 - 145 mmol/L 143   Potassium      3.5 - 5.0 mmol/L 3.7   Chloride      98 - 107 mmol/L 107   Carbon Dioxide      22 - 31 mmol/L 29   Anion Gap      5 - 18 mmol/L 7   Glucose      70 - 125 mg/dL 90   Calcium      8.5 - 10.5 mg/dL 10.3   Urea Nitrogen      8 - 28 mg/dL 21   Creatinine      0.60 - 1.10 mg/dL 0.73   GFR Estimate      >60 mL/min/1.73m2 76         ASSESSMENT/PLAN:  1. Weakness. Deconditioning. Advanced age, recent UTI. Continuing in therapies as able.   2. UTI. UC with E coli, completed course of abx. No urinary sx.  3. HTN. On metoprolol and isosorbide. Bps satisfactory.  4. HLD. Continue PTA Lipitor.   5. CAD. Hx angioplasty. On isosorbide and aspirin and statin. No chest pain, palpitations, dyspnea or dizziness.   6. Parkinsonism. Noted per chart and pt hx. Not on meds.         Electronically signed by: Rupinder White MD

## 2022-10-10 NOTE — PROGRESS NOTES
[facility] GERIATRICS    [place] Medical Record Number:  [id number]  Place of Service where encounter took place: [facility] ([facility]) [[id number]]   CODE STATUS:   DNR / DNI    Chief Complaint/Reason for Visit:  Chief Complaint   Patient presents with     Follow Up     TCU 10/4/2022. Weakness, UTI.       TCU HPI:    Yamilex Keller is a 89 year old female with hx of HTN and HLD, osteoporosis, Parkinsonism, resides in [facility], admitted to the hospital on 9/15/2022 with progressive generalized weakness. Work up revealed UTI. She was treated in the hospital with IV Rocephin then changed to PO Keflex, completed course for UC positive for E coli. There is no discharge summary available to provide further details of her hospitalization. Pt reports no problems during her hospital stay. She was felt to need TCU and discharged to [facility] TCU on 9/20/2022 for PT, OT, nursing cares, medical management and monitoring.       Today:  No new concerns. She is working with therapy, gaining strength slowly. Unclear if she will be able to return to prior living situation. Care conference in TCU needed. Denies any pain. Treated for UTI, no current sx. Appetite is fair. No abdominal pain, nausea, vomiting or diarrhea. No fever, cough or congestion. No complaints of chest pain, shortness of breath or dizziness. No bowel concerns.       PAST MEDICAL HISTORY:  Past Medical History:   Diagnosis Date     Acute ST elevation myocardial infarction (STEMI) involving left anterior descending coronary artery (H) 7/18/2017     CAD (coronary artery disease)      Closed wedge compression fracture of seventh thoracic vertebra, initial encounter 7/8/2018     Full code status      GERD (gastroesophageal reflux disease) 1/10/2020     Hearing loss      HLD (hyperlipidemia)      HTN (hypertension) 4/1/2019     Osteoporosis      Temporal arteritis (H)      Tubular adenoma of colon     Last colonoscopy 2011     Uses  "walker        MEDICATIONS:  Current Outpatient Medications   Medication Sig Dispense Refill     aspirin 81 MG EC tablet [ASPIRIN 81 MG EC TABLET] Take 1 tablet (81 mg total) by mouth at bedtime. For CAD 30 tablet 0     atorvastatin (LIPITOR) 40 MG tablet TAKE 1 TABLET BY MOUTH EVERY DAY. 90 tablet 3     calcium, as carbonate, (TUMS) 200 mg calcium (500 mg) chewable tablet [CALCIUM, AS CARBONATE, (TUMS) 200 MG CALCIUM (500 MG) CHEWABLE TABLET] Chew 1 tablet as needed for heartburn.       calcium-vitamin D 500 mg(1,250mg) -200 unit per tablet [CALCIUM-VITAMIN D 500 MG(1,250MG) -200 UNIT PER TABLET] Take 1 tablet by mouth 2 (two) times a day with meals.       isosorbide mononitrate (IMDUR) 30 MG 24 hr tablet TAKE 1 TABLET EVERY MORNING 90 tablet 2     metoprolol succinate ER (TOPROL-XL) 25 MG 24 hr tablet Take 1 tablet (25 mg) by mouth daily 90 tablet 2     nitroglycerin (NITROSTAT) 0.4 MG SL tablet [NITROGLYCERIN (NITROSTAT) 0.4 MG SL TABLET] Place 1 tablet (0.4 mg total) under the tongue every 5 (five) minutes as needed for chest pain. 25 tablet 2     omeprazole (PRILOSEC) 20 MG DR capsule TAKE 1 CAPSULE(20 MG) BY MOUTH DAILY BEFORE BREAKFAST 90 capsule 3     triamcinolone (KENALOG) 0.1 % cream [TRIAMCINOLONE (KENALOG) 0.1 % CREAM] Apply 1 application topically as needed.          PHYSICAL EXAM:  General: Patient is alert female, no distress.   Vitals:/58   Pulse 53   Temp 98.5  F (36.9  C)   Resp 18   Ht 1.651 m (5' 5\")   Wt 50.3 kg (111 lb)   SpO2 96%   BMI 18.47 kg/m     HEENT: Head is NCAT. Eyes show no injection or icterus. Nares negative. Oropharynx hydrated.  Neck:  No JVD.  Lungs: Non labored respirations.    Abdomen: Soft.  : Deferred.  Extremities: Trace edema.   Musculoskeletal: Age related degen changes.  Skin: Warm and dry.   Psych: Mood is good.       LABS/DIAGNOSTIC DATA:  Component      Latest Ref Rng & Units 9/15/2022             WBC      4.0 - 11.0 10e3/uL 7.4   RBC Count      3.80 - " 5.20 10e6/uL 4.58   Hemoglobin      11.7 - 15.7 g/dL 14.6   Hematocrit      35.0 - 47.0 % 43.6   MCV      78 - 100 fL 95   MCH      26.5 - 33.0 pg 31.9   MCHC      31.5 - 36.5 g/dL 33.5   RDW      10.0 - 15.0 % 13.2   Platelet Count      150 - 450 10e3/uL 177     Component      Latest Ref Rng & Units 9/15/2022             Sodium      136 - 145 mmol/L 143   Potassium      3.5 - 5.0 mmol/L 3.7   Chloride      98 - 107 mmol/L 107   Carbon Dioxide      22 - 31 mmol/L 29   Anion Gap      5 - 18 mmol/L 7   Glucose      70 - 125 mg/dL 90   Calcium      8.5 - 10.5 mg/dL 10.3   Urea Nitrogen      8 - 28 mg/dL 21   Creatinine      0.60 - 1.10 mg/dL 0.73   GFR Estimate      >60 mL/min/1.73m2 76         ASSESSMENT/PLAN:  1. Debilitation. Weakness and decondiitoning. Working with therapies as able. CC in TCU needed.   2. UTI. UC with E coli, completed course of abx. No urinary sx.  3. HTN. On metoprolol and isosorbide. Cont to monitor Bps in TCU.   4. CAD. Hx angioplasty. On isosorbide and aspirin and statin. No chest pain, palpitations, dyspnea or dizziness.   5. Parkinsonism. Not on meds.         Electronically signed by: Rupinder White MD

## 2022-10-11 NOTE — LETTER
10/11/2022        RE: Yamilex Keller  [address]  [place] [address]          [facility] GERIATRICS    [place] Medical Record Number:  [id number]  Place of Service where encounter took place: [facility] ([facility]) [[id number]]   CODE STATUS:   DNR / DNI    Chief Complaint/Reason for Visit:  Chief Complaint   Patient presents with     RECHECK     TCU 10/11/2022. Weakness, UTI, HTN.        TCU HPI:    Yamilex Keller is a 89 year old female with hx of HTN and HLD, osteoporosis, Parkinsonism, resides in [facility], admitted to the hospital on 9/15/2022 with progressive generalized weakness. Work up revealed UTI. She was treated in the hospital with IV Rocephin then changed to PO Keflex, completed course for UC positive for E coli. There is no discharge summary available to provide further details of her hospitalization. Pt reports no problems during her hospital stay. She was felt to need TCU and discharged to [facility] TCU on 9/20/2022 for PT, OT, nursing cares, medical management and monitoring.       Today:  Still working with therapies in TCU, has made some gains, still weak and frail, likely increased care needs to necessitate moving to LTC rather than return to her former group home. No discharge date set yet from TCU. She has no urinary sx, had been treated for UTI. No fever. No cough, congestion, chest pain or shortness of breath at rest. Appetite is a little better though not great.       PAST MEDICAL HISTORY:  Past Medical History:   Diagnosis Date     Acute ST elevation myocardial infarction (STEMI) involving left anterior descending coronary artery (H) 7/18/2017     CAD (coronary artery disease)      Closed wedge compression fracture of seventh thoracic vertebra, initial encounter 7/8/2018     Full code status      GERD (gastroesophageal reflux disease) 1/10/2020     Hearing loss      HLD (hyperlipidemia)      HTN (hypertension) 4/1/2019     Osteoporosis       "Temporal arteritis (H)      Tubular adenoma of colon     Last colonoscopy 2011     Uses walker        MEDICATIONS:  Current Outpatient Medications   Medication Sig Dispense Refill     aspirin 81 MG EC tablet [ASPIRIN 81 MG EC TABLET] Take 1 tablet (81 mg total) by mouth at bedtime. For CAD 30 tablet 0     atorvastatin (LIPITOR) 40 MG tablet TAKE 1 TABLET BY MOUTH EVERY DAY. 90 tablet 3     calcium, as carbonate, (TUMS) 200 mg calcium (500 mg) chewable tablet [CALCIUM, AS CARBONATE, (TUMS) 200 MG CALCIUM (500 MG) CHEWABLE TABLET] Chew 1 tablet as needed for heartburn.       calcium-vitamin D 500 mg(1,250mg) -200 unit per tablet [CALCIUM-VITAMIN D 500 MG(1,250MG) -200 UNIT PER TABLET] Take 1 tablet by mouth 2 (two) times a day with meals.       isosorbide mononitrate (IMDUR) 30 MG 24 hr tablet TAKE 1 TABLET EVERY MORNING 90 tablet 2     metoprolol succinate ER (TOPROL-XL) 25 MG 24 hr tablet Take 1 tablet (25 mg) by mouth daily 90 tablet 2     nitroglycerin (NITROSTAT) 0.4 MG SL tablet [NITROGLYCERIN (NITROSTAT) 0.4 MG SL TABLET] Place 1 tablet (0.4 mg total) under the tongue every 5 (five) minutes as needed for chest pain. 25 tablet 2     omeprazole (PRILOSEC) 20 MG DR capsule TAKE 1 CAPSULE(20 MG) BY MOUTH DAILY BEFORE BREAKFAST 90 capsule 3     triamcinolone (KENALOG) 0.1 % cream [TRIAMCINOLONE (KENALOG) 0.1 % CREAM] Apply 1 application topically as needed.          PHYSICAL EXAM:  General: Patient is alert female, no distress.   Vitals:/55   Pulse 58   Temp 98.9  F (37.2  C)   Resp 18   Ht 1.651 m (5' 5\")   Wt 49.5 kg (109 lb 3.2 oz)   SpO2 93%   BMI 18.17 kg/m     HEENT: Head is NCAT. Eyes show no injection or icterus. Nares negative. Oropharynx well hydrated.  Neck:  No JVD.  Lungs: Non labored respirations.   Abdomen: Soft.  : Deferred.  Extremities: No edema.   Musculoskeletal: Age related degen changes.  Skin: Warm and dry.    Psych: Mood appears good.       LABS/DIAGNOSTIC DATA:  Component      " Latest Ref Rng & Units 9/15/2022             WBC      4.0 - 11.0 10e3/uL 7.4   RBC Count      3.80 - 5.20 10e6/uL 4.58   Hemoglobin      11.7 - 15.7 g/dL 14.6   Hematocrit      35.0 - 47.0 % 43.6   MCV      78 - 100 fL 95   MCH      26.5 - 33.0 pg 31.9   MCHC      31.5 - 36.5 g/dL 33.5   RDW      10.0 - 15.0 % 13.2   Platelet Count      150 - 450 10e3/uL 177     Component      Latest Ref Rng & Units 9/15/2022             Sodium      136 - 145 mmol/L 143   Potassium      3.5 - 5.0 mmol/L 3.7   Chloride      98 - 107 mmol/L 107   Carbon Dioxide      22 - 31 mmol/L 29   Anion Gap      5 - 18 mmol/L 7   Glucose      70 - 125 mg/dL 90   Calcium      8.5 - 10.5 mg/dL 10.3   Urea Nitrogen      8 - 28 mg/dL 21   Creatinine      0.60 - 1.10 mg/dL 0.73   GFR Estimate      >60 mL/min/1.73m2 76         ASSESSMENT/PLAN:  1. Overall debilitation, weakness, deconditioning. Advanced age and medical conditions. Home to retirement versus LTC when done with TCU.   2. HTN. On metoprolol and isosorbide. Bps are controlled.   3. UTI. She completed treatment with abx, no urinary sx presently.   4. CAD. Hx angioplasty. Stable on isosorbide, aspirin and statin.  5. Parkinsonism. Not on meds.          Electronically signed by: Rupinder White MD          Sincerely,        Rupinder White MD

## 2022-10-17 NOTE — PROGRESS NOTES
[facility] GERIATRICS    [place] Medical Record Number:  [id number]  Place of Service where encounter took place: [facility] ([facility]) [[id number]]   CODE STATUS:   DNR / DNI    Chief Complaint/Reason for Visit:  Chief Complaint   Patient presents with     RECHECK     TCU 10/6/2022. Weakness, UTI       TCU HPI:    Yamilex Keller is a 89 year old female with hx of HTN and HLD, osteoporosis, Parkinsonism, resides in [facility], admitted to the hospital on 9/15/2022 with progressive generalized weakness. Work up revealed UTI. She was treated in the hospital with IV Rocephin then changed to PO Keflex, completed course for UC positive for E coli. There is no discharge summary available to provide further details of her hospitalization. Pt reports no problems during her hospital stay. She was felt to need TCU and discharged to [facility] TCU on 9/20/2022 for PT, OT, nursing cares, medical management and monitoring.       Today:  She is continuing in therapy in TCU. Will likely need LTC placement due to increased care needs. She denies any pain. No urinary sx, had completed treatment for UTI. No abdominal pain. No fever. Appetite is fair. Sleeping ok. No new concerns per nursing.  No nausea, vomiting or diarrhea. No cough or congestion. No complaints of chest pain, shortness of breath or dizziness. No bowel concerns.       PAST MEDICAL HISTORY:  Past Medical History:   Diagnosis Date     Acute ST elevation myocardial infarction (STEMI) involving left anterior descending coronary artery (H) 7/18/2017     CAD (coronary artery disease)      Closed wedge compression fracture of seventh thoracic vertebra, initial encounter 7/8/2018     Full code status      GERD (gastroesophageal reflux disease) 1/10/2020     Hearing loss      HLD (hyperlipidemia)      HTN (hypertension) 4/1/2019     Osteoporosis      Temporal arteritis (H)      Tubular adenoma of colon     Last colonoscopy 2011     Uses walker   "      MEDICATIONS:  Current Outpatient Medications   Medication Sig Dispense Refill     aspirin 81 MG EC tablet [ASPIRIN 81 MG EC TABLET] Take 1 tablet (81 mg total) by mouth at bedtime. For CAD 30 tablet 0     atorvastatin (LIPITOR) 40 MG tablet TAKE 1 TABLET BY MOUTH EVERY DAY. 90 tablet 3     calcium, as carbonate, (TUMS) 200 mg calcium (500 mg) chewable tablet [CALCIUM, AS CARBONATE, (TUMS) 200 MG CALCIUM (500 MG) CHEWABLE TABLET] Chew 1 tablet as needed for heartburn.       calcium-vitamin D 500 mg(1,250mg) -200 unit per tablet [CALCIUM-VITAMIN D 500 MG(1,250MG) -200 UNIT PER TABLET] Take 1 tablet by mouth 2 (two) times a day with meals.       isosorbide mononitrate (IMDUR) 30 MG 24 hr tablet TAKE 1 TABLET EVERY MORNING 90 tablet 2     metoprolol succinate ER (TOPROL-XL) 25 MG 24 hr tablet Take 1 tablet (25 mg) by mouth daily 90 tablet 2     nitroglycerin (NITROSTAT) 0.4 MG SL tablet [NITROGLYCERIN (NITROSTAT) 0.4 MG SL TABLET] Place 1 tablet (0.4 mg total) under the tongue every 5 (five) minutes as needed for chest pain. 25 tablet 2     omeprazole (PRILOSEC) 20 MG DR capsule TAKE 1 CAPSULE(20 MG) BY MOUTH DAILY BEFORE BREAKFAST 90 capsule 3     triamcinolone (KENALOG) 0.1 % cream [TRIAMCINOLONE (KENALOG) 0.1 % CREAM] Apply 1 application topically as needed.          PHYSICAL EXAM:  General: Patient is alert female, no distress.   Vitals:BP (!) 150/67   Pulse 60   Temp 98.5  F (36.9  C)   Resp 16   Ht 1.651 m (5' 5\")   Wt 50.6 kg (111 lb 9.6 oz)   SpO2 98%   BMI 18.57 kg/m     HEENT: Head is NCAT. Eyes show no injection or icterus. Nares negative. Oropharynx moist.  Neck:  No JVD.  Lungs: Non labored respirations.     Abdomen: Soft.  : Deferred.  Extremities: No edema.   Musculoskeletal: Age related degen changes.  Skin: No rashes.    Psych: Mood is good.       LABS/DIAGNOSTIC DATA:  Component      Latest Ref Rng & Units 9/15/2022             WBC      4.0 - 11.0 10e3/uL 7.4   RBC Count      3.80 - 5.20 " 10e6/uL 4.58   Hemoglobin      11.7 - 15.7 g/dL 14.6   Hematocrit      35.0 - 47.0 % 43.6   MCV      78 - 100 fL 95   MCH      26.5 - 33.0 pg 31.9   MCHC      31.5 - 36.5 g/dL 33.5   RDW      10.0 - 15.0 % 13.2   Platelet Count      150 - 450 10e3/uL 177     Component      Latest Ref Rng & Units 9/15/2022             Sodium      136 - 145 mmol/L 143   Potassium      3.5 - 5.0 mmol/L 3.7   Chloride      98 - 107 mmol/L 107   Carbon Dioxide      22 - 31 mmol/L 29   Anion Gap      5 - 18 mmol/L 7   Glucose      70 - 125 mg/dL 90   Calcium      8.5 - 10.5 mg/dL 10.3   Urea Nitrogen      8 - 28 mg/dL 21   Creatinine      0.60 - 1.10 mg/dL 0.73   GFR Estimate      >60 mL/min/1.73m2 76         ASSESSMENT/PLAN:  1. Weakness and deconditioning. Overall frailty. Continuing in therapies in TCU, likely will need LTC.  2. HTN. On metoprolol and isosorbide. Bps satisfactory, continue to monitor.   3. UTI. Completed treatment. No sx of concern.   4. CAD. Hx angioplasty. On isosorbide, aspirin and statin. No chest pain, palpitations, dyspnea or dizziness.   5. Parkinsonism. Not on meds.          Electronically signed by: Rupinder White MD

## 2022-10-17 NOTE — PROGRESS NOTES
[facility] GERIATRICS    [place] Medical Record Number:  [id number]  Place of Service where encounter took place: [facility] ([facility]) [[id number]]   CODE STATUS:   DNR / DNI    Chief Complaint/Reason for Visit:  Chief Complaint   Patient presents with     RECHECK     TCU 10/11/2022. Weakness, UTI, HTN.        TCU HPI:    Yamilex Keller is a 89 year old female with hx of HTN and HLD, osteoporosis, Parkinsonism, resides in [facility], admitted to the hospital on 9/15/2022 with progressive generalized weakness. Work up revealed UTI. She was treated in the hospital with IV Rocephin then changed to PO Keflex, completed course for UC positive for E coli. There is no discharge summary available to provide further details of her hospitalization. Pt reports no problems during her hospital stay. She was felt to need TCU and discharged to [facility] TCU on 9/20/2022 for PT, OT, nursing cares, medical management and monitoring.       Today:  Still working with therapies in TCU, has made some gains, still weak and frail, likely increased care needs to necessitate moving to LTC rather than return to her former [facility]. No discharge date set yet from TCU. She has no urinary sx, had been treated for UTI. No fever. No cough, congestion, chest pain or shortness of breath at rest. Appetite is a little better though not great.       PAST MEDICAL HISTORY:  Past Medical History:   Diagnosis Date     Acute ST elevation myocardial infarction (STEMI) involving left anterior descending coronary artery (H) 7/18/2017     CAD (coronary artery disease)      Closed wedge compression fracture of seventh thoracic vertebra, initial encounter 7/8/2018     Full code status      GERD (gastroesophageal reflux disease) 1/10/2020     Hearing loss      HLD (hyperlipidemia)      HTN (hypertension) 4/1/2019     Osteoporosis      Temporal arteritis (H)      Tubular adenoma of colon     Last colonoscopy 2011     Uses walker   "      MEDICATIONS:  Current Outpatient Medications   Medication Sig Dispense Refill     aspirin 81 MG EC tablet [ASPIRIN 81 MG EC TABLET] Take 1 tablet (81 mg total) by mouth at bedtime. For CAD 30 tablet 0     atorvastatin (LIPITOR) 40 MG tablet TAKE 1 TABLET BY MOUTH EVERY DAY. 90 tablet 3     calcium, as carbonate, (TUMS) 200 mg calcium (500 mg) chewable tablet [CALCIUM, AS CARBONATE, (TUMS) 200 MG CALCIUM (500 MG) CHEWABLE TABLET] Chew 1 tablet as needed for heartburn.       calcium-vitamin D 500 mg(1,250mg) -200 unit per tablet [CALCIUM-VITAMIN D 500 MG(1,250MG) -200 UNIT PER TABLET] Take 1 tablet by mouth 2 (two) times a day with meals.       isosorbide mononitrate (IMDUR) 30 MG 24 hr tablet TAKE 1 TABLET EVERY MORNING 90 tablet 2     metoprolol succinate ER (TOPROL-XL) 25 MG 24 hr tablet Take 1 tablet (25 mg) by mouth daily 90 tablet 2     nitroglycerin (NITROSTAT) 0.4 MG SL tablet [NITROGLYCERIN (NITROSTAT) 0.4 MG SL TABLET] Place 1 tablet (0.4 mg total) under the tongue every 5 (five) minutes as needed for chest pain. 25 tablet 2     omeprazole (PRILOSEC) 20 MG DR capsule TAKE 1 CAPSULE(20 MG) BY MOUTH DAILY BEFORE BREAKFAST 90 capsule 3     triamcinolone (KENALOG) 0.1 % cream [TRIAMCINOLONE (KENALOG) 0.1 % CREAM] Apply 1 application topically as needed.          PHYSICAL EXAM:  General: Patient is alert female, no distress.   Vitals:/55   Pulse 58   Temp 98.9  F (37.2  C)   Resp 18   Ht 1.651 m (5' 5\")   Wt 49.5 kg (109 lb 3.2 oz)   SpO2 93%   BMI 18.17 kg/m     HEENT: Head is NCAT. Eyes show no injection or icterus. Nares negative. Oropharynx well hydrated.  Neck:  No JVD.  Lungs: Non labored respirations.   Abdomen: Soft.  : Deferred.  Extremities: No edema.   Musculoskeletal: Age related degen changes.  Skin: Warm and dry.    Psych: Mood appears good.       LABS/DIAGNOSTIC DATA:  Component      Latest Ref Rng & Units 9/15/2022             WBC      4.0 - 11.0 10e3/uL 7.4   RBC Count      " 3.80 - 5.20 10e6/uL 4.58   Hemoglobin      11.7 - 15.7 g/dL 14.6   Hematocrit      35.0 - 47.0 % 43.6   MCV      78 - 100 fL 95   MCH      26.5 - 33.0 pg 31.9   MCHC      31.5 - 36.5 g/dL 33.5   RDW      10.0 - 15.0 % 13.2   Platelet Count      150 - 450 10e3/uL 177     Component      Latest Ref Rng & Units 9/15/2022             Sodium      136 - 145 mmol/L 143   Potassium      3.5 - 5.0 mmol/L 3.7   Chloride      98 - 107 mmol/L 107   Carbon Dioxide      22 - 31 mmol/L 29   Anion Gap      5 - 18 mmol/L 7   Glucose      70 - 125 mg/dL 90   Calcium      8.5 - 10.5 mg/dL 10.3   Urea Nitrogen      8 - 28 mg/dL 21   Creatinine      0.60 - 1.10 mg/dL 0.73   GFR Estimate      >60 mL/min/1.73m2 76         ASSESSMENT/PLAN:  1. Overall debilitation, weakness, deconditioning. Advanced age and medical conditions. Home to [facility] versus LTC when done with TCU.   2. HTN. On metoprolol and isosorbide. Bps are controlled.   3. UTI. She completed treatment with abx, no urinary sx presently.   4. CAD. Hx angioplasty. Stable on isosorbide, aspirin and statin.  5. Parkinsonism. Not on meds.          Electronically signed by: Rupinder White MD

## 2022-10-20 NOTE — TELEPHONE ENCOUNTER
General Call      Reason for Call:  Home care verbal orders    What are your questions or concerns:  Jay calling from [facility]. Needs verbal orders for PT    Start today 1x/wk for 4 wks , then 2 visit in 30days    Please call Jay [phone] , ok to leave detailed message, but if doing so, leave name and title

## 2022-10-21 NOTE — TELEPHONE ENCOUNTER
Writer called and spoke with Patel and gave verbal okay for nursing orders, she verbalized understanding and thanked writer for the call.

## 2022-10-21 NOTE — TELEPHONE ENCOUNTER
Okay to proceed.    Michael Arzate MD  General Internal Medicine  [facility]  10/21/2022, 4:45 PM

## 2022-10-21 NOTE — TELEPHONE ENCOUNTER
Okay to proceed.    Michael Arzate MD  General Internal Medicine  [facility]  10/21/2022, 4:45 PM

## 2022-10-21 NOTE — TELEPHONE ENCOUNTER
Reason for Call:  Home Health Care    Yessica with [facility] called regarding (reason for call): Verbal Orders    Orders are needed for this patient.     Skilled Nursin visit per week for 3 weeks    Pt Provider: Dr. Arzate    Phone Number Homecare Nurse can be reached at: [phone]    Can we leave a detailed message on this number? YES voice mail is secure and confidential    Call taken on 10/21/2022 at 1:08 PM by Diamond Givens

## 2022-10-28 NOTE — TELEPHONE ENCOUNTER
The Home Care is calling regarding an established patient.  Has the patient seen Home Care? Yes.     Josefina calling [facility]  requesting the following orders:    Preferred Call Back Number: [phone] Ok to leave a detailed message    OT eval was delayed by 6 days    Requesting once a week for 3 weeks started on 10/30/22    Then there will be a week with no therapy    Then they will do one visit to check on the patient on the week of 11/27/22.    Please call josefina to give verbal OK if ok with provider.    Sheyla Whitley RN  [place] Nurse Advisor  12:35 PM  10/28/2022

## 2022-10-28 NOTE — TELEPHONE ENCOUNTER
Okay for orders.    Patient or family member should call to schedule a posthospital/ nursing home visit.  Needs to have assessment as I have not seen her in awhile.    Michael Arzate MD  General Internal Medicine  [facility]  10/28/2022, 1:23 PM

## 2022-10-31 NOTE — TELEPHONE ENCOUNTER
Spoke to Inga- relayed verbal orders    Sending to scheduling to assist family in scheduling appointment with PCP for follow up

## 2022-11-03 NOTE — TELEPHONE ENCOUNTER
PT STATED SHE HAS BEEN WANTING AN APPT WITH DR. PALENCIA- SHE WANTS TO DISCUSS ABOUT THE LAST 10 MONTHS THAT HER  PASSED AWAY AND ITS BEEN HARD ON HER.   SHE WANTS TO RELAY THE MSSG TO [facility] TO GET AN APPT IN ASAP CAUSE PT ISNT GOOD AT REMEMBERING THINGS AND HAVING A HARD TIME GETTING UP-   PT HAS TO HAVE HER DAUGHTER BHARATH TO HELP HER TO GET TO AN APPT.     YOUR NEXT AVAILIBLE SPOT IS 1/2   ANY WAY YOU CAN SEE HER IN SOONER ???

## 2022-11-03 NOTE — TELEPHONE ENCOUNTER
89 YR OLD PT WANT TO SEE IF SHE CAN BE SEEN SOONER- SENT MSSG TO [facility]- SEE IF HE REPLIES BK   CALL DAUGHTER PHONE TO SET UP AN APPT [phone]

## 2022-11-03 NOTE — TELEPHONE ENCOUNTER
Can offer the patient 3 pm on Monday November 7th or 3:45 pm on Tuesday November 8th.    Michael Arzate MD  General Internal Medicine  [facility]  11/3/2022, 9:58 AM

## 2022-11-21 NOTE — TELEPHONE ENCOUNTER
[facility]     OT Extension     1 x a week for 2 more weeks   to address adaptive equipment needs and ADL independence     In the event to leave a VM, leave first name, last name initial and title     Fabian Nice   [phone]

## 2022-11-21 NOTE — TELEPHONE ENCOUNTER
The Home Care/Assisted Living/Nursing Facility is calling regarding an established patient.  Has the patient seen Home Care in the past or is currently residing in Assisted Living or Nursing Facility? Yes.     Tex calling from [facility] requesting the following orders that are within the Home Care, Assisted Living or Nursing Home Eval and Treatment standing order and can be signed as standing order signature required by RN.    Preferred Call Back Number: [phone]    PT/OT/Speech Therapy    Any additional Orders:  Are there any orders requested, not stated above, that are outside of the standing order and must be routed to a licensed practitioner for approval?    No    Writer has verified [employer] will send fax to have orders signed.

## 2022-12-20 NOTE — LETTER
Letter by Michael Arzate MD at      Author: Michael Arzate MD Service: -- Author Type: --    Filed:  Encounter Date: 6/28/2021 Status: (Other)       Yamilex Keller  [address]  [place] [address]      06/29[birth year]21      Dear Yamilex,      Here is the message from Dr. Arzate that we discussed on the phone today.        Her stress test look good overall.  There is no signs of worsening blood flow to her heart.     Due to the shortness of breath that can happen with the Brilinta, I would recommend a switch to clopidogrel and stopping the Brilinta.  We can then see if this helps with her breathing issue.     Please see what they think.  If they would like to follow-up on this, they could see me in 4 to 6 weeks to see how this is going.        Thank you for choosing us as your health care provider.    Sincerely,    Caro Posada   LPN - CMT/CA  [facility]  [facility] [id number] [phone]            Olumiant Pregnancy And Lactation Text: Based on animal studies, Olumiant may cause embryo-fetal harm when administered to pregnant women.  The medication should not be used in pregnancy.  Breastfeeding is not recommended during treatment.

## 2023-01-01 ENCOUNTER — LAB REQUISITION (OUTPATIENT)
Dept: LAB | Facility: CLINIC | Age: 88
End: 2023-01-01
Payer: OTHER MISCELLANEOUS

## 2023-01-01 ENCOUNTER — LAB REQUISITION (OUTPATIENT)
Dept: LAB | Facility: CLINIC | Age: 88
End: 2023-01-01
Payer: MEDICARE

## 2023-01-01 ENCOUNTER — DOCUMENTATION ONLY (OUTPATIENT)
Dept: OTHER | Facility: CLINIC | Age: 88
End: 2023-01-01
Payer: MEDICARE

## 2023-01-01 DIAGNOSIS — G30.9 ALZHEIMER'S DISEASE, UNSPECIFIED (CODE) (H): ICD-10-CM

## 2023-01-01 DIAGNOSIS — R30.0 DYSURIA: ICD-10-CM

## 2023-01-01 DIAGNOSIS — R35.0 FREQUENCY OF MICTURITION: ICD-10-CM

## 2023-01-01 DIAGNOSIS — I21.02 ACUTE ST ELEVATION MYOCARDIAL INFARCTION (STEMI) INVOLVING LEFT ANTERIOR DESCENDING CORONARY ARTERY (H): ICD-10-CM

## 2023-01-01 LAB
ALBUMIN SERPL BCG-MCNC: 3.6 G/DL (ref 3.5–5.2)
ALBUMIN UR-MCNC: 20 MG/DL
ALBUMIN UR-MCNC: 300 MG/DL
ALBUMIN UR-MCNC: NEGATIVE MG/DL
ALP SERPL-CCNC: 27 U/L (ref 35–104)
ALT SERPL W P-5'-P-CCNC: 6 U/L (ref 10–35)
ANION GAP SERPL CALCULATED.3IONS-SCNC: 14 MMOL/L (ref 7–15)
APPEARANCE UR: ABNORMAL
APPEARANCE UR: ABNORMAL
APPEARANCE UR: CLEAR
AST SERPL W P-5'-P-CCNC: 27 U/L (ref 10–35)
BACTERIA #/AREA URNS HPF: ABNORMAL /HPF
BACTERIA #/AREA URNS HPF: ABNORMAL /HPF
BACTERIA UR CULT: ABNORMAL
BACTERIA UR CULT: NORMAL
BACTERIA UR CULT: NORMAL
BASOPHILS # BLD AUTO: 0.1 10E3/UL (ref 0–0.2)
BASOPHILS NFR BLD AUTO: 1 %
BILIRUB SERPL-MCNC: 0.4 MG/DL
BILIRUB UR QL STRIP: NEGATIVE
BUN SERPL-MCNC: 22.8 MG/DL (ref 8–23)
CALCIUM SERPL-MCNC: 9.7 MG/DL (ref 8.2–9.6)
CHLORIDE SERPL-SCNC: 104 MMOL/L (ref 98–107)
COLOR UR AUTO: ABNORMAL
CREAT SERPL-MCNC: 0.8 MG/DL (ref 0.51–0.95)
DEPRECATED HCO3 PLAS-SCNC: 24 MMOL/L (ref 22–29)
EOSINOPHIL # BLD AUTO: 0.2 10E3/UL (ref 0–0.7)
EOSINOPHIL NFR BLD AUTO: 3 %
ERYTHROCYTE [DISTWIDTH] IN BLOOD BY AUTOMATED COUNT: 13.3 % (ref 10–15)
GFR SERPL CREATININE-BSD FRML MDRD: 68 ML/MIN/1.73M2
GLUCOSE SERPL-MCNC: 91 MG/DL (ref 70–99)
GLUCOSE UR STRIP-MCNC: NEGATIVE MG/DL
HCT VFR BLD AUTO: 42.5 % (ref 35–47)
HGB BLD-MCNC: 13.6 G/DL (ref 11.7–15.7)
HGB UR QL STRIP: ABNORMAL
HGB UR QL STRIP: NEGATIVE
HGB UR QL STRIP: NEGATIVE
IMM GRANULOCYTES # BLD: 0 10E3/UL
IMM GRANULOCYTES NFR BLD: 0 %
KETONES UR STRIP-MCNC: NEGATIVE MG/DL
LEUKOCYTE ESTERASE UR QL STRIP: ABNORMAL
LYMPHOCYTES # BLD AUTO: 1.4 10E3/UL (ref 0.8–5.3)
LYMPHOCYTES NFR BLD AUTO: 17 %
MCH RBC QN AUTO: 30.6 PG (ref 26.5–33)
MCHC RBC AUTO-ENTMCNC: 32 G/DL (ref 31.5–36.5)
MCV RBC AUTO: 96 FL (ref 78–100)
MONOCYTES # BLD AUTO: 1 10E3/UL (ref 0–1.3)
MONOCYTES NFR BLD AUTO: 12 %
MUCOUS THREADS #/AREA URNS LPF: PRESENT /LPF
MUCOUS THREADS #/AREA URNS LPF: PRESENT /LPF
NEUTROPHILS # BLD AUTO: 5.5 10E3/UL (ref 1.6–8.3)
NEUTROPHILS NFR BLD AUTO: 67 %
NITRATE UR QL: NEGATIVE
NITRATE UR QL: NEGATIVE
NITRATE UR QL: POSITIVE
NRBC # BLD AUTO: 0 10E3/UL
NRBC BLD AUTO-RTO: 0 /100
PH UR STRIP: 6.5 [PH] (ref 5–7)
PH UR STRIP: 7 [PH] (ref 5–7)
PH UR STRIP: 8 [PH] (ref 5–7)
PLATELET # BLD AUTO: 254 10E3/UL (ref 150–450)
POTASSIUM SERPL-SCNC: 3.9 MMOL/L (ref 3.4–5.3)
PROT SERPL-MCNC: 6.3 G/DL (ref 6.4–8.3)
RBC # BLD AUTO: 4.44 10E6/UL (ref 3.8–5.2)
RBC URINE: 103 /HPF
RBC URINE: 2 /HPF
RBC URINE: 8 /HPF
SODIUM SERPL-SCNC: 142 MMOL/L (ref 136–145)
SP GR UR STRIP: 1.01 (ref 1–1.03)
SP GR UR STRIP: 1.02 (ref 1–1.03)
SP GR UR STRIP: 1.02 (ref 1–1.03)
SQUAMOUS EPITHELIAL: 1 /HPF
SQUAMOUS EPITHELIAL: 1 /HPF
SQUAMOUS EPITHELIAL: 3 /HPF
TRANSITIONAL EPI: <1 /HPF
TRANSITIONAL EPI: <1 /HPF
TSH SERPL DL<=0.005 MIU/L-ACNC: 1.53 UIU/ML (ref 0.3–4.2)
UROBILINOGEN UR STRIP-MCNC: NORMAL MG/DL
VIT B12 SERPL-MCNC: 306 PG/ML (ref 232–1245)
WBC # BLD AUTO: 8.2 10E3/UL (ref 4–11)
WBC CLUMPS #/AREA URNS HPF: PRESENT /HPF
WBC URINE: 11 /HPF
WBC URINE: 25 /HPF
WBC URINE: 48 /HPF

## 2023-01-01 PROCEDURE — 87086 URINE CULTURE/COLONY COUNT: CPT | Mod: ORL | Performed by: INTERNAL MEDICINE

## 2023-01-01 PROCEDURE — 87086 URINE CULTURE/COLONY COUNT: CPT | Mod: ORL | Performed by: NURSE PRACTITIONER

## 2023-01-01 PROCEDURE — P9604 ONE-WAY ALLOW PRORATED TRIP: HCPCS | Mod: ORL | Performed by: NURSE PRACTITIONER

## 2023-01-01 PROCEDURE — 81001 URINALYSIS AUTO W/SCOPE: CPT | Mod: ORL | Performed by: NURSE PRACTITIONER

## 2023-01-01 PROCEDURE — 82607 VITAMIN B-12: CPT | Mod: ORL | Performed by: NURSE PRACTITIONER

## 2023-01-01 PROCEDURE — 85025 COMPLETE CBC W/AUTO DIFF WBC: CPT | Mod: ORL | Performed by: NURSE PRACTITIONER

## 2023-01-01 PROCEDURE — 36415 COLL VENOUS BLD VENIPUNCTURE: CPT | Mod: ORL | Performed by: NURSE PRACTITIONER

## 2023-01-01 PROCEDURE — 80053 COMPREHEN METABOLIC PANEL: CPT | Mod: ORL | Performed by: NURSE PRACTITIONER

## 2023-01-01 PROCEDURE — 84443 ASSAY THYROID STIM HORMONE: CPT | Mod: ORL | Performed by: NURSE PRACTITIONER

## 2023-01-01 PROCEDURE — 81001 URINALYSIS AUTO W/SCOPE: CPT | Mod: ORL | Performed by: INTERNAL MEDICINE

## 2023-01-01 RX ORDER — ISOSORBIDE MONONITRATE 30 MG/1
TABLET, EXTENDED RELEASE ORAL
Qty: 0.0001 TABLET | Refills: 0 | Status: SHIPPED | OUTPATIENT
Start: 2023-01-01

## 2024-01-22 NOTE — PROGRESS NOTES
[facility]      Facility:    [facility] [[id number]]  Code Status: FULL CODE      Chief Complaint/Reason for Visit:  Chief Complaint   Patient presents with     H & P     Acute T7 compression fracture, esophageal distention incidentally found on CT scan without symptoms, coronary artery disease with left heart catheterization on 7/18/2017 for acute ST segment myocardial infarction, acute hypokalemia, hypercalcemia, UTI on 7 4,.       HPI:   Yamilex is a 89 y.o. female who recently admitted to the hospital on 7/8/2018.  She does have hypertension hyperlipidemia as well as giant cell arteritis in which she is continues to be on prednisone.  She does have coronary disease with status post stent placement last year and she was brought to the hospital with rib cage pain.  It is bilateral and she also has upper back pain.  She had been in the emergency room 3 times in the last week.  She was then admitted to the hospital she was found to have a T7 acute compression fracture.  This was found by MRI and confirmed and neurosurgery consulted with instruction for TLSO brace.  She underwent physical and occupational therapy and she was on a light cane patch scheduled Tylenol and tramadol as needed.  She also had some Flexeril.  She was put on senna Colace secondary to prevention of constipation given her medications and she did have incidental finding of esophageal distention which she has no symptoms.  GI was consulted and EGD and found she had mild gastritis likely related to NSAID use.  She was placed on Pepcid for short course.  She does have coronary disease left heart catheterization and on 7/18/2017 she did have acute ST segment elevation myocardial infarction.  She was stented at this time at that time.  She remains on aspirin and Brilinta at this time and she did have low potassium and high calcium but IV fluids to correct these and sodium was normal.  Potassium was also  replaced and magnesium was within normal limits.  She was treated appropriately and transferred here to the TCU at [facility] in stable condition.    Patient claims her pain is not under adequate control at this time.  The brace is certain her at this time however she did tell me she did have a fall one before this and this could have been likely traumatic at this time.  She is moving her bowels but very little and her appetite is poor at this time.  She continues to be on the bowel meds however.    Past Medical History:  Past Medical History:   Diagnosis Date     Acute ST elevation myocardial infarction (STEMI) involving left anterior descending coronary artery (H) 7/18/2017     Basal Cell Carcinoma Of The Skin      Benign Adenomatous Polyp Of The Large Intestine     Yady Escamilla: colon @ [place]  '06--recheck 5      Chronic pain of right knee 10/19/2016     Coronary artery disease      Hyperlipidemia      Osteoporosis      Pneumonia      Temporal arteritis (H)            Surgical History:  Past Surgical History:   Procedure Laterality Date     CORONARY ANGIOPLASTY WITH STENT PLACEMENT  //     ESOPHAGOGASTRODUODENOSCOPY N/A 7/9/2018    Procedure: ESOPHAGOGASTRODUODENOSCOPY (EGD);  Surgeon: Prabhu Lin MD;  Location: [facility] GI;  Service:      OR CATH PLACEMENT & NJX CORONARY ART ANGIO IMG S&I N/A 7/18/2017    Procedure: Coronary Angiogram;  Surgeon: Shelia Garcia MD;  Location: [facility] Cath Lab;  Service: Cardiology     OR CATH PLMT L HRT & ARTS W/NJX & ANGIO IMG S&I Left 7/18/2017    Procedure: Left Heart Catheterization Without Left Ventriculogram;  Surgeon: Shelia Garcia MD;  Location: [facility] Cath Lab;  Service: Cardiology     OR TEMPORAL ARTERY LIGATN OR BX Bilateral 10/7/2015    Procedure: BILATERAL TEMPORAL ARTERY BIOPSY;  Surgeon: Alfredito Jason MD;  Location: [facility] OR;  Service: General     TONSILLECTOMY         Family History:   Family History   Problem  Relation Age of Onset     Pacemaker Brother        Social History:    Social History     Social History     Marital status:      Spouse name: Arsi     Number of children: N/A     Years of education: N/A     Social History Main Topics     Smoking status: Former Smoker     Packs/day: 1.00     Years: 4.00     Types: Cigarettes     Quit date: 1/1/1953     Smokeless tobacco: Never Used      Comment: Quit 65 yrs     Alcohol use Yes      Comment: Occassionally     Drug use: No     Sexual activity: Not Currently     Partners: Male     Other Topics Concern     None     Social History Narrative    Lives with her  - cantu in [place]          Review of Systems   Constitutional:        Uncontrollable pain is the main issue at this time and bowel movements.  She denies any fevers chills vomiting diarrhea change in vision hearing taste or smell weakness one side of the chest pain tightness or shortness of breath.  She is urinating without difficulty and there is no polyphagia or polydipsia polyuria urgency frequency or burning with urination and the remainder of the review of systems is negative.       Vitals:    07/13/18 1009   BP: (!) 156/94   Pulse: 82   Resp: 12   Temp: 97.4  F (36.3  C)   SpO2: 95%       Physical Exam   Constitutional: No distress.   HENT:   Head: Normocephalic and atraumatic.   Nose: Nose normal.   Eyes: Conjunctivae are normal. Right eye exhibits no discharge. Left eye exhibits no discharge. No scleral icterus.   Neck: Neck supple. No thyromegaly present.   Cardiovascular: Normal rate and regular rhythm.    No murmur heard.  Pulmonary/Chest: Effort normal and breath sounds normal. No respiratory distress. She has no wheezes.   Abdominal: Bowel sounds are normal.   Musculoskeletal: She exhibits tenderness. She exhibits no edema.   Tenderness is around the rib cage bilateral.   Neurological: She is alert. No cranial nerve deficit. She exhibits normal muscle tone.   Skin: Skin is warm and dry.  She is not diaphoretic.   Psychiatric: She has a normal mood and affect. Her behavior is normal.       Medication List:  Current Outpatient Prescriptions   Medication Sig     acetaminophen (TYLENOL) 500 MG tablet Take 2 tablets (1,000 mg total) by mouth 3 (three) times a day. For pain from T7 fx     alendronate (FOSAMAX) 70 MG tablet One tablet once weekly, in the morning on an empty stomach with a full glass of water 30 minutes before food     aspirin 81 MG EC tablet Take 1 tablet (81 mg total) by mouth at bedtime. For CAD     atorvastatin (LIPITOR) 80 MG tablet Take 1 tablet (80 mg total) by mouth at bedtime. for cad and lipids     cyclobenzaprine (FLEXERIL) 5 MG tablet Take 1 tablet (5 mg total) by mouth 2 (two) times a day as needed for muscle spasms.     doxycycline (VIBRA-TABS) 100 MG tablet Take 1 tablet (100 mg total) by mouth 2 (two) times a day for 2 days. For uti     famotidine (PEPCID) 20 MG tablet Take 1 tablet (20 mg total) by mouth 2 (two) times a day for 5 days. For mild gastritis     furosemide (LASIX) 20 MG tablet Take 1 tablet (20 mg total) by mouth daily. For edema     lidocaine 4 % patch Place 1 patch on the skin daily. Remove and discard patch with 12 hours or as directed by MD.for back pain from t7 fx     metoprolol succinate (TOPROL-XL) 25 MG Take 0.5 tablets (12.5 mg total) by mouth daily. For cad, htn     nitroglycerin (NITROSTAT) 0.4 MG SL tablet Place 1 tablet (0.4 mg total) under the tongue every 5 (five) minutes as needed for chest pain.     OMEGA-3/DHA/EPA/FISH OIL (FISH OIL-OMEGA-3 FATTY ACIDS) 300-1,000 mg capsule Take 1 g by mouth at bedtime.      polyethylene glycol (MIRALAX) 17 gram packet Take 1 packet (17 g total) by mouth daily. Hold if loose stools  This is for constipation     predniSONE (DELTASONE) 1 MG tablet Take 2 tablets (2 mg total) by mouth daily. For GCA     senna-docusate (PERICOLACE) 8.6-50 mg tablet Take 1 tablet by mouth 2 (two) times a day. For constipation      ticagrelor (BRILINTA) 90 mg Tab Take 1 tablet (90 mg total) by mouth 2 (two) times a day. For CAD     traMADol (ULTRAM) 50 mg tablet Take 0.5 tablets (25 mg total) by mouth every 6 (six) hours as needed for pain.       Labs: The labs are as follows; potassium was low but then did finish off at 3.8 and sodium was 143.  Calcium was 10.7 creatinine 0.89 with a BUN of 26 glucose of 100 CO2 of 24, GFR was greater than 60.  Alk phosphatase was 27, AST and ALT were normal as well as lipase was within normal limits.  Troponins were less than 0.01 white count was 7.9, hemoglobin was 14.7 platelets are 237,000.  CRP was 1.9, magnesium was 2.1.  Potassium was as low as 3.3.    Assessment:    ICD-10-CM    1. Traumatic compression fracture of T7 thoracic vertebra (H) S22.060A    2. Pain management R52    3. Coronary artery disease I25.10    4. Hypokalemia E87.6    5. Hypercalcemia E83.52    6. Urinary tract infection N39.0        Plan:   Plan at this time will increase tramadol 50 mg every 6 hours as needed for pain moderate to severe.  This would be 6 to be 4-10 out of 10.  Pain assessments will be done every 6 hours by staff and monitor bowel movements and notify if no bowel movements in 24 hours.  Check calcium and potassium Monday secondary to hospitalization she had low potassium and low and elevated calcium.  Her coronary disease seems relatively stable at this time and there is no signs of any urinary tract infection.  I will continue to monitor above medical problems and no other changes to care plan at this time.        Electronically signed by: Kyrie Vallejo DO   What Type Of Note Output Would You Prefer (Optional)?: Bullet Format Have Your Spot(S) Been Treated In The Past?: has not been treated Hpi Title: Evaluation of Skin Lesions